# Patient Record
Sex: MALE | Race: WHITE | NOT HISPANIC OR LATINO | Employment: OTHER | URBAN - NONMETROPOLITAN AREA
[De-identification: names, ages, dates, MRNs, and addresses within clinical notes are randomized per-mention and may not be internally consistent; named-entity substitution may affect disease eponyms.]

---

## 2017-01-04 RX ORDER — DOXYCYCLINE 100 MG/1
100 CAPSULE ORAL 2 TIMES DAILY
Qty: 20 CAPSULE | Refills: 0 | Status: SHIPPED | OUTPATIENT
Start: 2017-01-04 | End: 2017-02-06

## 2017-02-06 ENCOUNTER — OFFICE VISIT (OUTPATIENT)
Dept: FAMILY MEDICINE CLINIC | Facility: CLINIC | Age: 52
End: 2017-02-06

## 2017-02-06 VITALS
OXYGEN SATURATION: 98 % | HEART RATE: 94 BPM | BODY MASS INDEX: 44.1 KG/M2 | TEMPERATURE: 98.2 F | HEIGHT: 71 IN | SYSTOLIC BLOOD PRESSURE: 180 MMHG | WEIGHT: 315 LBS | DIASTOLIC BLOOD PRESSURE: 90 MMHG

## 2017-02-06 DIAGNOSIS — Z99.89 OSA ON CPAP: ICD-10-CM

## 2017-02-06 DIAGNOSIS — E11.9 TYPE 2 DIABETES MELLITUS WITHOUT COMPLICATION, WITHOUT LONG-TERM CURRENT USE OF INSULIN (HCC): Primary | ICD-10-CM

## 2017-02-06 DIAGNOSIS — I10 ESSENTIAL HYPERTENSION: ICD-10-CM

## 2017-02-06 DIAGNOSIS — G47.33 OSA ON CPAP: ICD-10-CM

## 2017-02-06 DIAGNOSIS — E03.9 ACQUIRED HYPOTHYROIDISM: ICD-10-CM

## 2017-02-06 DIAGNOSIS — E78.00 HYPERCHOLESTEROLEMIA: ICD-10-CM

## 2017-02-06 PROCEDURE — 99214 OFFICE O/P EST MOD 30 MIN: CPT | Performed by: INTERNAL MEDICINE

## 2017-02-06 RX ORDER — HYDROCHLOROTHIAZIDE 12.5 MG/1
12.5 CAPSULE, GELATIN COATED ORAL DAILY
Qty: 30 CAPSULE | Refills: 11 | Status: SHIPPED | OUTPATIENT
Start: 2017-02-06 | End: 2017-11-14

## 2017-02-06 NOTE — PROGRESS NOTES
"Subjective   Patient ID: Cal Oliver Jr. is a 51 y.o. male Pt is here for management of multiple medical problems.  History of Present Illness  Pt is here for management of multiple medical problems.    BP high and just had an energy drink;/     Pt is under a lot of stress.         The following portions of the patient's history were reviewed and updated as appropriate: allergies, current medications, past family history, past medical history, past social history, past surgical history and problem list.      Review of Systems   Constitutional: Negative for fatigue.   All other systems reviewed and are negative.      Objective     Visit Vitals   • /90 (BP Location: Left arm, Patient Position: Sitting)   • Pulse 94   • Temp 98.2 °F (36.8 °C)   • Ht 71\" (180.3 cm)   • Wt (!) 370 lb (168 kg)   • SpO2 98%   • BMI 51.6 kg/m2     Physical Exam    Cal had a diabetic foot exam performed today.   During the foot exam he had a monofilament test performed.    General Appearance:    Alert, cooperative, no distress, appears stated age   Head:    Normocephalic, without obvious abnormality, atraumatic   Eyes:    PERRL, conjunctiva/corneas clear, EOM's intact           Ears:    Normal TM's and external ear canals, both ears   Nose:   Nares normal, septum midline, mucosa normal, no drainage    or sinus tenderness   Throat:   Lips, mucosa, and tongue normal; teeth and gums normal   Neck:   Supple, symmetrical, trachea midline, no adenopathy;        thyroid:  No enlargement/tenderness/nodules; no carotid    bruit or JVD   Back:     Symmetric, no curvature, ROM normal, no CVA tenderness   Lungs:     Slight prolonged expiatory bs.   to auscultation bilaterally, respirations unlabored   Chest wall:    No tenderness or deformity   Heart:    Regular rate and rhythm, S1 and S2 normal, no murmur, rub   or gallop   Abdomen:     Soft, non-tender, bowel sounds active all four quadrants,     no masses, no organomegaly         "   Extremities:   Extremities normal, atraumatic, no cyanosis, +1 edema   Pulses:   2+ and symmetric all extremities   Skin:   Skin color, texture, turgor normal, no rashes or lesions   Lymph nodes:   Cervical, supraclavicular, and axillary nodes normal   Neurologic:   CNII-XII intact. Normal strength, sensation and reflexes       throughout       Assessment/Plan     No labs done yet. Adjust bp meds. Pt to stop soda and improve diet and activity.         Cal was seen today for follow-up.    Diagnoses and all orders for this visit:    Type 2 diabetes mellitus without complication, without long-term current use of insulin    Essential hypertension    Hypercholesterolemia    SHUKRI on CPAP    Acquired hypothyroidism    Other orders  -     hydrochlorothiazide (MICROZIDE) 12.5 MG capsule; Take 1 capsule by mouth Daily.      Return in about 4 weeks (around 3/6/2017).                   There are no Patient Instructions on file for this visit.

## 2017-04-28 RX ORDER — LOSARTAN POTASSIUM 100 MG/1
TABLET ORAL
Qty: 90 TABLET | Refills: 3 | Status: SHIPPED | OUTPATIENT
Start: 2017-04-28 | End: 2017-11-14

## 2017-05-15 RX ORDER — POTASSIUM CHLORIDE 20 MEQ/1
TABLET, EXTENDED RELEASE ORAL
Qty: 90 TABLET | Refills: 3 | Status: SHIPPED | OUTPATIENT
Start: 2017-05-15 | End: 2018-08-06 | Stop reason: SDUPTHER

## 2017-05-16 RX ORDER — OMEPRAZOLE 20 MG/1
20 CAPSULE, DELAYED RELEASE ORAL DAILY
Qty: 30 CAPSULE | Refills: 0 | Status: SHIPPED | OUTPATIENT
Start: 2017-05-16 | End: 2017-06-26 | Stop reason: SDUPTHER

## 2017-06-05 RX ORDER — ALBUTEROL SULFATE 90 UG/1
AEROSOL, METERED RESPIRATORY (INHALATION)
Qty: 54 INHALER | Refills: 0 | Status: SHIPPED | OUTPATIENT
Start: 2017-06-05 | End: 2017-08-20 | Stop reason: SDUPTHER

## 2017-06-14 RX ORDER — CYCLOBENZAPRINE HCL 10 MG
TABLET ORAL
Qty: 30 TABLET | Refills: 11 | Status: SHIPPED | OUTPATIENT
Start: 2017-06-14 | End: 2018-03-19 | Stop reason: SDUPTHER

## 2017-06-26 RX ORDER — ROPINIROLE 2 MG/1
TABLET, FILM COATED ORAL
Qty: 30 TABLET | Refills: 11 | Status: SHIPPED | OUTPATIENT
Start: 2017-06-26 | End: 2018-06-13 | Stop reason: SDUPTHER

## 2017-06-26 RX ORDER — OMEPRAZOLE 20 MG/1
CAPSULE, DELAYED RELEASE ORAL
Qty: 30 CAPSULE | Refills: 0 | Status: SHIPPED | OUTPATIENT
Start: 2017-06-26 | End: 2017-07-31 | Stop reason: SDUPTHER

## 2017-06-26 RX ORDER — TOPIRAMATE 50 MG/1
TABLET, FILM COATED ORAL
Qty: 180 TABLET | Refills: 3 | Status: SHIPPED | OUTPATIENT
Start: 2017-06-26 | End: 2018-06-27 | Stop reason: SDUPTHER

## 2017-06-26 NOTE — TELEPHONE ENCOUNTER
Patient is requesting a refill on Ropinirole and topiramate.  He has no been seen since February and was suppose to follow up in 4 weeks.

## 2017-07-29 RX ORDER — OMEPRAZOLE 20 MG/1
CAPSULE, DELAYED RELEASE ORAL
Qty: 30 CAPSULE | Refills: 0 | Status: CANCELLED | OUTPATIENT
Start: 2017-07-29

## 2017-07-31 RX ORDER — OMEPRAZOLE 20 MG/1
20 CAPSULE, DELAYED RELEASE ORAL DAILY
Qty: 30 CAPSULE | Refills: 0 | Status: SHIPPED | OUTPATIENT
Start: 2017-07-31 | End: 2017-09-12 | Stop reason: SDUPTHER

## 2017-08-21 RX ORDER — ALBUTEROL SULFATE 90 UG/1
AEROSOL, METERED RESPIRATORY (INHALATION)
Qty: 54 INHALER | Refills: 0 | Status: SHIPPED | OUTPATIENT
Start: 2017-08-21 | End: 2017-09-21 | Stop reason: SDUPTHER

## 2017-09-12 NOTE — TELEPHONE ENCOUNTER
Cal Campos has not been seen since 02/2017, he was a no show in 03/2017, patient does not have any future appointments scheduled.

## 2017-09-13 RX ORDER — OMEPRAZOLE 20 MG/1
CAPSULE, DELAYED RELEASE ORAL
Qty: 30 CAPSULE | Refills: 0 | Status: SHIPPED | OUTPATIENT
Start: 2017-09-13 | End: 2017-11-01 | Stop reason: SDUPTHER

## 2017-09-22 RX ORDER — ALBUTEROL SULFATE 90 UG/1
AEROSOL, METERED RESPIRATORY (INHALATION)
Qty: 54 INHALER | Refills: 0 | Status: SHIPPED | OUTPATIENT
Start: 2017-09-22 | End: 2017-11-14 | Stop reason: SDUPTHER

## 2017-11-01 RX ORDER — OMEPRAZOLE 20 MG/1
CAPSULE, DELAYED RELEASE ORAL
Qty: 30 CAPSULE | Refills: 0 | Status: SHIPPED | OUTPATIENT
Start: 2017-11-01 | End: 2017-11-22 | Stop reason: SDUPTHER

## 2017-11-01 NOTE — TELEPHONE ENCOUNTER
I contacted the patient and told him that only a 30 day supply of medication would be called in and that he had to have a follow up appointment with labs.  He did agree to schedule an appointment.

## 2017-11-11 LAB
ALBUMIN SERPL-MCNC: 4 G/DL (ref 3.5–5)
ALBUMIN/GLOB SERPL: 1.3 G/DL (ref 1–2)
ALP SERPL-CCNC: 132 U/L (ref 38–126)
ALT SERPL-CCNC: 37 U/L (ref 13–69)
AST SERPL-CCNC: 18 U/L (ref 15–46)
BASOPHILS # BLD AUTO: 0.05 10*3/MM3 (ref 0–0.2)
BASOPHILS NFR BLD AUTO: 0.6 % (ref 0–2.5)
BILIRUB SERPL-MCNC: 0.2 MG/DL (ref 0.2–1.3)
BUN SERPL-MCNC: 12 MG/DL (ref 7–20)
BUN/CREAT SERPL: 13.3 (ref 6.3–21.9)
CALCIUM SERPL-MCNC: 9.8 MG/DL (ref 8.4–10.2)
CHLORIDE SERPL-SCNC: 105 MMOL/L (ref 98–107)
CHOLEST SERPL-MCNC: 186 MG/DL (ref 0–199)
CO2 SERPL-SCNC: 26 MMOL/L (ref 26–30)
CREAT SERPL-MCNC: 0.9 MG/DL (ref 0.6–1.3)
EOSINOPHIL # BLD AUTO: 0.19 10*3/MM3 (ref 0–0.7)
EOSINOPHIL NFR BLD AUTO: 2.2 % (ref 0–7)
ERYTHROCYTE [DISTWIDTH] IN BLOOD BY AUTOMATED COUNT: 13.6 % (ref 11.5–14.5)
GFR SERPLBLD CREATININE-BSD FMLA CKD-EPI: 108 ML/MIN/1.73
GFR SERPLBLD CREATININE-BSD FMLA CKD-EPI: 89 ML/MIN/1.73
GLOBULIN SER CALC-MCNC: 3 GM/DL
GLUCOSE SERPL-MCNC: 129 MG/DL (ref 74–98)
HBA1C MFR BLD: 6.3 %
HCT VFR BLD AUTO: 48 % (ref 42–52)
HDLC SERPL-MCNC: 37 MG/DL (ref 40–60)
HGB BLD-MCNC: 15.7 G/DL (ref 14–18)
IMM GRANULOCYTES # BLD: 0.03 10*3/MM3 (ref 0–0.06)
IMM GRANULOCYTES NFR BLD: 0.3 % (ref 0–0.6)
LDLC SERPL CALC-MCNC: 123 MG/DL (ref 0–99)
LYMPHOCYTES # BLD AUTO: 2.11 10*3/MM3 (ref 0.6–3.4)
LYMPHOCYTES NFR BLD AUTO: 24.6 % (ref 10–50)
MCH RBC QN AUTO: 29.5 PG (ref 27–31)
MCHC RBC AUTO-ENTMCNC: 32.7 G/DL (ref 30–37)
MCV RBC AUTO: 90.1 FL (ref 80–94)
MICROALBUMIN UR-MCNC: 62.3 UG/ML
MONOCYTES # BLD AUTO: 0.6 10*3/MM3 (ref 0–0.9)
MONOCYTES NFR BLD AUTO: 7 % (ref 0–12)
NEUTROPHILS # BLD AUTO: 5.61 10*3/MM3 (ref 2–6.9)
NEUTROPHILS NFR BLD AUTO: 65.3 % (ref 37–80)
NRBC BLD AUTO-RTO: 0 /100 WBC (ref 0–0)
PLATELET # BLD AUTO: 324 10*3/MM3 (ref 130–400)
POTASSIUM SERPL-SCNC: 3.9 MMOL/L (ref 3.5–5.1)
PROT SERPL-MCNC: 7 G/DL (ref 6.3–8.2)
RBC # BLD AUTO: 5.33 10*6/MM3 (ref 4.7–6.1)
SODIUM SERPL-SCNC: 145 MMOL/L (ref 137–145)
T4 FREE SERPL-MCNC: 0.95 NG/DL (ref 0.78–2.19)
TRIGL SERPL-MCNC: 131 MG/DL
TSH SERPL DL<=0.005 MIU/L-ACNC: 1.75 MIU/ML (ref 0.47–4.68)
VIT B12 SERPL-MCNC: 441 PG/ML (ref 239–931)
VLDLC SERPL CALC-MCNC: 26.2 MG/DL
WBC # BLD AUTO: 8.59 10*3/MM3 (ref 4.8–10.8)

## 2017-11-13 RX ORDER — ALBUTEROL SULFATE 90 UG/1
AEROSOL, METERED RESPIRATORY (INHALATION)
Qty: 54 INHALER | Refills: 0 | Status: CANCELLED | OUTPATIENT
Start: 2017-11-13

## 2017-11-14 ENCOUNTER — OFFICE VISIT (OUTPATIENT)
Dept: FAMILY MEDICINE CLINIC | Facility: CLINIC | Age: 52
End: 2017-11-14

## 2017-11-14 VITALS
WEIGHT: 315 LBS | HEIGHT: 71 IN | OXYGEN SATURATION: 98 % | RESPIRATION RATE: 16 BRPM | BODY MASS INDEX: 44.1 KG/M2 | HEART RATE: 92 BPM | TEMPERATURE: 98.5 F | SYSTOLIC BLOOD PRESSURE: 168 MMHG | DIASTOLIC BLOOD PRESSURE: 88 MMHG

## 2017-11-14 DIAGNOSIS — G47.33 OSA ON CPAP: ICD-10-CM

## 2017-11-14 DIAGNOSIS — E03.9 ACQUIRED HYPOTHYROIDISM: ICD-10-CM

## 2017-11-14 DIAGNOSIS — E66.01 MORBID OBESITY (HCC): ICD-10-CM

## 2017-11-14 DIAGNOSIS — E78.00 HYPERCHOLESTEROLEMIA: ICD-10-CM

## 2017-11-14 DIAGNOSIS — Z99.89 OSA ON CPAP: ICD-10-CM

## 2017-11-14 DIAGNOSIS — J44.9 MILD CHRONIC OBSTRUCTIVE PULMONARY DISEASE (HCC): ICD-10-CM

## 2017-11-14 DIAGNOSIS — E11.9 TYPE 2 DIABETES MELLITUS WITHOUT COMPLICATION, WITHOUT LONG-TERM CURRENT USE OF INSULIN (HCC): ICD-10-CM

## 2017-11-14 DIAGNOSIS — Z00.00 ROUTINE GENERAL MEDICAL EXAMINATION AT A HEALTH CARE FACILITY: ICD-10-CM

## 2017-11-14 DIAGNOSIS — I10 ESSENTIAL HYPERTENSION: Primary | ICD-10-CM

## 2017-11-14 PROCEDURE — 99214 OFFICE O/P EST MOD 30 MIN: CPT | Performed by: INTERNAL MEDICINE

## 2017-11-14 PROCEDURE — G0439 PPPS, SUBSEQ VISIT: HCPCS | Performed by: INTERNAL MEDICINE

## 2017-11-14 PROCEDURE — 96160 PT-FOCUSED HLTH RISK ASSMT: CPT | Performed by: INTERNAL MEDICINE

## 2017-11-14 RX ORDER — BUDESONIDE AND FORMOTEROL FUMARATE DIHYDRATE 160; 4.5 UG/1; UG/1
2 AEROSOL RESPIRATORY (INHALATION) 2 TIMES DAILY
Qty: 1 INHALER | Refills: 11 | Status: SHIPPED | OUTPATIENT
Start: 2017-11-14 | End: 2019-01-09 | Stop reason: SDUPTHER

## 2017-11-14 RX ORDER — LOSARTAN POTASSIUM AND HYDROCHLOROTHIAZIDE 12.5; 1 MG/1; MG/1
1 TABLET ORAL DAILY
Qty: 90 TABLET | Refills: 3 | Status: SHIPPED | OUTPATIENT
Start: 2017-11-14 | End: 2018-04-17 | Stop reason: SDUPTHER

## 2017-11-14 RX ORDER — ATORVASTATIN CALCIUM 10 MG/1
10 TABLET, FILM COATED ORAL DAILY
Qty: 90 TABLET | Refills: 3 | Status: SHIPPED | OUTPATIENT
Start: 2017-11-14 | End: 2018-03-06

## 2017-11-14 RX ORDER — ALBUTEROL SULFATE 90 UG/1
AEROSOL, METERED RESPIRATORY (INHALATION)
Qty: 54 INHALER | Refills: 2 | Status: SHIPPED | OUTPATIENT
Start: 2017-11-14 | End: 2018-08-03 | Stop reason: SDUPTHER

## 2017-11-14 NOTE — PROGRESS NOTES
Subjective     Patient ID: Cal Oliver Jr. is a 52 y.o. male. Patient is here for management of multiple medical problems.     Chief Complaint   Patient presents with   • Diabetes Mellitus     9 month follow-up   • medication refills     patient needs refills on Flexeril, Omeprazole, Amlodipine, Metformin and Ventolin inhaler sent to [facility]     Diabetes Mellitus   This is a chronic problem. The current episode started more than 1 year ago. The problem occurs constantly. The problem has been unchanged. Pertinent negatives include no chest pain, fatigue or vertigo. Nothing aggravates the symptoms. He has tried nothing for the symptoms.   Heartburn   He reports no chest pain. This is a chronic problem. The current episode started more than 1 year ago. The problem has been unchanged. Nothing aggravates the symptoms. Pertinent negatives include no fatigue. He has tried a PPI for the symptoms.        Pt under a lot of stress.     Out of Symbicort. On ventolin. Pt is around smokers.        The following portions of the patient's history were reviewed and updated as appropriate: allergies, current medications, past family history, past medical history, past social history, past surgical history and problem list.    Review of Systems   Constitutional: Negative for fatigue.   Cardiovascular: Negative for chest pain.   Neurological: Negative for vertigo.       Current Outpatient Prescriptions:   •  amLODIPine (NORVASC) 5 MG tablet, Take 1 tablet by mouth Daily., Disp: 90 tablet, Rfl: 3  •  colestipol (COLESTID) 1 G tablet, Take 1 tablet by mouth 2 (two) times a day. Adjust dose to achieve 1-3 BM's daily., Disp: 60 tablet, Rfl: 5  •  cyclobenzaprine (FLEXERIL) 10 MG tablet, take 1 tablet by mouth twice a day, Disp: 30 tablet, Rfl: 11  •  HYDROcodone-acetaminophen (NORCO)  MG per tablet, Take  by mouth., Disp: , Rfl:   •  metFORMIN (GLUCOPHAGE) 500 MG tablet, take 1 tablet by mouth twice a day, Disp: 60 tablet,  "Rfl: 0  •  omeprazole (priLOSEC) 20 MG capsule, take 1 capsule by mouth once daily, Disp: 30 capsule, Rfl: 0  •  potassium chloride (K-DUR,KLOR-CON) 20 MEQ CR tablet, take 1 tablet by mouth once daily, Disp: 90 tablet, Rfl: 3  •  rOPINIRole (REQUIP) 2 MG tablet, take 1 tablet by mouth at bedtime, Disp: 30 tablet, Rfl: 11  •  topiramate (TOPAMAX) 50 MG tablet, take 1 tablet by mouth twice a day, Disp: 180 tablet, Rfl: 3  •  VENTOLIN  (90 Base) MCG/ACT inhaler, inhale 1 to 2 puffs by mouth every 4 to 6 hours if needed, Disp: 54 inhaler, Rfl: 0  •  budesonide-formoterol (SYMBICORT) 160-4.5 MCG/ACT inhaler, Inhale 2 puffs 2 (Two) Times a Day., Disp: 1 inhaler, Rfl: 11  •  losartan-hydrochlorothiazide (HYZAAR) 100-12.5 MG per tablet, Take 1 tablet by mouth Daily., Disp: 90 tablet, Rfl: 3  •  ondansetron (ZOFRAN) 4 MG tablet, Take 1 tablet by mouth every 4 (four) hours as needed., Disp: , Rfl:     Objective      Blood pressure 168/88, pulse 92, temperature 98.5 °F (36.9 °C), temperature source Temporal Artery , resp. rate 16, height 71\" (180.3 cm), weight (!) 365 lb (166 kg), SpO2 98 %.    Physical Exam     General Appearance:    Alert, cooperative, no distress, appears stated age   Head:    Normocephalic, without obvious abnormality, atraumatic   Eyes:    PERRL, conjunctiva/corneas clear, EOM's intact   Ears:    Normal TM's and external ear canals, both ears   Nose:   Nares normal, septum midline, mucosa normal, no drainage   or sinus tenderness   Throat:   Lips, mucosa, and tongue normal; teeth and gums normal   Neck:   Supple, symmetrical, trachea midline, no adenopathy;        thyroid:  No enlargement/tenderness/nodules; no carotid    bruit or JVD   Back:     Symmetric, no curvature, ROM normal, no CVA tenderness   Lungs:     Clear to auscultation bilaterally, respirations unlabored   Chest wall:    No tenderness or deformity   Heart:    Regular rate and rhythm, S1 and S2 normal, no murmur,        rub or gallop "   Abdomen:     Soft, non-tender, bowel sounds active all four quadrants,     no masses, no organomegaly   Extremities:   Extremities normal, atraumatic, no cyanosis or edema   Pulses:   2+ and symmetric all extremities   Skin:   Skin color, texture, turgor normal, no rashes or lesions   Lymph nodes:   Cervical, supraclavicular, and axillary nodes normal   Neurologic:   CNII-XII intact. Normal strength, sensation and reflexes       throughout      Results for orders placed or performed in visit on 11/10/17   Comprehensive Metabolic Panel   Result Value Ref Range    Glucose 129 (H) 74 - 98 mg/dL    BUN 12 7 - 20 mg/dL    Creatinine 0.90 0.60 - 1.30 mg/dL    eGFR Non African Am 89 >60 mL/min/1.73    eGFR African Am 108 >60 mL/min/1.73    BUN/Creatinine Ratio 13.3 6.3 - 21.9    Sodium 145 137 - 145 mmol/L    Potassium 3.9 3.5 - 5.1 mmol/L    Chloride 105 98 - 107 mmol/L    Total CO2 26.0 26.0 - 30.0 mmol/L    Calcium 9.8 8.4 - 10.2 mg/dL    Total Protein 7.0 6.3 - 8.2 g/dL    Albumin 4.00 3.50 - 5.00 g/dL    Globulin 3.0 gm/dL    A/G Ratio 1.3 1.0 - 2.0 g/dL    Total Bilirubin 0.2 0.2 - 1.3 mg/dL    Alkaline Phosphatase 132 (H) 38 - 126 U/L    AST (SGOT) 18 15 - 46 U/L    ALT (SGPT) 37 13 - 69 U/L   Lipid Panel   Result Value Ref Range    Total Cholesterol 186 0 - 199 mg/dL    Triglycerides 131 <150 mg/dL    HDL Cholesterol 37 (L) 40 - 60 mg/dL    VLDL Cholesterol 26.2 mg/dL    LDL Cholesterol  123 (H) 0 - 99 mg/dL   Hemoglobin A1c   Result Value Ref Range    Hemoglobin A1C 6.30 %   T4, Free   Result Value Ref Range    Free T4 0.95 0.78 - 2.19 ng/dL   TSH   Result Value Ref Range    TSH 1.750 0.470 - 4.680 mIU/mL   MicroAlbumin, Urine, Random   Result Value Ref Range    Microalbumin, Urine 62.3 Not Estab. ug/mL   Vitamin B12   Result Value Ref Range    Vitamin B-12 441 239 - 931 pg/mL   CBC & Differential   Result Value Ref Range    WBC 8.59 4.80 - 10.80 10*3/mm3    RBC 5.33 4.70 - 6.10 10*6/mm3    Hemoglobin 15.7 14.0  - 18.0 g/dL    Hematocrit 48.0 42.0 - 52.0 %    MCV 90.1 80.0 - 94.0 fL    MCH 29.5 27.0 - 31.0 pg    MCHC 32.7 30.0 - 37.0 g/dL    RDW 13.6 11.5 - 14.5 %    Platelets 324 130 - 400 10*3/mm3    Neutrophil Rel % 65.3 37.0 - 80.0 %    Lymphocyte Rel % 24.6 10.0 - 50.0 %    Monocyte Rel % 7.0 0.0 - 12.0 %    Eosinophil Rel % 2.2 0.0 - 7.0 %    Basophil Rel % 0.6 0.0 - 2.5 %    Neutrophils Absolute 5.61 2.00 - 6.90 10*3/mm3    Lymphocytes Absolute 2.11 0.60 - 3.40 10*3/mm3    Monocytes Absolute 0.60 0.00 - 0.90 10*3/mm3    Eosinophils Absolute 0.19 0.00 - 0.70 10*3/mm3    Basophils Absolute 0.05 0.00 - 0.20 10*3/mm3    Immature Granulocyte Rel % 0.3 0.0 - 0.6 %    Immature Grans Absolute 0.03 0.00 - 0.06 10*3/mm3    nRBC 0.0 0.0 - 0.0 /100 WBC         Assessment/Plan   Pt not on hctz.    Will restart and put in combo.    Wt loss going well      Cal was seen today for diabetes mellitus and medication refills.    Diagnoses and all orders for this visit:    Essential hypertension  -     losartan-hydrochlorothiazide (HYZAAR) 100-12.5 MG per tablet; Take 1 tablet by mouth Daily.    Hypercholesterolemia    SHUKRI on CPAP  -     Ambulatory Referral to Neurology    Mild chronic obstructive pulmonary disease  -     budesonide-formoterol (SYMBICORT) 160-4.5 MCG/ACT inhaler; Inhale 2 puffs 2 (Two) Times a Day.    Type 2 diabetes mellitus without complication, without long-term current use of insulin    Acquired hypothyroidism    Morbid obesity    Routine general medical examination at a health care facility  -     Ambulatory Referral to General Surgery      Return in about 3 months (around 2/14/2018).          There are no Patient Instructions on file for this visit.     Woo Betancourt MD    Assessment/Plan

## 2017-11-14 NOTE — PROGRESS NOTES
QUICK REFERENCE INFORMATION:  The ABCs of the Annual Wellness Visit    Initial Medicare Wellness Visit    HEALTH RISK ASSESSMENT    1965    Recent Hospitalizations:  No hospitalization(s) within the last year..        Current Medical Providers:  Patient Care Team:  Woo Betancourt MD as PCP - General        Smoking Status:  History   Smoking Status   • Current Some Day Smoker   • Years: 20.00   • Types: Cigarettes   Smokeless Tobacco   • Never Used       Alcohol Consumption:  History   Alcohol Use No       Depression Screen:   No flowsheet data found.    Health Habits and Functional and Cognitive Screening:  No flowsheet data found.        Does the patient have evidence of cognitive impairment? No    Asiprin use counseling: Start ASA 81 mg daily       Recent Lab Results:    Visual Acuity:  No exam data present    Age-appropriate Screening Schedule:  Refer to the list below for future screening recommendations based on patient's age, sex and/or medical conditions. Orders for these recommended tests are listed in the plan section. The patient has been provided with a written plan.    Health Maintenance   Topic Date Due   • DIABETIC EYE EXAM  06/03/2016   • COLONOSCOPY  06/14/2017   • INFLUENZA VACCINE  08/01/2017   • DIABETIC FOOT EXAM  02/06/2018   • HEMOGLOBIN A1C  05/10/2018   • LIPID PANEL  11/10/2018   • URINE MICROALBUMIN  11/10/2018   • TDAP/TD VACCINES (2 - Td) 12/13/2022   • PNEUMOCOCCAL VACCINE (19-64 MEDIUM RISK)  Addressed        Subjective   History of Present Illness    Cal Oliver Jr. is a 52 y.o. male who presents for an Annual Wellness Visit.    The following portions of the patient's history were reviewed and updated as appropriate: allergies, current medications, past family history, past medical history, past social history, past surgical history and problem list.    Outpatient Medications Prior to Visit   Medication Sig Dispense Refill   • amLODIPine (NORVASC) 5 MG tablet Take 1 tablet  by mouth Daily. 90 tablet 3   • colestipol (COLESTID) 1 G tablet Take 1 tablet by mouth 2 (two) times a day. Adjust dose to achieve 1-3 BM's daily. 60 tablet 5   • cyclobenzaprine (FLEXERIL) 10 MG tablet take 1 tablet by mouth twice a day 30 tablet 11   • HYDROcodone-acetaminophen (NORCO)  MG per tablet Take  by mouth.     • metFORMIN (GLUCOPHAGE) 500 MG tablet take 1 tablet by mouth twice a day 60 tablet 0   • omeprazole (priLOSEC) 20 MG capsule take 1 capsule by mouth once daily 30 capsule 0   • potassium chloride (K-DUR,KLOR-CON) 20 MEQ CR tablet take 1 tablet by mouth once daily 90 tablet 3   • rOPINIRole (REQUIP) 2 MG tablet take 1 tablet by mouth at bedtime 30 tablet 11   • topiramate (TOPAMAX) 50 MG tablet take 1 tablet by mouth twice a day 180 tablet 3   • VENTOLIN  (90 Base) MCG/ACT inhaler inhale 1 to 2 puffs by mouth every 4 to 6 hours if needed 54 inhaler 0   • hydrochlorothiazide (MICROZIDE) 12.5 MG capsule Take 1 capsule by mouth Daily. 30 capsule 11   • losartan (COZAAR) 100 MG tablet take 1 tablet by mouth once daily 90 tablet 3   • ondansetron (ZOFRAN) 4 MG tablet Take 1 tablet by mouth every 4 (four) hours as needed.     • budesonide-formoterol (SYMBICORT) 160-4.5 MCG/ACT inhaler Inhale 2 puffs 2 (two) times a day. 1 inhaler 11     No facility-administered medications prior to visit.        Patient Active Problem List   Diagnosis   • Acute pharyngitis   • Acute sinusitis   • Asthma   • Mild chronic obstructive pulmonary disease   • Dental abscess   • Depression   • Diarrhea   • Gastroenteritis   • Gastroesophageal reflux disease   • Hypercholesterolemia   • Hypertension   • Hypokalemia   • Hypothyroidism   • Insomnia   • Muscle pain   • Nausea and vomiting   • SHUKRI on CPAP   • Restless legs syndrome   • Type 2 diabetes mellitus   • Vitamin D deficiency   • Abnormal liver function tests   • Chronic back pain   • Morbid obesity       Advance Care Planning:  power of  for  "healthcare on file, has NO advance directive - information provided to the patient today    Identification of Risk Factors:  Risk factors include: weight  and polypharmacy.    Review of Systems    Compared to one year ago, the patient feels his physical health is the same.  Compared to one year ago, the patient feels his mental health is the same.    Objective     Physical Exam    Vitals:    11/14/17 1022   BP: 168/88   BP Location: Left arm   Patient Position: Sitting   Cuff Size: Large Adult   Pulse: 92   Resp: 16   Temp: 98.5 °F (36.9 °C)   TempSrc: Temporal Artery    SpO2: 98%   Weight: (!) 365 lb (166 kg)   Height: 71\" (180.3 cm)       Body mass index is 50.91 kg/(m^2).  Discussed the patient's BMI with him. The BMI is above average; BMI management plan is completed.    Assessment/Plan   Patient Self-Management and Personalized Health Advice  The patient has been provided with information about: diet and exercise and preventive services including:   · Advance directive, Influenza vaccine.    Visit Diagnoses:    ICD-10-CM ICD-9-CM   1. Essential hypertension I10 401.9   2. Hypercholesterolemia E78.00 272.0   3. SHUKRI on CPAP G47.33 327.23    Z99.89 V46.8   4. Mild chronic obstructive pulmonary disease J44.9 496   5. Type 2 diabetes mellitus without complication, without long-term current use of insulin E11.9 250.00   6. Acquired hypothyroidism E03.9 244.9   7. Morbid obesity E66.01 278.01   8. Routine general medical examination at a health care facility Z00.00 V70.0       Orders Placed This Encounter   Procedures   • Ambulatory Referral to General Surgery     Referral Priority:   Routine     Referral Type:   Consultation     Referral Reason:   Specialty Services Required     Referred to Provider:   Kameron Peter MD     Requested Specialty:   General Surgery     Number of Visits Requested:   1   • Ambulatory Referral to Neurology     Referral Priority:   Routine     Referral Type:   Consultation     Referral " Reason:   Specialty Services Required     Referred to Provider:   Hector Berger MD, FAAN     Requested Specialty:   Neurology     Number of Visits Requested:   1       Outpatient Encounter Prescriptions as of 11/14/2017   Medication Sig Dispense Refill   • amLODIPine (NORVASC) 5 MG tablet Take 1 tablet by mouth Daily. 90 tablet 3   • colestipol (COLESTID) 1 G tablet Take 1 tablet by mouth 2 (two) times a day. Adjust dose to achieve 1-3 BM's daily. 60 tablet 5   • cyclobenzaprine (FLEXERIL) 10 MG tablet take 1 tablet by mouth twice a day 30 tablet 11   • HYDROcodone-acetaminophen (NORCO)  MG per tablet Take  by mouth.     • metFORMIN (GLUCOPHAGE) 500 MG tablet take 1 tablet by mouth twice a day 60 tablet 0   • omeprazole (priLOSEC) 20 MG capsule take 1 capsule by mouth once daily 30 capsule 0   • potassium chloride (K-DUR,KLOR-CON) 20 MEQ CR tablet take 1 tablet by mouth once daily 90 tablet 3   • rOPINIRole (REQUIP) 2 MG tablet take 1 tablet by mouth at bedtime 30 tablet 11   • topiramate (TOPAMAX) 50 MG tablet take 1 tablet by mouth twice a day 180 tablet 3   • VENTOLIN  (90 Base) MCG/ACT inhaler inhale 1 to 2 puffs by mouth every 4 to 6 hours if needed 54 inhaler 0   • [DISCONTINUED] hydrochlorothiazide (MICROZIDE) 12.5 MG capsule Take 1 capsule by mouth Daily. 30 capsule 11   • [DISCONTINUED] losartan (COZAAR) 100 MG tablet take 1 tablet by mouth once daily 90 tablet 3   • budesonide-formoterol (SYMBICORT) 160-4.5 MCG/ACT inhaler Inhale 2 puffs 2 (Two) Times a Day. 1 inhaler 11   • losartan-hydrochlorothiazide (HYZAAR) 100-12.5 MG per tablet Take 1 tablet by mouth Daily. 90 tablet 3   • ondansetron (ZOFRAN) 4 MG tablet Take 1 tablet by mouth every 4 (four) hours as needed.     • [DISCONTINUED] budesonide-formoterol (SYMBICORT) 160-4.5 MCG/ACT inhaler Inhale 2 puffs 2 (two) times a day. 1 inhaler 11     No facility-administered encounter medications on file as of 11/14/2017.        Reviewed use of  high risk medication in the elderly: yes  Reviewed for potential of harmful drug interactions in the elderly: yes    Follow Up:  Return in about 3 months (around 2/14/2018).     An After Visit Summary and PPPS with all of these plans were given to the patient.

## 2017-11-22 RX ORDER — OMEPRAZOLE 20 MG/1
CAPSULE, DELAYED RELEASE ORAL
Qty: 30 CAPSULE | Refills: 0 | Status: SHIPPED | OUTPATIENT
Start: 2017-11-22 | End: 2018-01-08 | Stop reason: SDUPTHER

## 2017-11-28 RX ORDER — AMLODIPINE BESYLATE 5 MG/1
TABLET ORAL
Qty: 90 TABLET | Refills: 3 | Status: SHIPPED | OUTPATIENT
Start: 2017-11-28 | End: 2018-10-17 | Stop reason: SDUPTHER

## 2017-11-29 ENCOUNTER — TELEPHONE (OUTPATIENT)
Dept: SURGERY | Facility: CLINIC | Age: 52
End: 2017-11-29

## 2017-12-05 ENCOUNTER — PREP FOR SURGERY (OUTPATIENT)
Dept: OTHER | Facility: HOSPITAL | Age: 52
End: 2017-12-05

## 2017-12-05 DIAGNOSIS — Z12.11 ENCOUNTER FOR SCREENING COLONOSCOPY: Primary | ICD-10-CM

## 2018-01-06 RX ORDER — OMEPRAZOLE 20 MG/1
CAPSULE, DELAYED RELEASE ORAL
Qty: 30 CAPSULE | Refills: 0 | Status: CANCELLED | OUTPATIENT
Start: 2018-01-06

## 2018-01-08 RX ORDER — OMEPRAZOLE 20 MG/1
20 CAPSULE, DELAYED RELEASE ORAL DAILY
Qty: 30 CAPSULE | Refills: 3 | Status: SHIPPED | OUTPATIENT
Start: 2018-01-08 | End: 2018-04-30 | Stop reason: SDUPTHER

## 2018-01-23 ENCOUNTER — TELEPHONE (OUTPATIENT)
Dept: SURGERY | Facility: CLINIC | Age: 53
End: 2018-01-23

## 2018-01-23 NOTE — TELEPHONE ENCOUNTER
Patient was scheduled for a colon tomorrow at [facility], patient wants to cancel at this time due to a family emergency.

## 2018-02-16 ENCOUNTER — OFFICE VISIT (OUTPATIENT)
Dept: INTERNAL MEDICINE | Facility: CLINIC | Age: 53
End: 2018-02-16

## 2018-02-16 VITALS
HEART RATE: 107 BPM | SYSTOLIC BLOOD PRESSURE: 188 MMHG | TEMPERATURE: 98.5 F | DIASTOLIC BLOOD PRESSURE: 120 MMHG | BODY MASS INDEX: 44.1 KG/M2 | HEIGHT: 71 IN | OXYGEN SATURATION: 98 % | WEIGHT: 315 LBS

## 2018-02-16 DIAGNOSIS — R00.2 PALPITATIONS: ICD-10-CM

## 2018-02-16 DIAGNOSIS — F41.1 ANXIOUS REACTION: ICD-10-CM

## 2018-02-16 DIAGNOSIS — I10 ESSENTIAL HYPERTENSION: Primary | ICD-10-CM

## 2018-02-16 DIAGNOSIS — F43.0 STRESS REACTION: ICD-10-CM

## 2018-02-16 PROCEDURE — 99214 OFFICE O/P EST MOD 30 MIN: CPT | Performed by: PHYSICIAN ASSISTANT

## 2018-02-16 PROCEDURE — 93000 ELECTROCARDIOGRAM COMPLETE: CPT | Performed by: PHYSICIAN ASSISTANT

## 2018-02-16 RX ORDER — METOPROLOL SUCCINATE 25 MG/1
25 TABLET, EXTENDED RELEASE ORAL DAILY
Qty: 30 TABLET | Refills: 5 | Status: SHIPPED | OUTPATIENT
Start: 2018-02-16 | End: 2018-08-15 | Stop reason: SDUPTHER

## 2018-02-16 RX ORDER — FLUOXETINE HYDROCHLORIDE 20 MG/1
20 CAPSULE ORAL DAILY
Qty: 30 CAPSULE | Refills: 2 | Status: SHIPPED | OUTPATIENT
Start: 2018-02-16 | End: 2018-03-06

## 2018-02-16 RX ORDER — BUSPIRONE HYDROCHLORIDE 10 MG/1
10 TABLET ORAL 3 TIMES DAILY PRN
Qty: 90 TABLET | Refills: 1 | Status: SHIPPED | OUTPATIENT
Start: 2018-02-16 | End: 2018-03-06

## 2018-02-16 NOTE — PROGRESS NOTES
Cal Oliver . is a 52 y.o. male.     Subjective   History of Present Illness   His 15 year old daughter was recently arrested for sending pornographic pictures of herself and since then he has been very stressed and anxious.  He has been having palpitations and some confusion but denies any chest pain.  Not sleeping well due to recent events.  Denies SI or HI.  He has felt his blood pressure staying elevated the last week. Taking blood amlodipine and losartan-HCTZ as directed though blood pressure is very elevated today. Patient denies chest pain, dyspnea, orthopnea, lower extremity edema, headaches, weakness or visual disturbances.           The following portions of the patient's history were reviewed and updated as appropriate: allergies, current medications, past family history, past medical history, past social history, past surgical history and problem list.    Review of Systems    Constitutional: Negative for appetite change, chills, fatigue, fever and unexpected weight change.   HENT: Negative for congestion, ear pain, hearing loss, nosebleeds, postnasal drip, rhinorrhea, sore throat, tinnitus and trouble swallowing.    Eyes: Negative for photophobia, discharge and visual disturbance.   Respiratory: Negative for cough, chest tightness, shortness of breath and wheezing.    Cardiovascular: palpitations.  Negative for chest pain and leg swelling.   Gastrointestinal: Negative for abdominal distention, abdominal pain, blood in stool, constipation, diarrhea, nausea and vomiting.   Endocrine: Negative for cold intolerance, heat intolerance, polydipsia, polyphagia and polyuria.   Musculoskeletal: Negative for arthralgias, back pain, joint swelling, myalgias, neck pain and neck stiffness.   Skin: Negative for color change, pallor, rash and wound.   Allergic/Immunologic: Negative for environmental allergies, food allergies and immunocompromised state.   Neurological: Negative for dizziness, tremors, seizures,  "weakness, numbness and headaches.   Hematological: Negative for adenopathy. Does not bruise/bleed easily.   Psychiatric/Behavioral: stressed, anxious, sleep disturbances, confusion.  Negative for agitation, behavioral problems, hallucinations, self-injury and suicidal ideas.       Objective    Physical Exam  Constitutional: Obese. Oriented to person, place, and time.   HENT:   Head: Normocephalic and atraumatic.   Eyes: EOM are normal. Pupils are equal, round, and reactive to light.   Neck: Normal range of motion. Neck supple.   Cardiovascular:  Tachycardic. Regular rhythm and normal heart sounds.    Pulmonary/Chest: Effort normal and breath sounds normal. No respiratory distress.  Has no wheezes or rales. Exhibits no chest wall tenderness.   Abdominal: Soft. Bowel sounds are normal. Exhibits no distension and no mass. There is no tenderness.   Musculoskeletal: Normal range of motion. Exhibits no tenderness.   Neurological: Alert and oriented to person, place, and time.   Skin: Skin is warm and dry.   Psychiatric: anxious, nearly crying when talking about his daughter. Judgment and thought content normal.       ECG 12 Lead  Date/Time: 2/16/2018 10:15 AM  Performed by: MAE PÉREZ  Authorized by: MAE PÉREZ   Comparison: compared with previous ECG from 12/23/2014  Similar to previous ECG  Rhythm: sinus rhythm  Rate: normal  BPM: 85  Conduction: conduction normal  ST Segments: ST segments normal  Other: no other findings  Clinical impression: normal ECG              BP (!) 188/120  Pulse 107  Temp 98.5 °F (36.9 °C)  Ht 180.3 cm (70.98\")  Wt (!) 173 kg (382 lb)  SpO2 98%  BMI 53.3 kg/m2    Nursing note and vitals reviewed.        Assessment/Plan   Cal was seen today for palpitations and anxiety.    Diagnoses and all orders for this visit:    Essential hypertension  -     Begin: metoprolol succinate XL (TOPROL-XL) 25 MG 24 hr tablet; Take 1 tablet by mouth Daily.  Continue amlodipine and " Losartan-HCTZ.     Palpitations  -     Begin: metoprolol succinate XL (TOPROL-XL) 25 MG 24 hr tablet; Take 1 tablet by mouth Daily.  -     ECG 12 Lead- normal  ED with any worsened symptoms.     Stress reaction  Anxious reaction  -     Begin prn: busPIRone (BUSPAR) 10 MG tablet; Take 1 tablet by mouth 3 (Three) Times a Day As Needed (anxiety).  -     Begin daily: FLUoxetine (PROZAC) 20 MG capsule; Take 1 capsule by mouth Daily.        Keep appointment with Dr. Betancourt for 3/6. Follow up sooner if needed.

## 2018-03-06 ENCOUNTER — OFFICE VISIT (OUTPATIENT)
Dept: INTERNAL MEDICINE | Facility: CLINIC | Age: 53
End: 2018-03-06

## 2018-03-06 VITALS
HEART RATE: 93 BPM | WEIGHT: 315 LBS | SYSTOLIC BLOOD PRESSURE: 174 MMHG | OXYGEN SATURATION: 97 % | HEIGHT: 71 IN | BODY MASS INDEX: 44.1 KG/M2 | RESPIRATION RATE: 16 BRPM | TEMPERATURE: 97.9 F | DIASTOLIC BLOOD PRESSURE: 92 MMHG

## 2018-03-06 DIAGNOSIS — E78.00 HYPERCHOLESTEROLEMIA: Primary | ICD-10-CM

## 2018-03-06 DIAGNOSIS — I10 ESSENTIAL HYPERTENSION: ICD-10-CM

## 2018-03-06 DIAGNOSIS — E55.9 VITAMIN D DEFICIENCY: ICD-10-CM

## 2018-03-06 DIAGNOSIS — G72.0 STATIN MYOPATHY: ICD-10-CM

## 2018-03-06 DIAGNOSIS — T46.6X5A STATIN MYOPATHY: ICD-10-CM

## 2018-03-06 DIAGNOSIS — E11.9 TYPE 2 DIABETES MELLITUS WITHOUT COMPLICATION, WITHOUT LONG-TERM CURRENT USE OF INSULIN (HCC): ICD-10-CM

## 2018-03-06 PROCEDURE — 99214 OFFICE O/P EST MOD 30 MIN: CPT | Performed by: INTERNAL MEDICINE

## 2018-03-06 RX ORDER — CLONIDINE HYDROCHLORIDE 0.1 MG/1
0.1 TABLET ORAL
Qty: 30 TABLET | Refills: 11 | Status: SHIPPED | OUTPATIENT
Start: 2018-03-06 | End: 2019-03-06 | Stop reason: SDUPTHER

## 2018-03-06 NOTE — PROGRESS NOTES
Subjective     Patient ID: Cal Oliver Jr. is a 52 y.o. male. Patient is here for management of multiple medical problems.     Chief Complaint   Patient presents with   • Hypertension     follow-up   • Diabetes Mellitus     follow-up   • medication issues     patient has questions regarding which medications he should be taking, he stopped the Buspar and the Prozac due to side effects     Hypertension   This is a new problem. The current episode started more than 1 year ago. Associated symptoms include anxiety. Pertinent negatives include no blurred vision, chest pain or headaches. There are no associated agents to hypertension. Risk factors for coronary artery disease include dyslipidemia, obesity and male gender. Past treatments include angiotensin blockers and diuretics. The current treatment provides no improvement. There are no compliance problems.  There is no history of angina, kidney disease or CAD/MI. There is no history of chronic renal disease.   Diabetes Mellitus   This is a chronic problem. The current episode started more than 1 year ago. Pertinent negatives include no chest pain or headaches.      Daughter in [facility]. Pt under stress.    Exwife in long-term.    Pt in recently Buspar started by PREET Ornelas. Pt had behavioral changes and had to leave work. Poor sleep.    Statin myopathy on lipitor and had to stop.            The following portions of the patient's history were reviewed and updated as appropriate: allergies, current medications, past family history, past medical history, past social history, past surgical history and problem list.    Review of Systems   Eyes: Negative for blurred vision.   Cardiovascular: Negative for chest pain.   Neurological: Negative for headaches.       Current Outpatient Prescriptions:   •  albuterol (VENTOLIN HFA) 108 (90 Base) MCG/ACT inhaler, Inhale 1 to 2 puffs every 4 to 6 hours as needed, Disp: 54 inhaler, Rfl: 2  •  amLODIPine (NORVASC) 5 MG tablet, take 1 tablet  "by mouth once daily, Disp: 90 tablet, Rfl: 3  •  budesonide-formoterol (SYMBICORT) 160-4.5 MCG/ACT inhaler, Inhale 2 puffs 2 (Two) Times a Day., Disp: 1 inhaler, Rfl: 11  •  colestipol (COLESTID) 1 G tablet, Take 1 tablet by mouth 2 (two) times a day. Adjust dose to achieve 1-3 BM's daily., Disp: 60 tablet, Rfl: 5  •  cyclobenzaprine (FLEXERIL) 10 MG tablet, take 1 tablet by mouth twice a day, Disp: 30 tablet, Rfl: 11  •  HYDROcodone-acetaminophen (NORCO)  MG per tablet, Take  by mouth., Disp: , Rfl:   •  losartan-hydrochlorothiazide (HYZAAR) 100-12.5 MG per tablet, Take 1 tablet by mouth Daily., Disp: 90 tablet, Rfl: 3  •  metFORMIN (GLUCOPHAGE) 500 MG tablet, take 1 tablet by mouth twice a day, Disp: 60 tablet, Rfl: 0  •  metoprolol succinate XL (TOPROL-XL) 25 MG 24 hr tablet, Take 1 tablet by mouth Daily., Disp: 30 tablet, Rfl: 5  •  omeprazole (priLOSEC) 20 MG capsule, Take 1 capsule by mouth Daily., Disp: 30 capsule, Rfl: 3  •  ondansetron (ZOFRAN) 4 MG tablet, Take 1 tablet by mouth every 4 (four) hours as needed., Disp: , Rfl:   •  potassium chloride (K-DUR,KLOR-CON) 20 MEQ CR tablet, take 1 tablet by mouth once daily, Disp: 90 tablet, Rfl: 3  •  rOPINIRole (REQUIP) 2 MG tablet, take 1 tablet by mouth at bedtime, Disp: 30 tablet, Rfl: 11  •  topiramate (TOPAMAX) 50 MG tablet, take 1 tablet by mouth twice a day, Disp: 180 tablet, Rfl: 3  •  CloNIDine (CATAPRES) 0.1 MG tablet, Take 1 tablet by mouth every night at bedtime., Disp: 30 tablet, Rfl: 11    Objective      Blood pressure 174/92, pulse 93, temperature 97.9 °F (36.6 °C), temperature source Oral, resp. rate 16, height 180.3 cm (71\"), weight (!) 173 kg (382 lb), SpO2 97 %.    Physical Exam     General Appearance:    Alert, cooperative, no distress, appears stated age   Head:    Normocephalic, without obvious abnormality, atraumatic   Eyes:    PERRL, conjunctiva/corneas clear, EOM's intact   Ears:    Normal TM's and external ear canals, both ears "   Nose:   Nares normal, septum midline, mucosa normal, no drainage   or sinus tenderness   Throat:   Lips, mucosa, and tongue normal; teeth and gums normal   Neck:   Supple, symmetrical, trachea midline, no adenopathy;        thyroid:  No enlargement/tenderness/nodules; no carotid    bruit or JVD   Back:     Symmetric, no curvature, ROM normal, no CVA tenderness   Lungs:     Clear to auscultation bilaterally, respirations unlabored   Chest wall:    No tenderness or deformity   Heart:    Regular rate and rhythm, S1 and S2 normal, no murmur,        rub or gallop   Abdomen:     Soft, non-tender, bowel sounds active all four quadrants,     no masses, no organomegaly   Extremities:   Extremities normal, atraumatic, no cyanosis or edema   Pulses:   2+ and symmetric all extremities   Skin:   Skin color, texture, turgor normal, no rashes or lesions   Lymph nodes:   Cervical, supraclavicular, and axillary nodes normal   Neurologic:   CNII-XII intact. Normal strength, sensation and reflexes       throughout      Results for orders placed or performed in visit on 11/10/17   Comprehensive Metabolic Panel   Result Value Ref Range    Glucose 129 (H) 74 - 98 mg/dL    BUN 12 7 - 20 mg/dL    Creatinine 0.90 0.60 - 1.30 mg/dL    eGFR Non African Am 89 >60 mL/min/1.73    eGFR African Am 108 >60 mL/min/1.73    BUN/Creatinine Ratio 13.3 6.3 - 21.9    Sodium 145 137 - 145 mmol/L    Potassium 3.9 3.5 - 5.1 mmol/L    Chloride 105 98 - 107 mmol/L    Total CO2 26.0 26.0 - 30.0 mmol/L    Calcium 9.8 8.4 - 10.2 mg/dL    Total Protein 7.0 6.3 - 8.2 g/dL    Albumin 4.00 3.50 - 5.00 g/dL    Globulin 3.0 gm/dL    A/G Ratio 1.3 1.0 - 2.0 g/dL    Total Bilirubin 0.2 0.2 - 1.3 mg/dL    Alkaline Phosphatase 132 (H) 38 - 126 U/L    AST (SGOT) 18 15 - 46 U/L    ALT (SGPT) 37 13 - 69 U/L   Lipid Panel   Result Value Ref Range    Total Cholesterol 186 0 - 199 mg/dL    Triglycerides 131 <150 mg/dL    HDL Cholesterol 37 (L) 40 - 60 mg/dL    VLDL Cholesterol  26.2 mg/dL    LDL Cholesterol  123 (H) 0 - 99 mg/dL   Hemoglobin A1c   Result Value Ref Range    Hemoglobin A1C 6.30 %   T4, Free   Result Value Ref Range    Free T4 0.95 0.78 - 2.19 ng/dL   TSH   Result Value Ref Range    TSH 1.750 0.470 - 4.680 mIU/mL   MicroAlbumin, Urine, Random   Result Value Ref Range    Microalbumin, Urine 62.3 Not Estab. ug/mL   Vitamin B12   Result Value Ref Range    Vitamin B-12 441 239 - 931 pg/mL   CBC & Differential   Result Value Ref Range    WBC 8.59 4.80 - 10.80 10*3/mm3    RBC 5.33 4.70 - 6.10 10*6/mm3    Hemoglobin 15.7 14.0 - 18.0 g/dL    Hematocrit 48.0 42.0 - 52.0 %    MCV 90.1 80.0 - 94.0 fL    MCH 29.5 27.0 - 31.0 pg    MCHC 32.7 30.0 - 37.0 g/dL    RDW 13.6 11.5 - 14.5 %    Platelets 324 130 - 400 10*3/mm3    Neutrophil Rel % 65.3 37.0 - 80.0 %    Lymphocyte Rel % 24.6 10.0 - 50.0 %    Monocyte Rel % 7.0 0.0 - 12.0 %    Eosinophil Rel % 2.2 0.0 - 7.0 %    Basophil Rel % 0.6 0.0 - 2.5 %    Neutrophils Absolute 5.61 2.00 - 6.90 10*3/mm3    Lymphocytes Absolute 2.11 0.60 - 3.40 10*3/mm3    Monocytes Absolute 0.60 0.00 - 0.90 10*3/mm3    Eosinophils Absolute 0.19 0.00 - 0.70 10*3/mm3    Basophils Absolute 0.05 0.00 - 0.20 10*3/mm3    Immature Granulocyte Rel % 0.3 0.0 - 0.6 %    Immature Grans Absolute 0.03 0.00 - 0.06 10*3/mm3    nRBC 0.0 0.0 - 0.0 /100 WBC         Assessment/Plan   cbt discussed.   Pt didn't do well with meds.  D/c lipitor for statin myopathy.    Pt will start clonidine for sleep at night.    Pt will see Dr Berger in am.        Cal was seen today for hypertension, diabetes mellitus and medication issues.    Diagnoses and all orders for this visit:    Hypercholesterolemia  -     Hemoglobin A1c  -     T4, Free  -     TSH  -     Vitamin B12  -     Lipid Panel  -     CBC & Differential  -     Comprehensive Metabolic Panel    Essential hypertension  -     Hemoglobin A1c  -     T4, Free  -     TSH  -     Vitamin B12  -     Lipid Panel  -     CBC & Differential  -      Comprehensive Metabolic Panel    Type 2 diabetes mellitus without complication, without long-term current use of insulin  -     Hemoglobin A1c  -     T4, Free  -     TSH  -     Vitamin B12  -     Lipid Panel  -     CBC & Differential  -     Comprehensive Metabolic Panel    Vitamin D deficiency  -     Hemoglobin A1c  -     T4, Free  -     TSH  -     Vitamin B12  -     Lipid Panel  -     CBC & Differential  -     Comprehensive Metabolic Panel    Statin myopathy  -     Hemoglobin A1c  -     T4, Free  -     TSH  -     Vitamin B12  -     Lipid Panel  -     CBC & Differential  -     Comprehensive Metabolic Panel    Other orders  -     CloNIDine (CATAPRES) 0.1 MG tablet; Take 1 tablet by mouth every night at bedtime.      Return in about 6 weeks (around 4/17/2018).          There are no Patient Instructions on file for this visit.     Woo Betancourt MD    Assessment/Plan

## 2018-03-07 ENCOUNTER — OFFICE VISIT (OUTPATIENT)
Dept: NEUROLOGY | Facility: CLINIC | Age: 53
End: 2018-03-07

## 2018-03-07 VITALS
DIASTOLIC BLOOD PRESSURE: 102 MMHG | WEIGHT: 315 LBS | BODY MASS INDEX: 44.1 KG/M2 | SYSTOLIC BLOOD PRESSURE: 188 MMHG | HEIGHT: 71 IN | HEART RATE: 103 BPM | OXYGEN SATURATION: 98 %

## 2018-03-07 DIAGNOSIS — G47.19 EXCESSIVE DAYTIME SLEEPINESS: ICD-10-CM

## 2018-03-07 DIAGNOSIS — G47.33 OBSTRUCTIVE SLEEP APNEA: Primary | ICD-10-CM

## 2018-03-07 DIAGNOSIS — G47.34 NOCTURNAL OXYGEN DESATURATION: ICD-10-CM

## 2018-03-07 DIAGNOSIS — G47.61 PERIODIC LIMB MOVEMENT DISORDER (PLMD): ICD-10-CM

## 2018-03-07 DIAGNOSIS — G25.81 RESTLESS LEGS SYNDROME (RLS): ICD-10-CM

## 2018-03-07 PROCEDURE — 99204 OFFICE O/P NEW MOD 45 MIN: CPT | Performed by: PSYCHIATRY & NEUROLOGY

## 2018-03-07 NOTE — PROGRESS NOTES
[facility] NEUROLOGY [place] CONSULTATION   History of Present Illness     Date: 3/7/2018    Patient Identification  Cal Oliver Jr. is a 52 y.o. male.    Patient information was obtained from patient.  History/Exam limitations: none.    CONSULTATION requested by: Woo Betancourt MD      Chief Complaint   Sleep Apnea (Patient states he has been using a CPAP for years. )      History of Present Illness   This is a 52-year-old male comes in today for evaluation of sleep apnea.  He states that he continues to have sleep disturbances.  He states that he is not sleeping through the night.  He continues to wake up in the morning with headaches and feelings of restlessness.  He states that he continues to snore through his CPAP.  He denies any leg edema.  He says that he was losing weight due to his home situation is getting that weight back.    PMH:   Past Medical History:   Diagnosis Date   • Chronic back pain    • Diabetes mellitus    • High blood pressure    • Hyperlipidemia    • Nausea and vomiting    • Sleep apnea        Past Surgical History:   Past Surgical History:   Procedure Laterality Date   • BACK SURGERY     • GALLBLADDER SURGERY     • KNEE SURGERY         Family Hisotry:   Family History   Problem Relation Age of Onset   • Other Other      HIGH BLOOD PRESSURE   • Hypertension Other    • Heart disease Mother    • Heart attack Mother    • Heart disease Father        Social History:   Social History     Social History   • Marital status:      Spouse name: N/A   • Number of children: N/A   • Years of education: N/A     Occupational History   • Not on file.     Social History Main Topics   • Smoking status: Current Some Day Smoker     Years: 20.00     Types: Cigarettes   • Smokeless tobacco: Never Used   • Alcohol use No   • Drug use: No   • Sexual activity: Not on file     Other Topics Concern   • Not on file     Social History Narrative       Medications:   Current Outpatient Prescriptions    Medication Sig Dispense Refill   • albuterol (VENTOLIN HFA) 108 (90 Base) MCG/ACT inhaler Inhale 1 to 2 puffs every 4 to 6 hours as needed 54 inhaler 2   • amLODIPine (NORVASC) 5 MG tablet take 1 tablet by mouth once daily 90 tablet 3   • budesonide-formoterol (SYMBICORT) 160-4.5 MCG/ACT inhaler Inhale 2 puffs 2 (Two) Times a Day. 1 inhaler 11   • CloNIDine (CATAPRES) 0.1 MG tablet Take 1 tablet by mouth every night at bedtime. 30 tablet 11   • colestipol (COLESTID) 1 G tablet Take 1 tablet by mouth 2 (two) times a day. Adjust dose to achieve 1-3 BM's daily. 60 tablet 5   • cyclobenzaprine (FLEXERIL) 10 MG tablet take 1 tablet by mouth twice a day 30 tablet 11   • HYDROcodone-acetaminophen (NORCO)  MG per tablet Take  by mouth.     • losartan-hydrochlorothiazide (HYZAAR) 100-12.5 MG per tablet Take 1 tablet by mouth Daily. 90 tablet 3   • metFORMIN (GLUCOPHAGE) 500 MG tablet take 1 tablet by mouth twice a day 60 tablet 0   • metoprolol succinate XL (TOPROL-XL) 25 MG 24 hr tablet Take 1 tablet by mouth Daily. 30 tablet 5   • omeprazole (priLOSEC) 20 MG capsule Take 1 capsule by mouth Daily. 30 capsule 3   • ondansetron (ZOFRAN) 4 MG tablet Take 1 tablet by mouth every 4 (four) hours as needed.     • potassium chloride (K-DUR,KLOR-CON) 20 MEQ CR tablet take 1 tablet by mouth once daily 90 tablet 3   • rOPINIRole (REQUIP) 2 MG tablet take 1 tablet by mouth at bedtime 30 tablet 11   • topiramate (TOPAMAX) 50 MG tablet take 1 tablet by mouth twice a day 180 tablet 3     No current facility-administered medications for this visit.        Allergy:   Allergies   Allergen Reactions   • Levothyroxine Sodium Swelling   • Lipitor [Atorvastatin] Myalgia   • Piroxicam Rash       Review of Systems:  Review of Systems   Constitutional: Positive for fatigue and unexpected weight change. Negative for chills and fever.   HENT: Negative for congestion, ear pain, hearing loss, rhinorrhea and sore throat.    Eyes: Negative for  "pain, discharge and redness.   Respiratory: Positive for apnea. Negative for cough, shortness of breath, wheezing and stridor.    Cardiovascular: Negative for chest pain, palpitations and leg swelling.   Gastrointestinal: Negative for abdominal pain, constipation, nausea and vomiting.   Endocrine: Negative for cold intolerance, heat intolerance and polyphagia.   Genitourinary: Negative for dysuria, flank pain, frequency and urgency.   Musculoskeletal: Negative for joint swelling, myalgias, neck pain and neck stiffness.   Skin: Negative for pallor, rash and wound.   Allergic/Immunologic: Negative for environmental allergies.   Neurological: Negative for dizziness, tremors, seizures, syncope, facial asymmetry, speech difficulty, weakness, light-headedness, numbness and headaches.   Hematological: Negative for adenopathy.   Psychiatric/Behavioral: Positive for sleep disturbance. Negative for confusion and hallucinations. The patient is not nervous/anxious.        Physical Exam     Vitals:    03/07/18 1048   BP: (!) 188/102   Pulse: 103   SpO2: 98%   Weight: (!) 173 kg (382 lb)   Height: 180.3 cm (70.98\")     GENERAL: Patient is pleasant, cooperative, appears to be stated age.  Body habitus is endomorphic.  SKIN AND EXTREMITIES:  No skin rashes or lesions are noted.  No cyanosis, clubbing or edema of the extremities.    HEAD:  Head is normocephalic and atraumatic.    NECK: Neck are non-tender without thyromegaly or adenopathy.  Carotic upstrokes are 1+/4.  No cranial or cervical bruits.  The neck is supple with a full range of motion.   ENT: palate elevate symmetrically, no evidence of high arch palate, tongue midline erythema in posterior pharynx, Mallampati Classification Class III   CARDIOVASCULAR:  Regular rate and rhythm with normal S1 and S2 without rub or gallop.  RESPIRATORY:  Clear to auscultation without wheezes or crackle   ABDOMEN:  Soft and non-tender, positive bowel sound without hepatosplenomegaly  BACK:  " Back is straight without midline defect.    PSYCH:  Higher cortical function/mental status:  The patient is alert.  She is oriented x3 to time, place and person.  Recent and the remote memory appear normal.  The patient has a good fund of knowledge.  There is no visual or auditory hallucination or suicidal or homicidal ideation.  SPEECH:There is no gross evidence of aphasia, dysarthria or agnosia.      CRANIAL NERVES:  Pupils are 4mm, equal round reactive to light, reacting briskly to 2mm without afferent pupillary defect.  Visual fields are intact to confrontation testing.  Fundoscopic examination reveals sharp disk margins with normal vasculature.  No papilledema, hemorrhages or exudates.  Extraocular movements are full and smooth with normal pursuits and saccades.  No nystagmus noted.  The face is symmetric. palate elevate symmetrically, Tongue midline, positive gag reflex. The remainder of the cranial nerves are intact and symmetrical.    MOTOR: Strength is 5/5 throughout with normal tone and bulk with the following exceptions, 4/5 intrinsic muscles of the hands and feet.  No involuntary movements noted.    Deep Tendon Reflexes: are 2/4 and symmetrical in the upper extremities, 2/4 and symmetrical at the knees and 1/4 and symmetrical at the Achilles tendon.  Plantar responses were down-going bilaterally.    SENSATION:  Intact to pinprick, light touch, vibration and proprioception.  Coordination:  The patient normally performs finger-nose-finger, heel-to-knee-to-shin and rapid alternating movements in symmetrical fashion.    COORDINATION AND GAIT:  The patient walks with a narrow-based gait.  Patient is able to heel-toe and tandem walk forward and backwards without difficulty.  Romberg and monopedal  Romberg are negative.    MUSCULOSKELETAL: Range of motion normal, no clubbing, cyanosis, or edema.  No joint swelling.            Records Reviewed: I have personally reviewed his previous medical record.    Cal  was seen today for sleep apnea.    Diagnoses and all orders for this visit:    Obstructive sleep apnea  -     Polysomnography 4 or More Parameters With CPAP; Future    Excessive daytime sleepiness  -     Polysomnography 4 or More Parameters With CPAP; Future    Nocturnal oxygen desaturation  -     Polysomnography 4 or More Parameters With CPAP; Future    Periodic limb movement disorder (PLMD)  -     Polysomnography 4 or More Parameters With CPAP; Future    Restless legs syndrome (RLS)  -     Polysomnography 4 or More Parameters With CPAP; Future      Treatments:  1.  Counseled the patient extensively on obstructive sleep apnea as follows  I have counseled patient extensively on Sleep Hygiene including regular sleep wake schedule and stimulus control therapy.  I have also discussed the importance of weight reduction because 10% reduction in body weight can reduce sleep apnea by 50 %. We have also discussed abstaining from smoking and drinking.  I have explained to patient that obstructive apnea episode is defined as the absence of airflow for at least 10 seconds.  Sleep apnea is usually accompanied by snoring, disturbed sleep, and daytime sleepiness. Patients with micrognathia, retrognathia, enlarged tonsils, tongue enlargement, and acromegaly are especially predisposed to obstructive sleep apnea. Abnormalities or weakness in the muscles can also contribute to obstructive sleep apnea. Obesity can also contribute to sleep apnea.     Sleep apnea can lead to a number of complications, ranging from daytime sleepiness to possible increased risk of cardiovascular risks.   Daytime sleepiness is the most serious.  Daytime sleepiness can also increase the risk for accident-related injuries. Several studies have suggested that people with sleep apnea have two to three times as many car accidents, and five to seven times the risk for multiple accidents.   A number of cardiovascular diseases -- including high blood pressure,  heart failure, stroke, and heart arrhythmias -- have an association with obstructive sleep apnea.   Up to a third of patients with heart failure also have sleep apnea. Both central and obstructive sleep apnea are linked with heart failure. Obstructive sleep apnea is also noted to be associated with type 2 diabetes according to Dr. Benito at [facility].  The best treatment for symptomatic obstructive sleep apnea is continuous positive airflow pressure (CPAP). Bilevel positive airway pressure (BPAP) systems may be particularly helpful for patients with coexisting lung disease and those with excessive levels of carbon dioxide.  Other treatment options including UPPP surgery, LAUP surgery, radiofrequency somnoplasty and dental appliances such [place] or [place].  2.  Ordered the patient BiPAP    This Document is signed by Hector Berger MD, FAAN, FAASM March 7, 20184:54 PM

## 2018-03-15 ENCOUNTER — HOSPITAL ENCOUNTER (OUTPATIENT)
Dept: SLEEP MEDICINE | Facility: HOSPITAL | Age: 53
Setting detail: THERAPIES SERIES
End: 2018-03-15
Attending: PSYCHIATRY & NEUROLOGY

## 2018-03-15 DIAGNOSIS — G47.34 NOCTURNAL OXYGEN DESATURATION: ICD-10-CM

## 2018-03-15 DIAGNOSIS — G47.33 OBSTRUCTIVE SLEEP APNEA: ICD-10-CM

## 2018-03-15 DIAGNOSIS — G25.81 RESTLESS LEGS SYNDROME (RLS): ICD-10-CM

## 2018-03-15 DIAGNOSIS — G47.19 EXCESSIVE DAYTIME SLEEPINESS: ICD-10-CM

## 2018-03-15 DIAGNOSIS — G47.61 PERIODIC LIMB MOVEMENT DISORDER (PLMD): ICD-10-CM

## 2018-03-15 PROCEDURE — 95811 POLYSOM 6/>YRS CPAP 4/> PARM: CPT

## 2018-03-15 PROCEDURE — 95811 POLYSOM 6/>YRS CPAP 4/> PARM: CPT | Performed by: PSYCHIATRY & NEUROLOGY

## 2018-03-19 RX ORDER — CYCLOBENZAPRINE HCL 10 MG
TABLET ORAL
Qty: 30 TABLET | Refills: 11 | Status: SHIPPED | OUTPATIENT
Start: 2018-03-19 | End: 2018-10-16 | Stop reason: SDUPTHER

## 2018-04-12 ENCOUNTER — APPOINTMENT (OUTPATIENT)
Dept: SLEEP MEDICINE | Facility: HOSPITAL | Age: 53
End: 2018-04-12
Attending: PSYCHIATRY & NEUROLOGY

## 2018-04-16 NOTE — TELEPHONE ENCOUNTER
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

## 2018-04-17 DIAGNOSIS — I10 ESSENTIAL HYPERTENSION: ICD-10-CM

## 2018-04-17 RX ORDER — LOSARTAN POTASSIUM AND HYDROCHLOROTHIAZIDE 12.5; 1 MG/1; MG/1
1 TABLET ORAL DAILY
Qty: 90 TABLET | Refills: 3 | Status: SHIPPED | OUTPATIENT
Start: 2018-04-17 | End: 2018-09-05

## 2018-04-17 RX ORDER — LOSARTAN POTASSIUM 100 MG/1
TABLET ORAL
Qty: 90 TABLET | Refills: 3 | OUTPATIENT
Start: 2018-04-17

## 2018-04-18 ENCOUNTER — OFFICE VISIT (OUTPATIENT)
Dept: NEUROLOGY | Facility: CLINIC | Age: 53
End: 2018-04-18

## 2018-04-18 VITALS
OXYGEN SATURATION: 98 % | DIASTOLIC BLOOD PRESSURE: 90 MMHG | WEIGHT: 315 LBS | HEART RATE: 84 BPM | HEIGHT: 71 IN | SYSTOLIC BLOOD PRESSURE: 152 MMHG | BODY MASS INDEX: 44.1 KG/M2

## 2018-04-18 DIAGNOSIS — G47.34 NOCTURNAL OXYGEN DESATURATION: ICD-10-CM

## 2018-04-18 DIAGNOSIS — G47.19 EXCESSIVE DAYTIME SLEEPINESS: ICD-10-CM

## 2018-04-18 DIAGNOSIS — G47.33 OBSTRUCTIVE SLEEP APNEA: Primary | ICD-10-CM

## 2018-04-18 PROCEDURE — 99214 OFFICE O/P EST MOD 30 MIN: CPT | Performed by: PSYCHIATRY & NEUROLOGY

## 2018-04-18 NOTE — PROGRESS NOTES
[facility] NEUROLOGY [place] PROGRESS NOTE  History of Present Illness     Date: 4/18/2018    Patient Identification  Cal Oliver Jr. is a 52 y.o. male.    Patient information was obtained from patient.  History/Exam limitations: none.    Original consultation requested by: Woo Betancourt MD    Chief Complaint   Sleep Apnea (Pt in office today to review results of sleep study )      History of Present Illness   Patient is a pleasant 52-year-old referred to [facility] for evaluation of sleep apnea.  Patient was placed on nasal BIPAP at 18/13 cm water pressure up to failing nasal CPAP.  Patient reported that it was the best night she has slept for many years patient spent 154.3 minutes out of 162.3 minute of time in bed.  Patient spent 26.5 minute and REM sleep.  His AHI reduced to 2.3 events per hour and the lowest oxygen saturation was 89% and such pressure.      PMH:   Past Medical History:   Diagnosis Date   • Chronic back pain    • Diabetes mellitus    • High blood pressure    • Hyperlipidemia    • Nausea and vomiting    • Sleep apnea        Past Surgical History:   Past Surgical History:   Procedure Laterality Date   • BACK SURGERY     • GALLBLADDER SURGERY     • KNEE SURGERY         Family Hisotry:   Family History   Problem Relation Age of Onset   • Other Other      HIGH BLOOD PRESSURE   • Hypertension Other    • Heart disease Mother    • Heart attack Mother    • Heart disease Father        Social History:   Social History     Social History   • Marital status:      Spouse name: N/A   • Number of children: N/A   • Years of education: N/A     Occupational History   • Not on file.     Social History Main Topics   • Smoking status: Current Some Day Smoker     Years: 20.00     Types: Cigarettes   • Smokeless tobacco: Never Used   • Alcohol use No   • Drug use: No   • Sexual activity: Not on file     Other Topics Concern   • Not on file     Social History Narrative   • No  narrative on file       Medications:   Current Outpatient Prescriptions   Medication Sig Dispense Refill   • albuterol (VENTOLIN HFA) 108 (90 Base) MCG/ACT inhaler Inhale 1 to 2 puffs every 4 to 6 hours as needed 54 inhaler 2   • amLODIPine (NORVASC) 5 MG tablet take 1 tablet by mouth once daily 90 tablet 3   • budesonide-formoterol (SYMBICORT) 160-4.5 MCG/ACT inhaler Inhale 2 puffs 2 (Two) Times a Day. 1 inhaler 11   • CloNIDine (CATAPRES) 0.1 MG tablet Take 1 tablet by mouth every night at bedtime. 30 tablet 11   • colestipol (COLESTID) 1 G tablet Take 1 tablet by mouth 2 (two) times a day. Adjust dose to achieve 1-3 BM's daily. 60 tablet 5   • cyclobenzaprine (FLEXERIL) 10 MG tablet take 1 tablet by mouth twice a day 30 tablet 11   • HYDROcodone-acetaminophen (NORCO)  MG per tablet Take  by mouth.     • losartan-hydrochlorothiazide (HYZAAR) 100-12.5 MG per tablet Take 1 tablet by mouth Daily. 90 tablet 3   • metFORMIN (GLUCOPHAGE) 500 MG tablet Take 1 tablet by mouth 2 (Two) Times a Day. 60 tablet 5   • metoprolol succinate XL (TOPROL-XL) 25 MG 24 hr tablet Take 1 tablet by mouth Daily. 30 tablet 5   • omeprazole (priLOSEC) 20 MG capsule Take 1 capsule by mouth Daily. 30 capsule 3   • ondansetron (ZOFRAN) 4 MG tablet Take 1 tablet by mouth every 4 (four) hours as needed.     • potassium chloride (K-DUR,KLOR-CON) 20 MEQ CR tablet take 1 tablet by mouth once daily 90 tablet 3   • rOPINIRole (REQUIP) 2 MG tablet take 1 tablet by mouth at bedtime 30 tablet 11   • topiramate (TOPAMAX) 50 MG tablet take 1 tablet by mouth twice a day 180 tablet 3     No current facility-administered medications for this visit.        Allergy:   Allergies   Allergen Reactions   • Levothyroxine Sodium Swelling   • Lipitor [Atorvastatin] Myalgia   • Piroxicam Rash       Review of Systems:  Review of Systems   Constitutional: Positive for fatigue. Negative for chills and fever.   HENT: Negative for congestion, ear pain, hearing loss,  "rhinorrhea and sore throat.    Eyes: Negative for pain, discharge and redness.   Respiratory: Positive for apnea. Negative for cough, shortness of breath, wheezing and stridor.    Cardiovascular: Negative for chest pain, palpitations and leg swelling.   Gastrointestinal: Negative for abdominal pain, constipation, nausea and vomiting.   Endocrine: Negative for cold intolerance, heat intolerance and polyphagia.   Genitourinary: Negative for dysuria, flank pain, frequency and urgency.   Musculoskeletal: Negative for joint swelling, myalgias, neck pain and neck stiffness.   Skin: Negative for pallor, rash and wound.   Allergic/Immunologic: Negative for environmental allergies.   Neurological: Negative for dizziness, tremors, seizures, syncope, facial asymmetry, speech difficulty, weakness, light-headedness, numbness and headaches.   Hematological: Negative for adenopathy.   Psychiatric/Behavioral: Positive for sleep disturbance. Negative for confusion and hallucinations. The patient is not nervous/anxious.        Physical Exam     Vitals:    04/18/18 1559   BP: 152/90   Pulse: 84   SpO2: 98%   Weight: (!) 173 kg (382 lb)   Height: 180.3 cm (71\")     GENERAL: Patient is pleasant, cooperative, appears to be stated age.  Body habitus is endomorphic.  SKIN AND EXTREMITIES:  No skin rashes or lesions are noted.  No cyanosis, clubbing or edema of the extremities.    HEAD:  Head is normocephalic and atraumatic.    NECK: Neck are non-tender without thyromegaly or adenopathy.  Carotic upstrokes are 1+/4.  No cranial or cervical bruits.  The neck is supple with a full range of motion.   ENT: palate elevate symmetrically, no evidence of high arch palate, tongue midline erythema in posterior pharynx, Mallampati Classification Class III   CARDIOVASCULAR:  Regular rate and rhythm with normal S1 and S2 without rub or gallop.  RESPIRATORY:  Clear to auscultation without wheezes or crackle   ABDOMEN:  Soft and non-tender, positive bowel " sound without hepatosplenomegaly  BACK:  Back is straight without midline defect.    PSYCH:  Higher cortical function/mental status:  The patient is alert.  She is oriented x3 to time, place and person.  Recent and the remote memory appear normal.  The patient has a good fund of knowledge.  There is no visual or auditory hallucination or suicidal or homicidal ideation.  SPEECH:There is no gross evidence of aphasia, dysarthria or agnosia.      CRANIAL NERVES:  Pupils are 4mm, equal round reactive to light, reacting briskly to 2mm without afferent pupillary defect.  Visual fields are intact to confrontation testing.  Fundoscopic examination reveals sharp disk margins with normal vasculature.  No papilledema, hemorrhages or exudates.  Extraocular movements are full and smooth with normal pursuits and saccades.  No nystagmus noted.  The face is symmetric. palate elevate symmetrically, Tongue midline, positive gag reflex. The remainder of the cranial nerves are intact and symmetrical.    MOTOR: Strength is 5/5 throughout with normal tone and bulk with the following exceptions, 4/5 intrinsic muscles of the hands and feet.  No involuntary movements noted.    Deep Tendon Reflexes: are 2/4 and symmetrical in the upper extremities, 2/4 and symmetrical at the knees and 1/4 and symmetrical at the Achilles tendon.  Plantar responses were down-going bilaterally.    SENSATION:  Intact to pinprick, light touch, vibration and proprioception.  Coordination:  The patient normally performs finger-nose-finger, heel-to-knee-to-shin and rapid alternating movements in symmetrical fashion.    COORDINATION AND GAIT:  The patient walks with a narrow-based gait.  Patient is able to heel-toe and tandem walk forward and backwards without difficulty.  Romberg and monopedal  Romberg are negative.    MUSCULOSKELETAL: Range of motion normal, no clubbing, cyanosis, or edema.  No joint swelling.            Records Reviewed: I have personally reviewed  his previous medical record.    Cal was seen today for sleep apnea.    Diagnoses and all orders for this visit:    Obstructive sleep apnea    Excessive daytime sleepiness    Nocturnal oxygen desaturation        Treatments:  1.  Encourage patient to be compliance with nasal BiPAP at 18/13 cm water pressure  2.  Consider weight reduction   3.  Counseled patient on good sleep hygiene  Discussion:  I have counseled patient extensively on Sleep Hygiene including regular sleep wake schedule and stimulus control therapy.  I have also discussed the importance of weight reduction because 10% reduction in body weight can reduce sleep apnea by 50 %. We have also discussed abstaining from smoking and drinking.  I have explained to patient that obstructive apnea episode is defined as the absence of airflow for at least 10 seconds.  Sleep apnea is usually accompanied by snoring, disturbed sleep, and daytime sleepiness. Patients with micrognathia, retrognathia, enlarged tonsils, tongue enlargement, and acromegaly are especially predisposed to obstructive sleep apnea. Abnormalities or weakness in the muscles can also contribute to obstructive sleep apnea. Obesity can also contribute to sleep apnea.     Sleep apnea can lead to a number of complications, ranging from daytime sleepiness to possible increased risk of cardiovascular risks.   Daytime sleepiness is the most serious.  Daytime sleepiness can also increase the risk for accident-related injuries. Several studies have suggested that people with sleep apnea have two to three times as many car accidents, and five to seven times the risk for multiple accidents.   A number of cardiovascular diseases -- including high blood pressure, heart failure, stroke, and heart arrhythmias -- have an association with obstructive sleep apnea.   Up to a third of patients with heart failure also have sleep apnea. Both central and obstructive sleep apnea are linked with heart failure.  Obstructive sleep apnea is also noted to be associated with type 2 diabetes according to Dr. Benito at [facility].  The best treatment for symptomatic obstructive sleep apnea is continuous positive airflow pressure (CPAP). Bilevel positive airway pressure (BPAP) systems may be particularly helpful for patients with coexisting lung disease and those with excessive levels of carbon dioxide.  Other treatment options including UPPP surgery, LAUP surgery, radiofrequency somnoplasty and dental appliances such [place] or Clearway.    This Document is signed by Hector Berger MD, FAAN, FAASM   April 18, 20184:09 PM

## 2018-04-28 RX ORDER — OMEPRAZOLE 20 MG/1
CAPSULE, DELAYED RELEASE ORAL
Qty: 30 CAPSULE | Refills: 3 | Status: CANCELLED | OUTPATIENT
Start: 2018-04-28

## 2018-04-30 RX ORDER — OMEPRAZOLE 20 MG/1
20 CAPSULE, DELAYED RELEASE ORAL DAILY
Qty: 30 CAPSULE | Refills: 5 | Status: SHIPPED | OUTPATIENT
Start: 2018-04-30 | End: 2018-10-29 | Stop reason: SDUPTHER

## 2018-06-13 RX ORDER — ROPINIROLE 2 MG/1
TABLET, FILM COATED ORAL
Qty: 30 TABLET | Refills: 3 | Status: SHIPPED | OUTPATIENT
Start: 2018-06-13 | End: 2018-12-07 | Stop reason: SDUPTHER

## 2018-06-27 RX ORDER — TOPIRAMATE 50 MG/1
TABLET, FILM COATED ORAL
Qty: 180 TABLET | Refills: 1 | Status: SHIPPED | OUTPATIENT
Start: 2018-06-27 | End: 2018-12-31 | Stop reason: SDUPTHER

## 2018-07-09 ENCOUNTER — OFFICE VISIT (OUTPATIENT)
Dept: NEUROLOGY | Facility: CLINIC | Age: 53
End: 2018-07-09

## 2018-07-09 VITALS
DIASTOLIC BLOOD PRESSURE: 102 MMHG | WEIGHT: 315 LBS | SYSTOLIC BLOOD PRESSURE: 162 MMHG | BODY MASS INDEX: 44.1 KG/M2 | HEART RATE: 91 BPM | OXYGEN SATURATION: 100 % | HEIGHT: 71 IN

## 2018-07-09 DIAGNOSIS — G47.34 NOCTURNAL OXYGEN DESATURATION: ICD-10-CM

## 2018-07-09 DIAGNOSIS — G47.19 EXCESSIVE DAYTIME SLEEPINESS: ICD-10-CM

## 2018-07-09 DIAGNOSIS — G47.33 OBSTRUCTIVE SLEEP APNEA: Primary | ICD-10-CM

## 2018-07-09 PROCEDURE — 99213 OFFICE O/P EST LOW 20 MIN: CPT | Performed by: PSYCHIATRY & NEUROLOGY

## 2018-07-10 NOTE — PROGRESS NOTES
[facility] NEUROLOGY [place] PROGRESS NOTE  History of Present Illness     Date: 7/9/2018    Patient Identification  Cal Oliver Jr. is a 53 y.o. male.    Patient information was obtained from patient.  History/Exam limitations: none.    Original consultation requested by: Woo Betancourt MD      Chief Complaint   Sleep Apnea (Pt in office today for 3 month follow up, pt states he is sleeping better using BiPAP)      History of Present Illness   Patient is a delightful 33-year-old referred to [facility] for eval showed obstructive sleep apnea agent has been compliant with using nasal CPAP.  Her CPAP compliance meter show 100% compliance and 100% of the time.  Usage is more than 4 hours a night.  Apnea hypotony index reduced to 2.3 events per hour.  Patient was placed on nasal BiPAP on 18/13 cm water pressure.    PMH:   Past Medical History:   Diagnosis Date   • Chronic back pain    • Diabetes mellitus (CMS/HCC)    • High blood pressure    • Hyperlipidemia    • Nausea and vomiting    • Sleep apnea        Past Surgical History:   Past Surgical History:   Procedure Laterality Date   • BACK SURGERY     • GALLBLADDER SURGERY     • KNEE SURGERY         Family Hisotry:   Family History   Problem Relation Age of Onset   • Other Other         HIGH BLOOD PRESSURE   • Hypertension Other    • Heart disease Mother    • Heart attack Mother    • Heart disease Father        Social History:   Social History     Social History   • Marital status:      Spouse name: N/A   • Number of children: N/A   • Years of education: N/A     Occupational History   • Not on file.     Social History Main Topics   • Smoking status: Current Some Day Smoker     Years: 20.00     Types: Cigarettes   • Smokeless tobacco: Never Used   • Alcohol use No   • Drug use: No   • Sexual activity: Not on file     Other Topics Concern   • Not on file     Social History Narrative   • No narrative on file       Medications:    Current Outpatient Prescriptions   Medication Sig Dispense Refill   • albuterol (VENTOLIN HFA) 108 (90 Base) MCG/ACT inhaler Inhale 1 to 2 puffs every 4 to 6 hours as needed 54 inhaler 2   • amLODIPine (NORVASC) 5 MG tablet take 1 tablet by mouth once daily 90 tablet 3   • budesonide-formoterol (SYMBICORT) 160-4.5 MCG/ACT inhaler Inhale 2 puffs 2 (Two) Times a Day. 1 inhaler 11   • CloNIDine (CATAPRES) 0.1 MG tablet Take 1 tablet by mouth every night at bedtime. 30 tablet 11   • colestipol (COLESTID) 1 G tablet Take 1 tablet by mouth 2 (two) times a day. Adjust dose to achieve 1-3 BM's daily. 60 tablet 5   • cyclobenzaprine (FLEXERIL) 10 MG tablet take 1 tablet by mouth twice a day 30 tablet 11   • HYDROcodone-acetaminophen (NORCO)  MG per tablet Take  by mouth.     • losartan-hydrochlorothiazide (HYZAAR) 100-12.5 MG per tablet Take 1 tablet by mouth Daily. 90 tablet 3   • metFORMIN (GLUCOPHAGE) 500 MG tablet Take 1 tablet by mouth 2 (Two) Times a Day. 60 tablet 5   • metoprolol succinate XL (TOPROL-XL) 25 MG 24 hr tablet Take 1 tablet by mouth Daily. 30 tablet 5   • omeprazole (priLOSEC) 20 MG capsule Take 1 capsule by mouth Daily. 30 capsule 5   • ondansetron (ZOFRAN) 4 MG tablet Take 1 tablet by mouth every 4 (four) hours as needed.     • potassium chloride (K-DUR,KLOR-CON) 20 MEQ CR tablet take 1 tablet by mouth once daily 90 tablet 3   • rOPINIRole (REQUIP) 2 MG tablet take 1 tablet by mouth at bedtime 30 tablet 3   • topiramate (TOPAMAX) 50 MG tablet take 1 tablet by mouth twice a day 180 tablet 1     No current facility-administered medications for this visit.        Allergy:   Allergies   Allergen Reactions   • Levothyroxine Sodium Swelling   • Lipitor [Atorvastatin] Myalgia   • Piroxicam Rash       Review of Systems:  Review of Systems   Constitutional: Positive for fatigue. Negative for chills and fever.   HENT: Negative for congestion, ear pain, hearing loss, rhinorrhea and sore throat.    Eyes:  "Negative for pain, discharge and redness.   Respiratory: Positive for apnea. Negative for cough, shortness of breath, wheezing and stridor.    Cardiovascular: Negative for chest pain, palpitations and leg swelling.   Gastrointestinal: Negative for abdominal pain, constipation, nausea and vomiting.   Endocrine: Negative for cold intolerance, heat intolerance and polyphagia.   Genitourinary: Negative for dysuria, flank pain, frequency and urgency.   Musculoskeletal: Negative for joint swelling, myalgias, neck pain and neck stiffness.   Skin: Negative for pallor, rash and wound.   Allergic/Immunologic: Negative for environmental allergies.   Neurological: Negative for dizziness, tremors, seizures, syncope, facial asymmetry, speech difficulty, weakness, light-headedness, numbness and headaches.   Hematological: Negative for adenopathy.   Psychiatric/Behavioral: Positive for sleep disturbance. Negative for confusion and hallucinations. The patient is not nervous/anxious.        Physical Exam     Vitals:    07/09/18 1006   BP: (!) 162/102   Pulse: 91   SpO2: 100%   Weight: (!) 173 kg (382 lb)   Height: 180.3 cm (71\")     GENERAL: Patient is pleasant, cooperative, appears to be stated age.  Body habitus is endomorphic.  SKIN AND EXTREMITIES:  No skin rashes or lesions are noted.  No cyanosis, clubbing or edema of the extremities.    HEAD:  Head is normocephalic and atraumatic.    NECK: Neck are non-tender without thyromegaly or adenopathy.  Carotic upstrokes are 1+/4.  No cranial or cervical bruits.  The neck is supple with a full range of motion.   ENT: palate elevate symmetrically, no evidence of high arch palate, tongue midline erythema in posterior pharynx, Mallampati Classification Class III   CARDIOVASCULAR:  Regular rate and rhythm with normal S1 and S2 without rub or gallop.  RESPIRATORY:  Clear to auscultation without wheezes or crackle   ABDOMEN:  Soft and non-tender, positive bowel sound without " hepatosplenomegaly  BACK:  Back is straight without midline defect.    PSYCH:  Higher cortical function/mental status:  The patient is alert.  She is oriented x3 to time, place and person.  Recent and the remote memory appear normal.  The patient has a good fund of knowledge.  There is no visual or auditory hallucination or suicidal or homicidal ideation.  SPEECH:There is no gross evidence of aphasia, dysarthria or agnosia.      CRANIAL NERVES:  Pupils are 4mm, equal round reactive to light, reacting briskly to 2mm without afferent pupillary defect.  Visual fields are intact to confrontation testing.  Fundoscopic examination reveals sharp disk margins with normal vasculature.  No papilledema, hemorrhages or exudates.  Extraocular movements are full and smooth with normal pursuits and saccades.  No nystagmus noted.  The face is symmetric. palate elevate symmetrically, Tongue midline, positive gag reflex. The remainder of the cranial nerves are intact and symmetrical.    MOTOR: Strength is 5/5 throughout with normal tone and bulk with the following exceptions, 4/5 intrinsic muscles of the hands and feet.  No involuntary movements noted.    Deep Tendon Reflexes: are 2/4 and symmetrical in the upper extremities, 2/4 and symmetrical at the knees and 1/4 and symmetrical at the Achilles tendon.  Plantar responses were down-going bilaterally.    SENSATION:  Intact to pinprick, light touch, vibration and proprioception.  Coordination:  The patient normally performs finger-nose-finger, heel-to-knee-to-shin and rapid alternating movements in symmetrical fashion.    COORDINATION AND GAIT:  The patient walks with a narrow-based gait.  Patient is able to heel-toe and tandem walk forward and backwards without difficulty.  Romberg and monopedal  Romberg are negative.    MUSCULOSKELETAL: Range of motion normal, no clubbing, cyanosis, or edema.  No joint swelling.            Records Reviewed: I have personally reviewed his previous  medical record.    Cal was seen today for sleep apnea.    Diagnoses and all orders for this visit:    Obstructive sleep apnea    Excessive daytime sleepiness    Nocturnal oxygen desaturation        Treatments:  1.  Encourage regular sleep wake schedule  2.  Avoid sleep deprivation  3.  Counseled patient extensively on sleep apnea  Discussion:  I have counseled patient extensively on Sleep Hygiene including regular sleep wake schedule and stimulus control therapy.  I have also discussed the importance of weight reduction because 10% reduction in body weight can reduce sleep apnea by 50 %. We have also discussed abstaining from smoking and drinking.  I have explained to patient that obstructive apnea episode is defined as the absence of airflow for at least 10 seconds.  Sleep apnea is usually accompanied by snoring, disturbed sleep, and daytime sleepiness. Patients with micrognathia, retrognathia, enlarged tonsils, tongue enlargement, and acromegaly are especially predisposed to obstructive sleep apnea. Abnormalities or weakness in the muscles can also contribute to obstructive sleep apnea. Obesity can also contribute to sleep apnea.     Sleep apnea can lead to a number of complications, ranging from daytime sleepiness to possible increased risk of cardiovascular risks.   Daytime sleepiness is the most serious.  Daytime sleepiness can also increase the risk for accident-related injuries. Several studies have suggested that people with sleep apnea have two to three times as many car accidents, and five to seven times the risk for multiple accidents.   A number of cardiovascular diseases -- including high blood pressure, heart failure, stroke, and heart arrhythmias -- have an association with obstructive sleep apnea.   Up to a third of patients with heart failure also have sleep apnea. Both central and obstructive sleep apnea are linked with heart failure. Obstructive sleep apnea is also noted to be associated with  type 2 diabetes according to Dr. Benito at [facility].  The best treatment for symptomatic obstructive sleep apnea is continuous positive airflow pressure (CPAP). Bilevel positive airway pressure (BPAP) systems may be particularly helpful for patients with coexisting lung disease and those with excessive levels of carbon dioxide.  Other treatment options including UPPP surgery, LAUP surgery, radiofrequency somnoplasty and dental appliances such [place] or Clearway.  This Document is signed by Hector Berger MD, FAAN, FAASM   July 9, 20189:19 PM

## 2018-08-03 RX ORDER — ALBUTEROL SULFATE 90 UG/1
AEROSOL, METERED RESPIRATORY (INHALATION)
Qty: 54 G | Refills: 2 | Status: SHIPPED | OUTPATIENT
Start: 2018-08-03 | End: 2019-04-10 | Stop reason: SDUPTHER

## 2018-08-06 RX ORDER — POTASSIUM CHLORIDE 20 MEQ/1
TABLET, EXTENDED RELEASE ORAL
Qty: 90 TABLET | Refills: 3 | Status: SHIPPED | OUTPATIENT
Start: 2018-08-06 | End: 2018-09-05

## 2018-08-15 DIAGNOSIS — I10 ESSENTIAL HYPERTENSION: ICD-10-CM

## 2018-08-15 DIAGNOSIS — R00.2 PALPITATIONS: ICD-10-CM

## 2018-08-15 RX ORDER — METOPROLOL SUCCINATE 25 MG/1
TABLET, EXTENDED RELEASE ORAL
Qty: 30 TABLET | Refills: 5 | Status: SHIPPED | OUTPATIENT
Start: 2018-08-15 | End: 2019-02-08 | Stop reason: SDUPTHER

## 2018-08-29 ENCOUNTER — OFFICE VISIT (OUTPATIENT)
Dept: INTERNAL MEDICINE | Facility: CLINIC | Age: 53
End: 2018-08-29

## 2018-08-29 VITALS
DIASTOLIC BLOOD PRESSURE: 113 MMHG | SYSTOLIC BLOOD PRESSURE: 193 MMHG | WEIGHT: 315 LBS | OXYGEN SATURATION: 99 % | RESPIRATION RATE: 16 BRPM | BODY MASS INDEX: 44.1 KG/M2 | HEIGHT: 71 IN | TEMPERATURE: 98.2 F | HEART RATE: 80 BPM

## 2018-08-29 DIAGNOSIS — E78.00 HYPERCHOLESTEROLEMIA: ICD-10-CM

## 2018-08-29 DIAGNOSIS — E03.9 ACQUIRED HYPOTHYROIDISM: ICD-10-CM

## 2018-08-29 DIAGNOSIS — E11.9 TYPE 2 DIABETES MELLITUS WITHOUT COMPLICATION, WITHOUT LONG-TERM CURRENT USE OF INSULIN (HCC): ICD-10-CM

## 2018-08-29 DIAGNOSIS — I10 ESSENTIAL HYPERTENSION: Primary | ICD-10-CM

## 2018-08-29 PROCEDURE — 99214 OFFICE O/P EST MOD 30 MIN: CPT | Performed by: INTERNAL MEDICINE

## 2018-08-29 RX ORDER — MONTELUKAST SODIUM 4 MG/1
1 TABLET, CHEWABLE ORAL 2 TIMES DAILY
Qty: 60 TABLET | Refills: 5 | Status: SHIPPED | OUTPATIENT
Start: 2018-08-29 | End: 2019-12-30 | Stop reason: SDUPTHER

## 2018-08-29 NOTE — PROGRESS NOTES
Subjective     Patient ID: Cal Oliver Jr. is a 53 y.o. male. Patient is here for management of multiple medical problems.     Chief Complaint   Patient presents with   • Hyperlipidemia     follow-up   • Hypertension     follow-up   • Diabetes     follow-up     Hyperlipidemia   This is a chronic problem. The problem is uncontrolled. Recent lipid tests were reviewed and are normal. He has no history of chronic renal disease or diabetes. There are no known factors aggravating his hyperlipidemia. Associated symptoms include shortness of breath. Pertinent negatives include no chest pain. Current antihyperlipidemic treatment includes statins. The current treatment provides moderate improvement of lipids. There are no compliance problems.    Hypertension   This is a chronic problem. The problem is unchanged. The problem is uncontrolled. Associated symptoms include shortness of breath. Pertinent negatives include no chest pain. There are no associated agents to hypertension. Current antihypertension treatment includes angiotensin blockers and diuretics. The current treatment provides mild improvement. There are no compliance problems.  There is no history of angina or kidney disease. There is no history of chronic renal disease.   Diabetes   He presents for his follow-up diabetic visit. He has type 2 diabetes mellitus. There are no hypoglycemic associated symptoms. Associated symptoms include fatigue. Pertinent negatives for diabetes include no chest pain. There are no hypoglycemic complications. Symptoms are resolved. There are no diabetic complications. He is compliant with treatment all of the time. His weight is stable. He participates in exercise daily. His home blood glucose trend is fluctuating dramatically. An ACE inhibitor/angiotensin II receptor blocker is being taken. Eye exam is not current.        Pt goes to [facility] DR Hernandez. On pain meds that are not helping. Dr Hernandez has closed his practice. Pt was not  given an alternative PTC to take over.  Pt has surgery x 3 for lower back issues.  hydrocodone 10mg qid prn.  Has 1/2 bottle left.    Diarrhea since ccy. Asking for lomotil      Pt not taking norvasc.   Pt leaves to [place] soon.          The following portions of the patient's history were reviewed and updated as appropriate: allergies, current medications, past family history, past medical history, past social history, past surgical history and problem list.    Review of Systems   Constitutional: Positive for fatigue.   Respiratory: Positive for shortness of breath.    Cardiovascular: Negative for chest pain.   Gastrointestinal: Positive for diarrhea. Negative for abdominal distention and abdominal pain.   Musculoskeletal: Positive for arthralgias, back pain, gait problem and joint swelling.   Psychiatric/Behavioral: Negative for sleep disturbance.   All other systems reviewed and are negative.      Current Outpatient Prescriptions:   •  amLODIPine (NORVASC) 5 MG tablet, take 1 tablet by mouth once daily, Disp: 90 tablet, Rfl: 3  •  budesonide-formoterol (SYMBICORT) 160-4.5 MCG/ACT inhaler, Inhale 2 puffs 2 (Two) Times a Day., Disp: 1 inhaler, Rfl: 11  •  CloNIDine (CATAPRES) 0.1 MG tablet, Take 1 tablet by mouth every night at bedtime., Disp: 30 tablet, Rfl: 11  •  colestipol (COLESTID) 1 G tablet, Take 1 tablet by mouth 2 (two) times a day. Adjust dose to achieve 1-3 BM's daily., Disp: 60 tablet, Rfl: 5  •  cyclobenzaprine (FLEXERIL) 10 MG tablet, take 1 tablet by mouth twice a day, Disp: 30 tablet, Rfl: 11  •  HYDROcodone-acetaminophen (NORCO)  MG per tablet, Take  by mouth., Disp: , Rfl:   •  losartan-hydrochlorothiazide (HYZAAR) 100-12.5 MG per tablet, Take 1 tablet by mouth Daily., Disp: 90 tablet, Rfl: 3  •  metFORMIN (GLUCOPHAGE) 500 MG tablet, Take 1 tablet by mouth 2 (Two) Times a Day., Disp: 60 tablet, Rfl: 5  •  metoprolol succinate XL (TOPROL-XL) 25 MG 24 hr tablet, take 1 tablet by mouth once  "daily, Disp: 30 tablet, Rfl: 5  •  omeprazole (priLOSEC) 20 MG capsule, Take 1 capsule by mouth Daily., Disp: 30 capsule, Rfl: 5  •  ondansetron (ZOFRAN) 4 MG tablet, Take 1 tablet by mouth every 4 (four) hours as needed., Disp: , Rfl:   •  potassium chloride (K-DUR,KLOR-CON) 20 MEQ CR tablet, take 1 tablet by mouth once daily, Disp: 90 tablet, Rfl: 3  •  rOPINIRole (REQUIP) 2 MG tablet, take 1 tablet by mouth at bedtime, Disp: 30 tablet, Rfl: 3  •  topiramate (TOPAMAX) 50 MG tablet, take 1 tablet by mouth twice a day, Disp: 180 tablet, Rfl: 1  •  VENTOLIN  (90 Base) MCG/ACT inhaler, inhale 1 to 2 puffs by mouth every 4 to 6 hours if needed, Disp: 54 g, Rfl: 2  •  colestipol (COLESTID) 1 g tablet, Take 1 tablet by mouth 2 (Two) Times a Day. Adjust dose to achieve 1-3 BM's daily., Disp: 60 tablet, Rfl: 5    Objective      Blood pressure (!) 193/113, pulse 80, temperature 98.2 °F (36.8 °C), temperature source Oral, resp. rate 16, height 180.3 cm (71\"), weight (!) 176 kg (389 lb), SpO2 99 %.    Physical Exam     General Appearance:    Alert, cooperative, no distress, appears stated age   Head:    Normocephalic, without obvious abnormality, atraumatic   Eyes:    PERRL, conjunctiva/corneas clear, EOM's intact   Ears:    Normal TM's and external ear canals, both ears   Nose:   Nares normal, septum midline, mucosa normal, no drainage   or sinus tenderness   Throat:   Lips, mucosa, and tongue normal; teeth and gums normal   Neck:   Supple, symmetrical, trachea midline, no adenopathy;        thyroid:  No enlargement/tenderness/nodules; no carotid    bruit or JVD   Back:     Symmetric, no curvature, ROM normal, no CVA tenderness   Lungs:     Clear to auscultation bilaterally, respirations unlabored   Chest wall:    No tenderness or deformity   Heart:    Regular rate and rhythm, S1 and S2 normal, no murmur,        rub or gallop   Abdomen:     Soft, non-tender, bowel sounds active all four quadrants,     no masses, no " organomegaly   Extremities:   Extremities normal, atraumatic, no cyanosis or edema   Pulses:   2+ and symmetric all extremities   Skin:   Skin color, texture, turgor normal, no rashes or lesions   Lymph nodes:   Cervical, supraclavicular, and axillary nodes normal   Neurologic:   CNII-XII intact. Normal strength, sensation and reflexes       throughout      Results for orders placed or performed in visit on 11/10/17   Comprehensive Metabolic Panel   Result Value Ref Range    Glucose 129 (H) 74 - 98 mg/dL    BUN 12 7 - 20 mg/dL    Creatinine 0.90 0.60 - 1.30 mg/dL    eGFR Non African Am 89 >60 mL/min/1.73    eGFR African Am 108 >60 mL/min/1.73    BUN/Creatinine Ratio 13.3 6.3 - 21.9    Sodium 145 137 - 145 mmol/L    Potassium 3.9 3.5 - 5.1 mmol/L    Chloride 105 98 - 107 mmol/L    Total CO2 26.0 26.0 - 30.0 mmol/L    Calcium 9.8 8.4 - 10.2 mg/dL    Total Protein 7.0 6.3 - 8.2 g/dL    Albumin 4.00 3.50 - 5.00 g/dL    Globulin 3.0 gm/dL    A/G Ratio 1.3 1.0 - 2.0 g/dL    Total Bilirubin 0.2 0.2 - 1.3 mg/dL    Alkaline Phosphatase 132 (H) 38 - 126 U/L    AST (SGOT) 18 15 - 46 U/L    ALT (SGPT) 37 13 - 69 U/L   Lipid Panel   Result Value Ref Range    Total Cholesterol 186 0 - 199 mg/dL    Triglycerides 131 <150 mg/dL    HDL Cholesterol 37 (L) 40 - 60 mg/dL    VLDL Cholesterol 26.2 mg/dL    LDL Cholesterol  123 (H) 0 - 99 mg/dL   Hemoglobin A1c   Result Value Ref Range    Hemoglobin A1C 6.30 %   T4, Free   Result Value Ref Range    Free T4 0.95 0.78 - 2.19 ng/dL   TSH   Result Value Ref Range    TSH 1.750 0.470 - 4.680 mIU/mL   MicroAlbumin, Urine, Random   Result Value Ref Range    Microalbumin, Urine 62.3 Not Estab. ug/mL   Vitamin B12   Result Value Ref Range    Vitamin B-12 441 239 - 931 pg/mL   CBC & Differential   Result Value Ref Range    WBC 8.59 4.80 - 10.80 10*3/mm3    RBC 5.33 4.70 - 6.10 10*6/mm3    Hemoglobin 15.7 14.0 - 18.0 g/dL    Hematocrit 48.0 42.0 - 52.0 %    MCV 90.1 80.0 - 94.0 fL    MCH 29.5 27.0 -  31.0 pg    MCHC 32.7 30.0 - 37.0 g/dL    RDW 13.6 11.5 - 14.5 %    Platelets 324 130 - 400 10*3/mm3    Neutrophil Rel % 65.3 37.0 - 80.0 %    Lymphocyte Rel % 24.6 10.0 - 50.0 %    Monocyte Rel % 7.0 0.0 - 12.0 %    Eosinophil Rel % 2.2 0.0 - 7.0 %    Basophil Rel % 0.6 0.0 - 2.5 %    Neutrophils Absolute 5.61 2.00 - 6.90 10*3/mm3    Lymphocytes Absolute 2.11 0.60 - 3.40 10*3/mm3    Monocytes Absolute 0.60 0.00 - 0.90 10*3/mm3    Eosinophils Absolute 0.19 0.00 - 0.70 10*3/mm3    Basophils Absolute 0.05 0.00 - 0.20 10*3/mm3    Immature Granulocyte Rel % 0.3 0.0 - 0.6 %    Immature Grans Absolute 0.03 0.00 - 0.06 10*3/mm3    nRBC 0.0 0.0 - 0.0 /100 WBC         Assessment/Plan   Pt will ween off pain meds. Pain gel.  For lower back.    Restart norvasc.      Cal was seen today for hyperlipidemia, hypertension and diabetes.    Diagnoses and all orders for this visit:    Essential hypertension  -     TSH  -     T4, Free  -     Comprehensive Metabolic Panel  -     Vitamin B12  -     CBC & Differential  -     Lipid Panel  -     Hemoglobin A1c  -     MicroAlbumin, Urine, Random - Urine, Clean Catch    Hypercholesterolemia  -     Lipid Panel    Type 2 diabetes mellitus without complication, without long-term current use of insulin (CMS/[facility])  -     TSH  -     T4, Free  -     Comprehensive Metabolic Panel  -     Vitamin B12  -     CBC & Differential  -     Lipid Panel  -     Hemoglobin A1c  -     MicroAlbumin, Urine, Random - Urine, Clean Catch    Acquired hypothyroidism  -     TSH  -     T4, Free    Other orders  -     colestipol (COLESTID) 1 g tablet; Take 1 tablet by mouth 2 (Two) Times a Day. Adjust dose to achieve 1-3 BM's daily.      Return in about 1 week (around 9/5/2018).          There are no Patient Instructions on file for this visit.     Woo Betancourt MD    Assessment/Plan

## 2018-09-05 ENCOUNTER — OFFICE VISIT (OUTPATIENT)
Dept: INTERNAL MEDICINE | Facility: CLINIC | Age: 53
End: 2018-09-05

## 2018-09-05 VITALS
RESPIRATION RATE: 16 BRPM | TEMPERATURE: 97.9 F | OXYGEN SATURATION: 97 % | HEIGHT: 71 IN | DIASTOLIC BLOOD PRESSURE: 89 MMHG | WEIGHT: 315 LBS | SYSTOLIC BLOOD PRESSURE: 152 MMHG | BODY MASS INDEX: 44.1 KG/M2 | HEART RATE: 86 BPM

## 2018-09-05 DIAGNOSIS — I10 ESSENTIAL HYPERTENSION: Primary | ICD-10-CM

## 2018-09-05 DIAGNOSIS — E11.9 DIABETES MELLITUS WITHOUT COMPLICATION (HCC): ICD-10-CM

## 2018-09-05 LAB
ALBUMIN SERPL-MCNC: 4.3 G/DL (ref 3.5–5)
ALBUMIN/GLOB SERPL: 1.4 G/DL (ref 1–2)
ALP SERPL-CCNC: 135 U/L (ref 38–126)
ALT SERPL-CCNC: 51 U/L (ref 13–69)
AST SERPL-CCNC: 42 U/L (ref 15–46)
BASOPHILS # BLD AUTO: 0.04 10*3/MM3 (ref 0–0.2)
BASOPHILS NFR BLD AUTO: 0.4 % (ref 0–2.5)
BILIRUB SERPL-MCNC: 0.8 MG/DL (ref 0.2–1.3)
BUN SERPL-MCNC: 12 MG/DL (ref 7–20)
BUN/CREAT SERPL: 13.3 (ref 6.3–21.9)
CALCIUM SERPL-MCNC: 9.7 MG/DL (ref 8.4–10.2)
CHLORIDE SERPL-SCNC: 102 MMOL/L (ref 98–107)
CHOLEST SERPL-MCNC: 229 MG/DL (ref 0–199)
CO2 SERPL-SCNC: 29 MMOL/L (ref 26–30)
CREAT SERPL-MCNC: 0.9 MG/DL (ref 0.6–1.3)
EOSINOPHIL # BLD AUTO: 0.13 10*3/MM3 (ref 0–0.7)
EOSINOPHIL NFR BLD AUTO: 1.2 % (ref 0–7)
ERYTHROCYTE [DISTWIDTH] IN BLOOD BY AUTOMATED COUNT: 14.5 % (ref 11.5–14.5)
GLOBULIN SER CALC-MCNC: 3 GM/DL
GLUCOSE SERPL-MCNC: 123 MG/DL (ref 74–98)
HBA1C MFR BLD: 6.6 %
HCT VFR BLD AUTO: 48.1 % (ref 42–52)
HDLC SERPL-MCNC: 42 MG/DL (ref 40–60)
HGB BLD-MCNC: 15.8 G/DL (ref 14–18)
IMM GRANULOCYTES # BLD: 0.04 10*3/MM3 (ref 0–0.06)
IMM GRANULOCYTES NFR BLD: 0.4 % (ref 0–0.6)
LDLC SERPL CALC-MCNC: 141 MG/DL (ref 0–99)
LYMPHOCYTES # BLD AUTO: 1.88 10*3/MM3 (ref 0.6–3.4)
LYMPHOCYTES NFR BLD AUTO: 17.9 % (ref 10–50)
MCH RBC QN AUTO: 29.6 PG (ref 27–31)
MCHC RBC AUTO-ENTMCNC: 32.8 G/DL (ref 30–37)
MCV RBC AUTO: 90.2 FL (ref 80–94)
MICROALBUMIN UR-MCNC: 45.4 UG/ML
MONOCYTES # BLD AUTO: 0.81 10*3/MM3 (ref 0–0.9)
MONOCYTES NFR BLD AUTO: 7.7 % (ref 0–12)
NEUTROPHILS # BLD AUTO: 7.63 10*3/MM3 (ref 2–6.9)
NEUTROPHILS NFR BLD AUTO: 72.4 % (ref 37–80)
NRBC BLD AUTO-RTO: 0 /100 WBC (ref 0–0)
PLATELET # BLD AUTO: 335 10*3/MM3 (ref 130–400)
POTASSIUM SERPL-SCNC: 3.7 MMOL/L (ref 3.5–5.1)
PROT SERPL-MCNC: 7.3 G/DL (ref 6.3–8.2)
RBC # BLD AUTO: 5.33 10*6/MM3 (ref 4.7–6.1)
SODIUM SERPL-SCNC: 141 MMOL/L (ref 137–145)
T4 FREE SERPL-MCNC: 1 NG/DL (ref 0.78–2.19)
TRIGL SERPL-MCNC: 230 MG/DL
TSH SERPL DL<=0.005 MIU/L-ACNC: 1.41 MIU/ML (ref 0.47–4.68)
VIT B12 SERPL-MCNC: 560 PG/ML (ref 239–931)
VLDLC SERPL CALC-MCNC: 46 MG/DL
WBC # BLD AUTO: 10.53 10*3/MM3 (ref 4.8–10.8)

## 2018-09-05 PROCEDURE — 99214 OFFICE O/P EST MOD 30 MIN: CPT | Performed by: INTERNAL MEDICINE

## 2018-09-05 RX ORDER — POTASSIUM CHLORIDE 1500 MG/1
20 TABLET, FILM COATED, EXTENDED RELEASE ORAL 2 TIMES DAILY WITH MEALS
Qty: 180 TABLET | Refills: 3 | Status: SHIPPED | OUTPATIENT
Start: 2018-09-05 | End: 2019-07-03 | Stop reason: SDUPTHER

## 2018-09-05 RX ORDER — LOSARTAN POTASSIUM AND HYDROCHLOROTHIAZIDE 25; 100 MG/1; MG/1
1 TABLET ORAL DAILY
Qty: 90 TABLET | Refills: 3 | Status: SHIPPED | OUTPATIENT
Start: 2018-09-05 | End: 2019-09-25 | Stop reason: SDUPTHER

## 2018-09-05 NOTE — PROGRESS NOTES
Subjective     Patient ID: Cal Oliver Jr. is a 53 y.o. male. Patient is here for management of multiple medical problems.     Chief Complaint   Patient presents with   • Hypertension     1 week follow-up   • Hyperlipidemia     1 week follow-up     Hypertension   This is a chronic problem. The problem is controlled. Pertinent negatives include no neck pain or shortness of breath. There are no known risk factors for coronary artery disease. Past treatments include diuretics. Current antihypertension treatment includes diuretics, ACE inhibitors and calcium channel blockers.   Hyperlipidemia   Pertinent negatives include no myalgias or shortness of breath.        The following portions of the patient's history were reviewed and updated as appropriate: allergies, current medications, past family history, past medical history, past social history, past surgical history and problem list.    Review of Systems   HENT: Negative for congestion, postnasal drip, rhinorrhea, sinus pressure and sneezing.    Respiratory: Negative for shortness of breath.    Musculoskeletal: Negative for gait problem, joint swelling, myalgias and neck pain.   All other systems reviewed and are negative.      Current Outpatient Prescriptions:   •  amLODIPine (NORVASC) 5 MG tablet, take 1 tablet by mouth once daily, Disp: 90 tablet, Rfl: 3  •  budesonide-formoterol (SYMBICORT) 160-4.5 MCG/ACT inhaler, Inhale 2 puffs 2 (Two) Times a Day., Disp: 1 inhaler, Rfl: 11  •  CloNIDine (CATAPRES) 0.1 MG tablet, Take 1 tablet by mouth every night at bedtime., Disp: 30 tablet, Rfl: 11  •  colestipol (COLESTID) 1 g tablet, Take 1 tablet by mouth 2 (Two) Times a Day. Adjust dose to achieve 1-3 BM's daily., Disp: 60 tablet, Rfl: 5  •  cyclobenzaprine (FLEXERIL) 10 MG tablet, take 1 tablet by mouth twice a day, Disp: 30 tablet, Rfl: 11  •  HYDROcodone-acetaminophen (NORCO)  MG per tablet, Take  by mouth., Disp: , Rfl:   •  metFORMIN (GLUCOPHAGE) 500 MG  "tablet, Take 1 tablet by mouth 2 (Two) Times a Day., Disp: 60 tablet, Rfl: 5  •  metoprolol succinate XL (TOPROL-XL) 25 MG 24 hr tablet, take 1 tablet by mouth once daily, Disp: 30 tablet, Rfl: 5  •  omeprazole (priLOSEC) 20 MG capsule, Take 1 capsule by mouth Daily., Disp: 30 capsule, Rfl: 5  •  ondansetron (ZOFRAN) 4 MG tablet, Take 1 tablet by mouth every 4 (four) hours as needed., Disp: , Rfl:   •  rOPINIRole (REQUIP) 2 MG tablet, take 1 tablet by mouth at bedtime, Disp: 30 tablet, Rfl: 3  •  topiramate (TOPAMAX) 50 MG tablet, take 1 tablet by mouth twice a day, Disp: 180 tablet, Rfl: 1  •  VENTOLIN  (90 Base) MCG/ACT inhaler, inhale 1 to 2 puffs by mouth every 4 to 6 hours if needed, Disp: 54 g, Rfl: 2  •  losartan-hydrochlorothiazide (HYZAAR) 100-25 MG per tablet, Take 1 tablet by mouth Daily., Disp: 90 tablet, Rfl: 3  •  potassium chloride (K-TAB) 20 MEQ tablet controlled-release ER tablet, Take 1 tablet by mouth 2 (Two) Times a Day With Meals., Disp: 180 tablet, Rfl: 3    Objective      Blood pressure 152/89, pulse 86, temperature 97.9 °F (36.6 °C), temperature source Oral, resp. rate 16, height 180.3 cm (71\"), weight (!) 174 kg (384 lb), SpO2 97 %.    Physical Exam     General Appearance:    Alert, cooperative, no distress, appears stated age   Head:    Normocephalic, without obvious abnormality, atraumatic   Eyes:    PERRL, conjunctiva/corneas clear, EOM's intact   Ears:    Normal TM's and external ear canals, both ears   Nose:   Nares normal, septum midline, mucosa normal, no drainage   or sinus tenderness   Throat:   Lips, mucosa, and tongue normal; teeth and gums normal   Neck:   Supple, symmetrical, trachea midline, no adenopathy;        thyroid:  No enlargement/tenderness/nodules; no carotid    bruit or JVD   Back:     Symmetric, no curvature, ROM normal, no CVA tenderness   Lungs:     Clear to auscultation bilaterally, respirations unlabored   Chest wall:    No tenderness or deformity   Heart: "    Regular rate and rhythm, S1 and S2 normal, no murmur,        rub or gallop   Abdomen:     Soft, non-tender, bowel sounds active all four quadrants,     no masses, no organomegaly   Extremities:   Extremities normal, atraumatic, no cyanosis or edema   Pulses:   2+ and symmetric all extremities   Skin:   Skin color, texture, turgor normal, no rashes or lesions   Lymph nodes:   Cervical, supraclavicular, and axillary nodes normal   Neurologic:   CNII-XII intact. Normal strength, sensation and reflexes       throughout      Results for orders placed or performed in visit on 08/29/18   TSH   Result Value Ref Range    TSH 1.410 0.470 - 4.680 mIU/mL   T4, Free   Result Value Ref Range    Free T4 1.00 0.78 - 2.19 ng/dL   Comprehensive Metabolic Panel   Result Value Ref Range    Glucose 123 (H) 74 - 98 mg/dL    BUN 12 7 - 20 mg/dL    Creatinine 0.90 0.60 - 1.30 mg/dL    eGFR Non African Am 89 >60 mL/min/1.73    eGFR African Am 108 >60 mL/min/1.73    BUN/Creatinine Ratio 13.3 6.3 - 21.9    Sodium 141 137 - 145 mmol/L    Potassium 3.7 3.5 - 5.1 mmol/L    Chloride 102 98 - 107 mmol/L    Total CO2 29.0 26.0 - 30.0 mmol/L    Calcium 9.7 8.4 - 10.2 mg/dL    Total Protein 7.3 6.3 - 8.2 g/dL    Albumin 4.30 3.50 - 5.00 g/dL    Globulin 3.0 gm/dL    A/G Ratio 1.4 1.0 - 2.0 g/dL    Total Bilirubin 0.8 0.2 - 1.3 mg/dL    Alkaline Phosphatase 135 (H) 38 - 126 U/L    AST (SGOT) 42 15 - 46 U/L    ALT (SGPT) 51 13 - 69 U/L   Vitamin B12   Result Value Ref Range    Vitamin B-12 560 239 - 931 pg/mL   Lipid Panel   Result Value Ref Range    Total Cholesterol 229 (H) 0 - 199 mg/dL    Triglycerides 230 (H) <150 mg/dL    HDL Cholesterol 42 40 - 60 mg/dL    VLDL Cholesterol 46 mg/dL    LDL Cholesterol  141 (H) 0 - 99 mg/dL   Hemoglobin A1c   Result Value Ref Range    Hemoglobin A1C 6.60 %   MicroAlbumin, Urine, Random - Urine, Clean Catch   Result Value Ref Range    Microalbumin, Urine 45.4 Not Estab. ug/mL   CBC & Differential   Result Value  Ref Range    WBC 10.53 4.80 - 10.80 10*3/mm3    RBC 5.33 4.70 - 6.10 10*6/mm3    Hemoglobin 15.8 14.0 - 18.0 g/dL    Hematocrit 48.1 42.0 - 52.0 %    MCV 90.2 80.0 - 94.0 fL    MCH 29.6 27.0 - 31.0 pg    MCHC 32.8 30.0 - 37.0 g/dL    RDW 14.5 11.5 - 14.5 %    Platelets 335 130 - 400 10*3/mm3    Neutrophil Rel % 72.4 37.0 - 80.0 %    Lymphocyte Rel % 17.9 10.0 - 50.0 %    Monocyte Rel % 7.7 0.0 - 12.0 %    Eosinophil Rel % 1.2 0.0 - 7.0 %    Basophil Rel % 0.4 0.0 - 2.5 %    Neutrophils Absolute 7.63 (H) 2.00 - 6.90 10*3/mm3    Lymphocytes Absolute 1.88 0.60 - 3.40 10*3/mm3    Monocytes Absolute 0.81 0.00 - 0.90 10*3/mm3    Eosinophils Absolute 0.13 0.00 - 0.70 10*3/mm3    Basophils Absolute 0.04 0.00 - 0.20 10*3/mm3    Immature Granulocyte Rel % 0.4 0.0 - 0.6 %    Immature Grans Absolute 0.04 0.00 - 0.06 10*3/mm3    nRBC 0.0 0.0 - 0.0 /100 WBC         Assessment/Plan       Cal was seen today for hypertension and hyperlipidemia.    Diagnoses and all orders for this visit:    Essential hypertension    Diabetes mellitus without complication (CMS/[facility])    Other orders  -     losartan-hydrochlorothiazide (HYZAAR) 100-25 MG per tablet; Take 1 tablet by mouth Daily.  -     potassium chloride (K-TAB) 20 MEQ tablet controlled-release ER tablet; Take 1 tablet by mouth 2 (Two) Times a Day With Meals.    ha1c stable.    Return in about 6 weeks (around 10/17/2018).          There are no Patient Instructions on file for this visit.     Woo Betancourt MD    Assessment/Plan

## 2018-09-10 ENCOUNTER — TELEPHONE (OUTPATIENT)
Dept: INTERNAL MEDICINE | Facility: CLINIC | Age: 53
End: 2018-09-10

## 2018-09-10 NOTE — TELEPHONE ENCOUNTER
Patient called and left vm on med refill line requesting a call back from Dr. Betancourt's nurse regarding pain medication.

## 2018-09-11 NOTE — TELEPHONE ENCOUNTER
I spoke with patient, he did not get compounding medication, the order has been refaxed and patient has been notified

## 2018-10-16 RX ORDER — CYCLOBENZAPRINE HCL 10 MG
TABLET ORAL
Qty: 30 TABLET | Refills: 11 | Status: SHIPPED | OUTPATIENT
Start: 2018-10-16 | End: 2019-07-03 | Stop reason: SDUPTHER

## 2018-10-17 ENCOUNTER — OFFICE VISIT (OUTPATIENT)
Dept: INTERNAL MEDICINE | Facility: CLINIC | Age: 53
End: 2018-10-17

## 2018-10-17 ENCOUNTER — TELEPHONE (OUTPATIENT)
Dept: SURGERY | Facility: CLINIC | Age: 53
End: 2018-10-17

## 2018-10-17 VITALS
HEART RATE: 84 BPM | HEIGHT: 71 IN | RESPIRATION RATE: 16 BRPM | DIASTOLIC BLOOD PRESSURE: 98 MMHG | OXYGEN SATURATION: 99 % | SYSTOLIC BLOOD PRESSURE: 164 MMHG | WEIGHT: 315 LBS | TEMPERATURE: 97.8 F | BODY MASS INDEX: 44.1 KG/M2

## 2018-10-17 DIAGNOSIS — I10 ESSENTIAL HYPERTENSION: Primary | ICD-10-CM

## 2018-10-17 DIAGNOSIS — Z12.11 SCREEN FOR COLON CANCER: ICD-10-CM

## 2018-10-17 DIAGNOSIS — E11.9 TYPE 2 DIABETES MELLITUS WITHOUT COMPLICATION, WITHOUT LONG-TERM CURRENT USE OF INSULIN (HCC): ICD-10-CM

## 2018-10-17 DIAGNOSIS — R59.0 SUPRACLAVICULAR LYMPHADENOPATHY: ICD-10-CM

## 2018-10-17 PROCEDURE — 99214 OFFICE O/P EST MOD 30 MIN: CPT | Performed by: INTERNAL MEDICINE

## 2018-10-17 RX ORDER — AMLODIPINE BESYLATE 10 MG/1
5 TABLET ORAL DAILY
Qty: 90 TABLET | Refills: 3 | Status: SHIPPED | OUTPATIENT
Start: 2018-10-17 | End: 2019-07-03 | Stop reason: SDUPTHER

## 2018-10-17 NOTE — PROGRESS NOTES
Subjective     Patient ID: Cal Oliver Jr. is a 53 y.o. male. Patient is here for management of multiple medical problems.     Chief Complaint   Patient presents with   • Hypertension     follow-up   • medication refills     patient needs refills on Symbicort inhaler     History of Present Illness     The following portions of the patient's history were reviewed and updated as appropriate: allergies, current medications, past family history, past medical history, past social history, past surgical history and problem list.    Review of Systems    Current Outpatient Prescriptions:   •  amLODIPine (NORVASC) 5 MG tablet, take 1 tablet by mouth once daily, Disp: 90 tablet, Rfl: 3  •  budesonide-formoterol (SYMBICORT) 160-4.5 MCG/ACT inhaler, Inhale 2 puffs 2 (Two) Times a Day., Disp: 1 inhaler, Rfl: 11  •  CloNIDine (CATAPRES) 0.1 MG tablet, Take 1 tablet by mouth every night at bedtime., Disp: 30 tablet, Rfl: 11  •  colestipol (COLESTID) 1 g tablet, Take 1 tablet by mouth 2 (Two) Times a Day. Adjust dose to achieve 1-3 BM's daily., Disp: 60 tablet, Rfl: 5  •  cyclobenzaprine (FLEXERIL) 10 MG tablet, take 1 tablet by mouth twice a day, Disp: 30 tablet, Rfl: 11  •  HYDROcodone-acetaminophen (NORCO)  MG per tablet, Take  by mouth., Disp: , Rfl:   •  losartan-hydrochlorothiazide (HYZAAR) 100-25 MG per tablet, Take 1 tablet by mouth Daily., Disp: 90 tablet, Rfl: 3  •  metFORMIN (GLUCOPHAGE) 500 MG tablet, Take 1 tablet by mouth 2 (Two) Times a Day., Disp: 90 tablet, Rfl: 3  •  metoprolol succinate XL (TOPROL-XL) 25 MG 24 hr tablet, take 1 tablet by mouth once daily, Disp: 30 tablet, Rfl: 5  •  omeprazole (priLOSEC) 20 MG capsule, Take 1 capsule by mouth Daily., Disp: 30 capsule, Rfl: 5  •  ondansetron (ZOFRAN) 4 MG tablet, Take 1 tablet by mouth every 4 (four) hours as needed., Disp: , Rfl:   •  potassium chloride (K-TAB) 20 MEQ tablet controlled-release ER tablet, Take 1 tablet by mouth 2 (Two) Times a Day With  "Meals., Disp: 180 tablet, Rfl: 3  •  rOPINIRole (REQUIP) 2 MG tablet, take 1 tablet by mouth at bedtime, Disp: 30 tablet, Rfl: 3  •  topiramate (TOPAMAX) 50 MG tablet, take 1 tablet by mouth twice a day, Disp: 180 tablet, Rfl: 1  •  VENTOLIN  (90 Base) MCG/ACT inhaler, inhale 1 to 2 puffs by mouth every 4 to 6 hours if needed, Disp: 54 g, Rfl: 2    Objective      Blood pressure 164/98, pulse 84, temperature 97.8 °F (36.6 °C), temperature source Oral, resp. rate 16, height 180.3 cm (71\"), weight (!) 168 kg (370 lb), SpO2 99 %.    Physical Exam     General Appearance:    Alert, cooperative, no distress, appears stated age   Head:    Normocephalic, without obvious abnormality, atraumatic   Eyes:    PERRL, conjunctiva/corneas clear, EOM's intact   Ears:    Normal TM's and external ear canals, both ears   Nose:   Nares normal, septum midline, mucosa normal, no drainage   or sinus tenderness   Throat:   Lips, mucosa, and tongue normal; teeth and gums normal   Neck:   Supple, symmetrical, trachea midline, no adenopathy;        thyroid:  No enlargement/tenderness/nodules; no carotid    bruit or JVD   Back:     Symmetric, no curvature, ROM normal, no CVA tenderness   Lungs:     Clear to auscultation bilaterally, respirations unlabored   Chest wall:    No tenderness or deformity   Heart:    Regular rate and rhythm, S1 and S2 normal, no murmur,        rub or gallop   Abdomen:     Soft, non-tender, bowel sounds active all four quadrants,     no masses, no organomegaly   Extremities:   Extremities normal, atraumatic, no cyanosis or edema   Pulses:   2+ and symmetric all extremities   Skin:   Skin color, texture, turgor normal, no rashes or lesions   Lymph nodes:   Cervical, supraclavicular, and axillary nodes normal   Neurologic:   CNII-XII intact. Normal strength, sensation and reflexes       throughout      Results for orders placed or performed in visit on 08/29/18   TSH   Result Value Ref Range    TSH 1.410 0.470 - " 4.680 mIU/mL   T4, Free   Result Value Ref Range    Free T4 1.00 0.78 - 2.19 ng/dL   Comprehensive Metabolic Panel   Result Value Ref Range    Glucose 123 (H) 74 - 98 mg/dL    BUN 12 7 - 20 mg/dL    Creatinine 0.90 0.60 - 1.30 mg/dL    eGFR Non African Am 89 >60 mL/min/1.73    eGFR African Am 108 >60 mL/min/1.73    BUN/Creatinine Ratio 13.3 6.3 - 21.9    Sodium 141 137 - 145 mmol/L    Potassium 3.7 3.5 - 5.1 mmol/L    Chloride 102 98 - 107 mmol/L    Total CO2 29.0 26.0 - 30.0 mmol/L    Calcium 9.7 8.4 - 10.2 mg/dL    Total Protein 7.3 6.3 - 8.2 g/dL    Albumin 4.30 3.50 - 5.00 g/dL    Globulin 3.0 gm/dL    A/G Ratio 1.4 1.0 - 2.0 g/dL    Total Bilirubin 0.8 0.2 - 1.3 mg/dL    Alkaline Phosphatase 135 (H) 38 - 126 U/L    AST (SGOT) 42 15 - 46 U/L    ALT (SGPT) 51 13 - 69 U/L   Vitamin B12   Result Value Ref Range    Vitamin B-12 560 239 - 931 pg/mL   Lipid Panel   Result Value Ref Range    Total Cholesterol 229 (H) 0 - 199 mg/dL    Triglycerides 230 (H) <150 mg/dL    HDL Cholesterol 42 40 - 60 mg/dL    VLDL Cholesterol 46 mg/dL    LDL Cholesterol  141 (H) 0 - 99 mg/dL   Hemoglobin A1c   Result Value Ref Range    Hemoglobin A1C 6.60 %   MicroAlbumin, Urine, Random - Urine, Clean Catch   Result Value Ref Range    Microalbumin, Urine 45.4 Not Estab. ug/mL   CBC & Differential   Result Value Ref Range    WBC 10.53 4.80 - 10.80 10*3/mm3    RBC 5.33 4.70 - 6.10 10*6/mm3    Hemoglobin 15.8 14.0 - 18.0 g/dL    Hematocrit 48.1 42.0 - 52.0 %    MCV 90.2 80.0 - 94.0 fL    MCH 29.6 27.0 - 31.0 pg    MCHC 32.8 30.0 - 37.0 g/dL    RDW 14.5 11.5 - 14.5 %    Platelets 335 130 - 400 10*3/mm3    Neutrophil Rel % 72.4 37.0 - 80.0 %    Lymphocyte Rel % 17.9 10.0 - 50.0 %    Monocyte Rel % 7.7 0.0 - 12.0 %    Eosinophil Rel % 1.2 0.0 - 7.0 %    Basophil Rel % 0.4 0.0 - 2.5 %    Neutrophils Absolute 7.63 (H) 2.00 - 6.90 10*3/mm3    Lymphocytes Absolute 1.88 0.60 - 3.40 10*3/mm3    Monocytes Absolute 0.81 0.00 - 0.90 10*3/mm3     Eosinophils Absolute 0.13 0.00 - 0.70 10*3/mm3    Basophils Absolute 0.04 0.00 - 0.20 10*3/mm3    Immature Granulocyte Rel % 0.4 0.0 - 0.6 %    Immature Grans Absolute 0.04 0.00 - 0.06 10*3/mm3    nRBC 0.0 0.0 - 0.0 /100 WBC         Assessment/Plan       There are no diagnoses linked to this encounter.  No Follow-up on file.          There are no Patient Instructions on file for this visit.     Woo Betancourt MD    Assessment/Plan

## 2018-10-17 NOTE — PROGRESS NOTES
Subjective     Patient ID: Cal Oliver Jr. is a 53 y.o. male. Patient is here for management of multiple medical problems.     Chief Complaint   Patient presents with   • Hypertension     follow-up   • medication refills     patient needs refills on Symbicort inhaler     Hypertension   This is a chronic problem. The current episode started today. The problem is uncontrolled. Pertinent negatives include no anxiety, blurred vision, chest pain or headaches. There are no associated agents to hypertension. Risk factors for coronary artery disease include diabetes mellitus, obesity, male gender and sedentary lifestyle. Past treatments include ACE inhibitors. Current antihypertension treatment includes calcium channel blockers, angiotensin blockers, diuretics and beta blockers. The current treatment provides moderate improvement. There are no compliance problems.  Hypertensive end-organ damage includes CVA. There is no history of angina or kidney disease. There is no history of chronic renal disease, coarctation of the aorta, hyperaldosteronism, hypercortisolism or hyperparathyroidism.      Pt wt is down. Just got back from [place].   Pt much improved in mood.             The following portions of the patient's history were reviewed and updated as appropriate: allergies, current medications, past family history, past medical history, past social history, past surgical history and problem list.    Review of Systems   Eyes: Negative for blurred vision.   Cardiovascular: Negative for chest pain.   Neurological: Negative for headaches.       Current Outpatient Prescriptions:   •  amLODIPine (NORVASC) 10 MG tablet, Take 0.5 tablets by mouth Daily., Disp: 90 tablet, Rfl: 3  •  budesonide-formoterol (SYMBICORT) 160-4.5 MCG/ACT inhaler, Inhale 2 puffs 2 (Two) Times a Day., Disp: 1 inhaler, Rfl: 11  •  CloNIDine (CATAPRES) 0.1 MG tablet, Take 1 tablet by mouth every night at bedtime., Disp: 30 tablet, Rfl: 11  •  colestipol  "(COLESTID) 1 g tablet, Take 1 tablet by mouth 2 (Two) Times a Day. Adjust dose to achieve 1-3 BM's daily., Disp: 60 tablet, Rfl: 5  •  cyclobenzaprine (FLEXERIL) 10 MG tablet, take 1 tablet by mouth twice a day, Disp: 30 tablet, Rfl: 11  •  HYDROcodone-acetaminophen (NORCO)  MG per tablet, Take  by mouth., Disp: , Rfl:   •  losartan-hydrochlorothiazide (HYZAAR) 100-25 MG per tablet, Take 1 tablet by mouth Daily., Disp: 90 tablet, Rfl: 3  •  metFORMIN (GLUCOPHAGE) 500 MG tablet, Take 1 tablet by mouth 2 (Two) Times a Day., Disp: 90 tablet, Rfl: 3  •  metoprolol succinate XL (TOPROL-XL) 25 MG 24 hr tablet, take 1 tablet by mouth once daily, Disp: 30 tablet, Rfl: 5  •  omeprazole (priLOSEC) 20 MG capsule, Take 1 capsule by mouth Daily., Disp: 30 capsule, Rfl: 5  •  ondansetron (ZOFRAN) 4 MG tablet, Take 1 tablet by mouth every 4 (four) hours as needed., Disp: , Rfl:   •  potassium chloride (K-TAB) 20 MEQ tablet controlled-release ER tablet, Take 1 tablet by mouth 2 (Two) Times a Day With Meals., Disp: 180 tablet, Rfl: 3  •  rOPINIRole (REQUIP) 2 MG tablet, take 1 tablet by mouth at bedtime, Disp: 30 tablet, Rfl: 3  •  topiramate (TOPAMAX) 50 MG tablet, take 1 tablet by mouth twice a day, Disp: 180 tablet, Rfl: 1  •  VENTOLIN  (90 Base) MCG/ACT inhaler, inhale 1 to 2 puffs by mouth every 4 to 6 hours if needed, Disp: 54 g, Rfl: 2    Objective      Blood pressure 164/98, pulse 84, temperature 97.8 °F (36.6 °C), temperature source Oral, resp. rate 16, height 180.3 cm (71\"), weight (!) 168 kg (370 lb), SpO2 99 %.    Physical Exam    Cal had a diabetic foot exam performed today.   During the foot exam he had a monofilament test performed.    Neurological Sensory Findings - Unaltered hot/cold right ankle/foot discrimination and unaltered hot/cold left ankle/foot discrimination. Unaltered sharp/dull right ankle/foot discrimination and unaltered sharp/dull left ankle/foot discrimination. No right ankle/foot " altered proprioception and no left ankle/foot altered proprioception  Vascular Status -  His right foot exhibits normal foot vasculature  and no edema. His left foot exhibits normal foot vasculature  and no edema.  Skin Integrity  -  His right foot skin is intact..       General Appearance:    Alert, cooperative, no distress, appears stated age   Head:    Normocephalic, without obvious abnormality, atraumatic   Eyes:    PERRL, conjunctiva/corneas clear, EOM's intact   Ears:    Normal TM's and external ear canals, both ears   Nose:   Nares normal, septum midline, mucosa normal, no drainage   or sinus tenderness   Throat:   Lips, mucosa, and tongue normal; teeth and gums normal   Neck:   Supraclavicular ln right side.  Supple, symmetrical, trachea midline, no adenopathy;        thyroid:  No enlargement/tenderness/nodules; no carotid    bruit or JVD   Back:     Symmetric, no curvature, ROM normal, no CVA tenderness   Lungs:     Clear to auscultation bilaterally, respirations unlabored   Chest wall:    No tenderness or deformity   Heart:    Regular rate and rhythm, S1 and S2 normal, no murmur,        rub or gallop   Abdomen:     Soft, non-tender, bowel sounds active all four quadrants,     no masses, no organomegaly   Extremities:   Extremities normal, atraumatic, no cyanosis or edema   Pulses:   2+ and symmetric all extremities   Skin:   Skin color, texture, turgor normal, no rashes or lesions   Lymph nodes:   Cervical, supraclavicular, and axillary nodes normal   Neurologic:   CNII-XII intact. Normal strength, sensation and reflexes       throughout      Results for orders placed or performed in visit on 08/29/18   TSH   Result Value Ref Range    TSH 1.410 0.470 - 4.680 mIU/mL   T4, Free   Result Value Ref Range    Free T4 1.00 0.78 - 2.19 ng/dL   Comprehensive Metabolic Panel   Result Value Ref Range    Glucose 123 (H) 74 - 98 mg/dL    BUN 12 7 - 20 mg/dL    Creatinine 0.90 0.60 - 1.30 mg/dL    eGFR Non African Am 89  >60 mL/min/1.73    eGFR African Am 108 >60 mL/min/1.73    BUN/Creatinine Ratio 13.3 6.3 - 21.9    Sodium 141 137 - 145 mmol/L    Potassium 3.7 3.5 - 5.1 mmol/L    Chloride 102 98 - 107 mmol/L    Total CO2 29.0 26.0 - 30.0 mmol/L    Calcium 9.7 8.4 - 10.2 mg/dL    Total Protein 7.3 6.3 - 8.2 g/dL    Albumin 4.30 3.50 - 5.00 g/dL    Globulin 3.0 gm/dL    A/G Ratio 1.4 1.0 - 2.0 g/dL    Total Bilirubin 0.8 0.2 - 1.3 mg/dL    Alkaline Phosphatase 135 (H) 38 - 126 U/L    AST (SGOT) 42 15 - 46 U/L    ALT (SGPT) 51 13 - 69 U/L   Vitamin B12   Result Value Ref Range    Vitamin B-12 560 239 - 931 pg/mL   Lipid Panel   Result Value Ref Range    Total Cholesterol 229 (H) 0 - 199 mg/dL    Triglycerides 230 (H) <150 mg/dL    HDL Cholesterol 42 40 - 60 mg/dL    VLDL Cholesterol 46 mg/dL    LDL Cholesterol  141 (H) 0 - 99 mg/dL   Hemoglobin A1c   Result Value Ref Range    Hemoglobin A1C 6.60 %   MicroAlbumin, Urine, Random - Urine, Clean Catch   Result Value Ref Range    Microalbumin, Urine 45.4 Not Estab. ug/mL   CBC & Differential   Result Value Ref Range    WBC 10.53 4.80 - 10.80 10*3/mm3    RBC 5.33 4.70 - 6.10 10*6/mm3    Hemoglobin 15.8 14.0 - 18.0 g/dL    Hematocrit 48.1 42.0 - 52.0 %    MCV 90.2 80.0 - 94.0 fL    MCH 29.6 27.0 - 31.0 pg    MCHC 32.8 30.0 - 37.0 g/dL    RDW 14.5 11.5 - 14.5 %    Platelets 335 130 - 400 10*3/mm3    Neutrophil Rel % 72.4 37.0 - 80.0 %    Lymphocyte Rel % 17.9 10.0 - 50.0 %    Monocyte Rel % 7.7 0.0 - 12.0 %    Eosinophil Rel % 1.2 0.0 - 7.0 %    Basophil Rel % 0.4 0.0 - 2.5 %    Neutrophils Absolute 7.63 (H) 2.00 - 6.90 10*3/mm3    Lymphocytes Absolute 1.88 0.60 - 3.40 10*3/mm3    Monocytes Absolute 0.81 0.00 - 0.90 10*3/mm3    Eosinophils Absolute 0.13 0.00 - 0.70 10*3/mm3    Basophils Absolute 0.04 0.00 - 0.20 10*3/mm3    Immature Granulocyte Rel % 0.4 0.0 - 0.6 %    Immature Grans Absolute 0.04 0.00 - 0.06 10*3/mm3    nRBC 0.0 0.0 - 0.0 /100 WBC         Assessment/Plan     Decreased soda.   Diet going better          Cal was seen today for hypertension and medication refills.    Diagnoses and all orders for this visit:    Essential hypertension  -     amLODIPine (NORVASC) 10 MG tablet; Take 0.5 tablets by mouth Daily.    Type 2 diabetes mellitus without complication, without long-term current use of insulin (CMS/[facility])    Screen for colon cancer  -     Ambulatory Referral to General Surgery      Return in about 6 weeks (around 11/28/2018).          There are no Patient Instructions on file for this visit.     Woo Betancourt MD    Assessment/Plan

## 2018-10-18 ENCOUNTER — TELEPHONE (OUTPATIENT)
Dept: SURGERY | Facility: CLINIC | Age: 53
End: 2018-10-18

## 2018-10-22 RX ORDER — POLYETHYLENE GLYCOL 3350 17 G/17G
255 POWDER, FOR SOLUTION ORAL ONCE
Qty: 15 PACKET | Refills: 0 | Status: SHIPPED | OUTPATIENT
Start: 2018-10-22 | End: 2018-10-22

## 2018-10-22 RX ORDER — BISACODYL 5 MG/1
TABLET, DELAYED RELEASE ORAL
Qty: 4 TABLET | Refills: 0 | Status: SHIPPED | OUTPATIENT
Start: 2018-10-22 | End: 2019-09-25

## 2018-10-29 RX ORDER — OMEPRAZOLE 20 MG/1
CAPSULE, DELAYED RELEASE ORAL
Qty: 30 CAPSULE | Refills: 5 | Status: SHIPPED | OUTPATIENT
Start: 2018-10-29 | End: 2019-03-05 | Stop reason: SDUPTHER

## 2018-11-29 ENCOUNTER — OFFICE VISIT (OUTPATIENT)
Dept: INTERNAL MEDICINE | Facility: CLINIC | Age: 53
End: 2018-11-29

## 2018-11-29 VITALS
DIASTOLIC BLOOD PRESSURE: 107 MMHG | OXYGEN SATURATION: 98 % | RESPIRATION RATE: 16 BRPM | HEART RATE: 86 BPM | HEIGHT: 71 IN | WEIGHT: 315 LBS | SYSTOLIC BLOOD PRESSURE: 170 MMHG | TEMPERATURE: 98 F | BODY MASS INDEX: 44.1 KG/M2

## 2018-11-29 DIAGNOSIS — I10 ESSENTIAL HYPERTENSION: ICD-10-CM

## 2018-11-29 DIAGNOSIS — E11.9 TYPE 2 DIABETES MELLITUS WITHOUT COMPLICATION, WITHOUT LONG-TERM CURRENT USE OF INSULIN (HCC): Primary | ICD-10-CM

## 2018-11-29 PROCEDURE — 99214 OFFICE O/P EST MOD 30 MIN: CPT | Performed by: INTERNAL MEDICINE

## 2018-11-29 RX ORDER — HYDRALAZINE HYDROCHLORIDE 10 MG/1
10 TABLET, FILM COATED ORAL 3 TIMES DAILY
Qty: 90 TABLET | Refills: 5 | Status: SHIPPED | OUTPATIENT
Start: 2018-11-29 | End: 2018-12-07 | Stop reason: SDUPTHER

## 2018-11-29 NOTE — PROGRESS NOTES
Subjective     Patient ID: Cal Oliver Jr. is a 53 y.o. male. Patient is here for management of multiple medical problems.     Chief Complaint   Patient presents with   • Hypertension     follow-up     Hypertension   This is a chronic problem. The problem is uncontrolled. Associated symptoms include anxiety. Pertinent negatives include no blurred vision, chest pain or headaches. There are no associated agents to hypertension. Past treatments include calcium channel blockers, angiotensin blockers and diuretics. Current antihypertension treatment includes angiotensin blockers, calcium channel blockers, diuretics and beta blockers. The current treatment provides mild improvement. There are no compliance problems.  There is no history of angina, kidney disease, CAD/MI, heart failure or left ventricular hypertrophy. There is no history of chronic renal disease, coarctation of the aorta, hypercortisolism or hyperparathyroidism.              The following portions of the patient's history were reviewed and updated as appropriate: allergies, current medications, past family history, past medical history, past social history, past surgical history and problem list.    Review of Systems   Constitutional: Positive for fatigue.   Eyes: Negative for blurred vision.   Cardiovascular: Negative for chest pain.   Neurological: Negative for headaches.   Psychiatric/Behavioral: Positive for dysphoric mood and sleep disturbance.   All other systems reviewed and are negative.      Current Outpatient Medications:   •  amLODIPine (NORVASC) 10 MG tablet, Take 0.5 tablets by mouth Daily., Disp: 90 tablet, Rfl: 3  •  bisacodyl (DULCOLAX) 5 MG EC tablet, As directed/colon prep, Disp: 4 tablet, Rfl: 0  •  budesonide-formoterol (SYMBICORT) 160-4.5 MCG/ACT inhaler, Inhale 2 puffs 2 (Two) Times a Day., Disp: 1 inhaler, Rfl: 11  •  CloNIDine (CATAPRES) 0.1 MG tablet, Take 1 tablet by mouth every night at bedtime., Disp: 30 tablet, Rfl: 11  •  " colestipol (COLESTID) 1 g tablet, Take 1 tablet by mouth 2 (Two) Times a Day. Adjust dose to achieve 1-3 BM's daily., Disp: 60 tablet, Rfl: 5  •  cyclobenzaprine (FLEXERIL) 10 MG tablet, take 1 tablet by mouth twice a day, Disp: 30 tablet, Rfl: 11  •  HYDROcodone-acetaminophen (NORCO)  MG per tablet, Take  by mouth., Disp: , Rfl:   •  losartan-hydrochlorothiazide (HYZAAR) 100-25 MG per tablet, Take 1 tablet by mouth Daily., Disp: 90 tablet, Rfl: 3  •  metFORMIN (GLUCOPHAGE) 500 MG tablet, Take 1 tablet by mouth 2 (Two) Times a Day., Disp: 90 tablet, Rfl: 3  •  metoprolol succinate XL (TOPROL-XL) 25 MG 24 hr tablet, take 1 tablet by mouth once daily, Disp: 30 tablet, Rfl: 5  •  omeprazole (priLOSEC) 20 MG capsule, take 1 capsule by mouth once daily, Disp: 30 capsule, Rfl: 5  •  ondansetron (ZOFRAN) 4 MG tablet, Take 1 tablet by mouth every 4 (four) hours as needed., Disp: , Rfl:   •  potassium chloride (K-TAB) 20 MEQ tablet controlled-release ER tablet, Take 1 tablet by mouth 2 (Two) Times a Day With Meals., Disp: 180 tablet, Rfl: 3  •  rOPINIRole (REQUIP) 2 MG tablet, take 1 tablet by mouth at bedtime, Disp: 30 tablet, Rfl: 3  •  topiramate (TOPAMAX) 50 MG tablet, take 1 tablet by mouth twice a day, Disp: 180 tablet, Rfl: 1  •  VENTOLIN  (90 Base) MCG/ACT inhaler, inhale 1 to 2 puffs by mouth every 4 to 6 hours if needed, Disp: 54 g, Rfl: 2  •  hydrALAZINE (APRESOLINE) 10 MG tablet, Take 1 tablet by mouth 3 (Three) Times a Day., Disp: 90 tablet, Rfl: 5    Objective      Blood pressure (!) 170/107, pulse 86, temperature 98 °F (36.7 °C), temperature source Oral, resp. rate 16, height 180.3 cm (71\"), weight (!) 167 kg (369 lb), SpO2 98 %.    Physical Exam     General Appearance:    Alert, cooperative, no distress, appears stated age   Head:    Normocephalic, without obvious abnormality, atraumatic   Eyes:    PERRL, conjunctiva/corneas clear, EOM's intact   Ears:    Normal TM's and external ear canals, " both ears   Nose:   Nares normal, septum midline, mucosa normal, no drainage   or sinus tenderness   Throat:   Lips, mucosa, and tongue normal; teeth and gums normal   Neck:   Supple, symmetrical, trachea midline, no adenopathy;        thyroid:  No enlargement/tenderness/nodules; no carotid    bruit or JVD   Back:     Symmetric, no curvature, ROM normal, no CVA tenderness   Lungs:     Clear to auscultation bilaterally, respirations unlabored   Chest wall:    No tenderness or deformity   Heart:    Regular rate and rhythm, S1 and S2 normal, no murmur,        rub or gallop   Abdomen:     Soft, non-tender, bowel sounds active all four quadrants,     no masses, no organomegaly   Extremities:   Extremities normal, atraumatic, no cyanosis or edema   Pulses:   2+ and symmetric all extremities   Skin:   Skin color, texture, turgor normal, no rashes or lesions   Lymph nodes:   Cervical, supraclavicular, and axillary nodes normal   Neurologic:   CNII-XII intact. Normal strength, sensation and reflexes       throughout      Results for orders placed or performed in visit on 08/29/18   TSH   Result Value Ref Range    TSH 1.410 0.470 - 4.680 mIU/mL   T4, Free   Result Value Ref Range    Free T4 1.00 0.78 - 2.19 ng/dL   Comprehensive Metabolic Panel   Result Value Ref Range    Glucose 123 (H) 74 - 98 mg/dL    BUN 12 7 - 20 mg/dL    Creatinine 0.90 0.60 - 1.30 mg/dL    eGFR Non African Am 89 >60 mL/min/1.73    eGFR African Am 108 >60 mL/min/1.73    BUN/Creatinine Ratio 13.3 6.3 - 21.9    Sodium 141 137 - 145 mmol/L    Potassium 3.7 3.5 - 5.1 mmol/L    Chloride 102 98 - 107 mmol/L    Total CO2 29.0 26.0 - 30.0 mmol/L    Calcium 9.7 8.4 - 10.2 mg/dL    Total Protein 7.3 6.3 - 8.2 g/dL    Albumin 4.30 3.50 - 5.00 g/dL    Globulin 3.0 gm/dL    A/G Ratio 1.4 1.0 - 2.0 g/dL    Total Bilirubin 0.8 0.2 - 1.3 mg/dL    Alkaline Phosphatase 135 (H) 38 - 126 U/L    AST (SGOT) 42 15 - 46 U/L    ALT (SGPT) 51 13 - 69 U/L   Vitamin B12   Result  Value Ref Range    Vitamin B-12 560 239 - 931 pg/mL   Lipid Panel   Result Value Ref Range    Total Cholesterol 229 (H) 0 - 199 mg/dL    Triglycerides 230 (H) <150 mg/dL    HDL Cholesterol 42 40 - 60 mg/dL    VLDL Cholesterol 46 mg/dL    LDL Cholesterol  141 (H) 0 - 99 mg/dL   Hemoglobin A1c   Result Value Ref Range    Hemoglobin A1C 6.60 %   MicroAlbumin, Urine, Random - Urine, Clean Catch   Result Value Ref Range    Microalbumin, Urine 45.4 Not Estab. ug/mL   CBC & Differential   Result Value Ref Range    WBC 10.53 4.80 - 10.80 10*3/mm3    RBC 5.33 4.70 - 6.10 10*6/mm3    Hemoglobin 15.8 14.0 - 18.0 g/dL    Hematocrit 48.1 42.0 - 52.0 %    MCV 90.2 80.0 - 94.0 fL    MCH 29.6 27.0 - 31.0 pg    MCHC 32.8 30.0 - 37.0 g/dL    RDW 14.5 11.5 - 14.5 %    Platelets 335 130 - 400 10*3/mm3    Neutrophil Rel % 72.4 37.0 - 80.0 %    Lymphocyte Rel % 17.9 10.0 - 50.0 %    Monocyte Rel % 7.7 0.0 - 12.0 %    Eosinophil Rel % 1.2 0.0 - 7.0 %    Basophil Rel % 0.4 0.0 - 2.5 %    Neutrophils Absolute 7.63 (H) 2.00 - 6.90 10*3/mm3    Lymphocytes Absolute 1.88 0.60 - 3.40 10*3/mm3    Monocytes Absolute 0.81 0.00 - 0.90 10*3/mm3    Eosinophils Absolute 0.13 0.00 - 0.70 10*3/mm3    Basophils Absolute 0.04 0.00 - 0.20 10*3/mm3    Immature Granulocyte Rel % 0.4 0.0 - 0.6 %    Immature Grans Absolute 0.04 0.00 - 0.06 10*3/mm3    nRBC 0.0 0.0 - 0.0 /100 WBC         Assessment/Plan       Cal was seen today for hypertension.    Diagnoses and all orders for this visit:    Type 2 diabetes mellitus without complication, without long-term current use of insulin (CMS/[facility])    Essential hypertension    Other orders  -     hydrALAZINE (APRESOLINE) 10 MG tablet; Take 1 tablet by mouth 3 (Three) Times a Day.      Return in about 1 week (around 12/6/2018).          There are no Patient Instructions on file for this visit.     Woo Betancourt MD    Assessment/Plan

## 2018-12-07 ENCOUNTER — OFFICE VISIT (OUTPATIENT)
Dept: INTERNAL MEDICINE | Facility: CLINIC | Age: 53
End: 2018-12-07

## 2018-12-07 VITALS
SYSTOLIC BLOOD PRESSURE: 163 MMHG | WEIGHT: 315 LBS | OXYGEN SATURATION: 98 % | BODY MASS INDEX: 44.1 KG/M2 | HEART RATE: 88 BPM | DIASTOLIC BLOOD PRESSURE: 77 MMHG | RESPIRATION RATE: 16 BRPM | TEMPERATURE: 97.5 F | HEIGHT: 71 IN

## 2018-12-07 DIAGNOSIS — I10 ESSENTIAL HYPERTENSION: Primary | ICD-10-CM

## 2018-12-07 PROCEDURE — 99213 OFFICE O/P EST LOW 20 MIN: CPT | Performed by: INTERNAL MEDICINE

## 2018-12-07 RX ORDER — HYDRALAZINE HYDROCHLORIDE 25 MG/1
25 TABLET, FILM COATED ORAL 3 TIMES DAILY
Qty: 90 TABLET | Refills: 11 | Status: SHIPPED | OUTPATIENT
Start: 2018-12-07 | End: 2019-03-08 | Stop reason: SDUPTHER

## 2018-12-07 RX ORDER — ROPINIROLE 2 MG/1
2 TABLET, FILM COATED ORAL
Qty: 30 TABLET | Refills: 11 | Status: SHIPPED | OUTPATIENT
Start: 2018-12-07 | End: 2019-06-21 | Stop reason: SDUPTHER

## 2018-12-07 NOTE — PROGRESS NOTES
Subjective     Patient ID: Cal Oliver Jr. is a 53 y.o. male. Patient is here for management of multiple medical problems.     Chief Complaint   Patient presents with   • Hypertension     follow-up   • medication refills     patient need refills on Ropinirole 2 mg sent to [facility]     History of Present Illness         The following portions of the patient's history were reviewed and updated as appropriate: allergies, current medications, past family history, past medical history, past social history, past surgical history and problem list.    Review of Systems   Constitutional: Positive for fatigue.   Musculoskeletal: Negative for myalgias and neck pain.   Psychiatric/Behavioral: Negative for sleep disturbance.   All other systems reviewed and are negative.      Current Outpatient Medications:   •  amLODIPine (NORVASC) 10 MG tablet, Take 0.5 tablets by mouth Daily., Disp: 90 tablet, Rfl: 3  •  bisacodyl (DULCOLAX) 5 MG EC tablet, As directed/colon prep, Disp: 4 tablet, Rfl: 0  •  budesonide-formoterol (SYMBICORT) 160-4.5 MCG/ACT inhaler, Inhale 2 puffs 2 (Two) Times a Day., Disp: 1 inhaler, Rfl: 11  •  CloNIDine (CATAPRES) 0.1 MG tablet, Take 1 tablet by mouth every night at bedtime., Disp: 30 tablet, Rfl: 11  •  colestipol (COLESTID) 1 g tablet, Take 1 tablet by mouth 2 (Two) Times a Day. Adjust dose to achieve 1-3 BM's daily., Disp: 60 tablet, Rfl: 5  •  cyclobenzaprine (FLEXERIL) 10 MG tablet, take 1 tablet by mouth twice a day, Disp: 30 tablet, Rfl: 11  •  hydrALAZINE (APRESOLINE) 25 MG tablet, Take 1 tablet by mouth 3 (Three) Times a Day., Disp: 90 tablet, Rfl: 11  •  HYDROcodone-acetaminophen (NORCO)  MG per tablet, Take  by mouth., Disp: , Rfl:   •  losartan-hydrochlorothiazide (HYZAAR) 100-25 MG per tablet, Take 1 tablet by mouth Daily., Disp: 90 tablet, Rfl: 3  •  metFORMIN (GLUCOPHAGE) 500 MG tablet, Take 1 tablet by mouth 2 (Two) Times a Day., Disp: 90 tablet, Rfl: 3  •  metoprolol succinate  "XL (TOPROL-XL) 25 MG 24 hr tablet, take 1 tablet by mouth once daily, Disp: 30 tablet, Rfl: 5  •  omeprazole (priLOSEC) 20 MG capsule, take 1 capsule by mouth once daily, Disp: 30 capsule, Rfl: 5  •  ondansetron (ZOFRAN) 4 MG tablet, Take 1 tablet by mouth every 4 (four) hours as needed., Disp: , Rfl:   •  potassium chloride (K-TAB) 20 MEQ tablet controlled-release ER tablet, Take 1 tablet by mouth 2 (Two) Times a Day With Meals., Disp: 180 tablet, Rfl: 3  •  rOPINIRole (REQUIP) 2 MG tablet, Take 1 tablet by mouth every night at bedtime., Disp: 30 tablet, Rfl: 11  •  topiramate (TOPAMAX) 50 MG tablet, take 1 tablet by mouth twice a day, Disp: 180 tablet, Rfl: 1  •  VENTOLIN  (90 Base) MCG/ACT inhaler, inhale 1 to 2 puffs by mouth every 4 to 6 hours if needed, Disp: 54 g, Rfl: 2    Objective      Blood pressure 163/77, pulse 88, temperature 97.5 °F (36.4 °C), temperature source Oral, resp. rate 16, height 180.3 cm (71\"), weight (!) 168 kg (370 lb), SpO2 98 %.    Physical Exam     General Appearance:    Alert, cooperative, no distress, appears stated age   Head:    Normocephalic, without obvious abnormality, atraumatic   Eyes:    PERRL, conjunctiva/corneas clear, EOM's intact   Ears:    Normal TM's and external ear canals, both ears   Nose:   Nares normal, septum midline, mucosa normal, no drainage   or sinus tenderness   Throat:   Lips, mucosa, and tongue normal; teeth and gums normal   Neck:   Supple, symmetrical, trachea midline, no adenopathy;        thyroid:  No enlargement/tenderness/nodules; no carotid    bruit or JVD   Back:     Symmetric, no curvature, ROM normal, no CVA tenderness   Lungs:     Clear to auscultation bilaterally, respirations unlabored   Chest wall:    No tenderness or deformity   Heart:    Regular rate and rhythm, S1 and S2 normal, no murmur,        rub or gallop   Abdomen:     Soft, non-tender, bowel sounds active all four quadrants,     no masses, no organomegaly   Extremities:   " Extremities normal, atraumatic, no cyanosis or edema   Pulses:   2+ and symmetric all extremities   Skin:   Skin color, texture, turgor normal, no rashes or lesions   Lymph nodes:   Cervical, supraclavicular, and axillary nodes normal   Neurologic:   CNII-XII intact. Normal strength, sensation and reflexes       throughout      Results for orders placed or performed in visit on 08/29/18   TSH   Result Value Ref Range    TSH 1.410 0.470 - 4.680 mIU/mL   T4, Free   Result Value Ref Range    Free T4 1.00 0.78 - 2.19 ng/dL   Comprehensive Metabolic Panel   Result Value Ref Range    Glucose 123 (H) 74 - 98 mg/dL    BUN 12 7 - 20 mg/dL    Creatinine 0.90 0.60 - 1.30 mg/dL    eGFR Non African Am 89 >60 mL/min/1.73    eGFR African Am 108 >60 mL/min/1.73    BUN/Creatinine Ratio 13.3 6.3 - 21.9    Sodium 141 137 - 145 mmol/L    Potassium 3.7 3.5 - 5.1 mmol/L    Chloride 102 98 - 107 mmol/L    Total CO2 29.0 26.0 - 30.0 mmol/L    Calcium 9.7 8.4 - 10.2 mg/dL    Total Protein 7.3 6.3 - 8.2 g/dL    Albumin 4.30 3.50 - 5.00 g/dL    Globulin 3.0 gm/dL    A/G Ratio 1.4 1.0 - 2.0 g/dL    Total Bilirubin 0.8 0.2 - 1.3 mg/dL    Alkaline Phosphatase 135 (H) 38 - 126 U/L    AST (SGOT) 42 15 - 46 U/L    ALT (SGPT) 51 13 - 69 U/L   Vitamin B12   Result Value Ref Range    Vitamin B-12 560 239 - 931 pg/mL   Lipid Panel   Result Value Ref Range    Total Cholesterol 229 (H) 0 - 199 mg/dL    Triglycerides 230 (H) <150 mg/dL    HDL Cholesterol 42 40 - 60 mg/dL    VLDL Cholesterol 46 mg/dL    LDL Cholesterol  141 (H) 0 - 99 mg/dL   Hemoglobin A1c   Result Value Ref Range    Hemoglobin A1C 6.60 %   MicroAlbumin, Urine, Random - Urine, Clean Catch   Result Value Ref Range    Microalbumin, Urine 45.4 Not Estab. ug/mL   CBC & Differential   Result Value Ref Range    WBC 10.53 4.80 - 10.80 10*3/mm3    RBC 5.33 4.70 - 6.10 10*6/mm3    Hemoglobin 15.8 14.0 - 18.0 g/dL    Hematocrit 48.1 42.0 - 52.0 %    MCV 90.2 80.0 - 94.0 fL    MCH 29.6 27.0 - 31.0  pg    MCHC 32.8 30.0 - 37.0 g/dL    RDW 14.5 11.5 - 14.5 %    Platelets 335 130 - 400 10*3/mm3    Neutrophil Rel % 72.4 37.0 - 80.0 %    Lymphocyte Rel % 17.9 10.0 - 50.0 %    Monocyte Rel % 7.7 0.0 - 12.0 %    Eosinophil Rel % 1.2 0.0 - 7.0 %    Basophil Rel % 0.4 0.0 - 2.5 %    Neutrophils Absolute 7.63 (H) 2.00 - 6.90 10*3/mm3    Lymphocytes Absolute 1.88 0.60 - 3.40 10*3/mm3    Monocytes Absolute 0.81 0.00 - 0.90 10*3/mm3    Eosinophils Absolute 0.13 0.00 - 0.70 10*3/mm3    Basophils Absolute 0.04 0.00 - 0.20 10*3/mm3    Immature Granulocyte Rel % 0.4 0.0 - 0.6 %    Immature Grans Absolute 0.04 0.00 - 0.06 10*3/mm3    nRBC 0.0 0.0 - 0.0 /100 WBC         Assessment/Plan       Cal was seen today for hypertension and medication refills.    Diagnoses and all orders for this visit:    Essential hypertension    Other orders  -     hydrALAZINE (APRESOLINE) 25 MG tablet; Take 1 tablet by mouth 3 (Three) Times a Day.  -     rOPINIRole (REQUIP) 2 MG tablet; Take 1 tablet by mouth every night at bedtime.      Return in about 6 weeks (around 1/18/2019).          There are no Patient Instructions on file for this visit.     Woo Betancourt MD    Assessment/Plan

## 2019-01-02 RX ORDER — TOPIRAMATE 50 MG/1
TABLET, FILM COATED ORAL
Qty: 180 TABLET | Refills: 1 | Status: SHIPPED | OUTPATIENT
Start: 2019-01-02 | End: 2019-12-30 | Stop reason: SDUPTHER

## 2019-01-09 DIAGNOSIS — J44.9 MILD CHRONIC OBSTRUCTIVE PULMONARY DISEASE (HCC): ICD-10-CM

## 2019-01-09 RX ORDER — BUDESONIDE AND FORMOTEROL FUMARATE DIHYDRATE 160; 4.5 UG/1; UG/1
2 AEROSOL RESPIRATORY (INHALATION)
Qty: 1 INHALER | Refills: 11 | Status: SHIPPED | OUTPATIENT
Start: 2019-01-09 | End: 2019-10-17

## 2019-02-08 ENCOUNTER — OFFICE VISIT (OUTPATIENT)
Dept: INTERNAL MEDICINE | Facility: CLINIC | Age: 54
End: 2019-02-08

## 2019-02-08 VITALS
WEIGHT: 315 LBS | DIASTOLIC BLOOD PRESSURE: 82 MMHG | TEMPERATURE: 97.6 F | HEIGHT: 71 IN | SYSTOLIC BLOOD PRESSURE: 145 MMHG | RESPIRATION RATE: 16 BRPM | HEART RATE: 88 BPM | BODY MASS INDEX: 44.1 KG/M2 | OXYGEN SATURATION: 98 %

## 2019-02-08 DIAGNOSIS — M25.562 CHRONIC PAIN OF BOTH KNEES: ICD-10-CM

## 2019-02-08 DIAGNOSIS — M25.561 CHRONIC PAIN OF BOTH KNEES: ICD-10-CM

## 2019-02-08 DIAGNOSIS — G89.29 CHRONIC PAIN OF BOTH KNEES: ICD-10-CM

## 2019-02-08 DIAGNOSIS — R00.2 PALPITATIONS: ICD-10-CM

## 2019-02-08 DIAGNOSIS — I10 ESSENTIAL HYPERTENSION: Primary | ICD-10-CM

## 2019-02-08 DIAGNOSIS — E78.00 HYPERCHOLESTEROLEMIA: ICD-10-CM

## 2019-02-08 PROCEDURE — 99214 OFFICE O/P EST MOD 30 MIN: CPT | Performed by: INTERNAL MEDICINE

## 2019-02-08 RX ORDER — METOPROLOL SUCCINATE 50 MG/1
25 TABLET, EXTENDED RELEASE ORAL DAILY
Qty: 90 TABLET | Refills: 3 | Status: SHIPPED | OUTPATIENT
Start: 2019-02-08 | End: 2019-09-25 | Stop reason: SDUPTHER

## 2019-02-08 NOTE — PROGRESS NOTES
Subjective     Patient ID: Cal Oliver Jr. is a 53 y.o. male. Patient is here for management of multiple medical problems.     Chief Complaint   Patient presents with   • Hypertension     follow-up     Hypertension   This is a chronic problem. The problem is controlled. Associated symptoms include anxiety. There are no associated agents to hypertension. Current antihypertension treatment includes ACE inhibitors, angiotensin blockers, diuretics and central alpha agonists. The current treatment provides moderate improvement. There is no history of angina, kidney disease or CAD/MI.        Still on cpap and doing it every night.        The following portions of the patient's history were reviewed and updated as appropriate: allergies, current medications, past family history, past medical history, past social history, past surgical history and problem list.    Review of Systems   Constitutional: Positive for fatigue.   HENT: Positive for congestion, rhinorrhea and sinus pressure.    Psychiatric/Behavioral: Positive for sleep disturbance.       Current Outpatient Medications:   •  amLODIPine (NORVASC) 10 MG tablet, Take 0.5 tablets by mouth Daily., Disp: 90 tablet, Rfl: 3  •  bisacodyl (DULCOLAX) 5 MG EC tablet, As directed/colon prep, Disp: 4 tablet, Rfl: 0  •  budesonide-formoterol (SYMBICORT) 160-4.5 MCG/ACT inhaler, Inhale 2 puffs 2 (Two) Times a Day., Disp: 1 inhaler, Rfl: 11  •  CloNIDine (CATAPRES) 0.1 MG tablet, Take 1 tablet by mouth every night at bedtime., Disp: 30 tablet, Rfl: 11  •  colestipol (COLESTID) 1 g tablet, Take 1 tablet by mouth 2 (Two) Times a Day. Adjust dose to achieve 1-3 BM's daily., Disp: 60 tablet, Rfl: 5  •  cyclobenzaprine (FLEXERIL) 10 MG tablet, take 1 tablet by mouth twice a day, Disp: 30 tablet, Rfl: 11  •  hydrALAZINE (APRESOLINE) 25 MG tablet, Take 1 tablet by mouth 3 (Three) Times a Day., Disp: 90 tablet, Rfl: 11  •  HYDROcodone-acetaminophen (NORCO)  MG per tablet, Take  " by mouth., Disp: , Rfl:   •  losartan-hydrochlorothiazide (HYZAAR) 100-25 MG per tablet, Take 1 tablet by mouth Daily., Disp: 90 tablet, Rfl: 3  •  metFORMIN (GLUCOPHAGE) 500 MG tablet, Take 1 tablet by mouth 2 (Two) Times a Day., Disp: 90 tablet, Rfl: 3  •  metoprolol succinate XL (TOPROL-XL) 50 MG 24 hr tablet, Take 0.5 tablets by mouth Daily., Disp: 90 tablet, Rfl: 3  •  omeprazole (priLOSEC) 20 MG capsule, take 1 capsule by mouth once daily, Disp: 30 capsule, Rfl: 5  •  ondansetron (ZOFRAN) 4 MG tablet, Take 1 tablet by mouth every 4 (four) hours as needed., Disp: , Rfl:   •  potassium chloride (K-TAB) 20 MEQ tablet controlled-release ER tablet, Take 1 tablet by mouth 2 (Two) Times a Day With Meals., Disp: 180 tablet, Rfl: 3  •  rOPINIRole (REQUIP) 2 MG tablet, Take 1 tablet by mouth every night at bedtime., Disp: 30 tablet, Rfl: 11  •  topiramate (TOPAMAX) 50 MG tablet, take 1 tablet by mouth twice a day, Disp: 180 tablet, Rfl: 1  •  VENTOLIN  (90 Base) MCG/ACT inhaler, inhale 1 to 2 puffs by mouth every 4 to 6 hours if needed, Disp: 54 g, Rfl: 2    Objective      Blood pressure 145/82, pulse 88, temperature 97.6 °F (36.4 °C), temperature source Oral, resp. rate 16, height 180.3 cm (71\"), weight (!) 171 kg (377 lb), SpO2 98 %.    Physical Exam     General Appearance:    Alert, cooperative, no distress, appears stated age   Head:    Normocephalic, without obvious abnormality, atraumatic   Eyes:    PERRL, conjunctiva/corneas clear, EOM's intact   Ears:    Normal TM's and external ear canals, both ears   Nose:   Nares normal, septum midline, mucosa normal, no drainage   or sinus tenderness   Throat:   Lips, mucosa, and tongue normal; teeth and gums normal   Neck:   Supple, symmetrical, trachea midline, no adenopathy;        thyroid:  No enlargement/tenderness/nodules; no carotid    bruit or JVD   Back:     Symmetric, no curvature, ROM normal, no CVA tenderness   Lungs:     Clear to auscultation " bilaterally, respirations unlabored   Chest wall:    No tenderness or deformity   Heart:    Regular rate and rhythm, S1 and S2 normal, no murmur,        rub or gallop   Abdomen:     Soft, non-tender, bowel sounds active all four quadrants,     no masses, no organomegaly   Extremities:   Extremities normal, atraumatic, no cyanosis or edema   Pulses:   2+ and symmetric all extremities   Skin:   Skin color, texture, turgor normal, no rashes or lesions   Lymph nodes:   Cervical, supraclavicular, and axillary nodes normal   Neurologic:   CNII-XII intact. Normal strength, sensation and reflexes       throughout      Results for orders placed or performed in visit on 08/29/18   TSH   Result Value Ref Range    TSH 1.410 0.470 - 4.680 mIU/mL   T4, Free   Result Value Ref Range    Free T4 1.00 0.78 - 2.19 ng/dL   Comprehensive Metabolic Panel   Result Value Ref Range    Glucose 123 (H) 74 - 98 mg/dL    BUN 12 7 - 20 mg/dL    Creatinine 0.90 0.60 - 1.30 mg/dL    eGFR Non African Am 89 >60 mL/min/1.73    eGFR African Am 108 >60 mL/min/1.73    BUN/Creatinine Ratio 13.3 6.3 - 21.9    Sodium 141 137 - 145 mmol/L    Potassium 3.7 3.5 - 5.1 mmol/L    Chloride 102 98 - 107 mmol/L    Total CO2 29.0 26.0 - 30.0 mmol/L    Calcium 9.7 8.4 - 10.2 mg/dL    Total Protein 7.3 6.3 - 8.2 g/dL    Albumin 4.30 3.50 - 5.00 g/dL    Globulin 3.0 gm/dL    A/G Ratio 1.4 1.0 - 2.0 g/dL    Total Bilirubin 0.8 0.2 - 1.3 mg/dL    Alkaline Phosphatase 135 (H) 38 - 126 U/L    AST (SGOT) 42 15 - 46 U/L    ALT (SGPT) 51 13 - 69 U/L   Vitamin B12   Result Value Ref Range    Vitamin B-12 560 239 - 931 pg/mL   Lipid Panel   Result Value Ref Range    Total Cholesterol 229 (H) 0 - 199 mg/dL    Triglycerides 230 (H) <150 mg/dL    HDL Cholesterol 42 40 - 60 mg/dL    VLDL Cholesterol 46 mg/dL    LDL Cholesterol  141 (H) 0 - 99 mg/dL   Hemoglobin A1c   Result Value Ref Range    Hemoglobin A1C 6.60 %   MicroAlbumin, Urine, Random - Urine, Clean Catch   Result Value  Ref Range    Microalbumin, Urine 45.4 Not Estab. ug/mL   CBC & Differential   Result Value Ref Range    WBC 10.53 4.80 - 10.80 10*3/mm3    RBC 5.33 4.70 - 6.10 10*6/mm3    Hemoglobin 15.8 14.0 - 18.0 g/dL    Hematocrit 48.1 42.0 - 52.0 %    MCV 90.2 80.0 - 94.0 fL    MCH 29.6 27.0 - 31.0 pg    MCHC 32.8 30.0 - 37.0 g/dL    RDW 14.5 11.5 - 14.5 %    Platelets 335 130 - 400 10*3/mm3    Neutrophil Rel % 72.4 37.0 - 80.0 %    Lymphocyte Rel % 17.9 10.0 - 50.0 %    Monocyte Rel % 7.7 0.0 - 12.0 %    Eosinophil Rel % 1.2 0.0 - 7.0 %    Basophil Rel % 0.4 0.0 - 2.5 %    Neutrophils Absolute 7.63 (H) 2.00 - 6.90 10*3/mm3    Lymphocytes Absolute 1.88 0.60 - 3.40 10*3/mm3    Monocytes Absolute 0.81 0.00 - 0.90 10*3/mm3    Eosinophils Absolute 0.13 0.00 - 0.70 10*3/mm3    Basophils Absolute 0.04 0.00 - 0.20 10*3/mm3    Immature Granulocyte Rel % 0.4 0.0 - 0.6 %    Immature Grans Absolute 0.04 0.00 - 0.06 10*3/mm3    nRBC 0.0 0.0 - 0.0 /100 WBC         Assessment/Plan     Pt with b/l knee pian sever. Unable to afford pain gel from [place]. Will try [facility].               Cal was seen today for hypertension.    Diagnoses and all orders for this visit:    Essential hypertension  -     metoprolol succinate XL (TOPROL-XL) 50 MG 24 hr tablet; Take 0.5 tablets by mouth Daily.    Hypercholesterolemia    Chronic pain of both knees  -     Ambulatory Referral to Orthopedic Surgery    Palpitations  -     metoprolol succinate XL (TOPROL-XL) 50 MG 24 hr tablet; Take 0.5 tablets by mouth Daily.      Return in about 6 weeks (around 3/22/2019).          There are no Patient Instructions on file for this visit.     Woo Betancourt MD    Assessment/Plan

## 2019-03-05 RX ORDER — METOPROLOL SUCCINATE 25 MG/1
TABLET, EXTENDED RELEASE ORAL
Qty: 150 TABLET | Refills: 0 | Status: SHIPPED | OUTPATIENT
Start: 2019-03-05 | End: 2019-09-25 | Stop reason: SDUPTHER

## 2019-03-05 RX ORDER — OMEPRAZOLE 20 MG/1
20 CAPSULE, DELAYED RELEASE ORAL DAILY
Qty: 90 CAPSULE | Refills: 1 | Status: SHIPPED | OUTPATIENT
Start: 2019-03-05 | End: 2019-09-25 | Stop reason: SDUPTHER

## 2019-03-06 RX ORDER — CLONIDINE HYDROCHLORIDE 0.1 MG/1
0.1 TABLET ORAL
Qty: 30 TABLET | Refills: 11 | Status: SHIPPED | OUTPATIENT
Start: 2019-03-06 | End: 2019-06-21 | Stop reason: SDUPTHER

## 2019-03-08 ENCOUNTER — OFFICE VISIT (OUTPATIENT)
Dept: INTERNAL MEDICINE | Facility: CLINIC | Age: 54
End: 2019-03-08

## 2019-03-08 VITALS
TEMPERATURE: 97.9 F | DIASTOLIC BLOOD PRESSURE: 86 MMHG | BODY MASS INDEX: 44.1 KG/M2 | HEART RATE: 95 BPM | WEIGHT: 315 LBS | RESPIRATION RATE: 16 BRPM | HEIGHT: 71 IN | OXYGEN SATURATION: 97 % | SYSTOLIC BLOOD PRESSURE: 156 MMHG

## 2019-03-08 DIAGNOSIS — Z00.00 ROUTINE GENERAL MEDICAL EXAMINATION AT A HEALTH CARE FACILITY: ICD-10-CM

## 2019-03-08 DIAGNOSIS — I10 ESSENTIAL HYPERTENSION: Primary | ICD-10-CM

## 2019-03-08 PROCEDURE — 96160 PT-FOCUSED HLTH RISK ASSMT: CPT | Performed by: INTERNAL MEDICINE

## 2019-03-08 PROCEDURE — 99396 PREV VISIT EST AGE 40-64: CPT | Performed by: INTERNAL MEDICINE

## 2019-03-08 PROCEDURE — G0439 PPPS, SUBSEQ VISIT: HCPCS | Performed by: INTERNAL MEDICINE

## 2019-03-08 RX ORDER — HYDRALAZINE HYDROCHLORIDE 50 MG/1
50 TABLET, FILM COATED ORAL 3 TIMES DAILY
Qty: 90 TABLET | Refills: 5 | Status: SHIPPED | OUTPATIENT
Start: 2019-03-08 | End: 2019-10-17

## 2019-03-08 NOTE — PROGRESS NOTES
QUICK REFERENCE INFORMATION:  The ABCs of the Annual Wellness Visit    Medicare Annual Wellness Visit    Subjective   History of Present Illness    Cal Oliver Jr. is a 53 y.o. male who presents for an Annual Wellness Visit. In addition, we addressed the following health issues:htn    Pain scale: 7/10 due to chronic lower back pain.    PMH, PSH, SocHx, FamHx, Allergies, and Medications: Reviewed and updated.     Outpatient Medications Prior to Visit   Medication Sig Dispense Refill   • amLODIPine (NORVASC) 10 MG tablet Take 0.5 tablets by mouth Daily. 90 tablet 3   • bisacodyl (DULCOLAX) 5 MG EC tablet As directed/colon prep 4 tablet 0   • budesonide-formoterol (SYMBICORT) 160-4.5 MCG/ACT inhaler Inhale 2 puffs 2 (Two) Times a Day. 1 inhaler 11   • CloNIDine (CATAPRES) 0.1 MG tablet Take 1 tablet by mouth every night at bedtime. 30 tablet 11   • colestipol (COLESTID) 1 g tablet Take 1 tablet by mouth 2 (Two) Times a Day. Adjust dose to achieve 1-3 BM's daily. 60 tablet 5   • cyclobenzaprine (FLEXERIL) 10 MG tablet take 1 tablet by mouth twice a day 30 tablet 11   • HYDROcodone-acetaminophen (NORCO)  MG per tablet Take  by mouth.     • losartan-hydrochlorothiazide (HYZAAR) 100-25 MG per tablet Take 1 tablet by mouth Daily. 90 tablet 3   • metFORMIN (GLUCOPHAGE) 500 MG tablet Take 1 tablet by mouth 2 (Two) Times a Day. 90 tablet 3   • metoprolol succinate XL (TOPROL-XL) 25 MG 24 hr tablet take 1 tablet by mouth once daily 150 tablet 0   • metoprolol succinate XL (TOPROL-XL) 50 MG 24 hr tablet Take 0.5 tablets by mouth Daily. 90 tablet 3   • omeprazole (priLOSEC) 20 MG capsule Take 1 capsule by mouth Daily. 90 capsule 1   • ondansetron (ZOFRAN) 4 MG tablet Take 1 tablet by mouth every 4 (four) hours as needed.     • potassium chloride (K-TAB) 20 MEQ tablet controlled-release ER tablet Take 1 tablet by mouth 2 (Two) Times a Day With Meals. 180 tablet 3   • rOPINIRole (REQUIP) 2 MG tablet Take 1 tablet by  mouth every night at bedtime. 30 tablet 11   • topiramate (TOPAMAX) 50 MG tablet take 1 tablet by mouth twice a day 180 tablet 1   • VENTOLIN  (90 Base) MCG/ACT inhaler inhale 1 to 2 puffs by mouth every 4 to 6 hours if needed 54 g 2   • hydrALAZINE (APRESOLINE) 25 MG tablet Take 1 tablet by mouth 3 (Three) Times a Day. 90 tablet 11     No facility-administered medications prior to visit.        Patient Active Problem List   Diagnosis   • Acute pharyngitis   • Acute sinusitis   • Asthma   • Mild chronic obstructive pulmonary disease (CMS/HCC)   • Dental abscess   • Depression   • Diarrhea   • Gastroenteritis   • Gastroesophageal reflux disease   • Hypercholesterolemia   • Hypertension   • Hypokalemia   • Hypothyroidism   • Insomnia   • Muscle pain   • Nausea and vomiting   • SHUKRI on CPAP   • Restless legs syndrome   • Type 2 diabetes mellitus (CMS/HCC)   • Vitamin D deficiency   • Abnormal liver function tests   • Chronic back pain   • Morbid obesity (CMS/HCC)   • Encounter for screening colonoscopy   • Routine general medical examination at a health care facility       Health Habits:  Dental Exam. not up to date - can't afford.  Eye Exam. not up to date - rescedualeed  No exam data present  Exercise: 0 times/week.  Current exercise activities include: none    Health Risk Assessment:  The patient has completed a Health Risk Assessment. This has been reviewed with them and has been scanned into the patient's chart.    Current Medical Providers:  Patient Care Team:  Woo Betancourt MD as PCP - General    The [facility] providers who are involved in the care of this patient are listed above. Additional providers and suppliers are listed below:  None      Recent Hospitalizations:  No hospitalization(s) within the last year..    Recent Lab Results:  CMP:  Lab Results   Component Value Date     (H) 09/04/2018    BUN 12 09/04/2018    CREATININE 0.90 09/04/2018    EGFRIFNONA 89 09/04/2018    EGFRIFAFRI  108 09/04/2018    BCR 13.3 09/04/2018     09/04/2018    K 3.7 09/04/2018    CO2 29.0 09/04/2018    CALCIUM 9.7 09/04/2018    PROTENTOTREF 7.3 09/04/2018    ALBUMIN 4.30 09/04/2018    LABGLOBREF 3.0 09/04/2018    LABIL2 1.4 09/04/2018    BILITOT 0.8 09/04/2018    ALKPHOS 135 (H) 09/04/2018    AST 42 09/04/2018    ALT 51 09/04/2018       LIPID PANEL:  Lab Results   Component Value Date    CHLPL 229 (H) 09/04/2018    TRIG 230 (H) 09/04/2018    HDL 42 09/04/2018    VLDL 46 09/04/2018     (H) 09/04/2018       HbA1c:  Lab Results   Component Value Date    HGBA1C 6.60 09/04/2018       URINE MICROALBUMIN:  Lab Results   Component Value Date    MICROALBUR 45.4 09/04/2018       PSA:  No results found for: PSA    Age-appropriate Screening Schedule:  Refer to the list below for future screening recommendations based on patient's age, sex and/or medical conditions. Orders for these recommended tests are listed in the plan section. The patient has been provided with a written plan.    Health Maintenance   Topic Date Due   • COLONOSCOPY  06/03/2016   • ZOSTER VACCINE (1 of 2) 05/23/2017   • DIABETIC EYE EXAM  12/14/2018   • HEMOGLOBIN A1C  03/04/2019   • LIPID PANEL  09/04/2019   • URINE MICROALBUMIN  09/04/2019   • DIABETIC FOOT EXAM  10/17/2019   • TDAP/TD VACCINES (2 - Td) 12/13/2022   • INFLUENZA VACCINE  Completed   • PNEUMOCOCCAL VACCINE (19-64 MEDIUM RISK)  Addressed       Depression Screen:   PHQ-2/PHQ-9 Depression Screening 3/8/2019   Little interest or pleasure in doing things 0   Feeling down, depressed, or hopeless 0   Trouble falling or staying asleep, or sleeping too much 0   Feeling tired or having little energy 0   Poor appetite or overeating 0   Feeling bad about yourself - or that you are a failure or have let yourself or your family down 0   Trouble concentrating on things, such as reading the newspaper or watching television 0   Moving or speaking so slowly that other people could have noticed. Or  the opposite - being so fidgety or restless that you have been moving around a lot more than usual 0   Total Score 0         Functional and Cognitive Screening:  Functional & Cognitive Status 3/8/2019   Do you have difficulty preparing food and eating? No   Do you have difficulty bathing yourself, getting dressed or grooming yourself? No   Do you have difficulty using the toilet? No   Do you have difficulty moving around from place to place? No   Do you have trouble with steps or getting out of a bed or a chair? No   In the past year have you fallen or experienced a near fall? No   Current Diet Unhealthy Diet   Dental Exam Not up to date   Eye Exam Not up to date   Exercise (times per week) 0 times per week   Current Exercise Activities Include No Regular Exercise   Do you need help using the phone?  No   Are you deaf or do you have serious difficulty hearing?  No   Do you need help with transportation? No   Do you need help shopping? No   Do you need help preparing meals?  No   Do you need help with housework?  No   Do you need help with laundry? No   Do you need help taking your medications? No   Do you need help managing money? No   Do you ever drive or ride in a car without wearing a seat belt? No   Have you felt unusual stress, anger or loneliness in the last month? No   Who do you live with? Child   If you need help, do you have trouble finding someone available to you? No   Have you been bothered in the last four weeks by sexual problems? No   Do you have difficulty concentrating, remembering or making decisions? No       Does the patient have evidence of cognitive impairment? No    Advanced Care Planning:  has NO advance directive - not interested in additional information    Identification of Risk Factors:  Risk factors include: weight  and chronic pain.    Review of Systems    Compared to one year ago, the patient feels his physical health is better.  Compared to one year ago, the patient feels his mental  "health is better.    Objective     Physical Exam    Vitals:    03/08/19 1102   BP: 156/86   BP Location: Left arm   Patient Position: Sitting   Cuff Size: Large Adult   Pulse: 95   Resp: 16   Temp: 97.9 °F (36.6 °C)   TempSrc: Oral   SpO2: 97%   Weight: (!) 165 kg (363 lb 6.4 oz)   Height: 180.3 cm (71\")       Body mass index is 50.68 kg/m².  Discussed the patient's BMI with him. The BMI is in the acceptable range.    Assessment/Plan   Patient Self-Management and Personalized Health Advice  The patient has been provided with information about: diet, exercise and weight management and preventive services including:   · Advance directive, Fall Risk assessment done, Fall Risk plan of care done.    Visit Diagnoses:    ICD-10-CM ICD-9-CM   1. Essential hypertension I10 401.9   2. Routine general medical examination at a health care facility Z00.00 V70.0       No orders of the defined types were placed in this encounter.      Outpatient Encounter Medications as of 3/8/2019   Medication Sig Dispense Refill   • amLODIPine (NORVASC) 10 MG tablet Take 0.5 tablets by mouth Daily. 90 tablet 3   • bisacodyl (DULCOLAX) 5 MG EC tablet As directed/colon prep 4 tablet 0   • budesonide-formoterol (SYMBICORT) 160-4.5 MCG/ACT inhaler Inhale 2 puffs 2 (Two) Times a Day. 1 inhaler 11   • CloNIDine (CATAPRES) 0.1 MG tablet Take 1 tablet by mouth every night at bedtime. 30 tablet 11   • colestipol (COLESTID) 1 g tablet Take 1 tablet by mouth 2 (Two) Times a Day. Adjust dose to achieve 1-3 BM's daily. 60 tablet 5   • cyclobenzaprine (FLEXERIL) 10 MG tablet take 1 tablet by mouth twice a day 30 tablet 11   • hydrALAZINE (APRESOLINE) 50 MG tablet Take 1 tablet by mouth 3 (Three) Times a Day. 90 tablet 5   • HYDROcodone-acetaminophen (NORCO)  MG per tablet Take  by mouth.     • losartan-hydrochlorothiazide (HYZAAR) 100-25 MG per tablet Take 1 tablet by mouth Daily. 90 tablet 3   • metFORMIN (GLUCOPHAGE) 500 MG tablet Take 1 tablet by " mouth 2 (Two) Times a Day. 90 tablet 3   • metoprolol succinate XL (TOPROL-XL) 25 MG 24 hr tablet take 1 tablet by mouth once daily 150 tablet 0   • metoprolol succinate XL (TOPROL-XL) 50 MG 24 hr tablet Take 0.5 tablets by mouth Daily. 90 tablet 3   • omeprazole (priLOSEC) 20 MG capsule Take 1 capsule by mouth Daily. 90 capsule 1   • ondansetron (ZOFRAN) 4 MG tablet Take 1 tablet by mouth every 4 (four) hours as needed.     • potassium chloride (K-TAB) 20 MEQ tablet controlled-release ER tablet Take 1 tablet by mouth 2 (Two) Times a Day With Meals. 180 tablet 3   • rOPINIRole (REQUIP) 2 MG tablet Take 1 tablet by mouth every night at bedtime. 30 tablet 11   • topiramate (TOPAMAX) 50 MG tablet take 1 tablet by mouth twice a day 180 tablet 1   • VENTOLIN  (90 Base) MCG/ACT inhaler inhale 1 to 2 puffs by mouth every 4 to 6 hours if needed 54 g 2   • [DISCONTINUED] hydrALAZINE (APRESOLINE) 25 MG tablet Take 1 tablet by mouth 3 (Three) Times a Day. 90 tablet 11     No facility-administered encounter medications on file as of 3/8/2019.        Reviewed use of high risk medication in the elderly: yes  Reviewed for potential of harmful drug interactions in the elderly: yes    Follow Up:  Return in about 3 months (around 6/8/2019).     An After Visit Summary and PPPS with all of these plans were given to the patient.             Cal was seen today for hypertension and ear issues.    Diagnoses and all orders for this visit:    Essential hypertension  -     hydrALAZINE (APRESOLINE) 50 MG tablet; Take 1 tablet by mouth 3 (Three) Times a Day.    Routine general medical examination at a health care facility

## 2019-03-08 NOTE — PROGRESS NOTES
Subjective     Patient ID: Cal Oliver Jr. is a 53 y.o. male. Patient is here for management of multiple medical problems.     Chief Complaint   Patient presents with   • Hypertension     follow-up   • Ear issues     patient having ringing in both ears, worse at night x 1 week     Hypertension   This is a chronic problem. The current episode started more than 1 year ago. The problem is unchanged. The problem is controlled. Pertinent negatives include no anxiety, blurred vision, chest pain or headaches. There are no known risk factors for coronary artery disease. Current antihypertension treatment includes angiotensin blockers and diuretics.          Wt down.  Drinking water.          The following portions of the patient's history were reviewed and updated as appropriate: allergies, current medications, past family history, past medical history, past social history, past surgical history and problem list.    Review of Systems   Eyes: Negative for blurred vision.   Cardiovascular: Negative for chest pain.   Neurological: Negative for headaches.       Current Outpatient Medications:   •  amLODIPine (NORVASC) 10 MG tablet, Take 0.5 tablets by mouth Daily., Disp: 90 tablet, Rfl: 3  •  bisacodyl (DULCOLAX) 5 MG EC tablet, As directed/colon prep, Disp: 4 tablet, Rfl: 0  •  budesonide-formoterol (SYMBICORT) 160-4.5 MCG/ACT inhaler, Inhale 2 puffs 2 (Two) Times a Day., Disp: 1 inhaler, Rfl: 11  •  CloNIDine (CATAPRES) 0.1 MG tablet, Take 1 tablet by mouth every night at bedtime., Disp: 30 tablet, Rfl: 11  •  colestipol (COLESTID) 1 g tablet, Take 1 tablet by mouth 2 (Two) Times a Day. Adjust dose to achieve 1-3 BM's daily., Disp: 60 tablet, Rfl: 5  •  cyclobenzaprine (FLEXERIL) 10 MG tablet, take 1 tablet by mouth twice a day, Disp: 30 tablet, Rfl: 11  •  hydrALAZINE (APRESOLINE) 50 MG tablet, Take 1 tablet by mouth 3 (Three) Times a Day., Disp: 90 tablet, Rfl: 5  •  HYDROcodone-acetaminophen (NORCO)  MG per  "tablet, Take  by mouth., Disp: , Rfl:   •  losartan-hydrochlorothiazide (HYZAAR) 100-25 MG per tablet, Take 1 tablet by mouth Daily., Disp: 90 tablet, Rfl: 3  •  metFORMIN (GLUCOPHAGE) 500 MG tablet, Take 1 tablet by mouth 2 (Two) Times a Day., Disp: 90 tablet, Rfl: 3  •  metoprolol succinate XL (TOPROL-XL) 25 MG 24 hr tablet, take 1 tablet by mouth once daily, Disp: 150 tablet, Rfl: 0  •  metoprolol succinate XL (TOPROL-XL) 50 MG 24 hr tablet, Take 0.5 tablets by mouth Daily., Disp: 90 tablet, Rfl: 3  •  omeprazole (priLOSEC) 20 MG capsule, Take 1 capsule by mouth Daily., Disp: 90 capsule, Rfl: 1  •  ondansetron (ZOFRAN) 4 MG tablet, Take 1 tablet by mouth every 4 (four) hours as needed., Disp: , Rfl:   •  potassium chloride (K-TAB) 20 MEQ tablet controlled-release ER tablet, Take 1 tablet by mouth 2 (Two) Times a Day With Meals., Disp: 180 tablet, Rfl: 3  •  rOPINIRole (REQUIP) 2 MG tablet, Take 1 tablet by mouth every night at bedtime., Disp: 30 tablet, Rfl: 11  •  topiramate (TOPAMAX) 50 MG tablet, take 1 tablet by mouth twice a day, Disp: 180 tablet, Rfl: 1  •  VENTOLIN  (90 Base) MCG/ACT inhaler, inhale 1 to 2 puffs by mouth every 4 to 6 hours if needed, Disp: 54 g, Rfl: 2    Objective      Blood pressure 156/86, pulse 95, temperature 97.9 °F (36.6 °C), temperature source Oral, resp. rate 16, height 180.3 cm (71\"), weight (!) 165 kg (363 lb 6.4 oz), SpO2 97 %.    Physical Exam     General Appearance:    Alert, cooperative, no distress, appears stated age   Head:    Normocephalic, without obvious abnormality, atraumatic   Eyes:    PERRL, conjunctiva/corneas clear, EOM's intact   Ears:    Normal TM's and external ear canals, both ears   Nose:   Nares normal, septum midline, mucosa normal, no drainage   or sinus tenderness   Throat:   Lips, mucosa, and tongue normal; teeth and gums normal   Neck:   Supple, symmetrical, trachea midline, no adenopathy;        thyroid:  No enlargement/tenderness/nodules; no " carotid    bruit or JVD   Back:     Symmetric, no curvature, ROM normal, no CVA tenderness   Lungs:     Clear to auscultation bilaterally, respirations unlabored   Chest wall:    No tenderness or deformity   Heart:    Regular rate and rhythm, S1 and S2 normal, no murmur,        rub or gallop   Abdomen:     Soft, non-tender, bowel sounds active all four quadrants,     no masses, no organomegaly   Extremities:   Extremities normal, atraumatic, no cyanosis or edema   Pulses:   2+ and symmetric all extremities   Skin:   Skin color, texture, turgor normal, no rashes or lesions   Lymph nodes:   Cervical, supraclavicular, and axillary nodes normal   Neurologic:   CNII-XII intact. Normal strength, sensation and reflexes       throughout      Results for orders placed or performed in visit on 08/29/18   TSH   Result Value Ref Range    TSH 1.410 0.470 - 4.680 mIU/mL   T4, Free   Result Value Ref Range    Free T4 1.00 0.78 - 2.19 ng/dL   Comprehensive Metabolic Panel   Result Value Ref Range    Glucose 123 (H) 74 - 98 mg/dL    BUN 12 7 - 20 mg/dL    Creatinine 0.90 0.60 - 1.30 mg/dL    eGFR Non African Am 89 >60 mL/min/1.73    eGFR African Am 108 >60 mL/min/1.73    BUN/Creatinine Ratio 13.3 6.3 - 21.9    Sodium 141 137 - 145 mmol/L    Potassium 3.7 3.5 - 5.1 mmol/L    Chloride 102 98 - 107 mmol/L    Total CO2 29.0 26.0 - 30.0 mmol/L    Calcium 9.7 8.4 - 10.2 mg/dL    Total Protein 7.3 6.3 - 8.2 g/dL    Albumin 4.30 3.50 - 5.00 g/dL    Globulin 3.0 gm/dL    A/G Ratio 1.4 1.0 - 2.0 g/dL    Total Bilirubin 0.8 0.2 - 1.3 mg/dL    Alkaline Phosphatase 135 (H) 38 - 126 U/L    AST (SGOT) 42 15 - 46 U/L    ALT (SGPT) 51 13 - 69 U/L   Vitamin B12   Result Value Ref Range    Vitamin B-12 560 239 - 931 pg/mL   Lipid Panel   Result Value Ref Range    Total Cholesterol 229 (H) 0 - 199 mg/dL    Triglycerides 230 (H) <150 mg/dL    HDL Cholesterol 42 40 - 60 mg/dL    VLDL Cholesterol 46 mg/dL    LDL Cholesterol  141 (H) 0 - 99 mg/dL    Hemoglobin A1c   Result Value Ref Range    Hemoglobin A1C 6.60 %   MicroAlbumin, Urine, Random - Urine, Clean Catch   Result Value Ref Range    Microalbumin, Urine 45.4 Not Estab. ug/mL   CBC & Differential   Result Value Ref Range    WBC 10.53 4.80 - 10.80 10*3/mm3    RBC 5.33 4.70 - 6.10 10*6/mm3    Hemoglobin 15.8 14.0 - 18.0 g/dL    Hematocrit 48.1 42.0 - 52.0 %    MCV 90.2 80.0 - 94.0 fL    MCH 29.6 27.0 - 31.0 pg    MCHC 32.8 30.0 - 37.0 g/dL    RDW 14.5 11.5 - 14.5 %    Platelets 335 130 - 400 10*3/mm3    Neutrophil Rel % 72.4 37.0 - 80.0 %    Lymphocyte Rel % 17.9 10.0 - 50.0 %    Monocyte Rel % 7.7 0.0 - 12.0 %    Eosinophil Rel % 1.2 0.0 - 7.0 %    Basophil Rel % 0.4 0.0 - 2.5 %    Neutrophils Absolute 7.63 (H) 2.00 - 6.90 10*3/mm3    Lymphocytes Absolute 1.88 0.60 - 3.40 10*3/mm3    Monocytes Absolute 0.81 0.00 - 0.90 10*3/mm3    Eosinophils Absolute 0.13 0.00 - 0.70 10*3/mm3    Basophils Absolute 0.04 0.00 - 0.20 10*3/mm3    Immature Granulocyte Rel % 0.4 0.0 - 0.6 %    Immature Grans Absolute 0.04 0.00 - 0.06 10*3/mm3    nRBC 0.0 0.0 - 0.0 /100 WBC         Assessment/Plan   Student loan paper for disability signed today.    bp not well controlled. Increase meds.    Diet going well.      Cal was seen today for hypertension and ear issues.    Diagnoses and all orders for this visit:    Essential hypertension    Other orders  -     hydrALAZINE (APRESOLINE) 50 MG tablet; Take 1 tablet by mouth 3 (Three) Times a Day.      Return in about 3 months (around 6/8/2019).          There are no Patient Instructions on file for this visit.     Woo Betancourt MD    Assessment/Plan

## 2019-04-10 RX ORDER — ALBUTEROL SULFATE 90 UG/1
2 AEROSOL, METERED RESPIRATORY (INHALATION) EVERY 4 HOURS PRN
Qty: 3 INHALER | Refills: 3 | Status: SHIPPED | OUTPATIENT
Start: 2019-04-10 | End: 2019-12-04 | Stop reason: SDUPTHER

## 2019-05-30 RX ORDER — LOSARTAN POTASSIUM AND HYDROCHLOROTHIAZIDE 12.5; 1 MG/1; MG/1
TABLET ORAL
Qty: 90 TABLET | Refills: 1 | OUTPATIENT
Start: 2019-05-30

## 2019-06-12 ENCOUNTER — TELEPHONE (OUTPATIENT)
Dept: INTERNAL MEDICINE | Facility: CLINIC | Age: 54
End: 2019-06-12

## 2019-06-21 RX ORDER — CLONIDINE HYDROCHLORIDE 0.1 MG/1
0.1 TABLET ORAL
Qty: 30 TABLET | Refills: 7 | Status: SHIPPED | OUTPATIENT
Start: 2019-06-21 | End: 2019-09-25 | Stop reason: SDUPTHER

## 2019-06-21 RX ORDER — ROPINIROLE 2 MG/1
2 TABLET, FILM COATED ORAL
Qty: 30 TABLET | Refills: 5 | Status: SHIPPED | OUTPATIENT
Start: 2019-06-21 | End: 2019-09-25 | Stop reason: SDUPTHER

## 2019-07-03 DIAGNOSIS — I10 ESSENTIAL HYPERTENSION: ICD-10-CM

## 2019-07-03 RX ORDER — CYCLOBENZAPRINE HCL 10 MG
10 TABLET ORAL 2 TIMES DAILY
Qty: 60 TABLET | Refills: 5 | Status: SHIPPED | OUTPATIENT
Start: 2019-07-03 | End: 2019-12-30 | Stop reason: SDUPTHER

## 2019-07-03 RX ORDER — POTASSIUM CHLORIDE 1500 MG/1
20 TABLET, FILM COATED, EXTENDED RELEASE ORAL 2 TIMES DAILY WITH MEALS
Qty: 180 TABLET | Refills: 1 | Status: SHIPPED | OUTPATIENT
Start: 2019-07-03 | End: 2019-12-30 | Stop reason: SDUPTHER

## 2019-07-03 RX ORDER — AMLODIPINE BESYLATE 10 MG/1
5 TABLET ORAL DAILY
Qty: 90 TABLET | Refills: 1 | Status: SHIPPED | OUTPATIENT
Start: 2019-07-03 | End: 2019-09-25 | Stop reason: SDUPTHER

## 2019-09-25 ENCOUNTER — OFFICE VISIT (OUTPATIENT)
Dept: INTERNAL MEDICINE | Facility: CLINIC | Age: 54
End: 2019-09-25

## 2019-09-25 ENCOUNTER — HOSPITAL ENCOUNTER (OUTPATIENT)
Dept: GENERAL RADIOLOGY | Facility: HOSPITAL | Age: 54
Discharge: HOME | End: 2019-09-25
Admitting: INTERNAL MEDICINE

## 2019-09-25 VITALS
WEIGHT: 315 LBS | HEIGHT: 71 IN | SYSTOLIC BLOOD PRESSURE: 167 MMHG | BODY MASS INDEX: 44.1 KG/M2 | OXYGEN SATURATION: 97 % | DIASTOLIC BLOOD PRESSURE: 100 MMHG | RESPIRATION RATE: 16 BRPM | HEART RATE: 81 BPM | TEMPERATURE: 98.2 F

## 2019-09-25 DIAGNOSIS — E11.9 TYPE 2 DIABETES MELLITUS WITHOUT COMPLICATION, WITHOUT LONG-TERM CURRENT USE OF INSULIN (HCC): ICD-10-CM

## 2019-09-25 DIAGNOSIS — I10 ESSENTIAL HYPERTENSION: ICD-10-CM

## 2019-09-25 DIAGNOSIS — M25.561 CHRONIC PAIN OF RIGHT KNEE: ICD-10-CM

## 2019-09-25 DIAGNOSIS — K21.9 GASTROESOPHAGEAL REFLUX DISEASE WITHOUT ESOPHAGITIS: ICD-10-CM

## 2019-09-25 DIAGNOSIS — G89.29 CHRONIC PAIN OF RIGHT KNEE: ICD-10-CM

## 2019-09-25 DIAGNOSIS — G25.81 RLS (RESTLESS LEGS SYNDROME): ICD-10-CM

## 2019-09-25 DIAGNOSIS — R79.9 ABNORMAL FINDING OF BLOOD CHEMISTRY: ICD-10-CM

## 2019-09-25 DIAGNOSIS — E78.00 HYPERCHOLESTEROLEMIA: Primary | ICD-10-CM

## 2019-09-25 DIAGNOSIS — E78.00 HYPERCHOLESTEROLEMIA: ICD-10-CM

## 2019-09-25 PROCEDURE — 99214 OFFICE O/P EST MOD 30 MIN: CPT | Performed by: INTERNAL MEDICINE

## 2019-09-25 PROCEDURE — 73560 X-RAY EXAM OF KNEE 1 OR 2: CPT

## 2019-09-25 RX ORDER — CLONIDINE HYDROCHLORIDE 0.1 MG/1
0.1 TABLET ORAL
Qty: 90 TABLET | Refills: 3 | Status: SHIPPED | OUTPATIENT
Start: 2019-09-25 | End: 2019-12-30 | Stop reason: SDUPTHER

## 2019-09-25 RX ORDER — AMLODIPINE BESYLATE 10 MG/1
5 TABLET ORAL DAILY
Qty: 90 TABLET | Refills: 3 | Status: SHIPPED | OUTPATIENT
Start: 2019-09-25 | End: 2019-12-30 | Stop reason: SDUPTHER

## 2019-09-25 RX ORDER — METOPROLOL SUCCINATE 25 MG/1
25 TABLET, EXTENDED RELEASE ORAL DAILY
Qty: 90 TABLET | Refills: 3 | Status: SHIPPED | OUTPATIENT
Start: 2019-09-25 | End: 2019-12-30 | Stop reason: SDUPTHER

## 2019-09-25 RX ORDER — LOSARTAN POTASSIUM AND HYDROCHLOROTHIAZIDE 25; 100 MG/1; MG/1
1 TABLET ORAL DAILY
Qty: 90 TABLET | Refills: 3 | Status: SHIPPED | OUTPATIENT
Start: 2019-09-25 | End: 2019-12-30 | Stop reason: SDUPTHER

## 2019-09-25 RX ORDER — OMEPRAZOLE 20 MG/1
20 CAPSULE, DELAYED RELEASE ORAL DAILY
Qty: 90 CAPSULE | Refills: 3 | Status: SHIPPED | OUTPATIENT
Start: 2019-09-25 | End: 2019-12-30 | Stop reason: SDUPTHER

## 2019-09-25 RX ORDER — ROPINIROLE 2 MG/1
2 TABLET, FILM COATED ORAL
Qty: 90 TABLET | Refills: 3 | Status: SHIPPED | OUTPATIENT
Start: 2019-09-25 | End: 2019-12-30 | Stop reason: SDUPTHER

## 2019-09-25 NOTE — PROGRESS NOTES
Subjective     Patient ID: aCl Oliver Jr. is a 54 y.o. male. Patient is here for management of multiple medical problems.     Chief Complaint   Patient presents with   • Knee Pain     patient having right knee pain, for several years, worse x 1 week    • Hypertension     patient's blood pressure is elevated today, he states he has been out of some of his medications x 3 days     Knee Pain    The incident occurred more than 1 week ago. The incident occurred at home. There was no injury mechanism. The pain is at a severity of 3/10. The pain is moderate. The pain has been constant since onset. Pertinent negatives include no inability to bear weight or loss of motion. He reports no foreign bodies present.   Hypertension   This is a chronic problem. The current episode started more than 1 year ago. The problem is unchanged. Pertinent negatives include no chest pain or shortness of breath. There are no associated agents to hypertension. Past treatments include angiotensin blockers and diuretics.                  The following portions of the patient's history were reviewed and updated as appropriate: allergies, current medications, past family history, past medical history, past social history, past surgical history and problem list.    Review of Systems   Constitutional: Positive for fatigue.   Respiratory: Negative for cough, shortness of breath and stridor.    Cardiovascular: Negative for chest pain and leg swelling.   Musculoskeletal: Negative for gait problem and joint swelling.   Psychiatric/Behavioral: Negative for self-injury and sleep disturbance. The patient is not nervous/anxious and is not hyperactive.    All other systems reviewed and are negative.      Current Outpatient Medications:   •  albuterol sulfate HFA (VENTOLIN HFA) 108 (90 Base) MCG/ACT inhaler, Inhale 2 puffs Every 4 (Four) Hours As Needed for Wheezing or Shortness of Air., Disp: 3 inhaler, Rfl: 3  •  amLODIPine (NORVASC) 10 MG tablet, Take  "0.5 tablets by mouth Daily., Disp: 90 tablet, Rfl: 3  •  budesonide-formoterol (SYMBICORT) 160-4.5 MCG/ACT inhaler, Inhale 2 puffs 2 (Two) Times a Day., Disp: 1 inhaler, Rfl: 11  •  cloNIDine (CATAPRES) 0.1 MG tablet, Take 1 tablet by mouth every night at bedtime., Disp: 90 tablet, Rfl: 3  •  colestipol (COLESTID) 1 g tablet, Take 1 tablet by mouth 2 (Two) Times a Day. Adjust dose to achieve 1-3 BM's daily., Disp: 60 tablet, Rfl: 5  •  cyclobenzaprine (FLEXERIL) 10 MG tablet, Take 1 tablet by mouth 2 (Two) Times a Day., Disp: 60 tablet, Rfl: 5  •  hydrALAZINE (APRESOLINE) 50 MG tablet, Take 1 tablet by mouth 3 (Three) Times a Day., Disp: 90 tablet, Rfl: 5  •  losartan-hydrochlorothiazide (HYZAAR) 100-25 MG per tablet, Take 1 tablet by mouth Daily., Disp: 90 tablet, Rfl: 3  •  metFORMIN (GLUCOPHAGE) 500 MG tablet, Take 1 tablet by mouth 2 (Two) Times a Day., Disp: 180 tablet, Rfl: 3  •  metoprolol succinate XL (TOPROL-XL) 25 MG 24 hr tablet, Take 1 tablet by mouth Daily., Disp: 90 tablet, Rfl: 3  •  omeprazole (priLOSEC) 20 MG capsule, Take 1 capsule by mouth Daily., Disp: 90 capsule, Rfl: 3  •  ondansetron (ZOFRAN) 4 MG tablet, Take 1 tablet by mouth every 4 (four) hours as needed., Disp: , Rfl:   •  potassium chloride ER (K-TAB) 20 MEQ tablet controlled-release ER tablet, Take 1 tablet by mouth 2 (Two) Times a Day With Meals., Disp: 180 tablet, Rfl: 1  •  rOPINIRole (REQUIP) 2 MG tablet, Take 1 tablet by mouth every night at bedtime., Disp: 90 tablet, Rfl: 3  •  topiramate (TOPAMAX) 50 MG tablet, take 1 tablet by mouth twice a day, Disp: 180 tablet, Rfl: 1  •  HYDROcodone-acetaminophen (NORCO)  MG per tablet, Take  by mouth., Disp: , Rfl:     Objective      Blood pressure 167/100, pulse 81, temperature 98.2 °F (36.8 °C), temperature source Oral, resp. rate 16, height 180.3 cm (71\"), weight (!) 159 kg (351 lb), SpO2 97 %.    Physical Exam     General Appearance:    Alert, cooperative, no distress, appears " stated age   Head:    Normocephalic, without obvious abnormality, atraumatic   Eyes:    PERRL, conjunctiva/corneas clear, EOM's intact   Ears:    Normal TM's and external ear canals, both ears   Nose:   Nares normal, septum midline, mucosa normal, no drainage   or sinus tenderness   Throat:   Lips, mucosa, and tongue normal; teeth and gums normal   Neck:   Supple, symmetrical, trachea midline, no adenopathy;        thyroid:  No enlargement/tenderness/nodules; no carotid    bruit or JVD   Back:     Symmetric, no curvature, ROM normal, no CVA tenderness   Lungs:     Clear to auscultation bilaterally, respirations unlabored   Chest wall:    No tenderness or deformity   Heart:    Regular rate and rhythm, S1 and S2 normal, no murmur,        rub or gallop   Abdomen:     Soft, non-tender, bowel sounds active all four quadrants,     no masses, no organomegaly   Extremities:   Extremities normal, atraumatic, no cyanosis or edema   Pulses:   2+ and symmetric all extremities   Skin:   Skin color, texture, turgor normal, no rashes or lesions   Lymph nodes:   Cervical, supraclavicular, and axillary nodes normal   Neurologic:   CNII-XII intact. Normal strength, sensation and reflexes       throughout      Results for orders placed or performed in visit on 08/29/18   TSH   Result Value Ref Range    TSH 1.410 0.470 - 4.680 mIU/mL   T4, Free   Result Value Ref Range    Free T4 1.00 0.78 - 2.19 ng/dL   Comprehensive Metabolic Panel   Result Value Ref Range    Glucose 123 (H) 74 - 98 mg/dL    BUN 12 7 - 20 mg/dL    Creatinine 0.90 0.60 - 1.30 mg/dL    eGFR Non African Am 89 >60 mL/min/1.73    eGFR African Am 108 >60 mL/min/1.73    BUN/Creatinine Ratio 13.3 6.3 - 21.9    Sodium 141 137 - 145 mmol/L    Potassium 3.7 3.5 - 5.1 mmol/L    Chloride 102 98 - 107 mmol/L    Total CO2 29.0 26.0 - 30.0 mmol/L    Calcium 9.7 8.4 - 10.2 mg/dL    Total Protein 7.3 6.3 - 8.2 g/dL    Albumin 4.30 3.50 - 5.00 g/dL    Globulin 3.0 gm/dL    A/G Ratio  1.4 1.0 - 2.0 g/dL    Total Bilirubin 0.8 0.2 - 1.3 mg/dL    Alkaline Phosphatase 135 (H) 38 - 126 U/L    AST (SGOT) 42 15 - 46 U/L    ALT (SGPT) 51 13 - 69 U/L   Vitamin B12   Result Value Ref Range    Vitamin B-12 560 239 - 931 pg/mL   Lipid Panel   Result Value Ref Range    Total Cholesterol 229 (H) 0 - 199 mg/dL    Triglycerides 230 (H) <150 mg/dL    HDL Cholesterol 42 40 - 60 mg/dL    VLDL Cholesterol 46 mg/dL    LDL Cholesterol  141 (H) 0 - 99 mg/dL   Hemoglobin A1c   Result Value Ref Range    Hemoglobin A1C 6.60 %   MicroAlbumin, Urine, Random - Urine, Clean Catch   Result Value Ref Range    Microalbumin, Urine 45.4 Not Estab. ug/mL   CBC & Differential   Result Value Ref Range    WBC 10.53 4.80 - 10.80 10*3/mm3    RBC 5.33 4.70 - 6.10 10*6/mm3    Hemoglobin 15.8 14.0 - 18.0 g/dL    Hematocrit 48.1 42.0 - 52.0 %    MCV 90.2 80.0 - 94.0 fL    MCH 29.6 27.0 - 31.0 pg    MCHC 32.8 30.0 - 37.0 g/dL    RDW 14.5 11.5 - 14.5 %    Platelets 335 130 - 400 10*3/mm3    Neutrophil Rel % 72.4 37.0 - 80.0 %    Lymphocyte Rel % 17.9 10.0 - 50.0 %    Monocyte Rel % 7.7 0.0 - 12.0 %    Eosinophil Rel % 1.2 0.0 - 7.0 %    Basophil Rel % 0.4 0.0 - 2.5 %    Neutrophils Absolute 7.63 (H) 2.00 - 6.90 10*3/mm3    Lymphocytes Absolute 1.88 0.60 - 3.40 10*3/mm3    Monocytes Absolute 0.81 0.00 - 0.90 10*3/mm3    Eosinophils Absolute 0.13 0.00 - 0.70 10*3/mm3    Basophils Absolute 0.04 0.00 - 0.20 10*3/mm3    Immature Granulocyte Rel % 0.4 0.0 - 0.6 %    Immature Grans Absolute 0.04 0.00 - 0.06 10*3/mm3    nRBC 0.0 0.0 - 0.0 /100 WBC         Assessment/Plan       Cal was seen today for knee pain and hypertension.    Diagnoses and all orders for this visit:    Hypercholesterolemia  -     Ambulatory Referral to Orthopedic Surgery  -     XR Knee 1 or 2 View Right; Future  -     CBC & Differential  -     Vitamin B12  -     Comprehensive Metabolic Panel  -     T4, Free  -     TSH  -     Hemoglobin A1c  -     Lipid Panel    Essential  hypertension  -     losartan-hydrochlorothiazide (HYZAAR) 100-25 MG per tablet; Take 1 tablet by mouth Daily.  -     metoprolol succinate XL (TOPROL-XL) 25 MG 24 hr tablet; Take 1 tablet by mouth Daily.  -     amLODIPine (NORVASC) 10 MG tablet; Take 0.5 tablets by mouth Daily.  -     cloNIDine (CATAPRES) 0.1 MG tablet; Take 1 tablet by mouth every night at bedtime.  -     Ambulatory Referral to Orthopedic Surgery  -     XR Knee 1 or 2 View Right; Future  -     CBC & Differential  -     Vitamin B12  -     Comprehensive Metabolic Panel  -     T4, Free  -     TSH  -     Hemoglobin A1c  -     Lipid Panel    Chronic pain of right knee  -     Uric Acid  -     Ambulatory Referral to Orthopedic Surgery  -     XR Knee 1 or 2 View Right; Future  -     CBC & Differential  -     Vitamin B12  -     Comprehensive Metabolic Panel  -     T4, Free  -     TSH  -     Hemoglobin A1c  -     Lipid Panel    Type 2 diabetes mellitus without complication, without long-term current use of insulin (CMS/[facility])  -     metFORMIN (GLUCOPHAGE) 500 MG tablet; Take 1 tablet by mouth 2 (Two) Times a Day.    RLS (restless legs syndrome)  -     rOPINIRole (REQUIP) 2 MG tablet; Take 1 tablet by mouth every night at bedtime.    Gastroesophageal reflux disease without esophagitis  -     omeprazole (priLOSEC) 20 MG capsule; Take 1 capsule by mouth Daily.    Abnormal finding of blood chemistry   -     Hemoglobin A1c      Return in about 4 weeks (around 10/23/2019).          There are no Patient Instructions on file for this visit.     Woo Betancourt MD    Assessment/Plan

## 2019-09-26 LAB
ALBUMIN SERPL-MCNC: 4.4 G/DL (ref 3.5–5.2)
ALBUMIN/GLOB SERPL: 1.4 G/DL
ALP SERPL-CCNC: 114 U/L (ref 39–117)
ALT SERPL-CCNC: 31 U/L (ref 1–41)
AST SERPL-CCNC: 17 U/L (ref 1–40)
BASOPHILS # BLD AUTO: 0.06 10*3/MM3 (ref 0–0.2)
BASOPHILS NFR BLD AUTO: 0.5 % (ref 0–1.5)
BILIRUB SERPL-MCNC: 0.4 MG/DL (ref 0.2–1.2)
BUN SERPL-MCNC: 11 MG/DL (ref 6–20)
BUN/CREAT SERPL: 14.5 (ref 7–25)
CALCIUM SERPL-MCNC: 9.1 MG/DL (ref 8.6–10.5)
CHLORIDE SERPL-SCNC: 102 MMOL/L (ref 98–107)
CHOLEST SERPL-MCNC: 222 MG/DL (ref 0–200)
CO2 SERPL-SCNC: 27.9 MMOL/L (ref 22–29)
CREAT SERPL-MCNC: 0.76 MG/DL (ref 0.76–1.27)
EOSINOPHIL # BLD AUTO: 0.14 10*3/MM3 (ref 0–0.4)
EOSINOPHIL NFR BLD AUTO: 1.3 % (ref 0.3–6.2)
ERYTHROCYTE [DISTWIDTH] IN BLOOD BY AUTOMATED COUNT: 13.3 % (ref 12.3–15.4)
GLOBULIN SER CALC-MCNC: 3.1 GM/DL
GLUCOSE SERPL-MCNC: 88 MG/DL (ref 65–99)
HBA1C MFR BLD: 6.3 % (ref 4.8–5.6)
HCT VFR BLD AUTO: 49 % (ref 37.5–51)
HDLC SERPL-MCNC: 41 MG/DL (ref 40–60)
HGB BLD-MCNC: 16.3 G/DL (ref 13–17.7)
IMM GRANULOCYTES # BLD AUTO: 0.07 10*3/MM3 (ref 0–0.05)
IMM GRANULOCYTES NFR BLD AUTO: 0.6 % (ref 0–0.5)
LDLC SERPL CALC-MCNC: 152 MG/DL (ref 0–100)
LYMPHOCYTES # BLD AUTO: 2.51 10*3/MM3 (ref 0.7–3.1)
LYMPHOCYTES NFR BLD AUTO: 22.6 % (ref 19.6–45.3)
MCH RBC QN AUTO: 29.4 PG (ref 26.6–33)
MCHC RBC AUTO-ENTMCNC: 33.3 G/DL (ref 31.5–35.7)
MCV RBC AUTO: 88.3 FL (ref 79–97)
MONOCYTES # BLD AUTO: 0.96 10*3/MM3 (ref 0.1–0.9)
MONOCYTES NFR BLD AUTO: 8.6 % (ref 5–12)
NEUTROPHILS # BLD AUTO: 7.39 10*3/MM3 (ref 1.7–7)
NEUTROPHILS NFR BLD AUTO: 66.4 % (ref 42.7–76)
NRBC BLD AUTO-RTO: 0 /100 WBC (ref 0–0.2)
PLATELET # BLD AUTO: 360 10*3/MM3 (ref 140–450)
POTASSIUM SERPL-SCNC: 3.9 MMOL/L (ref 3.5–5.2)
PROT SERPL-MCNC: 7.5 G/DL (ref 6–8.5)
RBC # BLD AUTO: 5.55 10*6/MM3 (ref 4.14–5.8)
SODIUM SERPL-SCNC: 142 MMOL/L (ref 136–145)
T4 FREE SERPL-MCNC: 0.9 NG/DL (ref 0.93–1.7)
TRIGL SERPL-MCNC: 144 MG/DL (ref 0–150)
TSH SERPL DL<=0.005 MIU/L-ACNC: 2.46 UIU/ML (ref 0.27–4.2)
URATE SERPL-MCNC: 5.1 MG/DL (ref 3.4–7)
VIT B12 SERPL-MCNC: 695 PG/ML (ref 211–946)
VLDLC SERPL CALC-MCNC: 28.8 MG/DL
WBC # BLD AUTO: 11.13 10*3/MM3 (ref 3.4–10.8)

## 2019-10-17 ENCOUNTER — OFFICE VISIT (OUTPATIENT)
Dept: ORTHOPEDIC SURGERY | Facility: CLINIC | Age: 54
End: 2019-10-17

## 2019-10-17 VITALS — HEIGHT: 71 IN | WEIGHT: 315 LBS | HEART RATE: 84 BPM | OXYGEN SATURATION: 98 % | BODY MASS INDEX: 44.1 KG/M2

## 2019-10-17 DIAGNOSIS — M17.11 PRIMARY OSTEOARTHRITIS OF RIGHT KNEE: ICD-10-CM

## 2019-10-17 DIAGNOSIS — E11.9 TYPE 2 DIABETES MELLITUS WITHOUT COMPLICATION, WITHOUT LONG-TERM CURRENT USE OF INSULIN (HCC): ICD-10-CM

## 2019-10-17 DIAGNOSIS — Z72.0 TOBACCO ABUSE: ICD-10-CM

## 2019-10-17 DIAGNOSIS — M25.561 CHRONIC PAIN OF RIGHT KNEE: Primary | ICD-10-CM

## 2019-10-17 DIAGNOSIS — G89.29 CHRONIC PAIN OF RIGHT KNEE: Primary | ICD-10-CM

## 2019-10-17 DIAGNOSIS — E66.01 MORBID OBESITY (HCC): ICD-10-CM

## 2019-10-17 PROCEDURE — 99214 OFFICE O/P EST MOD 30 MIN: CPT | Performed by: PHYSICIAN ASSISTANT

## 2019-10-17 PROCEDURE — 99406 BEHAV CHNG SMOKING 3-10 MIN: CPT | Performed by: PHYSICIAN ASSISTANT

## 2019-10-17 RX ORDER — DICLOFENAC SODIUM 75 MG/1
75 TABLET, DELAYED RELEASE ORAL 2 TIMES DAILY
Qty: 60 TABLET | Refills: 2 | Status: SHIPPED | OUTPATIENT
Start: 2019-10-17 | End: 2019-12-30 | Stop reason: SDUPTHER

## 2019-10-17 NOTE — PROGRESS NOTES
[facility] [place] [facility] Note    Subjective     Chief Complaint   Patient presents with   • Right Knee - Pain   • Left Knee - Pain        HPI  Cal Oliver Jr. is a 54 y.o. male.  Patient presents for evaluation of bilateral knee pain.'s been ongoing for 4+ years.  No specific history of injury or trauma.  He did have a history of left and right knee arthroscopies performed for 5 years ago with debridement only.  No records are available regarding those procedures.    At this time he endorses pain scale of 6/10.  Severity the pain moderate.  Quality the pain burning, throbbing, stabbing, shooting.  Associated symptoms swelling, popping, grinding, stiffness and sensation of giving away.  Symptoms are worse with walking, standing, stair climbing and it does affect his sleep.  Pain is predominantly localized to medial lateral joint lines as well as anteriorly.  No reported numbness or tingling into the extremity.    Prior treatments included physical therapy following surgeries.  He is also undergoing gel series 3 years ago without any changes in his symptoms.  Following the gel series he did undergo corticosteroid injections without any changes in his symptoms.    Patient is a positive tobacco abuse, type 2 diabetes and morbidly obese.    Past Medical History:   Diagnosis Date   • Chronic back pain    • Diabetes mellitus (CMS/HCC)    • High blood pressure    • Hyperlipidemia    • Nausea and vomiting    • Sleep apnea       Past Surgical History:   Procedure Laterality Date   • BACK SURGERY     • GALLBLADDER SURGERY     • KNEE SURGERY        Family History   Problem Relation Age of Onset   • Other Other         HIGH BLOOD PRESSURE   • Hypertension Other    • Heart disease Mother    • Heart attack Mother    • Heart disease Father      Social History     Socioeconomic History   • Marital status:      Spouse name: Not on file   • Number of children: Not on file   • Years of education: Not on file    • Highest education level: Not on file   Tobacco Use   • Smoking status: Current Some Day Smoker     Packs/day: 0.25     Years: 20.00     Pack years: 5.00     Types: Cigarettes   • Smokeless tobacco: Never Used   Substance and Sexual Activity   • Alcohol use: No   • Drug use: No   • Sexual activity: Defer      Current Outpatient Medications on File Prior to Visit   Medication Sig Dispense Refill   • albuterol sulfate HFA (VENTOLIN HFA) 108 (90 Base) MCG/ACT inhaler Inhale 2 puffs Every 4 (Four) Hours As Needed for Wheezing or Shortness of Air. 3 inhaler 3   • amLODIPine (NORVASC) 10 MG tablet Take 0.5 tablets by mouth Daily. 90 tablet 3   • cloNIDine (CATAPRES) 0.1 MG tablet Take 1 tablet by mouth every night at bedtime. 90 tablet 3   • colestipol (COLESTID) 1 g tablet Take 1 tablet by mouth 2 (Two) Times a Day. Adjust dose to achieve 1-3 BM's daily. 60 tablet 5   • cyclobenzaprine (FLEXERIL) 10 MG tablet Take 1 tablet by mouth 2 (Two) Times a Day. 60 tablet 5   • losartan-hydrochlorothiazide (HYZAAR) 100-25 MG per tablet Take 1 tablet by mouth Daily. 90 tablet 3   • metFORMIN (GLUCOPHAGE) 500 MG tablet Take 1 tablet by mouth 2 (Two) Times a Day. 180 tablet 3   • metoprolol succinate XL (TOPROL-XL) 25 MG 24 hr tablet Take 1 tablet by mouth Daily. 90 tablet 3   • omeprazole (priLOSEC) 20 MG capsule Take 1 capsule by mouth Daily. 90 capsule 3   • potassium chloride ER (K-TAB) 20 MEQ tablet controlled-release ER tablet Take 1 tablet by mouth 2 (Two) Times a Day With Meals. 180 tablet 1   • rOPINIRole (REQUIP) 2 MG tablet Take 1 tablet by mouth every night at bedtime. 90 tablet 3   • topiramate (TOPAMAX) 50 MG tablet take 1 tablet by mouth twice a day 180 tablet 1     No current facility-administered medications on file prior to visit.       Allergies   Allergen Reactions   • Levothyroxine Sodium Swelling   • Lipitor [Atorvastatin] Myalgia   • Piroxicam Rash        The following portions of the patient's history were  "reviewed and updated as appropriate: allergies, current medications, past family history, past medical history, past social history, past surgical history and problem list.    Review of Systems   Constitutional: Positive for activity change, appetite change and fatigue.   Eyes: Negative.    Respiratory: Positive for apnea.    Cardiovascular: Negative.    Gastrointestinal: Negative.    Endocrine: Negative.    Genitourinary: Negative.    Musculoskeletal: Positive for arthralgias and back pain.   Skin: Negative.    Allergic/Immunologic: Negative.    Neurological: Positive for headaches.   Hematological: Negative.    Psychiatric/Behavioral: Negative.         Objective      Physical Exam  Pulse 84   Ht 180.3 cm (70.98\")   Wt (!) 149 kg (328 lb 0.7 oz)   SpO2 98%   BMI 45.77 kg/m²     Body mass index is 45.77 kg/m².    GENERAL APPEARANCE: awake, alert & oriented x 3, in no acute distress and well developed, well nourished  PSYCH: normal mood and affect  LUNGS:  breathing nonlabored, no wheezing  EYES: sclera anicteric, pupils equal  CARDIOVASCULAR: palpable pulses dorsalis pedis, palpable posterior tibial bilaterally. Capillary refill less than 2 seconds  INTEGUMENTARY: skin intact, no clubbing, cyanosis  NEUROLOGIC:  Normal Sensation         Ortho Exam  Peripheral Vascular:    Upper Extremity:   Inspection:  Left--no cyanotic nail beds Right--no cyanotic nail beds   Bilateral:  Pink nail beds with brisk capillary refill   Palpation:  Bilateral radial pulse normal    Musculoskeletal:  Global Assessment:  Overall assessment of Lower Extremity Muscle Strength and Tone:  Left quadriceps--5/5   Left hamstrings--5/5       Left tibialis anterior--5/5  Left gastroc-soleus--5/5  Left EHL--5/5    Right quadriceps--5/5  Right hamstrings--5/5  Right tibialis anterior--5/5  Right gastroc soleus--5/5  Right EHL--5/5    Lower Extremity:  Knee/Patella:  No digital clubbing or cyanosis.    Examination of left and right knees reveals: "  Normal deep tendon reflexes, coordination, strength, tone, sensation.  No known fractures or deformities.    Inspection and Palpation:    Left knee:  Tenderness: Positive medial greater than lateral joint line as well as anteriorly anna-/retropatellar.  Effusion: Trace  Crepitus: Positive  Pulses:  2+  Ecchymosis:  None  Warmth:  None     Right knee:  Tenderness:  Positive medial greater than lateral joint line as well as anteriorly anna-/retropatellar.  Effusion: Trace  Crepitus: Positive  Pulses:  2+  Ecchymosis:  None  Warmth:  None     ROM:  Right:  Extension:0    Flexion:120  Left:  Extension:0     Flexion:120    Instability:    Left:  Lachman Test:  Negative, Varus stress test negative, Valgus stress test negative   Anterior Drawer Test:  Negative, Posterior Drawer Test:  Negative    Right:  Lachman Test:  Negative, Varus stress test negative, Valgus stress test negative   Anterior Drawer Test:  Negative, Posterior Drawer Test:  Negative    Deformities/Malalignments/Discrepancies:    Left:  none  Right:  none    Functional Testing:  Right:  Keli's test:  Negative  Patella grind test: Positive  Q-angle:  Normal  Apprehension Sign:  Negative    Left:  Keli's test:  Negative  Patella grind test: Positive  Q-angle:  Normal  Apprehension Sign:  Negative      Imaging/Studies  Dr. Mejia and I reviewed plain film imaging performed on 9/25/2019    EXAMINATION: XR KNEE 1 OR 2 VW RIGHT-      INDICATION: I10-Essential (primary) hypertension; E78.00-Pure  hypercholesterolemia, unspecified; M25.561-Pain in right knee;  G89.29-Other chronic pain.      COMPARISON: None.     FINDINGS: Two views of the right knee were submitted for review. There  is normal anatomic alignment of the right knee. No acute osseous  abnormality identified. Severe tricompartmental degenerative changes are  identified with significant joint space narrowing and endplate sclerosis  identified predominantly involving the medial joint  space.  Tricompartment osteophytosis is identified. Small joint effusion is  present. No radiopaque foreign body identified.         IMPRESSION:  1. No acute osseous abnormality.  2. Severe tricompartmental degenerative changes with a small joint  effusion.     D:  09/26/2019  E:  09/26/2019     This report was finalized on 9/26/2019 6:06 PM by Dr. Farhan Buckner MD.    Laboratory data: A1c 9/25/2019 - 6.3  Assessment/Plan        ICD-10-CM ICD-9-CM   1. Chronic pain of right knee M25.561 719.46    G89.29 338.29   2. Primary osteoarthritis of right knee M17.11 715.16   3. Tobacco abuse Z72.0 305.1   4. Type 2 diabetes mellitus without complication, without long-term current use of insulin (CMS/[facility]) E11.9 250.00   5. Morbid obesity (CMS/[facility]) E66.01 278.01       No orders of the defined types were placed in this encounter.       -Chronic knee pain due to severe tricompartmental osteoarthritis.  -Only treatment options available at this time a repeat corticosteroid or Visco supplementation injections, use of anti-inflammatories.  If those do not work then TKA.  He is not a candidate for any type of knee arthroscopy.    -Patient is interested in TKA however because of weight, smoking and diabetes does need to get these under control before proceeding with TKA.  -Patient has a BMI of 45.8 which needs to be watched.  Briefly discussed weight loss and nutrition.  Patient is currently working on weight loss and understands he needs to have a BMI under 40 in order to be considered for TKA.  -With regards to his diabetes his A1c is under 7.5 so he needs to continue to maintain glucose control.    -Patient understands he needs to be tobacco free before proceeding with TKA.  Discussed the importance of smoking cessation, reviewed the adverse effects of tobacco use: increased risk of tendonitis (more difficult to treat and resolve), hardening of the arteries, gangrene, amputation to name a few. Included in the counseling were  treatments options for cessation: quitting cold turkey, medication, nicotine step-down programs and smoking cessation programs. Furthermore, I explained to the patient the importance of abstaining from nicotine usage as nicotine is known to increase risk of complications associated with surgery including but not limited to infection, decreased wound healing, fracture/fusion nonunion, slowed soft tissue healing, and increased surgery associated pain. (3 minutes spent counseling patient).  -Patient is requesting pain medication.  Unfortunately we are only able to prescribe narcotics post surgery and for those that have acute fractures.  All other narcotic type pain medication would have to go through his PCP.  I have prescribed him some Voltaren 75 mg twice daily.  Discontinue medication if GI side effects develop.  -Follow-up in 3 months with Dr. Mejia and if he has gotten his weight down, discontinued smoking, and is A1c remains under 7.5 he will be evaluated for TKA.  -Questions and concerns answered..    History, exam, imaging all discussed with Dr. Mejia who agrees with the above assessment and plan.    Medical Decision Making  Management Options : prescription/IM medicine  Data/Risk: lab tests and radiology tests       Magalys Bailey PA-C  10/21/19  8:14 AM         EMR Dragon/Transcription disclaimer:  Much of this encounter note is an electronic transcription of spoken language to printed text. Electronic transcription of spoken language may permit erroneous, or at times, nonsensical words or phrases to be inadvertently transcribed. Although I have reviewed the note for such errors, some may still exist.

## 2019-10-21 ENCOUNTER — OFFICE VISIT (OUTPATIENT)
Dept: INTERNAL MEDICINE | Facility: CLINIC | Age: 54
End: 2019-10-21

## 2019-10-21 VITALS
BODY MASS INDEX: 44.1 KG/M2 | SYSTOLIC BLOOD PRESSURE: 161 MMHG | OXYGEN SATURATION: 95 % | HEART RATE: 84 BPM | TEMPERATURE: 98.3 F | WEIGHT: 315 LBS | HEIGHT: 71 IN | DIASTOLIC BLOOD PRESSURE: 100 MMHG

## 2019-10-21 DIAGNOSIS — M25.561 CHRONIC PAIN OF RIGHT KNEE: Primary | ICD-10-CM

## 2019-10-21 DIAGNOSIS — E11.9 TYPE 2 DIABETES MELLITUS WITHOUT COMPLICATION, WITHOUT LONG-TERM CURRENT USE OF INSULIN (HCC): ICD-10-CM

## 2019-10-21 DIAGNOSIS — I10 ESSENTIAL HYPERTENSION: ICD-10-CM

## 2019-10-21 DIAGNOSIS — E66.01 MORBID OBESITY (HCC): ICD-10-CM

## 2019-10-21 DIAGNOSIS — G89.29 CHRONIC PAIN OF RIGHT KNEE: Primary | ICD-10-CM

## 2019-10-21 PROCEDURE — 99214 OFFICE O/P EST MOD 30 MIN: CPT | Performed by: NURSE PRACTITIONER

## 2019-10-21 NOTE — PROGRESS NOTES
Date: 10/21/2019    Name: Cal Oliver Jr.  : 1965    Chief Complaint:   Chief Complaint   Patient presents with   • Knee Pain     rt knee       HPI:  Patient presents with history of right knee pain, referred to ortho surgery and has right total knee replacement scheduled for 20.  He is requesting pain medication and handicapped placard.  Rates pain as 7/10 at this time.  At night it is a 9/10, can't sleep due to pain. Has been taking ibuprofen & using ice pack without relief.   Reports surgeon's office states that his primary care's responsibility to take care of pain control until surgery.  He has been taking diclofenac as prescribed, states he is not sure if it helps or not because he has not stopped taking it for months.  He was advised by surgeon to lose 50 pounds before surgery, to reduce complications and increase success of knee replacement.  He has lost 4 pounds in 2 weeks.    His BP is elevated, states he just took his medication.  Does not monitor BP at home.  Denies chest pain, dyspnea, orthopnea, palpitations, lower extremity edema, headaches, weakness, visual disturbances or confusion.  He has a history of type 2 diabetes, is taking Glucophage as prescribed.  Does not monitor blood sugar at home.  Has been following a low-carb diet after being advised to lose weight by surgeon.Patient denies foot ulcerations, hyperglycemia, hypoglycemia, nausea, paresthesia of the feet, polydipsia, polyuria, polyphagia, visual disturbances and weight loss.     History:  The following portions of the patient's history were reviewed and updated as appropriate: allergies, current medications, past medical history, family history, surgical history, social history and problem list.     ROS:  Review of Systems   Respiratory: Negative for cough and wheezing.    Musculoskeletal: Positive for arthralgias, gait problem and joint swelling. Negative for myalgias.       VS:  Vitals:    10/21/19 0946   BP: 161/100  "  Pulse: 84   Temp: 98.3 °F (36.8 °C)   TempSrc: Temporal   SpO2: 95%   Weight: (!) 158 kg (349 lb)   Height: 180.3 cm (70.98\")     Body mass index is 48.7 kg/m².    PE:  Physical Exam   Constitutional: He is oriented to person, place, and time. He appears well-developed and well-nourished. No distress. He is obese.  HENT:   Head: Normocephalic.   Mouth/Throat: Oropharynx is clear and moist.   Eyes: Conjunctivae are normal. Pupils are equal, round, and reactive to light.   Cardiovascular: Normal rate, regular rhythm, normal heart sounds and intact distal pulses.   Pulmonary/Chest: Effort normal. He has wheezes.   Musculoskeletal:   Right knee decreased range of motion with extension and flexion, tender to touch, edematous, crepitus with movement.   Neurological: He is alert and oriented to person, place, and time.   Skin: Skin is warm. Capillary refill takes less than 2 seconds.       Assessment/Plan:  Cal was seen today for knee pain.    Diagnoses and all orders for this visit:    Chronic pain of right knee         - Advised to contact surgeon's office for further clarification of pain control issues prior to surgery.         - Encouraged to lose weight.  Essential hypertension        - Follow heart healthy diet.  Advised to reduce daily sodium intake to < 1500 mg per day.  Avoid processed & fast foods.        - Monitor blood pressure as discussed, keep log and bring to next appointment.          - Exercise as tolerated, with a goal of 30 minutes of moderate exercise most days. Recumbent bike use discussed.        - Take medications as prescribed.  Morbid obesity (CMS/HCC)  Type 2 diabetes mellitus without complication, without long-term current use of insulin (CMS/HCC)  -     Semaglutide,0.25 or 0.5MG/DOS, (OZEMPIC) 2 MG/1.5ML solution pen-injector; Inject 0.25 mg under the skin into the appropriate area as directed 1 (One) Time Per Week. Patient watched video regarding administration of medication, no " questions at this time.          - Follow diabetic diet        - Monitor blood sugars as discussed        - See eye doctor annually or as discussed        - Wear protective foot wear/no bare feet        - Check feet regularly for calluses or ulcers        - Discussed risk of poorly controlled diabetes and long-term complications        - Exercise as tolerated up to 30 minutes 5 days a week        - Take all medications as prescribed    Return if symptoms worsen or fail to improve.

## 2019-10-24 ENCOUNTER — OFFICE VISIT (OUTPATIENT)
Dept: INTERNAL MEDICINE | Facility: CLINIC | Age: 54
End: 2019-10-24

## 2019-10-24 VITALS
SYSTOLIC BLOOD PRESSURE: 160 MMHG | OXYGEN SATURATION: 97 % | DIASTOLIC BLOOD PRESSURE: 92 MMHG | RESPIRATION RATE: 16 BRPM | HEIGHT: 71 IN | HEART RATE: 84 BPM | BODY MASS INDEX: 44.1 KG/M2 | TEMPERATURE: 98.2 F | WEIGHT: 315 LBS

## 2019-10-24 DIAGNOSIS — G89.29 CHRONIC PAIN OF RIGHT KNEE: Primary | ICD-10-CM

## 2019-10-24 DIAGNOSIS — M25.561 CHRONIC PAIN OF RIGHT KNEE: Primary | ICD-10-CM

## 2019-10-24 PROCEDURE — 99213 OFFICE O/P EST LOW 20 MIN: CPT | Performed by: INTERNAL MEDICINE

## 2019-10-24 NOTE — PROGRESS NOTES
Subjective     Patient ID: Cal Oliver Jr. is a 54 y.o. male. Patient is here for management of multiple medical problems.     Chief Complaint   Patient presents with   • Hyperlipidemia     follow-up   • Hypertension     follow-up     History of Present Illness     Pt still with pain in right knee.       Pt waiting for surgical eval.    Pain gel worked in past.      The following portions of the patient's history were reviewed and updated as appropriate: allergies, current medications, past family history, past medical history, past social history, past surgical history and problem list.    Review of Systems   HENT: Negative for congestion and dental problem.    Cardiovascular: Negative for chest pain and leg swelling.   Gastrointestinal: Negative for abdominal distention and abdominal pain.   Psychiatric/Behavioral: Negative for self-injury and sleep disturbance. The patient is not nervous/anxious.    All other systems reviewed and are negative.      Current Outpatient Medications:   •  albuterol sulfate HFA (VENTOLIN HFA) 108 (90 Base) MCG/ACT inhaler, Inhale 2 puffs Every 4 (Four) Hours As Needed for Wheezing or Shortness of Air., Disp: 3 inhaler, Rfl: 3  •  amLODIPine (NORVASC) 10 MG tablet, Take 0.5 tablets by mouth Daily., Disp: 90 tablet, Rfl: 3  •  cloNIDine (CATAPRES) 0.1 MG tablet, Take 1 tablet by mouth every night at bedtime., Disp: 90 tablet, Rfl: 3  •  colestipol (COLESTID) 1 g tablet, Take 1 tablet by mouth 2 (Two) Times a Day. Adjust dose to achieve 1-3 BM's daily., Disp: 60 tablet, Rfl: 5  •  cyclobenzaprine (FLEXERIL) 10 MG tablet, Take 1 tablet by mouth 2 (Two) Times a Day., Disp: 60 tablet, Rfl: 5  •  diclofenac (VOLTAREN) 75 MG EC tablet, Take 1 tablet by mouth 2 (Two) Times a Day., Disp: 60 tablet, Rfl: 2  •  losartan-hydrochlorothiazide (HYZAAR) 100-25 MG per tablet, Take 1 tablet by mouth Daily., Disp: 90 tablet, Rfl: 3  •  metFORMIN (GLUCOPHAGE) 500 MG tablet, Take 1 tablet by mouth 2  "(Two) Times a Day., Disp: 180 tablet, Rfl: 3  •  metoprolol succinate XL (TOPROL-XL) 25 MG 24 hr tablet, Take 1 tablet by mouth Daily., Disp: 90 tablet, Rfl: 3  •  omeprazole (priLOSEC) 20 MG capsule, Take 1 capsule by mouth Daily., Disp: 90 capsule, Rfl: 3  •  potassium chloride ER (K-TAB) 20 MEQ tablet controlled-release ER tablet, Take 1 tablet by mouth 2 (Two) Times a Day With Meals., Disp: 180 tablet, Rfl: 1  •  rOPINIRole (REQUIP) 2 MG tablet, Take 1 tablet by mouth every night at bedtime., Disp: 90 tablet, Rfl: 3  •  Semaglutide,0.25 or 0.5MG/DOS, (OZEMPIC) 2 MG/1.5ML solution pen-injector, Inject 0.25 mg under the skin into the appropriate area as directed 1 (One) Time Per Week., Disp: 2 pen, Rfl: 0  •  topiramate (TOPAMAX) 50 MG tablet, take 1 tablet by mouth twice a day, Disp: 180 tablet, Rfl: 1    Objective      Blood pressure 160/92, pulse 84, temperature 98.2 °F (36.8 °C), temperature source Oral, resp. rate 16, height 180.3 cm (71\"), weight (!) 163 kg (359 lb 12.8 oz), SpO2 97 %.    Physical Exam     General Appearance:    Alert, cooperative, no distress, appears stated age   Head:    Normocephalic, without obvious abnormality, atraumatic   Eyes:    PERRL, conjunctiva/corneas clear, EOM's intact   Ears:    Normal TM's and external ear canals, both ears   Nose:   Nares normal, septum midline, mucosa normal, no drainage   or sinus tenderness   Throat:   Lips, mucosa, and tongue normal; teeth and gums normal   Neck:   Supple, symmetrical, trachea midline, no adenopathy;        thyroid:  No enlargement/tenderness/nodules; no carotid    bruit or JVD   Back:     Symmetric, no curvature, ROM normal, no CVA tenderness   Lungs:     Clear to auscultation bilaterally, respirations unlabored   Chest wall:    No tenderness or deformity   Heart:    Regular rate and rhythm, S1 and S2 normal, no murmur,        rub or gallop   Abdomen:     Soft, non-tender, bowel sounds active all four quadrants,     no masses, no " organomegaly   Extremities:   Extremities normal, atraumatic, no cyanosis or edema   Pulses:   2+ and symmetric all extremities   Skin:   Skin color, texture, turgor normal, no rashes or lesions   Lymph nodes:   Cervical, supraclavicular, and axillary nodes normal   Neurologic:   CNII-XII intact. Normal strength, sensation and reflexes       throughout      Results for orders placed or performed in visit on 09/25/19   Uric Acid   Result Value Ref Range    Uric Acid 5.1 3.4 - 7.0 mg/dL   Vitamin B12   Result Value Ref Range    Vitamin B-12 695 211 - 946 pg/mL   Comprehensive Metabolic Panel   Result Value Ref Range    Glucose 88 65 - 99 mg/dL    BUN 11 6 - 20 mg/dL    Creatinine 0.76 0.76 - 1.27 mg/dL    eGFR Non African Am 108 >60 mL/min/1.73    eGFR African Am 131 >60 mL/min/1.73    BUN/Creatinine Ratio 14.5 7.0 - 25.0    Sodium 142 136 - 145 mmol/L    Potassium 3.9 3.5 - 5.2 mmol/L    Chloride 102 98 - 107 mmol/L    Total CO2 27.9 22.0 - 29.0 mmol/L    Calcium 9.1 8.6 - 10.5 mg/dL    Total Protein 7.5 6.0 - 8.5 g/dL    Albumin 4.40 3.50 - 5.20 g/dL    Globulin 3.1 gm/dL    A/G Ratio 1.4 g/dL    Total Bilirubin 0.4 0.2 - 1.2 mg/dL    Alkaline Phosphatase 114 39 - 117 U/L    AST (SGOT) 17 1 - 40 U/L    ALT (SGPT) 31 1 - 41 U/L   T4, Free   Result Value Ref Range    Free T4 0.90 (L) 0.93 - 1.70 ng/dL   TSH   Result Value Ref Range    TSH 2.460 0.270 - 4.200 uIU/mL   Hemoglobin A1c   Result Value Ref Range    Hemoglobin A1C 6.30 (H) 4.80 - 5.60 %   Lipid Panel   Result Value Ref Range    Total Cholesterol 222 (H) 0 - 200 mg/dL    Triglycerides 144 0 - 150 mg/dL    HDL Cholesterol 41 40 - 60 mg/dL    VLDL Cholesterol 28.8 mg/dL    LDL Cholesterol  152 (H) 0 - 100 mg/dL   CBC & Differential   Result Value Ref Range    WBC 11.13 (H) 3.40 - 10.80 10*3/mm3    RBC 5.55 4.14 - 5.80 10*6/mm3    Hemoglobin 16.3 13.0 - 17.7 g/dL    Hematocrit 49.0 37.5 - 51.0 %    MCV 88.3 79.0 - 97.0 fL    MCH 29.4 26.6 - 33.0 pg    MCHC 33.3  31.5 - 35.7 g/dL    RDW 13.3 12.3 - 15.4 %    Platelets 360 140 - 450 10*3/mm3    Neutrophil Rel % 66.4 42.7 - 76.0 %    Lymphocyte Rel % 22.6 19.6 - 45.3 %    Monocyte Rel % 8.6 5.0 - 12.0 %    Eosinophil Rel % 1.3 0.3 - 6.2 %    Basophil Rel % 0.5 0.0 - 1.5 %    Neutrophils Absolute 7.39 (H) 1.70 - 7.00 10*3/mm3    Lymphocytes Absolute 2.51 0.70 - 3.10 10*3/mm3    Monocytes Absolute 0.96 (H) 0.10 - 0.90 10*3/mm3    Eosinophils Absolute 0.14 0.00 - 0.40 10*3/mm3    Basophils Absolute 0.06 0.00 - 0.20 10*3/mm3    Immature Granulocyte Rel % 0.6 (H) 0.0 - 0.5 %    Immature Grans Absolute 0.07 (H) 0.00 - 0.05 10*3/mm3    nRBC 0.0 0.0 - 0.2 /100 WBC         Assessment/Plan     Pain gel rx sen to [facility].  Will include ketamine and gabapentin.     Start ozempic for wt loss.    And diabetic controled.          Cal was seen today for hyperlipidemia and hypertension.    Diagnoses and all orders for this visit:    Chronic pain of right knee      Return if symptoms worsen or fail to improve.          There are no Patient Instructions on file for this visit.     Woo Betancourt MD    Assessment/Plan

## 2019-12-04 RX ORDER — ALBUTEROL SULFATE 90 UG/1
AEROSOL, METERED RESPIRATORY (INHALATION)
Qty: 54 G | Refills: 0 | Status: SHIPPED | OUTPATIENT
Start: 2019-12-04 | End: 2019-12-05 | Stop reason: SDUPTHER

## 2019-12-04 RX ORDER — ALBUTEROL SULFATE 90 UG/1
AEROSOL, METERED RESPIRATORY (INHALATION)
Qty: 90 G | Refills: 0 | Status: CANCELLED | OUTPATIENT
Start: 2019-12-04

## 2019-12-05 RX ORDER — ALBUTEROL SULFATE 90 UG/1
2 AEROSOL, METERED RESPIRATORY (INHALATION) EVERY 4 HOURS PRN
Qty: 3 INHALER | Refills: 3 | Status: SHIPPED | OUTPATIENT
Start: 2019-12-05 | End: 2019-12-30 | Stop reason: SDUPTHER

## 2019-12-30 ENCOUNTER — OFFICE VISIT (OUTPATIENT)
Dept: INTERNAL MEDICINE | Facility: CLINIC | Age: 54
End: 2019-12-30

## 2019-12-30 VITALS
HEIGHT: 71 IN | RESPIRATION RATE: 16 BRPM | WEIGHT: 315 LBS | HEART RATE: 93 BPM | BODY MASS INDEX: 44.1 KG/M2 | OXYGEN SATURATION: 97 % | DIASTOLIC BLOOD PRESSURE: 93 MMHG | TEMPERATURE: 96.8 F | SYSTOLIC BLOOD PRESSURE: 155 MMHG

## 2019-12-30 DIAGNOSIS — E11.9 TYPE 2 DIABETES MELLITUS WITHOUT COMPLICATION, WITHOUT LONG-TERM CURRENT USE OF INSULIN (HCC): ICD-10-CM

## 2019-12-30 DIAGNOSIS — G25.81 RLS (RESTLESS LEGS SYNDROME): ICD-10-CM

## 2019-12-30 DIAGNOSIS — M25.512 LEFT SHOULDER PAIN, UNSPECIFIED CHRONICITY: Primary | ICD-10-CM

## 2019-12-30 DIAGNOSIS — I10 ESSENTIAL HYPERTENSION: ICD-10-CM

## 2019-12-30 DIAGNOSIS — E66.01 MORBID OBESITY (HCC): ICD-10-CM

## 2019-12-30 DIAGNOSIS — M54.12 CERVICAL RADICULOPATHY: ICD-10-CM

## 2019-12-30 DIAGNOSIS — K21.9 GASTROESOPHAGEAL REFLUX DISEASE WITHOUT ESOPHAGITIS: ICD-10-CM

## 2019-12-30 PROBLEM — G47.30 SLEEP APNEA: Status: ACTIVE | Noted: 2019-12-30

## 2019-12-30 PROCEDURE — 99214 OFFICE O/P EST MOD 30 MIN: CPT | Performed by: PHYSICIAN ASSISTANT

## 2019-12-30 RX ORDER — ALBUTEROL SULFATE 90 UG/1
2 AEROSOL, METERED RESPIRATORY (INHALATION) EVERY 4 HOURS PRN
Qty: 3 INHALER | Refills: 3 | Status: SHIPPED | OUTPATIENT
Start: 2019-12-30 | End: 2020-11-04

## 2019-12-30 RX ORDER — DICLOFENAC SODIUM 75 MG/1
75 TABLET, DELAYED RELEASE ORAL 2 TIMES DAILY
Qty: 60 TABLET | Refills: 2 | Status: SHIPPED | OUTPATIENT
Start: 2019-12-30 | End: 2020-01-17

## 2019-12-30 RX ORDER — TOPIRAMATE 50 MG/1
50 TABLET, FILM COATED ORAL 2 TIMES DAILY
Qty: 180 TABLET | Refills: 1 | Status: SHIPPED | OUTPATIENT
Start: 2019-12-30 | End: 2020-06-16

## 2019-12-30 RX ORDER — POTASSIUM CHLORIDE 1500 MG/1
20 TABLET, FILM COATED, EXTENDED RELEASE ORAL 2 TIMES DAILY WITH MEALS
Qty: 180 TABLET | Refills: 1 | Status: SHIPPED | OUTPATIENT
Start: 2019-12-30 | End: 2020-09-11 | Stop reason: SDUPTHER

## 2019-12-30 RX ORDER — ROPINIROLE 3 MG/1
3 TABLET, FILM COATED ORAL
Qty: 90 TABLET | Refills: 1 | Status: SHIPPED | OUTPATIENT
Start: 2019-12-30 | End: 2020-09-11 | Stop reason: SDUPTHER

## 2019-12-30 RX ORDER — CYCLOBENZAPRINE HCL 10 MG
10 TABLET ORAL 2 TIMES DAILY
Qty: 60 TABLET | Refills: 5 | Status: SHIPPED | OUTPATIENT
Start: 2019-12-30 | End: 2020-09-11 | Stop reason: SDUPTHER

## 2019-12-30 RX ORDER — MONTELUKAST SODIUM 4 MG/1
1 TABLET, CHEWABLE ORAL 2 TIMES DAILY
Qty: 60 TABLET | Refills: 5 | Status: SHIPPED | OUTPATIENT
Start: 2019-12-30 | End: 2020-06-16

## 2019-12-30 RX ORDER — OMEPRAZOLE 20 MG/1
20 CAPSULE, DELAYED RELEASE ORAL DAILY
Qty: 90 CAPSULE | Refills: 3 | Status: SHIPPED | OUTPATIENT
Start: 2019-12-30 | End: 2021-05-14 | Stop reason: SDUPTHER

## 2019-12-30 RX ORDER — CLONIDINE HYDROCHLORIDE 0.1 MG/1
0.1 TABLET ORAL
Qty: 90 TABLET | Refills: 3 | Status: SHIPPED | OUTPATIENT
Start: 2019-12-30 | End: 2021-01-27

## 2019-12-30 RX ORDER — LOSARTAN POTASSIUM AND HYDROCHLOROTHIAZIDE 25; 100 MG/1; MG/1
1 TABLET ORAL DAILY
Qty: 90 TABLET | Refills: 3 | Status: SHIPPED | OUTPATIENT
Start: 2019-12-30 | End: 2021-01-27

## 2019-12-30 RX ORDER — AMLODIPINE BESYLATE 10 MG/1
5 TABLET ORAL DAILY
Qty: 90 TABLET | Refills: 3 | Status: SHIPPED | OUTPATIENT
Start: 2019-12-30 | End: 2020-08-12 | Stop reason: SDUPTHER

## 2019-12-30 RX ORDER — METOPROLOL SUCCINATE 25 MG/1
25 TABLET, EXTENDED RELEASE ORAL DAILY
Qty: 90 TABLET | Refills: 3 | Status: SHIPPED | OUTPATIENT
Start: 2019-12-30 | End: 2021-01-27

## 2019-12-30 NOTE — PROGRESS NOTES
Cal Oliver Jr. is a 54 y.o. male.     Subjective   History of Present Illness   Here today with concern of chronic left-sided neck and left shoulder pain which has been acutely worse for the last 5 days. Certain positions cause some pain relief and others cause worsened pain which disturbs sleep.  He endorses numbness of the left 2nd and 3rd fingers.  He has also been experiencing leftellbow pain.  Flexeril and a previously prescribed compounded pain gel are not helping any.  He feels symptoms may have worsened after sleeping wrong.  He denies CP or SOA. He does endorse a history of cervical disc disorder which has previously been managed with chiropractic care. He has not yet tried chiropractic care for the acute pain flare up.     He has been using Ozempic 0.25 mg weekly for control of diabetes and in effort to help him lose weight.  He is tolerating Ozempic very well without any side effects, although he has had an additional 8 pound weight gain in the last 2 months.  He admits he did eat poorly over the Christmas holiday which may have contributed to weight gain.    Symptoms of restless leg syndrome have been very bothersome recently.  Symptoms were previously well controlled with Requip but gradually it seems to have reduced efficacy.          The following portions of the patient's history were reviewed and updated as appropriate: allergies, current medications, past family history, past medical history, past social history, past surgical history and problem list.    Review of Systems   Constitutional: Positive for unexpected weight change. Negative for appetite change, chills, fatigue and fever.   Eyes: Negative for visual disturbance.   Respiratory: Negative for cough, chest tightness, shortness of breath and wheezing.    Cardiovascular: Negative for chest pain, palpitations and leg swelling.   Musculoskeletal: Positive for arthralgias, myalgias and neck pain. Negative for joint swelling and neck  stiffness.   Skin: Negative for color change, pallor, rash and wound.   Allergic/Immunologic: Negative for immunocompromised state.   Neurological: Positive for numbness. Negative for dizziness, syncope, weakness and headaches.   Hematological: Negative for adenopathy. Does not bruise/bleed easily.   Psychiatric/Behavioral: Positive for sleep disturbance. Negative for agitation, behavioral problems, decreased concentration, dysphoric mood, self-injury and suicidal ideas. The patient is not nervous/anxious and is not hyperactive.          Objective    Physical Exam   Constitutional: He is oriented to person, place, and time. He appears well-developed and well-nourished. No distress.   Morbidly obese.    HENT:   Head: Normocephalic and atraumatic.   Eyes: Pupils are equal, round, and reactive to light. Conjunctivae and EOM are normal.   Neck: Normal range of motion. Neck supple.   Cardiovascular: Normal rate, regular rhythm and normal heart sounds. Exam reveals no gallop and no friction rub.   No murmur heard.  Pulmonary/Chest: Effort normal and breath sounds normal. No respiratory distress. He has no wheezes. He has no rales.   Abdominal: Soft. Bowel sounds are normal. There is no tenderness.   Musculoskeletal: He exhibits no edema or deformity.        Cervical back: He exhibits decreased range of motion, tenderness, bony tenderness (C3-C5 predominantly), pain and spasm. He exhibits no swelling, no deformity and normal pulse.        Back:    Neurological: He is alert and oriented to person, place, and time. He displays normal reflexes. No cranial nerve deficit or sensory deficit. He exhibits normal muscle tone. Coordination normal.   Skin: Skin is warm and dry. Capillary refill takes less than 2 seconds. No rash noted. He is not diaphoretic.   Psychiatric: He has a normal mood and affect. His behavior is normal. Judgment and thought content normal.   Nursing note and vitals reviewed.        /93   Pulse 93    "Temp 96.8 °F (36 °C)   Resp 16   Ht 180.3 cm (71\")   Wt (!) 166 kg (367 lb)   SpO2 97%   BMI 51.19 kg/m²     Nursing note and vitals reviewed.          Assessment/Plan   Cal was seen today for shoulder pain.    Diagnoses and all orders for this visit:    Left shoulder pain, unspecified chronicity  Cervical radiculopathy  Strongly encouraged he seek chiropractic care today as this has the best chance of offering pain relief.  May continue diclofenac 75 mg twice daily as needed but was advised to use sparingly and to always take with food.  May continue Flexeril 3 times daily as needed.      Pain medication was requested by the patient for short-term use but was denied by his PCP, Dr. Betancourt, who agreed that he should seek chiropractic care instead.  CONTROLLED SUBSTANCE TRACKING 12/30/2019   Last Surjit 12/30/2019   Report Number [id number]        Essential hypertension  -     Continue: AmLODIPine (NORVASC) 10 MG tablet; Take 0.5 tablets by mouth Daily.  -     Continue: CloNIDine (CATAPRES) 0.1 MG tablet; Take 1 tablet by mouth every night at bedtime.  -     Continue: Losartan-hydrochlorothiazide (HYZAAR) 100-25 MG per tablet; Take 1 tablet by mouth Daily.  -     Continue: Metoprolol succinate XL (TOPROL-XL) 25 MG 24 hr tablet; Take 1 tablet by mouth Daily.  -     potassium chloride ER (K-TAB) 20 MEQ tablet controlled-release ER tablet; Take 1 tablet by mouth 2 (Two) Times a Day With Meals.  Weight loss strongly encouraged.  Follow heart healthy/low salt diet.  Avoid processed foods.  Monitor blood pressure as discussed.  Exercise as tolerated up to 30 minutes 5 days per week.  Take medications as prescribed.    Type 2 diabetes mellitus without complication, without long-term current use of insulin (CMS/[facility])  -     Continue: MetFORMIN (GLUCOPHAGE) 500 MG tablet; Take 1 tablet by mouth 2 (Two) Times a Day.  -     Increased to 0.5 mg weekly: Semaglutide,0.25 or 0.5MG/DOS, (OZEMPIC) 2 MG/1.5ML solution " pen-injector; Inject 0.5 mg under the skin into the appropriate area as directed 1 (One) Time Per Week.  Improved diet strongly encouraged.    RLS (restless legs syndrome)  -     Dose increase: rOPINIRole (REQUIP) 3 MG tablet; Take 1 tablet by mouth every night at bedtime.    Morbid obesity (CMS/HCC)  -     Dose increased: Semaglutide,0.25 or 0.5MG/DOS, (OZEMPIC) 2 MG/1.5ML solution pen-injector; Inject 0.5 mg under the skin into the appropriate area as directed 1 (One) Time Per Week.    Other orders (refills)  -     albuterol sulfate HFA (VENTOLIN HFA) 108 (90 Base) MCG/ACT inhaler; Inhale 2 puffs Every 4 (Four) Hours As Needed for Wheezing.  -     colestipol (COLESTID) 1 g tablet; Take 1 tablet by mouth 2 (Two) Times a Day. Adjust dose to achieve 1-3 BM's daily.  -     cyclobenzaprine (FLEXERIL) 10 MG tablet; Take 1 tablet by mouth 2 (Two) Times a Day.  -     diclofenac (VOLTAREN) 75 MG EC tablet; Take 1 tablet by mouth 2 (Two) Times a Day.  -     topiramate (TOPAMAX) 50 MG tablet; Take 1 tablet by mouth 2 (Two) Times a Day.        Follow-up with Dr. Betancourt in 3 months or sooner if needed.

## 2020-01-17 ENCOUNTER — OFFICE VISIT (OUTPATIENT)
Dept: ORTHOPEDIC SURGERY | Facility: CLINIC | Age: 55
End: 2020-01-17

## 2020-01-17 VITALS — OXYGEN SATURATION: 99 % | BODY MASS INDEX: 44.1 KG/M2 | HEIGHT: 71 IN | HEART RATE: 119 BPM | WEIGHT: 315 LBS

## 2020-01-17 DIAGNOSIS — M17.11 PRIMARY OSTEOARTHRITIS OF RIGHT KNEE: ICD-10-CM

## 2020-01-17 DIAGNOSIS — E66.01 MORBID OBESITY WITH BMI OF 50.0-59.9, ADULT (HCC): ICD-10-CM

## 2020-01-17 DIAGNOSIS — Z72.0 TOBACCO ABUSE: ICD-10-CM

## 2020-01-17 DIAGNOSIS — M17.11 PRIMARY OSTEOARTHRITIS OF RIGHT KNEE: Primary | ICD-10-CM

## 2020-01-17 PROCEDURE — 99213 OFFICE O/P EST LOW 20 MIN: CPT | Performed by: ORTHOPAEDIC SURGERY

## 2020-01-17 PROCEDURE — 99406 BEHAV CHNG SMOKING 3-10 MIN: CPT | Performed by: ORTHOPAEDIC SURGERY

## 2020-01-17 RX ORDER — MELOXICAM 15 MG/1
TABLET ORAL
Qty: 60 TABLET | Refills: 0 | Status: SHIPPED | OUTPATIENT
Start: 2020-01-17 | End: 2020-01-17

## 2020-01-17 RX ORDER — MELOXICAM 15 MG/1
TABLET ORAL
Qty: 90 TABLET | Refills: 1 | Status: SHIPPED | OUTPATIENT
Start: 2020-01-17 | End: 2020-09-11 | Stop reason: ALTCHOICE

## 2020-01-17 NOTE — PROGRESS NOTES
Orthopaedic Clinic Note: Knee Established Patient    Chief Complaint   Patient presents with   • Follow-up     3 month recheck - Primary osteoarthritis of right knee        HPI    It has been 3  month(s) since Mr. Oliver's last visit. He returns to clinic today for complaint of right knee pain.  He had previously seen Magalys Bailey.  He is new to me.  He is here today to see me to discuss potential right knee arthroplasty.  He is complaining of 8/10 pain on the right knee.  His pain is localized globally throughout the knee.  His pain is worse with walking weightbearing activities.  Sitting and resting eases his pain.  He describes pain as aching throbbing sensation with occasional stabbing shooting pain.  He has been attempting weight loss but has made no significant progress.  His BMI has now increased from 45-51.  He continues to smoke but has cut back.  Reportedly his A1c is under better control.  Overall he is doing worse.    Past Medical History:   Diagnosis Date   • Chronic back pain    • Diabetes mellitus (CMS/HCC)    • High blood pressure    • Hyperlipidemia    • Nausea and vomiting    • Sleep apnea       Past Surgical History:   Procedure Laterality Date   • BACK SURGERY     • GALLBLADDER SURGERY     • KNEE SURGERY        Family History   Problem Relation Age of Onset   • Other Other         HIGH BLOOD PRESSURE   • Hypertension Other    • Heart disease Mother    • Heart attack Mother    • Heart disease Father      Social History     Socioeconomic History   • Marital status:      Spouse name: Not on file   • Number of children: Not on file   • Years of education: Not on file   • Highest education level: Not on file   Tobacco Use   • Smoking status: Current Some Day Smoker     Packs/day: 0.25     Years: 20.00     Pack years: 5.00     Types: Cigarettes   • Smokeless tobacco: Never Used   Substance and Sexual Activity   • Alcohol use: No   • Drug use: No   • Sexual activity: Defer      Current  Outpatient Medications on File Prior to Visit   Medication Sig Dispense Refill   • albuterol sulfate HFA (VENTOLIN HFA) 108 (90 Base) MCG/ACT inhaler Inhale 2 puffs Every 4 (Four) Hours As Needed for Wheezing. 3 inhaler 3   • amLODIPine (NORVASC) 10 MG tablet Take 0.5 tablets by mouth Daily. 90 tablet 3   • cloNIDine (CATAPRES) 0.1 MG tablet Take 1 tablet by mouth every night at bedtime. 90 tablet 3   • colestipol (COLESTID) 1 g tablet Take 1 tablet by mouth 2 (Two) Times a Day. Adjust dose to achieve 1-3 BM's daily. 60 tablet 5   • cyclobenzaprine (FLEXERIL) 10 MG tablet Take 1 tablet by mouth 2 (Two) Times a Day. 60 tablet 5   • losartan-hydrochlorothiazide (HYZAAR) 100-25 MG per tablet Take 1 tablet by mouth Daily. 90 tablet 3   • metFORMIN (GLUCOPHAGE) 500 MG tablet Take 1 tablet by mouth 2 (Two) Times a Day. 180 tablet 3   • metoprolol succinate XL (TOPROL-XL) 25 MG 24 hr tablet Take 1 tablet by mouth Daily. 90 tablet 3   • omeprazole (priLOSEC) 20 MG capsule Take 1 capsule by mouth Daily. 90 capsule 3   • potassium chloride ER (K-TAB) 20 MEQ tablet controlled-release ER tablet Take 1 tablet by mouth 2 (Two) Times a Day With Meals. 180 tablet 1   • rOPINIRole (REQUIP) 3 MG tablet Take 1 tablet by mouth every night at bedtime. 90 tablet 1   • Semaglutide,0.25 or 0.5MG/DOS, (OZEMPIC) 2 MG/1.5ML solution pen-injector Inject 0.5 mg under the skin into the appropriate area as directed 1 (One) Time Per Week. 2 pen 0   • topiramate (TOPAMAX) 50 MG tablet Take 1 tablet by mouth 2 (Two) Times a Day. 180 tablet 1   • [DISCONTINUED] diclofenac (VOLTAREN) 75 MG EC tablet Take 1 tablet by mouth 2 (Two) Times a Day. 60 tablet 2     No current facility-administered medications on file prior to visit.       Allergies   Allergen Reactions   • Levothyroxine Sodium Swelling   • Lipitor [Atorvastatin] Myalgia   • Piroxicam Rash        Review of Systems   Constitutional: Negative.    HENT: Negative.    Eyes: Negative.   "  Respiratory: Negative.    Cardiovascular: Negative.    Gastrointestinal: Negative.    Endocrine: Negative.    Genitourinary: Negative.    Musculoskeletal: Positive for arthralgias and joint swelling.   Skin: Negative.    Allergic/Immunologic: Negative.    Neurological: Negative.    Hematological: Negative.    Psychiatric/Behavioral: Negative.         The patient's Review of Systems was personally reviewed and confirmed as accurate.    Physical Exam  Pulse 119, height 180.3 cm (70.98\"), weight (!) 166 kg (365 lb 15.4 oz), SpO2 99 %.    Body mass index is 51.06 kg/m².    GENERAL APPEARANCE: awake, alert, oriented, in no acute distress, well developed, well nourished and obese  LUNGS:  breathing nonlabored  EXTREMITIES: no clubbing, cyanosis  PERIPHERAL PULSES: palpable dorsalis pedis and posterior tibial pulses bilaterally.    GAIT:  Antalgic        ----------  Right Knee Exam:  ----------  ALIGNMENT: severe varus, correctable to neutral  ----------  RANGE OF MOTION:  Decreased (5 - 115 degrees) with no extensor lag  LIGAMENTOUS STABILITY:   stable to varus and valgus stress at terminal extension and 30 degrees; retensioning of the MCL is appreciated with valgus stress at 30 degrees consistent with medial compartment degeneration  ----------  STRENGTH:  KNEE FLEXION 5/5  KNEE EXTENSION  5/5  ANKLE DORSIFLEXION  5/5  ANKLE PLANTARFLEXION  5/5  ----------  PAIN WITH PALPATION:global  KNEE EFFUSION: yes, mild effusion  PAIN WITH KNEE ROM: yes  PATELLAR CREPITUS:  yes, painful and symptomatic  ----------  SENSATION TO LIGHT TOUCH:  DEEP PERONEAL/SUPERFICIAL PERONEAL/SURAL/SAPHENOUS/TIBIAL:    intact  ----------  EDEMA:  no  ERYTHEMA:    no  WOUNDS/INCISIONS:   no  _____________________________________________________________________  _____________________________________________________________________    RADIOGRAPHIC FINDINGS:   No new imaging today.  Prior imaging from 9/25/2019 reveals moderate to severe " tricompartmental osteoarthritis with genu varum alignment    Assessment/Plan:   Diagnosis Plan   1. Primary osteoarthritis of right knee  meloxicam (MOBIC) 15 MG tablet   2. Tobacco abuse     3. Morbid obesity with BMI of 50.0-59.9, adult (CMS/[facility])       Patient is not a candidate for surgical intervention due to elevated BMI and continued smoking status.  I discussed alternative treatment options.  He is agreeable to prescription anti-inflammatory.    Patient has an elevated BMI greater than 40.  This places the patient at increased risk for perioperative complications as well as increased risk of accelerating the degenerative processes within the joint.  As a result, surgical intervention will be deferred until the patient can achieve a BMI less than 40.  In the interval, the patient has been instructed on weight loss avenues including diet, portion control, calorie restriction, low/no impact exercise, referral to weight loss management and/or bariatric surgery.  It was explained that weight loss can improve joint pain alone by decreasing the joint reaction forces.  For every pound of weight change, the knee and hip joints see a 4 to 5 fold multiplier affect.  Given these options, the patient will proceed with diet and modified exercise.    I spent approximately 5-10 minutes counseling the patient regarding the adverse effects of tobacco use.  Included in this counseling session were treatment options for cessation including quitting cold turkey, medication, nicotine step-down programs, and smoking cessation programs.  Furthermore, I explained to the patient the importance of abstaining from nicotine usage as nicotine is known to increase the risk of complications associated with surgery including but not limited to infection, decreased wound healing, slowed soft tissue healing, and increased surgery associated pain.  As a result of this counseling session, the patient will weigh the options and determine how to  proceed with achieving tobacco cessation in preparation for surgery.  He will follow-up in 3 months to discuss his cessation success.      Arnold Mejia MD  01/17/20  11:40 AM

## 2020-03-18 RX ORDER — DICLOFENAC SODIUM 75 MG/1
TABLET, DELAYED RELEASE ORAL
Qty: 60 TABLET | Refills: 2 | OUTPATIENT
Start: 2020-03-18

## 2020-06-16 RX ORDER — TOPIRAMATE 50 MG/1
TABLET, FILM COATED ORAL
Qty: 180 TABLET | Refills: 1 | Status: SHIPPED | OUTPATIENT
Start: 2020-06-16 | End: 2020-09-29

## 2020-06-16 RX ORDER — MONTELUKAST SODIUM 4 MG/1
TABLET, CHEWABLE ORAL
Qty: 60 TABLET | Refills: 5 | Status: SHIPPED | OUTPATIENT
Start: 2020-06-16 | End: 2020-09-29

## 2020-08-12 DIAGNOSIS — I10 ESSENTIAL HYPERTENSION: ICD-10-CM

## 2020-08-12 RX ORDER — AMLODIPINE BESYLATE 10 MG/1
5 TABLET ORAL DAILY
Qty: 90 TABLET | Refills: 3 | Status: SHIPPED | OUTPATIENT
Start: 2020-08-12 | End: 2020-10-08 | Stop reason: SDUPTHER

## 2020-09-11 ENCOUNTER — OFFICE VISIT (OUTPATIENT)
Dept: INTERNAL MEDICINE | Facility: CLINIC | Age: 55
End: 2020-09-11

## 2020-09-11 ENCOUNTER — RESULTS ENCOUNTER (OUTPATIENT)
Dept: INTERNAL MEDICINE | Facility: CLINIC | Age: 55
End: 2020-09-11

## 2020-09-11 VITALS
HEART RATE: 90 BPM | WEIGHT: 315 LBS | BODY MASS INDEX: 44.1 KG/M2 | RESPIRATION RATE: 16 BRPM | HEIGHT: 71 IN | OXYGEN SATURATION: 96 % | SYSTOLIC BLOOD PRESSURE: 175 MMHG | DIASTOLIC BLOOD PRESSURE: 96 MMHG | TEMPERATURE: 97.7 F

## 2020-09-11 DIAGNOSIS — E11.9 TYPE 2 DIABETES MELLITUS WITHOUT COMPLICATION, WITHOUT LONG-TERM CURRENT USE OF INSULIN (HCC): ICD-10-CM

## 2020-09-11 DIAGNOSIS — Z12.5 PROSTATE CANCER SCREENING: ICD-10-CM

## 2020-09-11 DIAGNOSIS — G25.81 RLS (RESTLESS LEGS SYNDROME): ICD-10-CM

## 2020-09-11 DIAGNOSIS — Z00.00 ROUTINE GENERAL MEDICAL EXAMINATION AT A HEALTH CARE FACILITY: Primary | ICD-10-CM

## 2020-09-11 DIAGNOSIS — Z12.11 COLON CANCER SCREENING: ICD-10-CM

## 2020-09-11 DIAGNOSIS — I10 ESSENTIAL HYPERTENSION: ICD-10-CM

## 2020-09-11 DIAGNOSIS — R79.9 ABNORMAL FINDING OF BLOOD CHEMISTRY, UNSPECIFIED: ICD-10-CM

## 2020-09-11 DIAGNOSIS — E66.01 MORBID OBESITY (HCC): ICD-10-CM

## 2020-09-11 PROCEDURE — 99396 PREV VISIT EST AGE 40-64: CPT | Performed by: INTERNAL MEDICINE

## 2020-09-11 PROCEDURE — 96160 PT-FOCUSED HLTH RISK ASSMT: CPT | Performed by: INTERNAL MEDICINE

## 2020-09-11 RX ORDER — ROPINIROLE 3 MG/1
3 TABLET, FILM COATED ORAL
Qty: 90 TABLET | Refills: 1 | Status: SHIPPED | OUTPATIENT
Start: 2020-09-11 | End: 2021-05-27

## 2020-09-11 RX ORDER — CYCLOBENZAPRINE HCL 10 MG
10 TABLET ORAL 2 TIMES DAILY
Qty: 60 TABLET | Refills: 5 | Status: SHIPPED | OUTPATIENT
Start: 2020-09-11 | End: 2021-05-27

## 2020-09-11 RX ORDER — POTASSIUM CHLORIDE 1500 MG/1
20 TABLET, FILM COATED, EXTENDED RELEASE ORAL 2 TIMES DAILY WITH MEALS
Qty: 180 TABLET | Refills: 1 | Status: SHIPPED | OUTPATIENT
Start: 2020-09-11 | End: 2021-04-27

## 2020-09-11 RX ORDER — MELOXICAM 15 MG/1
15 TABLET ORAL DAILY
Qty: 30 TABLET | Refills: 3 | Status: SHIPPED | OUTPATIENT
Start: 2020-09-11 | End: 2021-01-11

## 2020-09-11 NOTE — PROGRESS NOTES
QUICK REFERENCE INFORMATION:  The ABCs of the Annual Wellness Visit    Medicare Annual Wellness Visit    Subjective   History of Present Illness    Cal Oliver Jr. is a 55 y.o. male who presents for an Annual Wellness Visit. In addition, we addressed the following health issues:  htn    Didn't have bp meds today and ran out of norava.,     Chronic pain      PMH, PSH, SocHx, FamHx, Allergies, and Medications: Reviewed and updated.     Outpatient Medications Prior to Visit   Medication Sig Dispense Refill   • albuterol sulfate HFA (VENTOLIN HFA) 108 (90 Base) MCG/ACT inhaler Inhale 2 puffs Every 4 (Four) Hours As Needed for Wheezing. 3 inhaler 3   • amLODIPine (NORVASC) 10 MG tablet Take 0.5 tablets by mouth Daily. 90 tablet 3   • aspirin 81 MG chewable tablet CSW 1 T PO D     • cloNIDine (CATAPRES) 0.1 MG tablet Take 1 tablet by mouth every night at bedtime. 90 tablet 3   • colestipol (COLESTID) 1 g tablet TAKE 1 TABLET BY MOUTH TWICE DAILY. ADJUST DOSE TO ACHIEVE 1-3 BOWEL MOVEMENT 'S DAILY 60 tablet 5   • losartan-hydrochlorothiazide (HYZAAR) 100-25 MG per tablet Take 1 tablet by mouth Daily. 90 tablet 3   • metFORMIN (GLUCOPHAGE) 500 MG tablet Take 1 tablet by mouth 2 (Two) Times a Day. 180 tablet 3   • metoprolol succinate XL (TOPROL-XL) 25 MG 24 hr tablet Take 1 tablet by mouth Daily. 90 tablet 3   • omeprazole (priLOSEC) 20 MG capsule Take 1 capsule by mouth Daily. 90 capsule 3   • Semaglutide,0.25 or 0.5MG/DOS, (OZEMPIC) 2 MG/1.5ML solution pen-injector Inject 0.5 mg under the skin into the appropriate area as directed 1 (One) Time Per Week. 2 pen 0   • topiramate (TOPAMAX) 50 MG tablet TAKE 1 TABLET BY MOUTH TWICE DAILY 180 tablet 1   • cyclobenzaprine (FLEXERIL) 10 MG tablet Take 1 tablet by mouth 2 (Two) Times a Day. 60 tablet 5   • potassium chloride ER (K-TAB) 20 MEQ tablet controlled-release ER tablet Take 1 tablet by mouth 2 (Two) Times a Day With Meals. 180 tablet 1   • rOPINIRole (REQUIP) 3 MG  tablet Take 1 tablet by mouth every night at bedtime. 90 tablet 1   • doxycycline (VIBRAMYCIN) 100 MG capsule Take 1 capsule by mouth 2 (Two) Times a Day. 14 capsule 0   • meloxicam (MOBIC) 15 MG tablet TAKE 1 TABLET BY MOUTH DAILY WITH FOOD 90 tablet 1     No facility-administered medications prior to visit.        Patient Active Problem List   Diagnosis   • Asthma   • Mild chronic obstructive pulmonary disease (CMS/HCC)   • Dental abscess   • Depression   • Diarrhea   • Gastroesophageal reflux disease   • Hypercholesterolemia   • Hypertension   • Hypokalemia   • Hypothyroidism   • Insomnia   • Muscle pain   • Nausea and vomiting   • SHUKRI on CPAP   • Restless legs syndrome   • Type 2 diabetes mellitus (CMS/HCC)   • Vitamin D deficiency   • Abnormal liver function tests   • Chronic back pain   • Morbid obesity (CMS/HCC)   • Encounter for screening colonoscopy   • Routine general medical examination at a health care facility   • Chronic pain of right knee   • Sleep apnea       Health Habits:  Dental Exam. up to date  Eye Exam. up to date  No exam data present  Exercise: 7 times/week.  Current exercise activities include: walking    Health Risk Assessment:  The patient has completed a Health Risk Assessment. This has been reviewed with them and has been scanned into the patient's chart.    Current Medical Providers:  Patient Care Team:  Woo Betancourt MD as PCP - General    The [facility] providers who are involved in the care of this patient are listed above. Additional providers and suppliers are listed below:      Recent Hospitalizations:  No hospitalization(s) within the last year..    Recent Lab Results:  CMP:  Lab Results   Component Value Date    GLU 88 09/25/2019    BUN 11 09/25/2019    CREATININE 0.76 09/25/2019    EGFRIFNONA 108 09/25/2019    EGFRIFAFRI 131 09/25/2019    BCR 14.5 09/25/2019     09/25/2019    K 3.9 09/25/2019    CO2 27.9 09/25/2019    CALCIUM 9.1 09/25/2019    PROTENTOTREF 7.5  09/25/2019    ALBUMIN 4.40 09/25/2019    LABGLOBREF 3.1 09/25/2019    LABIL2 1.4 09/25/2019    BILITOT 0.4 09/25/2019    ALKPHOS 114 09/25/2019    AST 17 09/25/2019    ALT 31 09/25/2019       LIPID PANEL:  Lab Results   Component Value Date    CHLPL 222 (H) 09/25/2019    TRIG 144 09/25/2019    HDL 41 09/25/2019    VLDL 28.8 09/25/2019     (H) 09/25/2019       HbA1c:  Lab Results   Component Value Date    HGBA1C 6.30 (H) 09/25/2019       URINE MICROALBUMIN:  Lab Results   Component Value Date    MICROALBUR 45.4 09/04/2018       PSA:  No results found for: PSA    Age-appropriate Screening Schedule:  Refer to the list below for future screening recommendations based on patient's age, sex and/or medical conditions. Orders for these recommended tests are listed in the plan section. The patient has been provided with a written plan.    Health Maintenance   Topic Date Due   • COLONOSCOPY  06/03/2016   • ZOSTER VACCINE (1 of 2) 05/23/2017   • DIABETIC EYE EXAM  12/14/2018   • URINE MICROALBUMIN  09/04/2019   • DIABETIC FOOT EXAM  10/17/2019   • HEMOGLOBIN A1C  03/25/2020   • LIPID PANEL  09/25/2020   • TDAP/TD VACCINES (2 - Td) 12/13/2022   • INFLUENZA VACCINE  Completed   • PNEUMOCOCCAL VACCINE (19-64 MEDIUM RISK)  Addressed       Depression Screen:   PHQ-2/PHQ-9 Depression Screening 9/11/2020   Little interest or pleasure in doing things 1   Feeling down, depressed, or hopeless 0   Trouble falling or staying asleep, or sleeping too much -   Feeling tired or having little energy -   Poor appetite or overeating -   Feeling bad about yourself - or that you are a failure or have let yourself or your family down -   Trouble concentrating on things, such as reading the newspaper or watching television -   Moving or speaking so slowly that other people could have noticed. Or the opposite - being so fidgety or restless that you have been moving around a lot more than usual -   Total Score 1         Functional and Cognitive  Screening:  Functional & Cognitive Status 9/11/2020   Do you have difficulty preparing food and eating? No   Do you have difficulty bathing yourself, getting dressed or grooming yourself? No   Do you have difficulty using the toilet? No   Do you have difficulty moving around from place to place? Yes   Do you have trouble with steps or getting out of a bed or a chair? Yes   Current Diet Well Balanced Diet   Dental Exam Up to date   Eye Exam Up to date   Exercise (times per week) 0 times per week   Current Exercise Activities Include None   Do you need help using the phone?  No   Are you deaf or do you have serious difficulty hearing?  No   Do you need help with transportation? No   Do you need help shopping? No   Do you need help preparing meals?  No   Do you need help with housework?  No   Do you need help with laundry? No   Do you need help taking your medications? No   Do you need help managing money? No   Do you ever drive or ride in a car without wearing a seat belt? Yes   Have you felt unusual stress, anger or loneliness in the last month? -   Who do you live with? -   If you need help, do you have trouble finding someone available to you? -   Have you been bothered in the last four weeks by sexual problems? -   Do you have difficulty concentrating, remembering or making decisions? -       Does the patient have evidence of cognitive impairment? No    Advanced Care Planning:  ACP discussion was held with the patient during this visit. Patient has an advance directive in EMR which is still valid.     Identification of Risk Factors:  Risk factors include: Advance Directive Discussion  Chronic Pain   Inactivity/Sedentary.    Review of Systems   Constitutional: Negative for fatigue.   Respiratory: Negative for apnea and shortness of breath.    Genitourinary: Negative for scrotal swelling and testicular pain.   Psychiatric/Behavioral: Negative for hallucinations. The patient is not nervous/anxious and is not  "hyperactive.    All other systems reviewed and are negative.      Compared to one year ago, the patient feels his physical health is the same.  Compared to one year ago, the patient feels his mental health is the same.    Objective     Physical Exam   Constitutional: He is oriented to person, place, and time. He appears well-developed and well-nourished.   HENT:   Head: Normocephalic.   Right Ear: External ear normal.   Left Ear: External ear normal.   Mouth/Throat: Oropharynx is clear and moist. No oropharyngeal exudate.   Eyes: Pupils are equal, round, and reactive to light. EOM are normal. Right eye exhibits no discharge. Left eye exhibits no discharge. No scleral icterus.   Neck: Normal range of motion. Neck supple.   Cardiovascular: Normal rate.   Pulmonary/Chest: No stridor. No respiratory distress. He has no rales.   Abdominal: Soft. Bowel sounds are normal. He exhibits no distension and no mass. There is no tenderness. There is no guarding.   Neurological: He is alert and oriented to person, place, and time. No cranial nerve deficit.   Skin: Skin is warm and dry. Capillary refill takes less than 2 seconds.   Psychiatric: He has a normal mood and affect. His behavior is normal. Judgment and thought content normal.   Vitals reviewed.      Vitals:    09/11/20 1013 09/11/20 1016   BP: (!) 171/101 175/96   BP Location: Left arm Right arm   Patient Position: Sitting Sitting   Cuff Size: Large Adult Large Adult   Pulse: 89 90   Resp: 16 16   Temp: 97.7 °F (36.5 °C) 97.7 °F (36.5 °C)   TempSrc: Temporal Temporal   SpO2: 97% 96%   Weight: (!) 176 kg (388 lb 6.4 oz) (!) 176 kg (388 lb 6.4 oz)   Height: 180.3 cm (71\") 180.3 cm (71\")       Body mass index is 54.17 kg/m².  Discussed the patient's BMI with him. The BMI is above average; BMI management plan is completed.    Assessment/Plan   Patient Self-Management and Personalized Health Advice  The patient has been provided with information about: diet, exercise, weight " management and fall prevention and preventive services including:   · Annual Wellness Visit (AWV).    Visit Diagnoses:    ICD-10-CM ICD-9-CM   1. Routine general medical examination at a health care facility Z00.00 V70.0   2. Essential hypertension I10 401.9   3. RLS (restless legs syndrome) G25.81 333.94   4. Prostate cancer screening Z12.5 V76.44   5. Abnormal finding of blood chemistry, unspecified  R79.9 790.6   6. Colon cancer screening Z12.11 V76.51   7. Type 2 diabetes mellitus without complication, without long-term current use of insulin (CMS/[facility]) E11.9 250.00       Orders Placed This Encounter   Procedures   • Lipid Panel   • Vitamin B12   • Comprehensive Metabolic Panel   • TSH   • T4, Free   • Hemoglobin A1c   • MicroAlbumin, Urine, Random - Urine, Clean Catch   • PSA Screen   • Cologuard - Stool, Per Rectum     Standing Status:   Future     Standing Expiration Date:   9/11/2021   • Ambulatory Referral to Ophthalmology     Referral Priority:   Routine     Referral Type:   Consultation     Referral Reason:   Specialty Services Required     Referred to Provider:   Jose Muniz MD     Requested Specialty:   Ophthalmology     Number of Visits Requested:   1   • CBC & Differential     Order Specific Question:   Manual Differential     Answer:   No       Outpatient Encounter Medications as of 9/11/2020   Medication Sig Dispense Refill   • albuterol sulfate HFA (VENTOLIN HFA) 108 (90 Base) MCG/ACT inhaler Inhale 2 puffs Every 4 (Four) Hours As Needed for Wheezing. 3 inhaler 3   • amLODIPine (NORVASC) 10 MG tablet Take 0.5 tablets by mouth Daily. 90 tablet 3   • aspirin 81 MG chewable tablet CSW 1 T PO D     • cloNIDine (CATAPRES) 0.1 MG tablet Take 1 tablet by mouth every night at bedtime. 90 tablet 3   • colestipol (COLESTID) 1 g tablet TAKE 1 TABLET BY MOUTH TWICE DAILY. ADJUST DOSE TO ACHIEVE 1-3 BOWEL MOVEMENT 'S DAILY 60 tablet 5   • cyclobenzaprine (FLEXERIL) 10 MG tablet Take 1 tablet by mouth 2  (Two) Times a Day. 60 tablet 5   • losartan-hydrochlorothiazide (HYZAAR) 100-25 MG per tablet Take 1 tablet by mouth Daily. 90 tablet 3   • metFORMIN (GLUCOPHAGE) 500 MG tablet Take 1 tablet by mouth 2 (Two) Times a Day. 180 tablet 3   • metoprolol succinate XL (TOPROL-XL) 25 MG 24 hr tablet Take 1 tablet by mouth Daily. 90 tablet 3   • omeprazole (priLOSEC) 20 MG capsule Take 1 capsule by mouth Daily. 90 capsule 3   • potassium chloride ER (K-TAB) 20 MEQ tablet controlled-release ER tablet Take 1 tablet by mouth 2 (Two) Times a Day With Meals. 180 tablet 1   • rOPINIRole (REQUIP) 3 MG tablet Take 1 tablet by mouth every night at bedtime. 90 tablet 1   • Semaglutide,0.25 or 0.5MG/DOS, (OZEMPIC) 2 MG/1.5ML solution pen-injector Inject 0.5 mg under the skin into the appropriate area as directed 1 (One) Time Per Week. 2 pen 0   • topiramate (TOPAMAX) 50 MG tablet TAKE 1 TABLET BY MOUTH TWICE DAILY 180 tablet 1   • [DISCONTINUED] cyclobenzaprine (FLEXERIL) 10 MG tablet Take 1 tablet by mouth 2 (Two) Times a Day. 60 tablet 5   • [DISCONTINUED] potassium chloride ER (K-TAB) 20 MEQ tablet controlled-release ER tablet Take 1 tablet by mouth 2 (Two) Times a Day With Meals. 180 tablet 1   • [DISCONTINUED] rOPINIRole (REQUIP) 3 MG tablet Take 1 tablet by mouth every night at bedtime. 90 tablet 1   • meloxicam (Mobic) 15 MG tablet Take 1 tablet by mouth Daily. 30 tablet 3   • [DISCONTINUED] doxycycline (VIBRAMYCIN) 100 MG capsule Take 1 capsule by mouth 2 (Two) Times a Day. 14 capsule 0   • [DISCONTINUED] meloxicam (MOBIC) 15 MG tablet TAKE 1 TABLET BY MOUTH DAILY WITH FOOD 90 tablet 1     No facility-administered encounter medications on file as of 9/11/2020.        Reviewed use of high risk medication in the elderly: yes  Reviewed for potential of harmful drug interactions in the elderly: yes    Follow Up:  Return in about 4 weeks (around 10/9/2020).     An After Visit Summary and PPPS with all of these plans were given to  the patientFina Mccall was seen today for medicare wellness-subsequent.    Diagnoses and all orders for this visit:    Routine general medical examination at a health care facility  -     Lipid Panel  -     CBC & Differential  -     Vitamin B12  -     Comprehensive Metabolic Panel  -     TSH  -     T4, Free  -     Hemoglobin A1c  -     MicroAlbumin, Urine, Random - Urine, Clean Catch    Essential hypertension  -     potassium chloride ER (K-TAB) 20 MEQ tablet controlled-release ER tablet; Take 1 tablet by mouth 2 (Two) Times a Day With Meals.  -     Lipid Panel  -     CBC & Differential  -     Vitamin B12  -     Comprehensive Metabolic Panel  -     TSH  -     T4, Free  -     Hemoglobin A1c  -     MicroAlbumin, Urine, Random - Urine, Clean Catch    RLS (restless legs syndrome)  -     rOPINIRole (REQUIP) 3 MG tablet; Take 1 tablet by mouth every night at bedtime.  -     Lipid Panel  -     CBC & Differential  -     Vitamin B12  -     Comprehensive Metabolic Panel  -     TSH  -     T4, Free  -     Hemoglobin A1c  -     MicroAlbumin, Urine, Random - Urine, Clean Catch    Prostate cancer screening  -     PSA Screen    Abnormal finding of blood chemistry, unspecified   -     Hemoglobin A1c    Colon cancer screening  -     Cologuard - Stool, Per Rectum; Future    Type 2 diabetes mellitus without complication, without long-term current use of insulin (CMS/[facility])  -     Ambulatory Referral to Ophthalmology    Other orders  -     cyclobenzaprine (FLEXERIL) 10 MG tablet; Take 1 tablet by mouth 2 (Two) Times a Day.  -     meloxicam (Mobic) 15 MG tablet; Take 1 tablet by mouth Daily.

## 2020-09-29 RX ORDER — TOPIRAMATE 50 MG/1
TABLET, FILM COATED ORAL
Qty: 60 TABLET | Refills: 0 | Status: SHIPPED | OUTPATIENT
Start: 2020-09-29 | End: 2020-10-29

## 2020-09-29 RX ORDER — MONTELUKAST SODIUM 4 MG/1
TABLET, CHEWABLE ORAL
Qty: 60 TABLET | Refills: 0 | Status: SHIPPED | OUTPATIENT
Start: 2020-09-29 | End: 2020-10-29

## 2020-10-08 ENCOUNTER — OFFICE VISIT (OUTPATIENT)
Dept: INTERNAL MEDICINE | Facility: CLINIC | Age: 55
End: 2020-10-08

## 2020-10-08 VITALS
BODY MASS INDEX: 44.1 KG/M2 | HEART RATE: 92 BPM | HEIGHT: 71 IN | DIASTOLIC BLOOD PRESSURE: 90 MMHG | WEIGHT: 315 LBS | RESPIRATION RATE: 16 BRPM | OXYGEN SATURATION: 97 % | TEMPERATURE: 97.7 F | SYSTOLIC BLOOD PRESSURE: 155 MMHG

## 2020-10-08 DIAGNOSIS — E66.01 MORBID OBESITY (HCC): ICD-10-CM

## 2020-10-08 DIAGNOSIS — E11.9 TYPE 2 DIABETES MELLITUS WITHOUT COMPLICATION, WITHOUT LONG-TERM CURRENT USE OF INSULIN (HCC): ICD-10-CM

## 2020-10-08 DIAGNOSIS — E03.9 ACQUIRED HYPOTHYROIDISM: ICD-10-CM

## 2020-10-08 DIAGNOSIS — I10 ESSENTIAL HYPERTENSION: Primary | ICD-10-CM

## 2020-10-08 PROCEDURE — 99214 OFFICE O/P EST MOD 30 MIN: CPT | Performed by: INTERNAL MEDICINE

## 2020-10-08 RX ORDER — AMLODIPINE BESYLATE 10 MG/1
10 TABLET ORAL DAILY
Qty: 90 TABLET | Refills: 3 | Status: SHIPPED | OUTPATIENT
Start: 2020-10-08 | End: 2021-11-18 | Stop reason: SDUPTHER

## 2020-10-08 NOTE — PROGRESS NOTES
Subjective     Patient ID: Cal Oliver Jr. is a 55 y.o. male. Patient is here for management of multiple medical problems.     Chief Complaint   Patient presents with   • Hypertension     History of Present Illness     htn  bp still elevated.        Wt up. Pt ran out of ozempic 2 weeks a go.        The following portions of the patient's history were reviewed and updated as appropriate: allergies, current medications, past family history, past medical history, past social history, past surgical history and problem list.    Review of Systems   Constitutional: Negative for diaphoresis and fatigue.   HENT: Negative for congestion and drooling.    Gastrointestinal: Negative for abdominal distention and abdominal pain.   Musculoskeletal: Negative for arthralgias and back pain.   Skin: Negative for color change and pallor.   Psychiatric/Behavioral: Negative for self-injury and sleep disturbance. The patient is not nervous/anxious.    All other systems reviewed and are negative.      Current Outpatient Medications:   •  albuterol sulfate HFA (VENTOLIN HFA) 108 (90 Base) MCG/ACT inhaler, Inhale 2 puffs Every 4 (Four) Hours As Needed for Wheezing., Disp: 3 inhaler, Rfl: 3  •  amLODIPine (NORVASC) 10 MG tablet, Take 1 tablet by mouth Daily., Disp: 90 tablet, Rfl: 3  •  aspirin 81 MG chewable tablet, CSW 1 T PO D, Disp: , Rfl:   •  cloNIDine (CATAPRES) 0.1 MG tablet, Take 1 tablet by mouth every night at bedtime., Disp: 90 tablet, Rfl: 3  •  colestipol (COLESTID) 1 g tablet, TAKE 1 TABLET BY MOUTH TWICE DAILY. ADJUST DOSE TO ACHIEVE 1-3 BOWEL MOVEMENTS DAILY, Disp: 60 tablet, Rfl: 0  •  cyclobenzaprine (FLEXERIL) 10 MG tablet, Take 1 tablet by mouth 2 (Two) Times a Day., Disp: 60 tablet, Rfl: 5  •  losartan-hydrochlorothiazide (HYZAAR) 100-25 MG per tablet, Take 1 tablet by mouth Daily., Disp: 90 tablet, Rfl: 3  •  meloxicam (Mobic) 15 MG tablet, Take 1 tablet by mouth Daily., Disp: 30 tablet, Rfl: 3  •  metFORMIN  "(GLUCOPHAGE) 500 MG tablet, Take 1 tablet by mouth 2 (Two) Times a Day., Disp: 180 tablet, Rfl: 3  •  metoprolol succinate XL (TOPROL-XL) 25 MG 24 hr tablet, Take 1 tablet by mouth Daily., Disp: 90 tablet, Rfl: 3  •  omeprazole (priLOSEC) 20 MG capsule, Take 1 capsule by mouth Daily., Disp: 90 capsule, Rfl: 3  •  potassium chloride ER (K-TAB) 20 MEQ tablet controlled-release ER tablet, Take 1 tablet by mouth 2 (Two) Times a Day With Meals., Disp: 180 tablet, Rfl: 1  •  rOPINIRole (REQUIP) 3 MG tablet, Take 1 tablet by mouth every night at bedtime., Disp: 90 tablet, Rfl: 1  •  Semaglutide,0.25 or 0.5MG/DOS, (OZEMPIC) 2 MG/1.5ML solution pen-injector, Inject 1 mg under the skin into the appropriate area as directed 1 (One) Time Per Week., Disp: 2 pen, Rfl: 0  •  topiramate (TOPAMAX) 50 MG tablet, TAKE 1 TABLET BY MOUTH TWICE DAILY, Disp: 60 tablet, Rfl: 0    Objective      Blood pressure 155/90, pulse 92, temperature 97.7 °F (36.5 °C), temperature source Temporal, resp. rate 16, height 180.3 cm (71\"), weight (!) 176 kg (387 lb 12.8 oz), SpO2 97 %.    Physical Exam     General Appearance:    Alert, cooperative, no distress, appears stated age   Head:    Normocephalic, without obvious abnormality, atraumatic   Eyes:    PERRL, conjunctiva/corneas clear, EOM's intact   Ears:    Normal TM's and external ear canals, both ears   Nose:   Nares normal, septum midline, mucosa normal, no drainage   or sinus tenderness   Throat:   Lips, mucosa, and tongue normal; teeth and gums normal   Neck:   Supple, symmetrical, trachea midline, no adenopathy;        thyroid:  No enlargement/tenderness/nodules; no carotid    bruit or JVD   Back:     Symmetric, no curvature, ROM normal, no CVA tenderness   Lungs:     Clear to auscultation bilaterally, respirations unlabored   Chest wall:    No tenderness or deformity   Heart:    Regular rate and rhythm, S1 and S2 normal, no murmur,        rub or gallop   Abdomen:     Soft, non-tender, bowel " sounds active all four quadrants,     no masses, no organomegaly   Extremities:   Extremities normal, atraumatic, no cyanosis or edema   Pulses:   2+ and symmetric all extremities   Skin:   Skin color, texture, turgor normal, no rashes or lesions   Lymph nodes:   Cervical, supraclavicular, and axillary nodes normal   Neurologic:   CNII-XII intact. Normal strength, sensation and reflexes       throughout      Results for orders placed or performed during the hospital encounter of 03/11/20   POCT Influenza A/B    Specimen: Swab   Result Value Ref Range    Rapid Influenza A Ag Negative Negative    Rapid Influenza B Ag Negative Negative    Internal Control Passed Passed    Lot Number [id number]     Expiration Date 12/4/22          Assessment/Plan     Wt 360-->387.      Sample of ozempic.      Cal was seen today for hypertension.    Diagnoses and all orders for this visit:    Essential hypertension  -     amLODIPine (NORVASC) 10 MG tablet; Take 1 tablet by mouth Daily.    Type 2 diabetes mellitus without complication, without long-term current use of insulin (CMS/[facility])  -     Semaglutide,0.25 or 0.5MG/DOS, (OZEMPIC) 2 MG/1.5ML solution pen-injector; Inject 1 mg under the skin into the appropriate area as directed 1 (One) Time Per Week.    Acquired hypothyroidism    Morbid obesity (CMS/HCC)  -     Semaglutide,0.25 or 0.5MG/DOS, (OZEMPIC) 2 MG/1.5ML solution pen-injector; Inject 1 mg under the skin into the appropriate area as directed 1 (One) Time Per Week.      Return in about 8 weeks (around 12/3/2020).          There are no Patient Instructions on file for this visit.     Woo Betancourt MD    Assessment/Plan

## 2020-10-21 ENCOUNTER — TELEPHONE (OUTPATIENT)
Dept: NEUROLOGY | Facility: CLINIC | Age: 55
End: 2020-10-21

## 2020-10-21 NOTE — TELEPHONE ENCOUNTER
PATIENT CALLED TO SCHEDULE SLEEP FOLLOW UP, FORMER PATIENT OF DR OBSS.  LAST SEEN 07-09-18.  I WAS UNABLE TO FIND A Tuesday APPT WITH [facility] TO SCHEDULE PATIENT PRIOR TO January; PATIENT WOULD LIKE TO BE SEEN SOONER IF POSSIBLE.  PER REFERENCE TOOL, SLEEP PATIENTS ARE Tuesday ONLY.    PLEASE ADVISE ON SCHEDULING.    BEST CALL BACK: [phone]

## 2020-10-27 ENCOUNTER — OFFICE VISIT (OUTPATIENT)
Dept: NEUROLOGY | Facility: CLINIC | Age: 55
End: 2020-10-27

## 2020-10-27 VITALS
HEART RATE: 98 BPM | OXYGEN SATURATION: 97 % | DIASTOLIC BLOOD PRESSURE: 92 MMHG | TEMPERATURE: 98 F | BODY MASS INDEX: 44.1 KG/M2 | SYSTOLIC BLOOD PRESSURE: 140 MMHG | WEIGHT: 315 LBS | HEIGHT: 71 IN

## 2020-10-27 DIAGNOSIS — G47.33 OBSTRUCTIVE SLEEP APNEA: Primary | ICD-10-CM

## 2020-10-27 PROCEDURE — 99213 OFFICE O/P EST LOW 20 MIN: CPT | Performed by: NURSE PRACTITIONER

## 2020-10-27 NOTE — PROGRESS NOTES
New Sleep Patient Office Visit      Patient Name: Cal Oliver Jr.  : 1965   MRN: [id number]     Referring Physician: No ref. provider found    Chief Complaint:    Chief Complaint   Patient presents with   • Sleeping Problem     Patient is in office today to Follow up on sleep apnea       History of Present Illness: Cal Oliver Jr. is a 55 y.o. male who is here today to follow up for SHUKRI.  Currenlty on Bipap 18/13cm, compliance 100%, AHI 6.6.  He is tolerating his pressures well and says he loves his machine.  He is here today because he wants an order for heated tubing, which he has been told could help with his extremely dry mouth.  Additional risk factors- BMI 53, HTN, GERD, dyslipidemia, COPD, diabetes type 2.     Pertinent Medical History:   Current compliance report reviewed and has been scanned into the patient's medical record.    Subjective      Review of Systems:   Review of Systems   Constitutional: Negative for fatigue, fever, unexpected weight gain and unexpected weight loss.   HENT: Negative for hearing loss, sore throat, swollen glands, tinnitus and trouble swallowing.    Eyes: Negative for blurred vision, double vision, photophobia and visual disturbance.   Respiratory: Positive for apnea. Negative for cough, chest tightness and shortness of breath.    Cardiovascular: Negative for chest pain, palpitations and leg swelling.   Gastrointestinal: Negative for constipation, diarrhea and nausea.   Endocrine: Negative for cold intolerance and heat intolerance.   Musculoskeletal: Negative for gait problem, neck pain and neck stiffness.   Skin: Negative for color change and rash.   Allergic/Immunologic: Negative for environmental allergies and food allergies.   Neurological: Negative for dizziness, syncope, facial asymmetry, speech difficulty, weakness, headache, memory problem and confusion.   Psychiatric/Behavioral: Positive for sleep disturbance. Negative for agitation, behavioral  problems and depressed mood. The patient is not nervous/anxious.        Past Medical History:   Past Medical History:   Diagnosis Date   • Chronic back pain    • Diabetes mellitus (CMS/HCC)    • High blood pressure    • Hyperlipidemia    • Migraine    • Nausea and vomiting    • Sleep apnea        Past Surgical History:   Past Surgical History:   Procedure Laterality Date   • BACK SURGERY     • GALLBLADDER SURGERY     • KNEE SURGERY         Family History:   Family History   Problem Relation Age of Onset   • Other Other         HIGH BLOOD PRESSURE   • Hypertension Other    • Heart disease Mother    • Heart attack Mother    • Heart disease Father        Social History:   Social History     Socioeconomic History   • Marital status:      Spouse name: Not on file   • Number of children: Not on file   • Years of education: Not on file   • Highest education level: Not on file   Tobacco Use   • Smoking status: Current Some Day Smoker     Packs/day: 0.25     Years: 20.00     Pack years: 5.00     Types: Cigarettes   • Smokeless tobacco: Never Used   Substance and Sexual Activity   • Alcohol use: No   • Drug use: No   • Sexual activity: Defer       Medications:     Current Outpatient Medications:   •  albuterol sulfate HFA (VENTOLIN HFA) 108 (90 Base) MCG/ACT inhaler, Inhale 2 puffs Every 4 (Four) Hours As Needed for Wheezing., Disp: 3 inhaler, Rfl: 3  •  amLODIPine (NORVASC) 10 MG tablet, Take 1 tablet by mouth Daily., Disp: 90 tablet, Rfl: 3  •  aspirin 81 MG chewable tablet, CSW 1 T PO D, Disp: , Rfl:   •  cloNIDine (CATAPRES) 0.1 MG tablet, Take 1 tablet by mouth every night at bedtime., Disp: 90 tablet, Rfl: 3  •  colestipol (COLESTID) 1 g tablet, TAKE 1 TABLET BY MOUTH TWICE DAILY. ADJUST DOSE TO ACHIEVE 1-3 BOWEL MOVEMENTS DAILY, Disp: 60 tablet, Rfl: 0  •  cyclobenzaprine (FLEXERIL) 10 MG tablet, Take 1 tablet by mouth 2 (Two) Times a Day., Disp: 60 tablet, Rfl: 5  •  losartan-hydrochlorothiazide (HYZAAR) 100-25  "MG per tablet, Take 1 tablet by mouth Daily., Disp: 90 tablet, Rfl: 3  •  meloxicam (Mobic) 15 MG tablet, Take 1 tablet by mouth Daily., Disp: 30 tablet, Rfl: 3  •  metFORMIN (GLUCOPHAGE) 500 MG tablet, Take 1 tablet by mouth 2 (Two) Times a Day., Disp: 180 tablet, Rfl: 3  •  metoprolol succinate XL (TOPROL-XL) 25 MG 24 hr tablet, Take 1 tablet by mouth Daily., Disp: 90 tablet, Rfl: 3  •  omeprazole (priLOSEC) 20 MG capsule, Take 1 capsule by mouth Daily., Disp: 90 capsule, Rfl: 3  •  potassium chloride ER (K-TAB) 20 MEQ tablet controlled-release ER tablet, Take 1 tablet by mouth 2 (Two) Times a Day With Meals., Disp: 180 tablet, Rfl: 1  •  rOPINIRole (REQUIP) 3 MG tablet, Take 1 tablet by mouth every night at bedtime., Disp: 90 tablet, Rfl: 1  •  Semaglutide,0.25 or 0.5MG/DOS, (OZEMPIC) 2 MG/1.5ML solution pen-injector, Inject 1 mg under the skin into the appropriate area as directed 1 (One) Time Per Week., Disp: 2 pen, Rfl: 0  •  topiramate (TOPAMAX) 50 MG tablet, TAKE 1 TABLET BY MOUTH TWICE DAILY, Disp: 60 tablet, Rfl: 0    Allergies:   Allergies   Allergen Reactions   • Levothyroxine Sodium Swelling   • Lipitor [Atorvastatin] Myalgia   • Piroxicam Rash       Objective     Physical Exam:  Vital Signs:   Vitals:    10/27/20 1524   BP: 140/92   BP Location: Left arm   Patient Position: Sitting   Cuff Size: Large Adult   Pulse: 98   Temp: 98 °F (36.7 °C)   SpO2: 97%   Weight: (!) 175 kg (386 lb 3.2 oz)   Height: 180.3 cm (71\")   PainSc: 0-No pain     BMI: Body mass index is 53.86 kg/m².    Physical Exam  Vitals signs and nursing note reviewed.   Constitutional:       General: He is not in acute distress.     Appearance: He is well-developed. He is obese. He is not diaphoretic.   HENT:      Head: Normocephalic and atraumatic.      Comments: Mallampati 4  Eyes:      Conjunctiva/sclera: Conjunctivae normal.      Pupils: Pupils are equal, round, and reactive to light.   Neck:      Musculoskeletal: Neck supple.      " Thyroid: No thyroid mass or thyromegaly.      Vascular: Normal carotid pulses.      Trachea: Trachea normal.   Cardiovascular:      Rate and Rhythm: Normal rate and regular rhythm.      Heart sounds: Normal heart sounds. No murmur. No friction rub. No gallop.    Pulmonary:      Effort: Pulmonary effort is normal. No respiratory distress.      Breath sounds: Normal breath sounds. No wheezing or rales.   Musculoskeletal: Normal range of motion.   Skin:     General: Skin is warm and dry.      Findings: No rash.   Neurological:      Mental Status: He is alert and oriented to person, place, and time.   Psychiatric:         Behavior: Behavior normal.         Thought Content: Thought content normal.         Assessment / Plan      Assessment/Plan:   Diagnoses and all orders for this visit:    1. Obstructive sleep apnea (Primary)  -  Order for supplies sent to [facility].   - Advised patient to avoid driving if drowsy.     2. BMI 50.0-59.9, adult (CMS/[facility])  - The patient was counseled on goals and the need for weight reduction. They were directed to the NIH's website on weight management, (https://www.niddk.nih.gov/health-information/weight-management/health-tips-adults) which addresses the risks of being overweight or obese, a healthy diet, tips for losing weight, and the benefit of physical activity/exercise.         Follow Up:   Return in about 1 year (around 10/27/2021) for F/U Obstructive Sleep Apnea.    I have advised the patient the need to continue the use of CPAP.  Gold standard for treatment of sleep apnea includes weight loss, use of cpap and avoidance of alcohol.  Untreated SHUKRI may increase the risk for development of hypertension, stroke, myocardial infarction, diabetes, cardiovascular disease, work-related issues and driving accidents. I have counseled and advised the patient to avoid driving or operating heavy/dangerous equipment if feeling drowsy.     REFUGIO Ann, FNP-C  Psychiatric  Neurology and Sleep Medicine       Please note that portions of this note may have been completed with a voice recognition program. Efforts were made to edit the dictations, but occasionally words are mistranscribed.

## 2020-10-27 NOTE — PATIENT INSTRUCTIONS
Living With Sleep Apnea  Sleep apnea is a condition in which breathing pauses or becomes shallow during sleep. Sleep apnea is most commonly caused by a collapsed or blocked airway. People with sleep apnea snore loudly and have times when they gasp and stop breathing for 10 seconds or more during sleep. This happens over and over during the night. This disrupts your sleep and keeps your body from getting the rest that it needs, which can cause tiredness and lack of energy (fatigue) during the day.  The breaks in breathing also interrupt the deep sleep that you need to feel rested. Even if you do not completely wake up from the gaps in breathing, your sleep may not be restful. You may also have a headache in the morning and low energy during the day, and you may feel anxious or depressed.  How can sleep apnea affect me?  Sleep apnea increases your chances of extreme tiredness during the day (daytime fatigue). It can also increase your risk for health conditions, such as:  · Heart attack.  · Stroke.  · Diabetes.  · Heart failure.  · Irregular heartbeat.  · High blood pressure.  If you have daytime fatigue as a result of sleep apnea, you may be more likely to:  · Perform poorly at school or work.  · Fall asleep while driving.  · Have difficulty with attention.  · Develop depression or anxiety.  · Become severely overweight (obese).  · Have sexual dysfunction.  What actions can I take to manage sleep apnea?  Sleep apnea treatment    · If you were given a device to open your airway while you sleep, use it only as told by your health care provider. You may be given:  ? An oral appliance. This is a custom-made mouthpiece that shifts your lower jaw forward.  ? A continuous positive airway pressure (CPAP) device. This device blows air through a mask when you breathe out (exhale).  ? A nasal expiratory positive airway pressure (EPAP) device. This device has valves that you put into each nostril.  ? A bi-level positive airway  pressure (BPAP) device. This device blows air through a mask when you breathe in (inhale) and breathe out (exhale).  · You may need surgery if other treatments do not work for you.  Sleep habits  · Go to sleep and wake up at the same time every day. This helps set your internal clock (circadian rhythm) for sleeping.  ? If you stay up later than usual, such as on weekends, try to get up in the morning within 2 hours of your normal wake time.  · Try to get at least 7-9 hours of sleep each night.  · Stop computer, tablet, and mobile phone use a few hours before bedtime.  · Do not take long naps during the day. If you nap, limit it to 30 minutes.  · Have a relaxing bedtime routine. Reading or listening to music may relax you and help you sleep.  · Use your bedroom only for sleep.  ? Keep your television and computer out of your bedroom.  ? Keep your bedroom cool, dark, and quiet.  ? Use a supportive mattress and pillows.  · Follow your health care provider's instructions for other changes to sleep habits.  Nutrition  · Do not eat heavy meals in the evening.  · Do not have caffeine in the later part of the day. The effects of caffeine can last for more than 5 hours.  · Follow your health care provider's or dietitian's instructions for any diet changes.  Lifestyle         · Do not drink alcohol before bedtime. Alcohol can cause you to fall asleep at first, but then it can cause you to wake up in the middle of the night and have trouble getting back to sleep.  · Do not use any products that contain nicotine or tobacco, such as cigarettes and e-cigarettes. If you need help quitting, ask your health care provider.  Medicines  · Take over-the-counter and prescription medicines only as told by your health care provider.  · Do not use over-the-counter sleep medicine. You can become dependent on this medicine, and it can make sleep apnea worse.  · Do not use medicines, such as sedatives and narcotics, unless told by your health  care provider.  Activity  · Exercise on most days, but avoid exercising in the evening. Exercising near bedtime can interfere with sleeping.  · If possible, spend time outside every day. Natural light helps regulate your circadian rhythm.  General information  · Lose weight if you need to, and maintain a healthy weight.  · Keep all follow-up visits as told by your health care provider. This is important.  · If you are having surgery, make sure to tell your health care provider that you have sleep apnea. You may need to bring your device with you.  Where to find more information  Learn more about sleep apnea and daytime fatigue from:  · American Sleep Association: sleepassociation.org  · National Sleep Foundation: sleepfoundation.org  · National Heart, Lung, and Blood [place]: nhlbi.nih.gov  Summary  · Sleep apnea can cause daytime fatigue and other serious health conditions.  · Both sleep apnea and daytime fatigue can be bad for your health and well-being.  · You may need to wear a device while sleeping to help keep your airway open.  · If you are having surgery, make sure to tell your health care provider that you have sleep apnea. You may need to bring your device with you.  · Making changes to sleep habits, diet, lifestyle, and activity can help you manage sleep apnea.  This information is not intended to replace advice given to you by your health care provider. Make sure you discuss any questions you have with your health care provider.  Document Released: 03/14/2019 Document Revised: 04/10/2020 Document Reviewed: 03/14/2019  [employer] Patient Education © 2020 [employer]

## 2020-10-29 RX ORDER — MONTELUKAST SODIUM 4 MG/1
TABLET, CHEWABLE ORAL
Qty: 180 TABLET | Refills: 3 | Status: SHIPPED | OUTPATIENT
Start: 2020-10-29 | End: 2021-10-25

## 2020-10-29 RX ORDER — TOPIRAMATE 50 MG/1
TABLET, FILM COATED ORAL
Qty: 180 TABLET | Refills: 5 | Status: SHIPPED | OUTPATIENT
Start: 2020-10-29 | End: 2022-01-25

## 2020-11-05 RX ORDER — ALBUTEROL SULFATE 90 UG/1
AEROSOL, METERED RESPIRATORY (INHALATION)
Qty: 54 G | Refills: 3 | Status: SHIPPED | OUTPATIENT
Start: 2020-11-05 | End: 2021-05-14 | Stop reason: SDUPTHER

## 2020-11-25 ENCOUNTER — TELEPHONE (OUTPATIENT)
Dept: INTERNAL MEDICINE | Facility: CLINIC | Age: 55
End: 2020-11-25

## 2020-11-25 NOTE — TELEPHONE ENCOUNTER
Patient was informed of Daisy's message and stated he will come in prior to his appointment to do his lab work.    Patient call back [phone]

## 2020-11-25 NOTE — TELEPHONE ENCOUNTER
Called patient with no answer, and no vm set up. If patient calls back, Dr. Betancourt ordered labs for patient to have done before his upcoming appointment on 12/03/2020 and wanted patient to have those done a few days before appointment. Hub may deliver message.

## 2020-12-30 PROCEDURE — U0004 COV-19 TEST NON-CDC HGH THRU: HCPCS | Performed by: NURSE PRACTITIONER

## 2020-12-31 RX ORDER — DOXYCYCLINE 100 MG/1
100 CAPSULE ORAL EVERY 12 HOURS SCHEDULED
Qty: 20 CAPSULE | Refills: 0 | Status: SHIPPED | OUTPATIENT
Start: 2020-12-31 | End: 2021-08-18

## 2021-01-11 RX ORDER — MELOXICAM 15 MG/1
15 TABLET ORAL DAILY
Qty: 90 TABLET | Refills: 3 | Status: SHIPPED | OUTPATIENT
Start: 2021-01-11 | End: 2023-02-03 | Stop reason: SDUPTHER

## 2021-01-27 DIAGNOSIS — I10 ESSENTIAL HYPERTENSION: ICD-10-CM

## 2021-01-27 RX ORDER — LOSARTAN POTASSIUM AND HYDROCHLOROTHIAZIDE 25; 100 MG/1; MG/1
1 TABLET ORAL DAILY
Qty: 90 TABLET | Refills: 3 | Status: SHIPPED | OUTPATIENT
Start: 2021-01-27 | End: 2022-01-25

## 2021-01-27 RX ORDER — CLONIDINE HYDROCHLORIDE 0.1 MG/1
0.1 TABLET ORAL
Qty: 90 TABLET | Refills: 3 | Status: SHIPPED | OUTPATIENT
Start: 2021-01-27 | End: 2023-02-03 | Stop reason: SDUPTHER

## 2021-01-27 RX ORDER — METOPROLOL SUCCINATE 25 MG/1
25 TABLET, EXTENDED RELEASE ORAL DAILY
Qty: 90 TABLET | Refills: 3 | Status: SHIPPED | OUTPATIENT
Start: 2021-01-27 | End: 2021-08-18 | Stop reason: SDUPTHER

## 2021-02-01 DIAGNOSIS — E11.9 TYPE 2 DIABETES MELLITUS WITHOUT COMPLICATION, WITHOUT LONG-TERM CURRENT USE OF INSULIN (HCC): ICD-10-CM

## 2021-02-26 DIAGNOSIS — G25.81 RLS (RESTLESS LEGS SYNDROME): ICD-10-CM

## 2021-02-26 RX ORDER — ROPINIROLE 2 MG/1
2 TABLET, FILM COATED ORAL
Qty: 90 TABLET | Refills: 3 | Status: SHIPPED | OUTPATIENT
Start: 2021-02-26 | End: 2022-02-22

## 2021-04-27 DIAGNOSIS — I10 ESSENTIAL HYPERTENSION: ICD-10-CM

## 2021-04-27 RX ORDER — POTASSIUM CHLORIDE 1500 MG/1
TABLET, FILM COATED, EXTENDED RELEASE ORAL
Qty: 180 TABLET | Refills: 3 | Status: SHIPPED | OUTPATIENT
Start: 2021-04-27 | End: 2022-04-22

## 2021-05-14 DIAGNOSIS — K21.9 GASTROESOPHAGEAL REFLUX DISEASE WITHOUT ESOPHAGITIS: ICD-10-CM

## 2021-05-14 RX ORDER — OMEPRAZOLE 20 MG/1
20 CAPSULE, DELAYED RELEASE ORAL DAILY
Qty: 90 CAPSULE | Refills: 3 | Status: SHIPPED | OUTPATIENT
Start: 2021-05-14 | End: 2021-08-18 | Stop reason: SDUPTHER

## 2021-05-14 RX ORDER — ALBUTEROL SULFATE 90 UG/1
AEROSOL, METERED RESPIRATORY (INHALATION)
Qty: 54 G | Refills: 3 | Status: SHIPPED | OUTPATIENT
Start: 2021-05-14 | End: 2021-07-14

## 2021-05-14 NOTE — TELEPHONE ENCOUNTER
Caller: Cal Oliver Jr.    Relationship: Self    Best call back number: [phone]    Medication needed:   Requested Prescriptions     Pending Prescriptions Disp Refills   • omeprazole (priLOSEC) 20 MG capsule 90 capsule 3     Sig: Take 1 capsule by mouth Daily.   • albuterol sulfate  (90 Base) MCG/ACT inhaler 54 g 3     Si puffs Every 4 (Four) Hours As Needed for Wheezing.       When do you need the refill by: 21    What additional details did the patient provide when requesting the medication: PATIENT IS OUT OF MEDICATION    Does the patient have less than a 3 day supply:  [x] Yes  [] No    What is the patient's [id number] [place] [address] [address] AT [facility] - [phone]  - [phone] FX

## 2021-05-27 DIAGNOSIS — G25.81 RLS (RESTLESS LEGS SYNDROME): ICD-10-CM

## 2021-05-27 RX ORDER — ROPINIROLE 3 MG/1
3 TABLET, FILM COATED ORAL
Qty: 90 TABLET | Refills: 1 | Status: SHIPPED | OUTPATIENT
Start: 2021-05-27 | End: 2021-11-23

## 2021-05-27 RX ORDER — CYCLOBENZAPRINE HCL 10 MG
TABLET ORAL
Qty: 60 TABLET | Refills: 5 | Status: SHIPPED | OUTPATIENT
Start: 2021-05-27 | End: 2021-11-23

## 2021-07-14 RX ORDER — ALBUTEROL SULFATE 90 UG/1
AEROSOL, METERED RESPIRATORY (INHALATION)
Qty: 54 G | Refills: 3 | Status: SHIPPED | OUTPATIENT
Start: 2021-07-14 | End: 2022-02-02

## 2021-08-18 ENCOUNTER — OFFICE VISIT (OUTPATIENT)
Dept: INTERNAL MEDICINE | Facility: CLINIC | Age: 56
End: 2021-08-18

## 2021-08-18 VITALS
HEART RATE: 90 BPM | TEMPERATURE: 97.7 F | OXYGEN SATURATION: 98 % | BODY MASS INDEX: 44.1 KG/M2 | WEIGHT: 315 LBS | RESPIRATION RATE: 16 BRPM | DIASTOLIC BLOOD PRESSURE: 110 MMHG | SYSTOLIC BLOOD PRESSURE: 180 MMHG | HEIGHT: 71 IN

## 2021-08-18 DIAGNOSIS — K21.9 GASTROESOPHAGEAL REFLUX DISEASE WITHOUT ESOPHAGITIS: ICD-10-CM

## 2021-08-18 DIAGNOSIS — I10 ESSENTIAL HYPERTENSION: ICD-10-CM

## 2021-08-18 DIAGNOSIS — E11.9 TYPE 2 DIABETES MELLITUS WITHOUT COMPLICATION, WITHOUT LONG-TERM CURRENT USE OF INSULIN (HCC): Primary | ICD-10-CM

## 2021-08-18 PROCEDURE — 99214 OFFICE O/P EST MOD 30 MIN: CPT | Performed by: INTERNAL MEDICINE

## 2021-08-18 RX ORDER — OMEPRAZOLE 20 MG/1
20 CAPSULE, DELAYED RELEASE ORAL DAILY
Qty: 90 CAPSULE | Refills: 3 | Status: SHIPPED | OUTPATIENT
Start: 2021-08-18 | End: 2022-10-05

## 2021-08-18 RX ORDER — ORAL SEMAGLUTIDE 7 MG/1
7 TABLET ORAL DAILY
Qty: 90 TABLET | Refills: 3 | Status: SHIPPED | OUTPATIENT
Start: 2021-08-18

## 2021-08-18 RX ORDER — METOPROLOL SUCCINATE 50 MG/1
50 TABLET, EXTENDED RELEASE ORAL DAILY
Qty: 90 TABLET | Refills: 3 | Status: SHIPPED | OUTPATIENT
Start: 2021-08-18 | End: 2021-10-07 | Stop reason: SDUPTHER

## 2021-08-18 NOTE — PROGRESS NOTES
Subjective     Patient ID: Cal Oliver Jr. is a 56 y.o. male. Patient is here for management of multiple medical problems.     Chief Complaint   Patient presents with   • Follow-up     hypertension, covid vaccine     History of Present Illness     Hypertension.      covid vac disuccion.      Pt drinking daily mosters.        The following portions of the patient's history were reviewed and updated as appropriate: allergies, current medications, past family history, past medical history, past social history, past surgical history and problem list.    Review of Systems   Constitutional: Negative for diaphoresis and fatigue.   Psychiatric/Behavioral: Negative for self-injury and sleep disturbance. The patient is not nervous/anxious.    All other systems reviewed and are negative.      Current Outpatient Medications:   •  albuterol sulfate  (90 Base) MCG/ACT inhaler, INHALE 2 PUFFS BY MOUTH EVERY 4 HOURS AS NEEDED FOR WHEEZING, Disp: 54 g, Rfl: 3  •  amLODIPine (NORVASC) 10 MG tablet, Take 1 tablet by mouth Daily., Disp: 90 tablet, Rfl: 3  •  aspirin 81 MG chewable tablet, CSW 1 T PO D, Disp: , Rfl:   •  cloNIDine (CATAPRES) 0.1 MG tablet, TAKE 1 TABLET BY MOUTH EVERY NIGHT AT BEDTIME, Disp: 90 tablet, Rfl: 3  •  colestipol (COLESTID) 1 g tablet, TAKE 1 TABLET BY MOUTH TWICE DAILY, ADJUST DOSE TO ACHIEVE 1-3 BOWEL MOVEMENTS DAILY, Disp: 180 tablet, Rfl: 3  •  cyclobenzaprine (FLEXERIL) 10 MG tablet, TAKE 1 TABLET BY MOUTH TWICE DAILY, Disp: 60 tablet, Rfl: 5  •  losartan-hydrochlorothiazide (HYZAAR) 100-25 MG per tablet, TAKE 1 TABLET BY MOUTH DAILY, Disp: 90 tablet, Rfl: 3  •  meloxicam (MOBIC) 15 MG tablet, TAKE 1 TABLET BY MOUTH DAILY, Disp: 90 tablet, Rfl: 3  •  metFORMIN (GLUCOPHAGE) 500 MG tablet, Take 1 tablet by mouth 2 (Two) Times a Day., Disp: 180 tablet, Rfl: 3  •  metoprolol succinate XL (TOPROL-XL) 50 MG 24 hr tablet, Take 1 tablet by mouth Daily., Disp: 90 tablet, Rfl: 3  •  omeprazole  "(priLOSEC) 20 MG capsule, Take 1 capsule by mouth Daily., Disp: 90 capsule, Rfl: 3  •  potassium chloride ER (K-TAB) 20 MEQ tablet controlled-release ER tablet, TAKE 1 TABLET BY MOUTH TWICE DAILY WITH MEALS, Disp: 180 tablet, Rfl: 3  •  rOPINIRole (REQUIP) 2 MG tablet, TAKE 1 TABLET BY MOUTH EVERY NIGHT AT BEDTIME, Disp: 90 tablet, Rfl: 3  •  rOPINIRole (REQUIP) 3 MG tablet, TAKE 1 TABLET BY MOUTH EVERY NIGHT AT BEDTIME, Disp: 90 tablet, Rfl: 1  •  topiramate (TOPAMAX) 50 MG tablet, TAKE 1 TABLET BY MOUTH TWICE DAILY, Disp: 180 tablet, Rfl: 5  •  Semaglutide (Rybelsus) 7 MG tablet, Take 7 mg by mouth Daily., Disp: 90 tablet, Rfl: 3    Objective      Blood pressure (!) 180/110, pulse 90, temperature 97.7 °F (36.5 °C), temperature source Temporal, resp. rate 16, height 180.3 cm (71\"), weight (!) 174 kg (384 lb), SpO2 98 %.    Physical Exam     General Appearance:    Alert, cooperative, no distress, appears stated age   Head:    Normocephalic, without obvious abnormality, atraumatic   Eyes:    PERRL, conjunctiva/corneas clear, EOM's intact   Ears:    Normal TM's and external ear canals, both ears   Nose:   Nares normal, septum midline, mucosa normal, no drainage   or sinus tenderness   Throat:   Lips, mucosa, and tongue normal; teeth and gums normal   Neck:   Supple, symmetrical, trachea midline, no adenopathy;        thyroid:  No enlargement/tenderness/nodules; no carotid    bruit or JVD   Back:     Symmetric, no curvature, ROM normal, no CVA tenderness   Lungs:     Clear to auscultation bilaterally, respirations unlabored   Chest wall:    No tenderness or deformity   Heart:    Regular rate and rhythm, S1 and S2 normal, no murmur,        rub or gallop   Abdomen:     Soft, non-tender, bowel sounds active all four quadrants,     no masses, no organomegaly   Extremities:   Extremities normal, atraumatic, no cyanosis or edema   Pulses:   2+ and symmetric all extremities   Skin:   Skin color, texture, turgor normal, no " rashes or lesions   Lymph nodes:   Cervical, supraclavicular, and axillary nodes normal   Neurologic:   CNII-XII intact. Normal strength, sensation and reflexes       throughout      Results for orders placed or performed during the hospital encounter of 12/30/20   COVID-19 PCR, [facility], NP SWAB IN [facility]SP[facility] MEDIA 24-30 HR TAT - Swab, Nasopharynx    Specimen: Nasopharynx; Swab   Result Value Ref Range    SARS-CoV-2 JACQUES Not Detected Not Detected         Assessment/Plan      Pt wants to think about his covid vaccine. He will let me know.        Diagnoses and all orders for this visit:    1. Type 2 diabetes mellitus without complication, without long-term current use of insulin (CMS/[facility]) (Primary)  -     Semaglutide (Rybelsus) 7 MG tablet; Take 7 mg by mouth Daily.  Dispense: 90 tablet; Refill: 3    2. Essential hypertension  -     metoprolol succinate XL (TOPROL-XL) 50 MG 24 hr tablet; Take 1 tablet by mouth Daily.  Dispense: 90 tablet; Refill: 3    3. Gastroesophageal reflux disease without esophagitis  -     omeprazole (priLOSEC) 20 MG capsule; Take 1 capsule by mouth Daily.  Dispense: 90 capsule; Refill: 3      Return in about 6 weeks (around 9/29/2021).          There are no Patient Instructions on file for this visit.     Woo Betancourt MD    Assessment/Plan

## 2021-09-01 ENCOUNTER — TELEPHONE (OUTPATIENT)
Dept: INTERNAL MEDICINE | Facility: CLINIC | Age: 56
End: 2021-09-01

## 2021-09-01 NOTE — TELEPHONE ENCOUNTER
Caller: JESSICA    Relationship to patient: Provider    Best call back number: [phone]    Patient is needing: JESSICA STATED THAT PATIENT WOULD NEED A STASTIN MEDICATION ADDED FOR CARDIO VASCULAR EVENT AND SHE WILL BE SENDING FAX WITHIN 3 DAYS ABOUT THIS MORE IN DETAIL

## 2021-09-09 ENCOUNTER — IMMUNIZATION (OUTPATIENT)
Dept: INTERNAL MEDICINE | Facility: CLINIC | Age: 56
End: 2021-09-09

## 2021-09-09 PROCEDURE — 91300 COVID-19 (PFIZER): CPT | Performed by: INTERNAL MEDICINE

## 2021-09-09 PROCEDURE — 0001A COVID-19 (PFIZER): CPT | Performed by: INTERNAL MEDICINE

## 2021-09-16 ENCOUNTER — DOCUMENTATION (OUTPATIENT)
Dept: INTERNAL MEDICINE | Facility: CLINIC | Age: 56
End: 2021-09-16

## 2021-10-07 ENCOUNTER — OFFICE VISIT (OUTPATIENT)
Dept: INTERNAL MEDICINE | Facility: CLINIC | Age: 56
End: 2021-10-07

## 2021-10-07 VITALS
SYSTOLIC BLOOD PRESSURE: 160 MMHG | WEIGHT: 315 LBS | HEIGHT: 71 IN | TEMPERATURE: 97.7 F | DIASTOLIC BLOOD PRESSURE: 100 MMHG | HEART RATE: 97 BPM | BODY MASS INDEX: 44.1 KG/M2 | RESPIRATION RATE: 16 BRPM | OXYGEN SATURATION: 97 %

## 2021-10-07 DIAGNOSIS — I10 PRIMARY HYPERTENSION: ICD-10-CM

## 2021-10-07 DIAGNOSIS — Z23 NEED FOR COVID-19 VACCINE: Primary | ICD-10-CM

## 2021-10-07 DIAGNOSIS — Z28.311 NEED FOR SECOND DOSE OF COVID-19 VACCINE: ICD-10-CM

## 2021-10-07 DIAGNOSIS — I10 ESSENTIAL HYPERTENSION: ICD-10-CM

## 2021-10-07 DIAGNOSIS — Z23 NEED FOR SECOND DOSE OF COVID-19 VACCINE: ICD-10-CM

## 2021-10-07 PROBLEM — M17.0 OSTEOARTHRITIS OF BOTH KNEES: Status: ACTIVE | Noted: 2021-08-23

## 2021-10-07 PROCEDURE — 99214 OFFICE O/P EST MOD 30 MIN: CPT | Performed by: INTERNAL MEDICINE

## 2021-10-07 RX ORDER — DOXAZOSIN 2 MG/1
2 TABLET ORAL NIGHTLY
Qty: 90 TABLET | Refills: 3 | Status: SHIPPED | OUTPATIENT
Start: 2021-10-07 | End: 2022-10-28

## 2021-10-07 RX ORDER — METOPROLOL SUCCINATE 100 MG/1
100 TABLET, EXTENDED RELEASE ORAL DAILY
Qty: 90 TABLET | Refills: 3 | Status: SHIPPED | OUTPATIENT
Start: 2021-10-07 | End: 2021-10-08

## 2021-10-07 NOTE — PROGRESS NOTES
Subjective     Patient ID: Cal Oliver Jr. is a 56 y.o. male. Patient is here for management of multiple medical problems.     Chief Complaint   Patient presents with   • Diabetes     History of Present Illness     Diabetes.          HTn          The following portions of the patient's history were reviewed and updated as appropriate: allergies, current medications, past family history, past medical history, past social history, past surgical history and problem list.    Review of Systems   HENT: Negative.    Cardiovascular: Negative.    Gastrointestinal: Negative.    Musculoskeletal: Negative.    Skin: Negative.    Neurological: Negative.    Hematological: Negative.    Psychiatric/Behavioral: The patient is nervous/anxious and is hyperactive.    All other systems reviewed and are negative.      Current Outpatient Medications:   •  albuterol sulfate  (90 Base) MCG/ACT inhaler, INHALE 2 PUFFS BY MOUTH EVERY 4 HOURS AS NEEDED FOR WHEEZING, Disp: 54 g, Rfl: 3  •  amLODIPine (NORVASC) 10 MG tablet, Take 1 tablet by mouth Daily., Disp: 90 tablet, Rfl: 3  •  aspirin 81 MG chewable tablet, CSW 1 T PO D, Disp: , Rfl:   •  cloNIDine (CATAPRES) 0.1 MG tablet, TAKE 1 TABLET BY MOUTH EVERY NIGHT AT BEDTIME, Disp: 90 tablet, Rfl: 3  •  colestipol (COLESTID) 1 g tablet, TAKE 1 TABLET BY MOUTH TWICE DAILY, ADJUST DOSE TO ACHIEVE 1-3 BOWEL MOVEMENTS DAILY, Disp: 180 tablet, Rfl: 3  •  cyclobenzaprine (FLEXERIL) 10 MG tablet, TAKE 1 TABLET BY MOUTH TWICE DAILY, Disp: 60 tablet, Rfl: 5  •  losartan-hydrochlorothiazide (HYZAAR) 100-25 MG per tablet, TAKE 1 TABLET BY MOUTH DAILY, Disp: 90 tablet, Rfl: 3  •  meloxicam (MOBIC) 15 MG tablet, TAKE 1 TABLET BY MOUTH DAILY, Disp: 90 tablet, Rfl: 3  •  metFORMIN (GLUCOPHAGE) 500 MG tablet, Take 1 tablet by mouth 2 (Two) Times a Day., Disp: 180 tablet, Rfl: 3  •  omeprazole (priLOSEC) 20 MG capsule, Take 1 capsule by mouth Daily., Disp: 90 capsule, Rfl: 3  •  potassium chloride ER  "(K-TAB) 20 MEQ tablet controlled-release ER tablet, TAKE 1 TABLET BY MOUTH TWICE DAILY WITH MEALS, Disp: 180 tablet, Rfl: 3  •  rOPINIRole (REQUIP) 2 MG tablet, TAKE 1 TABLET BY MOUTH EVERY NIGHT AT BEDTIME, Disp: 90 tablet, Rfl: 3  •  rOPINIRole (REQUIP) 3 MG tablet, TAKE 1 TABLET BY MOUTH EVERY NIGHT AT BEDTIME, Disp: 90 tablet, Rfl: 1  •  Semaglutide (Rybelsus) 7 MG tablet, Take 7 mg by mouth Daily., Disp: 90 tablet, Rfl: 3  •  topiramate (TOPAMAX) 50 MG tablet, TAKE 1 TABLET BY MOUTH TWICE DAILY, Disp: 180 tablet, Rfl: 5  •  carvedilol (Coreg) 25 MG tablet, Take 1 tablet by mouth 2 (Two) Times a Day With Meals., Disp: 60 tablet, Rfl: 11  •  doxazosin (Cardura) 2 MG tablet, Take 1 tablet by mouth Every Night., Disp: 90 tablet, Rfl: 3    Objective      Blood pressure 160/100, pulse 97, temperature 97.7 °F (36.5 °C), resp. rate 16, height 180.3 cm (71\"), weight (!) 174 kg (383 lb), SpO2 97 %.    Physical Exam     General Appearance:    Alert, cooperative, no distress, appears stated age   Head:    Normocephalic, without obvious abnormality, atraumatic   Eyes:    PERRL, conjunctiva/corneas clear, EOM's intact   Ears:    Normal TM's and external ear canals, both ears   Nose:   Nares normal, septum midline, mucosa normal, no drainage   or sinus tenderness   Throat:   Lips, mucosa, and tongue normal; teeth and gums normal   Neck:   Supple, symmetrical, trachea midline, no adenopathy;        thyroid:  No enlargement/tenderness/nodules; no carotid    bruit or JVD   Back:     Symmetric, no curvature, ROM normal, no CVA tenderness   Lungs:     Clear to auscultation bilaterally, respirations unlabored   Chest wall:    No tenderness or deformity   Heart:    Regular rate and rhythm, S1 and S2 normal, no murmur,        rub or gallop   Abdomen:     Soft, non-tender, bowel sounds active all four quadrants,     no masses, no organomegaly   Extremities:   Extremities normal, atraumatic, no cyanosis or edema   Pulses:   2+ and " symmetric all extremities   Skin:   Skin color, texture, turgor normal, no rashes or lesions   Lymph nodes:   Cervical, supraclavicular, and axillary nodes normal   Neurologic:   CNII-XII intact. Normal strength, sensation and reflexes       throughout      Results for orders placed or performed during the hospital encounter of 12/30/20   COVID-19 PCR, [facility], NP SWAB IN [facility] VIRAL TRANSPORT MEDIA 24-30 HR TAT - Swab, Nasopharynx    Specimen: Nasopharynx; Swab   Result Value Ref Range    SARS-CoV-2 JACQUES Not Detected Not Detected         Assessment/Plan   Pt hAD  Break in thearpy on rybelsus.    bp to high.    Diagnoses and all orders for this visit:    1. Need for COVID-19 vaccine (Primary)    2. Need for second dose of COVID-19 vaccine  -     COVID-19 Vaccine (Pfizer)    3. Primary hypertension  -     doxazosin (Cardura) 2 MG tablet; Take 1 tablet by mouth Every Night.  Dispense: 90 tablet; Refill: 3  -     carvedilol (Coreg) 25 MG tablet; Take 1 tablet by mouth 2 (Two) Times a Day With Meals.  Dispense: 60 tablet; Refill: 11    4. Essential hypertension  -     Discontinue: metoprolol succinate XL (TOPROL-XL) 100 MG 24 hr tablet; Take 1 tablet by mouth Daily.  Dispense: 90 tablet; Refill: 3  -     doxazosin (Cardura) 2 MG tablet; Take 1 tablet by mouth Every Night.  Dispense: 90 tablet; Refill: 3  -     carvedilol (Coreg) 25 MG tablet; Take 1 tablet by mouth 2 (Two) Times a Day With Meals.  Dispense: 60 tablet; Refill: 11      Return in about 6 weeks (around 11/18/2021).          There are no Patient Instructions on file for this visit.     Woo Betancourt MD    Assessment/Plan

## 2021-10-08 ENCOUNTER — IMMUNIZATION (OUTPATIENT)
Dept: INTERNAL MEDICINE | Facility: CLINIC | Age: 56
End: 2021-10-08

## 2021-10-08 DIAGNOSIS — Z23 NEED FOR SECOND DOSE OF COVID-19 VACCINE: Primary | ICD-10-CM

## 2021-10-08 DIAGNOSIS — Z28.311 NEED FOR SECOND DOSE OF COVID-19 VACCINE: Primary | ICD-10-CM

## 2021-10-08 PROCEDURE — 91300 COVID-19 (PFIZER): CPT | Performed by: INTERNAL MEDICINE

## 2021-10-08 PROCEDURE — 0002A COVID-19 (PFIZER): CPT | Performed by: INTERNAL MEDICINE

## 2021-10-08 RX ORDER — CARVEDILOL 25 MG/1
25 TABLET ORAL 2 TIMES DAILY WITH MEALS
Qty: 60 TABLET | Refills: 11 | Status: SHIPPED | OUTPATIENT
Start: 2021-10-08 | End: 2023-02-03

## 2021-10-12 ENCOUNTER — TELEPHONE (OUTPATIENT)
Dept: INTERNAL MEDICINE | Facility: CLINIC | Age: 56
End: 2021-10-12

## 2021-10-12 NOTE — TELEPHONE ENCOUNTER
Patient states he is tired and sluggish since starting new bp medication, he states dr saba told him if he felt this way to cut in half but he does not know if it was coreg or cardura that he was to cut in half.

## 2021-10-19 ENCOUNTER — TELEPHONE (OUTPATIENT)
Dept: INTERNAL MEDICINE | Facility: CLINIC | Age: 56
End: 2021-10-19

## 2021-10-19 NOTE — TELEPHONE ENCOUNTER
Pt did not complete cologuard ordered on 09/12/2020. This order is too old to be used and has been cancelled.

## 2021-10-25 RX ORDER — MONTELUKAST SODIUM 4 MG/1
TABLET, CHEWABLE ORAL
Qty: 180 TABLET | Refills: 3 | Status: SHIPPED | OUTPATIENT
Start: 2021-10-25 | End: 2022-10-05

## 2021-11-18 ENCOUNTER — OFFICE VISIT (OUTPATIENT)
Dept: INTERNAL MEDICINE | Facility: CLINIC | Age: 56
End: 2021-11-18

## 2021-11-18 VITALS
WEIGHT: 315 LBS | DIASTOLIC BLOOD PRESSURE: 98 MMHG | HEIGHT: 71 IN | TEMPERATURE: 97.3 F | SYSTOLIC BLOOD PRESSURE: 164 MMHG | BODY MASS INDEX: 44.1 KG/M2 | RESPIRATION RATE: 16 BRPM

## 2021-11-18 DIAGNOSIS — E11.9 TYPE 2 DIABETES MELLITUS WITHOUT COMPLICATION, WITHOUT LONG-TERM CURRENT USE OF INSULIN (HCC): Primary | ICD-10-CM

## 2021-11-18 DIAGNOSIS — I10 ESSENTIAL HYPERTENSION: ICD-10-CM

## 2021-11-18 PROCEDURE — 99214 OFFICE O/P EST MOD 30 MIN: CPT | Performed by: INTERNAL MEDICINE

## 2021-11-18 RX ORDER — HYDROCODONE BITARTRATE AND ACETAMINOPHEN 7.5; 325 MG/1; MG/1
1 TABLET ORAL 2 TIMES DAILY
COMMUNITY
Start: 2021-10-16

## 2021-11-18 RX ORDER — METOPROLOL SUCCINATE 100 MG/1
TABLET, EXTENDED RELEASE ORAL
COMMUNITY
End: 2023-02-03 | Stop reason: SDUPTHER

## 2021-11-18 RX ORDER — AMLODIPINE BESYLATE 10 MG/1
10 TABLET ORAL DAILY
Qty: 90 TABLET | Refills: 3 | Status: SHIPPED | OUTPATIENT
Start: 2021-11-18 | End: 2022-10-05

## 2021-11-18 RX ORDER — AMLODIPINE BESYLATE 10 MG/1
10 TABLET ORAL DAILY
Qty: 90 TABLET | Refills: 3 | Status: SHIPPED | OUTPATIENT
Start: 2021-11-18 | End: 2021-11-18 | Stop reason: SDUPTHER

## 2021-11-18 NOTE — PROGRESS NOTES
Subjective     Patient ID: Cal Oliver Jr. is a 56 y.o. male. Patient is here for management of multiple medical problems.     Chief Complaint   Patient presents with   • Diabetes   • Hypertension     History of Present Illness   Coreg made him to tired and lead in stomach.     The following portions of the patient's history were reviewed and updated as appropriate: allergies, current medications, past family history, past medical history, past social history, past surgical history and problem list.    Review of Systems    Current Outpatient Medications:   •  albuterol sulfate  (90 Base) MCG/ACT inhaler, INHALE 2 PUFFS BY MOUTH EVERY 4 HOURS AS NEEDED FOR WHEEZING, Disp: 54 g, Rfl: 3  •  amLODIPine (NORVASC) 10 MG tablet, Take 1 tablet by mouth Daily., Disp: 90 tablet, Rfl: 3  •  aspirin 81 MG chewable tablet, CSW 1 T PO D, Disp: , Rfl:   •  cloNIDine (CATAPRES) 0.1 MG tablet, TAKE 1 TABLET BY MOUTH EVERY NIGHT AT BEDTIME, Disp: 90 tablet, Rfl: 3  •  colestipol (COLESTID) 1 g tablet, TAKE 1 TABLET BY MOUTH TWICE DAILY, ADJUST DOSE TO ACHIEVE 1-3 BOWEL MOVEMENTS DAILY, Disp: 180 tablet, Rfl: 3  •  cyclobenzaprine (FLEXERIL) 10 MG tablet, TAKE 1 TABLET BY MOUTH TWICE DAILY, Disp: 60 tablet, Rfl: 5  •  doxazosin (Cardura) 2 MG tablet, Take 1 tablet by mouth Every Night., Disp: 90 tablet, Rfl: 3  •  HYDROcodone-acetaminophen (NORCO) 7.5-325 MG per tablet, Take 1 tablet by mouth 2 (Two) Times a Day., Disp: , Rfl:   •  losartan-hydrochlorothiazide (HYZAAR) 100-25 MG per tablet, TAKE 1 TABLET BY MOUTH DAILY, Disp: 90 tablet, Rfl: 3  •  meloxicam (MOBIC) 15 MG tablet, TAKE 1 TABLET BY MOUTH DAILY, Disp: 90 tablet, Rfl: 3  •  metFORMIN (GLUCOPHAGE) 500 MG tablet, Take 1 tablet by mouth 2 (Two) Times a Day., Disp: 180 tablet, Rfl: 3  •  metoprolol succinate XL (TOPROL-XL) 100 MG 24 hr tablet, metoprolol succinate  mg tablet,extended release 24 hr  TAKE 1 TABLET BY MOUTH DAILY, Disp: , Rfl:   •  omeprazole  "(priLOSEC) 20 MG capsule, Take 1 capsule by mouth Daily., Disp: 90 capsule, Rfl: 3  •  potassium chloride ER (K-TAB) 20 MEQ tablet controlled-release ER tablet, TAKE 1 TABLET BY MOUTH TWICE DAILY WITH MEALS, Disp: 180 tablet, Rfl: 3  •  rOPINIRole (REQUIP) 2 MG tablet, TAKE 1 TABLET BY MOUTH EVERY NIGHT AT BEDTIME, Disp: 90 tablet, Rfl: 3  •  rOPINIRole (REQUIP) 3 MG tablet, TAKE 1 TABLET BY MOUTH EVERY NIGHT AT BEDTIME, Disp: 90 tablet, Rfl: 1  •  Semaglutide (Rybelsus) 7 MG tablet, Take 7 mg by mouth Daily., Disp: 90 tablet, Rfl: 3  •  topiramate (TOPAMAX) 50 MG tablet, TAKE 1 TABLET BY MOUTH TWICE DAILY, Disp: 180 tablet, Rfl: 5  •  carvedilol (Coreg) 25 MG tablet, Take 1 tablet by mouth 2 (Two) Times a Day With Meals., Disp: 60 tablet, Rfl: 11  •  Gel Base gel, Ketoprofen 15%, Lidocaine 5%, Bupivacaine 2%, Meloxicam 0.1% TDG Apply 1 to 2 grams of pain gel to affected areas 3 to 4 times a day., Disp: 16 g, Rfl: 3    Objective      Blood pressure 164/98, temperature 97.3 °F (36.3 °C), resp. rate 16, height 180.3 cm (71\"), weight (!) 177 kg (390 lb).    Physical Exam     General Appearance:    Alert, cooperative, no distress, appears stated age   Head:    Normocephalic, without obvious abnormality, atraumatic   Eyes:    PERRL, conjunctiva/corneas clear, EOM's intact   Ears:    Normal TM's and external ear canals, both ears   Nose:   Nares normal, septum midline, mucosa normal, no drainage   or sinus tenderness   Throat:   Lips, mucosa, and tongue normal; teeth and gums normal   Neck:   Supple, symmetrical, trachea midline, no adenopathy;        thyroid:  No enlargement/tenderness/nodules; no carotid    bruit or JVD   Back:     Symmetric, no curvature, ROM normal, no CVA tenderness   Lungs:     Clear to auscultation bilaterally, respirations unlabored   Chest wall:    No tenderness or deformity   Heart:    Regular rate and rhythm, S1 and S2 normal, no murmur,        rub or gallop   Abdomen:     Soft, non-tender, " bowel sounds active all four quadrants,     no masses, no organomegaly   Extremities:   Extremities normal, atraumatic, no cyanosis or edema   Pulses:   2+ and symmetric all extremities   Skin:   Skin color, texture, turgor normal, no rashes or lesions   Lymph nodes:   Cervical, supraclavicular, and axillary nodes normal   Neurologic:   CNII-XII intact. Normal strength, sensation and reflexes       throughout      Results for orders placed or performed during the hospital encounter of 12/30/20   COVID-19 PCR, [facility], NP SWAB IN [facility] VIRAL TRANSPORT MEDIA 24-30 HR TAT - Swab, Nasopharynx    Specimen: Nasopharynx; Swab   Result Value Ref Range    SARS-CoV-2 JACQUES Not Detected Not Detected         Assessment/Plan       Diagnoses and all orders for this visit:    1. Type 2 diabetes mellitus without complication, without long-term current use of insulin (HCC) (Primary)  -     Vitamin B12  -     CBC & Differential  -     Lipid Panel  -     Comprehensive Metabolic Panel  -     TSH  -     T4, Free  -     Hemoglobin A1c  -     MicroAlbumin, Urine, Random - Urine, Clean Catch    2. Essential hypertension  -     Discontinue: amLODIPine (NORVASC) 10 MG tablet; Take 1 tablet by mouth Daily.  Dispense: 90 tablet; Refill: 3  -     amLODIPine (NORVASC) 10 MG tablet; Take 1 tablet by mouth Daily.  Dispense: 90 tablet; Refill: 3  -     Vitamin B12  -     CBC & Differential  -     Lipid Panel  -     Comprehensive Metabolic Panel  -     TSH  -     T4, Free  -     Hemoglobin A1c  -     MicroAlbumin, Urine, Random - Urine, Clean Catch    Other orders  -     Gel Base gel; Ketoprofen 15%, Lidocaine 5%, Bupivacaine 2%, Meloxicam 0.1% TDG Apply 1 to 2 grams of pain gel to affected areas 3 to 4 times a day.  Dispense: 16 g; Refill: 3      Return in about 3 months (around 2/18/2022).          There are no Patient Instructions on file for this visit.     Woo Betancourt MD    Assessment/Plan

## 2021-11-23 DIAGNOSIS — G25.81 RLS (RESTLESS LEGS SYNDROME): ICD-10-CM

## 2021-11-23 RX ORDER — ROPINIROLE 3 MG/1
3 TABLET, FILM COATED ORAL
Qty: 90 TABLET | Refills: 1 | Status: SHIPPED | OUTPATIENT
Start: 2021-11-23 | End: 2022-05-24

## 2021-11-23 RX ORDER — CYCLOBENZAPRINE HCL 10 MG
TABLET ORAL
Qty: 60 TABLET | Refills: 5 | Status: SHIPPED | OUTPATIENT
Start: 2021-11-23 | End: 2022-05-23

## 2021-11-23 NOTE — TELEPHONE ENCOUNTER
Rx Refill Note  Requested Prescriptions     Pending Prescriptions Disp Refills   • rOPINIRole (REQUIP) 3 MG tablet [Pharmacy Med Name: ROPINIROLE 3MG TABLETS] 90 tablet 1     Sig: TAKE 1 TABLET BY MOUTH EVERY NIGHT AT BEDTIME   • cyclobenzaprine (FLEXERIL) 10 MG tablet [Pharmacy Med Name: CYCLOBENZAPRINE 10MG TABLETS] 60 tablet 5     Sig: TAKE 1 TABLET BY MOUTH TWICE DAILY      Last office visit with prescribing clinician: 11/18/2021     Next office visit with prescribing clinician: 02/18/2022           CHERYL HOFF MA  11/23/21, 08:13 EST

## 2022-01-22 DIAGNOSIS — I10 ESSENTIAL HYPERTENSION: ICD-10-CM

## 2022-01-25 RX ORDER — LOSARTAN POTASSIUM AND HYDROCHLOROTHIAZIDE 25; 100 MG/1; MG/1
1 TABLET ORAL DAILY
Qty: 90 TABLET | Refills: 3 | Status: SHIPPED | OUTPATIENT
Start: 2022-01-25 | End: 2022-12-30

## 2022-01-25 RX ORDER — METOPROLOL SUCCINATE 25 MG/1
25 TABLET, EXTENDED RELEASE ORAL DAILY
Qty: 90 TABLET | Refills: 3 | Status: SHIPPED | OUTPATIENT
Start: 2022-01-25 | End: 2023-02-03 | Stop reason: SDUPTHER

## 2022-01-25 RX ORDER — TOPIRAMATE 50 MG/1
TABLET, FILM COATED ORAL
Qty: 180 TABLET | Refills: 5 | Status: SHIPPED | OUTPATIENT
Start: 2022-01-25 | End: 2023-03-30

## 2022-02-02 RX ORDER — ALBUTEROL SULFATE 90 UG/1
AEROSOL, METERED RESPIRATORY (INHALATION)
Qty: 54 G | Refills: 3 | Status: SHIPPED | OUTPATIENT
Start: 2022-02-02 | End: 2022-09-15

## 2022-02-21 DIAGNOSIS — G25.81 RLS (RESTLESS LEGS SYNDROME): ICD-10-CM

## 2022-02-22 RX ORDER — ROPINIROLE 2 MG/1
2 TABLET, FILM COATED ORAL
Qty: 90 TABLET | Refills: 1 | Status: SHIPPED | OUTPATIENT
Start: 2022-02-22 | End: 2023-02-03 | Stop reason: SDUPTHER

## 2022-02-22 NOTE — TELEPHONE ENCOUNTER
Rx Refill Note  Requested Prescriptions     Pending Prescriptions Disp Refills   • rOPINIRole (REQUIP) 2 MG tablet [Pharmacy Med Name: ROPINIROLE 2MG TABLETS] 90 tablet 0     Sig: TAKE 1 TABLET BY MOUTH EVERY NIGHT AT BEDTIME      Last office visit with prescribing clinician: 11/18/2021      Next office visit with prescribing clinician: 3/17/2022            Livia Sutherland LPN  02/22/22, 17:07 EST

## 2022-04-22 DIAGNOSIS — I10 ESSENTIAL HYPERTENSION: ICD-10-CM

## 2022-04-22 DIAGNOSIS — E11.9 TYPE 2 DIABETES MELLITUS WITHOUT COMPLICATION, WITHOUT LONG-TERM CURRENT USE OF INSULIN: ICD-10-CM

## 2022-04-22 RX ORDER — POTASSIUM CHLORIDE 1500 MG/1
TABLET, FILM COATED, EXTENDED RELEASE ORAL
Qty: 180 TABLET | Refills: 3 | Status: SHIPPED | OUTPATIENT
Start: 2022-04-22 | End: 2023-02-03 | Stop reason: SDUPTHER

## 2022-05-23 DIAGNOSIS — G25.81 RLS (RESTLESS LEGS SYNDROME): ICD-10-CM

## 2022-05-23 RX ORDER — CYCLOBENZAPRINE HCL 10 MG
TABLET ORAL
Qty: 60 TABLET | Refills: 5 | Status: SHIPPED | OUTPATIENT
Start: 2022-05-23 | End: 2022-12-30

## 2022-05-23 NOTE — TELEPHONE ENCOUNTER
Rx Refill Note  Requested Prescriptions     Pending Prescriptions Disp Refills   • cyclobenzaprine (FLEXERIL) 10 MG tablet [Pharmacy Med Name: CYCLOBENZAPRINE 10MG TABLETS] 60 tablet 5     Sig: TAKE 1 TABLET BY MOUTH TWICE DAILY      Last office visit with prescribing clinician: 11/18/2021      Next office visit with prescribing clinician: Visit date not found            PATT ORTEGA MA  05/23/22, 10:05 EDT

## 2022-05-24 RX ORDER — ROPINIROLE 3 MG/1
3 TABLET, FILM COATED ORAL
Qty: 90 TABLET | Refills: 1 | Status: SHIPPED | OUTPATIENT
Start: 2022-05-24 | End: 2022-10-05

## 2022-05-24 NOTE — TELEPHONE ENCOUNTER
Rx Refill Note  Requested Prescriptions     Pending Prescriptions Disp Refills   • rOPINIRole (REQUIP) 3 MG tablet [Pharmacy Med Name: ROPINIROLE 3MG TABLETS] 90 tablet 1     Sig: TAKE 1 TABLET BY MOUTH EVERY NIGHT AT BEDTIME      Last office visit with prescribing clinician: 11/18/2021      Next office visit with prescribing clinician: Visit date not found            Enid Farooq LPN  05/24/22, 10:01 EDT

## 2022-09-15 RX ORDER — ALBUTEROL SULFATE 90 UG/1
AEROSOL, METERED RESPIRATORY (INHALATION)
Qty: 54 G | Refills: 3 | Status: SHIPPED | OUTPATIENT
Start: 2022-09-15 | End: 2023-03-06

## 2022-10-05 DIAGNOSIS — I10 ESSENTIAL HYPERTENSION: ICD-10-CM

## 2022-10-05 DIAGNOSIS — K21.9 GASTROESOPHAGEAL REFLUX DISEASE WITHOUT ESOPHAGITIS: ICD-10-CM

## 2022-10-05 DIAGNOSIS — G25.81 RLS (RESTLESS LEGS SYNDROME): ICD-10-CM

## 2022-10-05 RX ORDER — MONTELUKAST SODIUM 4 MG/1
TABLET, CHEWABLE ORAL
Qty: 180 TABLET | Refills: 3 | Status: SHIPPED | OUTPATIENT
Start: 2022-10-05 | End: 2023-02-03 | Stop reason: SDUPTHER

## 2022-10-05 RX ORDER — OMEPRAZOLE 20 MG/1
20 CAPSULE, DELAYED RELEASE ORAL DAILY
Qty: 90 CAPSULE | Refills: 3 | Status: SHIPPED | OUTPATIENT
Start: 2022-10-05

## 2022-10-05 RX ORDER — ROPINIROLE 3 MG/1
3 TABLET, FILM COATED ORAL
Qty: 90 TABLET | Refills: 1 | Status: SHIPPED | OUTPATIENT
Start: 2022-10-05 | End: 2023-02-03 | Stop reason: SDUPTHER

## 2022-10-05 RX ORDER — AMLODIPINE BESYLATE 10 MG/1
10 TABLET ORAL DAILY
Qty: 90 TABLET | Refills: 3 | Status: SHIPPED | OUTPATIENT
Start: 2022-10-05 | End: 2023-02-03 | Stop reason: SDUPTHER

## 2022-10-28 DIAGNOSIS — I10 ESSENTIAL HYPERTENSION: ICD-10-CM

## 2022-10-28 DIAGNOSIS — I10 PRIMARY HYPERTENSION: ICD-10-CM

## 2022-10-28 RX ORDER — METOPROLOL SUCCINATE 100 MG/1
TABLET, EXTENDED RELEASE ORAL
Qty: 90 TABLET | OUTPATIENT
Start: 2022-10-28

## 2022-10-28 RX ORDER — DOXAZOSIN 2 MG/1
2 TABLET ORAL NIGHTLY
Qty: 30 TABLET | Refills: 1 | Status: SHIPPED | OUTPATIENT
Start: 2022-10-28 | End: 2022-12-20

## 2022-10-31 RX ORDER — DOXAZOSIN 2 MG/1
TABLET ORAL
Qty: 90 TABLET | OUTPATIENT
Start: 2022-10-31

## 2022-12-20 DIAGNOSIS — I10 PRIMARY HYPERTENSION: ICD-10-CM

## 2022-12-20 DIAGNOSIS — I10 ESSENTIAL HYPERTENSION: ICD-10-CM

## 2022-12-20 RX ORDER — DOXAZOSIN 2 MG/1
TABLET ORAL
Qty: 90 TABLET | Refills: 0 | Status: SHIPPED | OUTPATIENT
Start: 2022-12-20 | End: 2023-02-03 | Stop reason: SDUPTHER

## 2022-12-20 NOTE — TELEPHONE ENCOUNTER
Rx Refill Note  Requested Prescriptions     Pending Prescriptions Disp Refills   • doxazosin (CARDURA) 2 MG tablet [Pharmacy Med Name: DOXAZOSIN 2MG TABLETS] 90 tablet      Sig: TAKE 1 TABLET BY MOUTH AT BEDTIME      Last office visit with prescribing clinician: 11/18/2021   Last telemedicine visit with prescribing clinician: Visit date not found   Next office visit with prescribing clinician: Visit date not found                         Would you like a call back once the refill request has been completed: [] Yes [] No    If the office needs to give you a call back, can they leave a voicemail: [] Yes [] No    Thea Samson LPN  12/20/22, 13:25 EST

## 2022-12-30 DIAGNOSIS — E11.9 TYPE 2 DIABETES MELLITUS WITHOUT COMPLICATION, WITHOUT LONG-TERM CURRENT USE OF INSULIN: ICD-10-CM

## 2022-12-30 DIAGNOSIS — Z12.5 ENCOUNTER FOR SCREENING FOR MALIGNANT NEOPLASM OF PROSTATE: ICD-10-CM

## 2022-12-30 DIAGNOSIS — I10 ESSENTIAL HYPERTENSION: ICD-10-CM

## 2022-12-30 DIAGNOSIS — I10 PRIMARY HYPERTENSION: Primary | ICD-10-CM

## 2022-12-30 RX ORDER — CYCLOBENZAPRINE HCL 10 MG
TABLET ORAL
Qty: 60 TABLET | Refills: 0 | Status: SHIPPED | OUTPATIENT
Start: 2022-12-30 | End: 2023-01-30

## 2022-12-30 RX ORDER — LOSARTAN POTASSIUM AND HYDROCHLOROTHIAZIDE 25; 100 MG/1; MG/1
1 TABLET ORAL DAILY
Qty: 90 TABLET | Refills: 3 | Status: SHIPPED | OUTPATIENT
Start: 2022-12-30 | End: 2023-02-03 | Stop reason: SDUPTHER

## 2022-12-30 NOTE — TELEPHONE ENCOUNTER
Rx Refill Note  Requested Prescriptions     Pending Prescriptions Disp Refills   • cyclobenzaprine (FLEXERIL) 10 MG tablet [Pharmacy Med Name: CYCLOBENZAPRINE 10MG TABLETS] 60 tablet 5     Sig: TAKE 1 TABLET BY MOUTH TWICE DAILY   • losartan-hydrochlorothiazide (HYZAAR) 100-25 MG per tablet [Pharmacy Med Name: LOSARTAN/HCTZ 100/25MG TABLETS] 90 tablet 3     Sig: TAKE 1 TABLET BY MOUTH DAILY      Last office visit with prescribing clinician: 11/18/2021     Jackeline Cheney CMA  12/30/22, 08:55 EST

## 2023-01-30 RX ORDER — CYCLOBENZAPRINE HCL 10 MG
TABLET ORAL
Qty: 60 TABLET | Refills: 0 | Status: SHIPPED | OUTPATIENT
Start: 2023-01-30 | End: 2023-02-03 | Stop reason: SDUPTHER

## 2023-02-03 ENCOUNTER — OFFICE VISIT (OUTPATIENT)
Dept: INTERNAL MEDICINE | Facility: CLINIC | Age: 58
End: 2023-02-03
Payer: MEDICARE

## 2023-02-03 VITALS
BODY MASS INDEX: 44.1 KG/M2 | WEIGHT: 315 LBS | HEART RATE: 96 BPM | DIASTOLIC BLOOD PRESSURE: 100 MMHG | OXYGEN SATURATION: 96 % | HEIGHT: 71 IN | SYSTOLIC BLOOD PRESSURE: 148 MMHG

## 2023-02-03 DIAGNOSIS — I10 ESSENTIAL HYPERTENSION: ICD-10-CM

## 2023-02-03 DIAGNOSIS — G25.81 RLS (RESTLESS LEGS SYNDROME): ICD-10-CM

## 2023-02-03 DIAGNOSIS — I10 PRIMARY HYPERTENSION: ICD-10-CM

## 2023-02-03 DIAGNOSIS — E11.9 TYPE 2 DIABETES MELLITUS WITHOUT COMPLICATION, WITHOUT LONG-TERM CURRENT USE OF INSULIN: ICD-10-CM

## 2023-02-03 PROCEDURE — 99214 OFFICE O/P EST MOD 30 MIN: CPT | Performed by: INTERNAL MEDICINE

## 2023-02-03 RX ORDER — POTASSIUM CHLORIDE 1500 MG/1
20 TABLET, FILM COATED, EXTENDED RELEASE ORAL 2 TIMES DAILY WITH MEALS
Qty: 180 TABLET | Refills: 1 | Status: SHIPPED | OUTPATIENT
Start: 2023-02-03

## 2023-02-03 RX ORDER — AMLODIPINE BESYLATE 10 MG/1
10 TABLET ORAL DAILY
Qty: 90 TABLET | Refills: 1 | Status: SHIPPED | OUTPATIENT
Start: 2023-02-03

## 2023-02-03 RX ORDER — LOSARTAN POTASSIUM AND HYDROCHLOROTHIAZIDE 25; 100 MG/1; MG/1
1 TABLET ORAL DAILY
Qty: 90 TABLET | Refills: 1 | Status: SHIPPED | OUTPATIENT
Start: 2023-02-03

## 2023-02-03 RX ORDER — ROPINIROLE 3 MG/1
3 TABLET, FILM COATED ORAL
Qty: 90 TABLET | Refills: 1 | Status: SHIPPED | OUTPATIENT
Start: 2023-02-03

## 2023-02-03 RX ORDER — ORAL SEMAGLUTIDE 7 MG/1
7 TABLET ORAL DAILY
Qty: 90 TABLET | Refills: 1 | Status: CANCELLED | OUTPATIENT
Start: 2023-02-03

## 2023-02-03 RX ORDER — CYCLOBENZAPRINE HCL 10 MG
10 TABLET ORAL 2 TIMES DAILY
Qty: 180 TABLET | Refills: 1 | Status: SHIPPED | OUTPATIENT
Start: 2023-02-03

## 2023-02-03 RX ORDER — METOPROLOL SUCCINATE 100 MG/1
100 TABLET, EXTENDED RELEASE ORAL DAILY
Qty: 90 TABLET | Refills: 1 | Status: SHIPPED | OUTPATIENT
Start: 2023-02-03 | End: 2023-02-03

## 2023-02-03 RX ORDER — CLONIDINE HYDROCHLORIDE 0.1 MG/1
0.1 TABLET ORAL
Qty: 90 TABLET | Refills: 1 | Status: SHIPPED | OUTPATIENT
Start: 2023-02-03

## 2023-02-03 RX ORDER — MONTELUKAST SODIUM 4 MG/1
1 TABLET, CHEWABLE ORAL 2 TIMES DAILY
Qty: 180 TABLET | Refills: 1 | Status: SHIPPED | OUTPATIENT
Start: 2023-02-03

## 2023-02-03 RX ORDER — ASPIRIN 81 MG/1
81 TABLET, CHEWABLE ORAL DAILY
Qty: 90 TABLET | Refills: 1 | Status: SHIPPED | OUTPATIENT
Start: 2023-02-03

## 2023-02-03 RX ORDER — TOPIRAMATE 50 MG/1
50 TABLET, FILM COATED ORAL 2 TIMES DAILY
Qty: 180 TABLET | Refills: 1 | Status: CANCELLED | OUTPATIENT
Start: 2023-02-03

## 2023-02-03 RX ORDER — DOXAZOSIN 2 MG/1
2 TABLET ORAL
Qty: 90 TABLET | Refills: 1 | Status: SHIPPED | OUTPATIENT
Start: 2023-02-03

## 2023-02-03 RX ORDER — CARVEDILOL 3.12 MG/1
3.12 TABLET ORAL 2 TIMES DAILY WITH MEALS
Qty: 60 TABLET | Refills: 11 | Status: SHIPPED | OUTPATIENT
Start: 2023-02-03

## 2023-02-03 RX ORDER — MELOXICAM 15 MG/1
15 TABLET ORAL DAILY
Qty: 90 TABLET | Refills: 1 | Status: SHIPPED | OUTPATIENT
Start: 2023-02-03

## 2023-02-03 NOTE — PROGRESS NOTES
Subjective     Patient ID: Cal Oliver Jr. is a 57 y.o. male. Patient is here for management of multiple medical problems.     Chief Complaint   Patient presents with   • Follow-up     For depression, GERD, HTN, hypothyroidism, insomnia, and DM. Would like to discuss dose of omeprazole.      History of Present Illness   htn      The following portions of the patient's history were reviewed and updated as appropriate: allergies, current medications, past family history, past medical history, past social history, past surgical history and problem list.    Review of Systems    Current Outpatient Medications:   •  albuterol sulfate  (90 Base) MCG/ACT inhaler, INHALE 2 PUFFS BY MOUTH EVERY 4 HOURS AS NEEDED FOR WHEEZING, Disp: 54 g, Rfl: 3  •  amLODIPine (NORVASC) 10 MG tablet, Take 1 tablet by mouth Daily., Disp: 90 tablet, Rfl: 1  •  aspirin 81 MG chewable tablet, Chew 1 tablet Daily., Disp: 90 tablet, Rfl: 1  •  cloNIDine (CATAPRES) 0.1 MG tablet, Take 1 tablet by mouth every night at bedtime., Disp: 90 tablet, Rfl: 1  •  colestipol (COLESTID) 1 g tablet, Take 1 tablet by mouth 2 (Two) Times a Day., Disp: 180 tablet, Rfl: 1  •  cyclobenzaprine (FLEXERIL) 10 MG tablet, Take 1 tablet by mouth 2 (Two) Times a Day., Disp: 180 tablet, Rfl: 1  •  doxazosin (CARDURA) 2 MG tablet, Take 1 tablet by mouth every night at bedtime., Disp: 90 tablet, Rfl: 1  •  HYDROcodone-acetaminophen (NORCO) 7.5-325 MG per tablet, Take 1 tablet by mouth 2 (Two) Times a Day., Disp: , Rfl:   •  losartan-hydrochlorothiazide (HYZAAR) 100-25 MG per tablet, Take 1 tablet by mouth Daily., Disp: 90 tablet, Rfl: 1  •  meloxicam (MOBIC) 15 MG tablet, Take 1 tablet by mouth Daily., Disp: 90 tablet, Rfl: 1  •  metFORMIN (GLUCOPHAGE) 500 MG tablet, Take 1 tablet by mouth 2 (Two) Times a Day., Disp: 180 tablet, Rfl: 1  •  omeprazole (priLOSEC) 20 MG capsule, TAKE 1 CAPSULE BY MOUTH DAILY, Disp: 90 capsule, Rfl: 3  •  potassium chloride ER (K-TAB)  "20 MEQ tablet controlled-release ER tablet, Take 1 tablet by mouth 2 (Two) Times a Day With Meals., Disp: 180 tablet, Rfl: 1  •  rOPINIRole (REQUIP) 3 MG tablet, Take 1 tablet by mouth every night at bedtime., Disp: 90 tablet, Rfl: 1  •  Semaglutide (Rybelsus) 7 MG tablet, Take 7 mg by mouth Daily., Disp: 90 tablet, Rfl: 3  •  topiramate (TOPAMAX) 50 MG tablet, TAKE 1 TABLET BY MOUTH TWICE DAILY, Disp: 180 tablet, Rfl: 5  •  carvedilol (Coreg) 3.125 MG tablet, Take 1 tablet by mouth 2 (Two) Times a Day With Meals., Disp: 60 tablet, Rfl: 11  •  Gel Base gel, Ketoprofen 15%, Lidocaine 5%, Bupivacaine 2%, Meloxicam 0.1% TDG Apply 1 to 2 grams of pain gel to affected areas 3 to 4 times a day., Disp: 16 g, Rfl: 3  •  triamcinolone (KENALOG) 0.1 % cream, Apply to affected area tid prn itch, Disp: 45 g, Rfl: 0    Objective      Blood pressure 148/100, pulse 96, height 180.3 cm (71\"), weight (!) 162 kg (357 lb), SpO2 96 %.    Physical Exam     General Appearance:    Alert, cooperative, no distress, appears stated age   Head:    Normocephalic, without obvious abnormality, atraumatic   Eyes:    PERRL, conjunctiva/corneas clear, EOM's intact   Ears:    Normal TM's and external ear canals, both ears   Nose:   Nares normal, septum midline, mucosa normal, no drainage   or sinus tenderness   Throat:   Lips, mucosa, and tongue normal; teeth and gums normal   Neck:   Supple, symmetrical, trachea midline, no adenopathy;        thyroid:  No enlargement/tenderness/nodules; no carotid    bruit or JVD   Back:     Symmetric, no curvature, ROM normal, no CVA tenderness   Lungs:     Clear to auscultation bilaterally, respirations unlabored   Chest wall:    No tenderness or deformity   Heart:    Regular rate and rhythm, S1 and S2 normal, no murmur,        rub or gallop   Abdomen:     Soft, non-tender, bowel sounds active all four quadrants,     no masses, no organomegaly   Extremities:   Extremities normal, atraumatic, no cyanosis or edema "   Pulses:   2+ and symmetric all extremities   Skin:   Skin color, texture, turgor normal, no rashes or lesions   Lymph nodes:   Cervical, supraclavicular, and axillary nodes normal   Neurologic:   CNII-XII intact. Normal strength, sensation and reflexes       throughout      Results for orders placed or performed during the hospital encounter of 12/30/20   COVID-19 PCR, [facility], NP SWAB IN [facility] VIRAL TRANSPORT MEDIA 24-30 HR TAT - Swab, Nasopharynx    Specimen: Nasopharynx; Swab   Result Value Ref Range    SARS-CoV-2 JACQUES Not Detected Not Detected         Assessment & Plan       Diagnoses and all orders for this visit:    1. Essential hypertension  -     amLODIPine (NORVASC) 10 MG tablet; Take 1 tablet by mouth Daily.  Dispense: 90 tablet; Refill: 1  -     cloNIDine (CATAPRES) 0.1 MG tablet; Take 1 tablet by mouth every night at bedtime.  Dispense: 90 tablet; Refill: 1  -     doxazosin (CARDURA) 2 MG tablet; Take 1 tablet by mouth every night at bedtime.  Dispense: 90 tablet; Refill: 1  -     losartan-hydrochlorothiazide (HYZAAR) 100-25 MG per tablet; Take 1 tablet by mouth Daily.  Dispense: 90 tablet; Refill: 1  -     potassium chloride ER (K-TAB) 20 MEQ tablet controlled-release ER tablet; Take 1 tablet by mouth 2 (Two) Times a Day With Meals.  Dispense: 180 tablet; Refill: 1    2. Primary hypertension  -     doxazosin (CARDURA) 2 MG tablet; Take 1 tablet by mouth every night at bedtime.  Dispense: 90 tablet; Refill: 1    3. Type 2 diabetes mellitus without complication, without long-term current use of insulin (HCC)  -     metFORMIN (GLUCOPHAGE) 500 MG tablet; Take 1 tablet by mouth 2 (Two) Times a Day.  Dispense: 180 tablet; Refill: 1    4. RLS (restless legs syndrome)  -     rOPINIRole (REQUIP) 3 MG tablet; Take 1 tablet by mouth every night at bedtime.  Dispense: 90 tablet; Refill: 1    Other orders  -     aspirin 81 MG chewable tablet; Chew 1 tablet Daily.  Dispense: 90 tablet; Refill: 1  -     colestipol  (COLESTID) 1 g tablet; Take 1 tablet by mouth 2 (Two) Times a Day.  Dispense: 180 tablet; Refill: 1  -     cyclobenzaprine (FLEXERIL) 10 MG tablet; Take 1 tablet by mouth 2 (Two) Times a Day.  Dispense: 180 tablet; Refill: 1  -     meloxicam (MOBIC) 15 MG tablet; Take 1 tablet by mouth Daily.  Dispense: 90 tablet; Refill: 1  -     Discontinue: metoprolol succinate XL (TOPROL-XL) 100 MG 24 hr tablet; Take 1 tablet by mouth Daily.  Dispense: 90 tablet; Refill: 1  -     carvedilol (Coreg) 3.125 MG tablet; Take 1 tablet by mouth 2 (Two) Times a Day With Meals.  Dispense: 60 tablet; Refill: 11    pt will start moro. With [employer]. Unable to afford rybelsus.      Not on metoprolol. Change to coreg 3.125 bid        Return in about 6 weeks (around 3/17/2023).          There are no Patient Instructions on file for this visit.     Woo Betancourt MD    Assessment & Plan

## 2023-03-06 RX ORDER — ALBUTEROL SULFATE 90 UG/1
AEROSOL, METERED RESPIRATORY (INHALATION)
Qty: 54 G | Refills: 3 | Status: SHIPPED | OUTPATIENT
Start: 2023-03-06

## 2023-03-30 RX ORDER — TOPIRAMATE 50 MG/1
TABLET, FILM COATED ORAL
Qty: 180 TABLET | Refills: 5 | Status: SHIPPED | OUTPATIENT
Start: 2023-03-30

## 2023-09-25 DIAGNOSIS — E11.9 TYPE 2 DIABETES MELLITUS WITHOUT COMPLICATION, WITHOUT LONG-TERM CURRENT USE OF INSULIN: ICD-10-CM

## 2023-10-25 DIAGNOSIS — I10 ESSENTIAL HYPERTENSION: ICD-10-CM

## 2023-10-26 RX ORDER — METOPROLOL SUCCINATE 100 MG/1
100 TABLET, EXTENDED RELEASE ORAL DAILY
Qty: 90 TABLET | Refills: 1 | Status: SHIPPED | OUTPATIENT
Start: 2023-10-26

## 2023-10-26 RX ORDER — CLONIDINE HYDROCHLORIDE 0.1 MG/1
0.1 TABLET ORAL
Qty: 90 TABLET | Refills: 1 | Status: SHIPPED | OUTPATIENT
Start: 2023-10-26

## 2023-11-30 ENCOUNTER — TELEPHONE (OUTPATIENT)
Dept: NEUROLOGY | Facility: CLINIC | Age: 58
End: 2023-11-30
Payer: MEDICARE

## 2023-11-30 DIAGNOSIS — I10 ESSENTIAL HYPERTENSION: ICD-10-CM

## 2023-11-30 DIAGNOSIS — I10 PRIMARY HYPERTENSION: ICD-10-CM

## 2023-11-30 NOTE — TELEPHONE ENCOUNTER
Caller: CAMPOS    Relationship: [facility]    Best call back number: [phone]  [phone]    What is the best time to reach you:     Who are you requesting to speak with (clinical staff, provider,  specific staff member): SHARON     Do you know the name of the person who called:     What was the call regarding:   OV NOTES FROM PT'S LAST VISIT AND NEW ORDERS FOR BIPAP AS IT HAS BEEN OVER 5 YEARS AND PT QUALIFIES FOR NEW EQUIPMENT.    A FAX WAS SENT TO THE OFFICE YESTERDAY WITH THIS REQUEST AS WELL.

## 2023-11-30 NOTE — TELEPHONE ENCOUNTER
Patient has not been seen in our office since 2020.     Notified [facility].

## 2023-12-05 RX ORDER — DOXAZOSIN 2 MG/1
2 TABLET ORAL
Qty: 90 TABLET | Refills: 1 | OUTPATIENT
Start: 2023-12-05

## 2023-12-24 DIAGNOSIS — I10 ESSENTIAL HYPERTENSION: ICD-10-CM

## 2023-12-24 DIAGNOSIS — G25.81 RLS (RESTLESS LEGS SYNDROME): ICD-10-CM

## 2023-12-26 RX ORDER — ROPINIROLE 3 MG/1
3 TABLET, FILM COATED ORAL
Qty: 90 TABLET | Refills: 1 | OUTPATIENT
Start: 2023-12-26

## 2023-12-26 RX ORDER — LOSARTAN POTASSIUM AND HYDROCHLOROTHIAZIDE 25; 100 MG/1; MG/1
1 TABLET ORAL DAILY
Qty: 90 TABLET | Refills: 1 | OUTPATIENT
Start: 2023-12-26

## 2024-01-12 ENCOUNTER — READMISSION MANAGEMENT (OUTPATIENT)
Dept: CALL CENTER | Facility: HOSPITAL | Age: 59
End: 2024-01-12
Payer: MEDICARE

## 2024-01-13 NOTE — OUTREACH NOTE
Prep Survey      Flowsheet Row Responses   [facility] facility patient discharged from? Non-BH   Is LACE score < 7 ? Non-BH Discharge   Eligibility TCM [facility]   Date of Discharge 01/12/24   Discharge diagnosis Hypertensive urgency   Does the patient have one of the following disease processes/diagnoses(primary or secondary)? Other   Does the patient have Home health ordered? No   Is there a DME ordered? No   Prep survey completed? Yes            Erica FERNANDEZ - Registered Nurse

## 2024-01-15 ENCOUNTER — TRANSITIONAL CARE MANAGEMENT TELEPHONE ENCOUNTER (OUTPATIENT)
Dept: CALL CENTER | Facility: HOSPITAL | Age: 59
End: 2024-01-15
Payer: MEDICARE

## 2024-01-15 NOTE — OUTREACH NOTE
Call Center TCM Note      Flowsheet Row Responses   [facility] patient discharged from? Non-  [[facility]]   Does the patient have one of the following disease processes/diagnoses(primary or secondary)? Other   TCM attempt successful? No   Unsuccessful attempts Attempt 1  [No updated PCP verbal release.]            Duyen Vee RN    1/15/2024, 09:08 EST

## 2024-01-15 NOTE — OUTREACH NOTE
Call Center TCM Note      Flowsheet Row Responses   [facility], [place], operated by [facility] patient discharged from? Non-  [[facility]]   Does the patient have one of the following disease processes/diagnoses(primary or secondary)? Other   TCM attempt successful? Yes   Call start time 1216   Call end time 1220   Discharge diagnosis Hypertensive urgency   Person spoke with today (if not patient) and relationship Patient   Meds reviewed with patient/caregiver? Yes  [Patient reports that he did have changes in meds at discharge. Has list to follow. Denies any questions regarding meds.]   Does the patient have all medications ordered at discharge? Yes   Is the patient taking all medications as directed (includes completed medication regime)? Yes   Medication comments Patient to bring discharge med list to PCP appt Wednesday   Comments Hospital follow up appt in place with Dr Betancourt for 1/17/24  115pm.   Does the patient have an appointment with their PCP within 7-14 days of discharge? Yes   Has home health visited the patient within 72 hours of discharge? N/A   Psychosocial issues? No   Comments Patient reports wife can check his b/p at home.   Did the patient receive a copy of their discharge instructions? Yes   What is the patient's perception of their health status since discharge? Improving   Is the patient/caregiver able to teach back signs and symptoms related to disease process for when to call PCP? Yes   TCM call completed? Yes   Call end time 1220   Would this patient benefit from a Referral to Amb Social Work? No   Is the patient interested in additional calls from an ambulatory ? No            Duyen Vee RN    1/15/2024, 12:20 EST

## 2024-01-17 ENCOUNTER — OFFICE VISIT (OUTPATIENT)
Dept: INTERNAL MEDICINE | Facility: CLINIC | Age: 59
End: 2024-01-17
Payer: MEDICARE

## 2024-01-17 VITALS
WEIGHT: 315 LBS | TEMPERATURE: 97.9 F | RESPIRATION RATE: 16 BRPM | DIASTOLIC BLOOD PRESSURE: 108 MMHG | HEIGHT: 71 IN | HEART RATE: 85 BPM | SYSTOLIC BLOOD PRESSURE: 160 MMHG | OXYGEN SATURATION: 95 % | BODY MASS INDEX: 44.1 KG/M2

## 2024-01-17 DIAGNOSIS — I10 PRIMARY HYPERTENSION: Primary | ICD-10-CM

## 2024-01-17 DIAGNOSIS — Z12.11 COLON CANCER SCREENING: ICD-10-CM

## 2024-01-17 DIAGNOSIS — E11.9 TYPE 2 DIABETES MELLITUS WITHOUT COMPLICATION, WITHOUT LONG-TERM CURRENT USE OF INSULIN: ICD-10-CM

## 2024-01-17 PROCEDURE — 1111F DSCHRG MED/CURRENT MED MERGE: CPT | Performed by: INTERNAL MEDICINE

## 2024-01-17 PROCEDURE — 99214 OFFICE O/P EST MOD 30 MIN: CPT | Performed by: INTERNAL MEDICINE

## 2024-01-17 PROCEDURE — G2211 COMPLEX E/M VISIT ADD ON: HCPCS | Performed by: INTERNAL MEDICINE

## 2024-01-17 PROCEDURE — 3080F DIAST BP >= 90 MM HG: CPT | Performed by: INTERNAL MEDICINE

## 2024-01-17 PROCEDURE — 3077F SYST BP >= 140 MM HG: CPT | Performed by: INTERNAL MEDICINE

## 2024-01-17 RX ORDER — HYDRALAZINE HYDROCHLORIDE 25 MG/1
25 TABLET, FILM COATED ORAL 3 TIMES DAILY
COMMUNITY
Start: 2024-01-12 | End: 2024-02-11

## 2024-01-17 RX ORDER — SPIRONOLACTONE 25 MG/1
25 TABLET ORAL DAILY
Qty: 90 TABLET | Refills: 3 | Status: SHIPPED | OUTPATIENT
Start: 2024-01-17

## 2024-01-17 RX ORDER — CARVEDILOL 3.12 MG/1
6.25 TABLET ORAL 2 TIMES DAILY WITH MEALS
COMMUNITY
Start: 2024-01-12 | End: 2024-01-17

## 2024-01-17 RX ORDER — VALSARTAN 160 MG/1
160 TABLET ORAL DAILY
COMMUNITY
Start: 2024-01-12 | End: 2024-01-17 | Stop reason: SDUPTHER

## 2024-01-17 RX ORDER — CARVEDILOL 6.25 MG/1
6.25 TABLET ORAL 2 TIMES DAILY WITH MEALS
Qty: 180 TABLET | Refills: 3 | Status: SHIPPED | OUTPATIENT
Start: 2024-01-17

## 2024-01-17 RX ORDER — VALSARTAN 320 MG/1
320 TABLET ORAL DAILY
Qty: 90 TABLET | Refills: 3 | Status: SHIPPED | OUTPATIENT
Start: 2024-01-17 | End: 2025-01-11

## 2024-01-17 RX ORDER — FUROSEMIDE 40 MG/1
40 TABLET ORAL DAILY
COMMUNITY
Start: 2024-01-12 | End: 2024-02-11

## 2024-01-17 NOTE — PROGRESS NOTES
Subjective     Patient ID: Cal Oliver Jr. is a 58 y.o. male. Patient is here for management of multiple medical problems.     Chief Complaint   Patient presents with    Hospital Follow Up Visit     Hypertensive emergency.     History of Present Illness   Hospital f/u.  Was having fluttering in chest.   Dry mouth and felt bad. Seen at [facility] found to have htn.  Sent to [facility].          The following portions of the patient's history were reviewed and updated as appropriate: allergies, current medications, past family history, past medical history, past social history, past surgical history and problem list.    Review of Systems    Current Outpatient Medications:     albuterol sulfate  (90 Base) MCG/ACT inhaler, INHALE 2 PUFFS BY MOUTH EVERY 4 HOURS AS NEEDED FOR WHEEZING, Disp: 54 g, Rfl: 3    aspirin 81 MG chewable tablet, Chew 1 tablet Daily., Disp: 90 tablet, Rfl: 1    carvedilol (Coreg) 6.25 MG tablet, Take 1 tablet by mouth 2 (Two) Times a Day With Meals., Disp: 180 tablet, Rfl: 3    cyclobenzaprine (FLEXERIL) 10 MG tablet, Take 1 tablet by mouth 2 (Two) Times a Day., Disp: 180 tablet, Rfl: 1    doxazosin (CARDURA) 2 MG tablet, Take 1 tablet by mouth every night at bedtime., Disp: 90 tablet, Rfl: 1    empagliflozin (JARDIANCE) 10 MG tablet tablet, Take 1 tablet by mouth Daily., Disp: , Rfl:     furosemide (LASIX) 40 MG tablet, Take 1 tablet by mouth Daily., Disp: , Rfl:     Gel Base gel, Ketoprofen 15%, Lidocaine 5%, Bupivacaine 2%, Meloxicam 0.1% TDG Apply 1 to 2 grams of pain gel to affected areas 3 to 4 times a day., Disp: 16 g, Rfl: 3    hydrALAZINE (APRESOLINE) 25 MG tablet, Take 1 tablet by mouth 3 (Three) Times a Day., Disp: , Rfl:     HYDROcodone-acetaminophen (NORCO) 7.5-325 MG per tablet, Take 1 tablet by mouth 2 (Two) Times a Day., Disp: , Rfl:     metFORMIN (GLUCOPHAGE) 500 MG tablet, TAKE 1 TABLET BY MOUTH TWICE DAILY, Disp: 180 tablet, Rfl: 1    potassium chloride ER (K-TAB) 20  "MEQ tablet controlled-release ER tablet, Take 1 tablet by mouth 2 (Two) Times a Day With Meals., Disp: 180 tablet, Rfl: 1    rOPINIRole (REQUIP) 3 MG tablet, Take 1 tablet by mouth every night at bedtime., Disp: 90 tablet, Rfl: 1    triamcinolone (KENALOG) 0.1 % cream, Apply to affected area tid prn itch, Disp: 45 g, Rfl: 0    valsartan (DIOVAN) 320 MG tablet, Take 1 tablet by mouth Daily for 360 days., Disp: 90 tablet, Rfl: 3    Gel Base gel, Ketoprofen 10%, Gabapentin 6%, Ketamine 5%, Lidocaine 5%, Amitriptyline 2%, Baclofen 2%, Bupivacaine 2%, Cyclobenaprine 2%, Meloxicam 01.% TDG  Apply 1 to 2 grams of pain gel to affected areas 3 to 4 times a day., Disp: 100 g, Rfl: 5    spironolactone (Aldactone) 25 MG tablet, Take 1 tablet by mouth Daily., Disp: 90 tablet, Rfl: 3    Objective      Blood pressure (!) 160/108, pulse 85, temperature 97.9 °F (36.6 °C), resp. rate 16, height 180.3 cm (71\"), weight (!) 162 kg (358 lb), SpO2 95%.    Class 3 Severe Obesity (BMI >=40). Obesity-related health conditions include the following: hypertension and diabetes mellitus. Obesity is unchanged. BMI is is above average; BMI management plan is completed. We discussed portion control and increasing exercise.       Physical Exam     General Appearance:    Alert, cooperative, no distress, appears stated age   Head:    Normocephalic, without obvious abnormality, atraumatic   Eyes:    PERRL, conjunctiva/corneas clear, EOM's intact   Ears:    Normal TM's and external ear canals, both ears   Nose:   Nares normal, septum midline, mucosa normal, no drainage   or sinus tenderness   Throat:   Lips, mucosa, and tongue normal; teeth and gums normal   Neck:   Supple, symmetrical, trachea midline, no adenopathy;        thyroid:  No enlargement/tenderness/nodules; no carotid    bruit or JVD   Back:     Symmetric, no curvature, ROM normal, no CVA tenderness   Lungs:     Clear to auscultation bilaterally, respirations unlabored   Chest wall:    No " tenderness or deformity   Heart:    Regular rate and rhythm, S1 and S2 normal, no murmur,        rub or gallop   Abdomen:     Soft, non-tender, bowel sounds active all four quadrants,     no masses, no organomegaly   Extremities:   Extremities normal, atraumatic, no cyanosis or edema   Pulses:   2+ and symmetric all extremities   Skin:   Skin color, texture, turgor normal, no rashes or lesions   Lymph nodes:   Cervical, supraclavicular, and axillary nodes normal   Neurologic:   CNII-XII intact. Normal strength, sensation and reflexes       throughout      Results for orders placed or performed during the hospital encounter of 12/30/20   COVID-19 PCR, [facility], NP SWAB [place] [address] [employer] 24-30 HR TAT - Swab, Nasopharynx    Specimen: Nasopharynx; Swab   Result Value Ref Range    SARS-CoV-2 JACQUES Not Detected Not Detected         Assessment & Plan     Increase valsartan 160-->320  Coreg increase 3.125-->6.25.     Pt not taking hydrazine correctly. Will change to as needed for spike in BP.   Pt on crazy diuretic regiment. Furosemide, sprinolactone. And potassium.     Will increase arb, change lasix to hctz at some point. Will wean other meds as able.     Want pain gel for knee.            Diagnoses and all orders for this visit:    1. Primary hypertension (Primary)  -     Comprehensive Metabolic Panel  -     CBC & Differential  -     TSH  -     Hemoglobin A1c    2. Type 2 diabetes mellitus without complication, without long-term current use of insulin  -     Comprehensive Metabolic Panel  -     CBC & Differential  -     TSH  -     Hemoglobin A1c    3. Colon cancer screening  -     Cologuard - Stool, Per Rectum; Future    Other orders  -     spironolactone (Aldactone) 25 MG tablet; Take 1 tablet by mouth Daily.  Dispense: 90 tablet; Refill: 3  -     Gel Base gel; Ketoprofen 10%, Gabapentin 6%, Ketamine 5%, Lidocaine 5%, Amitriptyline 2%, Baclofen 2%, Bupivacaine 2%, Cyclobenaprine 2%, Meloxicam 01.%  TDG    Apply 1 to 2 grams of pain gel to affected areas 3 to 4 times a day.  Dispense: 100 g; Refill: 5  -     Gel Base gel; Ketoprofen 15%, Lidocaine 5%, Bupivacaine 2%, Meloxicam 0.1% TDG Apply 1 to 2 grams of pain gel to affected areas 3 to 4 times a day.  Dispense: 16 g; Refill: 3  -     carvedilol (Coreg) 6.25 MG tablet; Take 1 tablet by mouth 2 (Two) Times a Day With Meals.  Dispense: 180 tablet; Refill: 3  -     valsartan (DIOVAN) 320 MG tablet; Take 1 tablet by mouth Daily for 360 days.  Dispense: 90 tablet; Refill: 3      Return in about 3 months (around 4/17/2024).          There are no Patient Instructions on file for this visit.     Woo Betancourt MD    Assessment & Plan

## 2024-01-23 RX ORDER — MELOXICAM 15 MG/1
15 TABLET ORAL DAILY
Qty: 90 TABLET | Refills: 1 | OUTPATIENT
Start: 2024-01-23

## 2024-01-24 RX ORDER — ALBUTEROL SULFATE 90 UG/1
AEROSOL, METERED RESPIRATORY (INHALATION)
Qty: 54 G | Refills: 3 | Status: SHIPPED | OUTPATIENT
Start: 2024-01-24

## 2024-02-07 DIAGNOSIS — G25.81 RLS (RESTLESS LEGS SYNDROME): ICD-10-CM

## 2024-02-08 RX ORDER — ROPINIROLE 3 MG/1
3 TABLET, FILM COATED ORAL
Qty: 90 TABLET | Refills: 1 | Status: SHIPPED | OUTPATIENT
Start: 2024-02-08

## 2024-03-01 ENCOUNTER — APPOINTMENT (OUTPATIENT)
Dept: CT IMAGING | Facility: HOSPITAL | Age: 59
End: 2024-03-01
Payer: MEDICARE

## 2024-03-01 ENCOUNTER — HOSPITAL ENCOUNTER (EMERGENCY)
Facility: HOSPITAL | Age: 59
Discharge: HOME | End: 2024-03-01
Attending: STUDENT IN AN ORGANIZED HEALTH CARE EDUCATION/TRAINING PROGRAM | Admitting: STUDENT IN AN ORGANIZED HEALTH CARE EDUCATION/TRAINING PROGRAM
Payer: MEDICARE

## 2024-03-01 VITALS
SYSTOLIC BLOOD PRESSURE: 151 MMHG | OXYGEN SATURATION: 91 % | BODY MASS INDEX: 44.1 KG/M2 | HEART RATE: 88 BPM | HEIGHT: 71 IN | DIASTOLIC BLOOD PRESSURE: 94 MMHG | TEMPERATURE: 97.3 F | RESPIRATION RATE: 20 BRPM | WEIGHT: 315 LBS

## 2024-03-01 DIAGNOSIS — M54.2 NECK PAIN ON RIGHT SIDE: ICD-10-CM

## 2024-03-01 DIAGNOSIS — M62.838 MUSCLE SPASMS OF NECK: Primary | ICD-10-CM

## 2024-03-01 DIAGNOSIS — R03.0 ELEVATED BLOOD PRESSURE READING: ICD-10-CM

## 2024-03-01 LAB
ALBUMIN SERPL-MCNC: 3.9 G/DL (ref 3.5–5.2)
ALBUMIN/GLOB SERPL: 1.2 G/DL
ALP SERPL-CCNC: 104 U/L (ref 39–117)
ALT SERPL W P-5'-P-CCNC: 18 U/L (ref 1–41)
ANION GAP SERPL CALCULATED.3IONS-SCNC: 11.2 MMOL/L (ref 5–15)
AST SERPL-CCNC: 13 U/L (ref 1–40)
BASOPHILS # BLD AUTO: 0.05 10*3/MM3 (ref 0–0.2)
BASOPHILS NFR BLD AUTO: 0.4 % (ref 0–1.5)
BILIRUB SERPL-MCNC: <0.2 MG/DL (ref 0–1.2)
BUN SERPL-MCNC: 13 MG/DL (ref 6–20)
BUN/CREAT SERPL: 17.1 (ref 7–25)
CALCIUM SPEC-SCNC: 9.6 MG/DL (ref 8.6–10.5)
CHLORIDE SERPL-SCNC: 100 MMOL/L (ref 98–107)
CO2 SERPL-SCNC: 27.8 MMOL/L (ref 22–29)
CREAT SERPL-MCNC: 0.76 MG/DL (ref 0.76–1.27)
DEPRECATED RDW RBC AUTO: 45.7 FL (ref 37–54)
EGFRCR SERPLBLD CKD-EPI 2021: 105.5 ML/MIN/1.73
EOSINOPHIL # BLD AUTO: 0.17 10*3/MM3 (ref 0–0.4)
EOSINOPHIL NFR BLD AUTO: 1.2 % (ref 0.3–6.2)
ERYTHROCYTE [DISTWIDTH] IN BLOOD BY AUTOMATED COUNT: 14.2 % (ref 12.3–15.4)
GLOBULIN UR ELPH-MCNC: 3.2 GM/DL
GLUCOSE SERPL-MCNC: 198 MG/DL (ref 65–99)
HCT VFR BLD AUTO: 49.3 % (ref 37.5–51)
HGB BLD-MCNC: 16.3 G/DL (ref 13–17.7)
IMM GRANULOCYTES # BLD AUTO: 0.04 10*3/MM3 (ref 0–0.05)
IMM GRANULOCYTES NFR BLD AUTO: 0.3 % (ref 0–0.5)
LYMPHOCYTES # BLD AUTO: 2.61 10*3/MM3 (ref 0.7–3.1)
LYMPHOCYTES NFR BLD AUTO: 18.8 % (ref 19.6–45.3)
MCH RBC QN AUTO: 29.3 PG (ref 26.6–33)
MCHC RBC AUTO-ENTMCNC: 33.1 G/DL (ref 31.5–35.7)
MCV RBC AUTO: 88.5 FL (ref 79–97)
MONOCYTES # BLD AUTO: 1.02 10*3/MM3 (ref 0.1–0.9)
MONOCYTES NFR BLD AUTO: 7.3 % (ref 5–12)
NEUTROPHILS NFR BLD AUTO: 10.01 10*3/MM3 (ref 1.7–7)
NEUTROPHILS NFR BLD AUTO: 72 % (ref 42.7–76)
NRBC BLD AUTO-RTO: 0 /100 WBC (ref 0–0.2)
PLATELET # BLD AUTO: 320 10*3/MM3 (ref 140–450)
PMV BLD AUTO: 9.5 FL (ref 6–12)
POTASSIUM SERPL-SCNC: 3.7 MMOL/L (ref 3.5–5.2)
PROT SERPL-MCNC: 7.1 G/DL (ref 6–8.5)
RBC # BLD AUTO: 5.57 10*6/MM3 (ref 4.14–5.8)
SODIUM SERPL-SCNC: 139 MMOL/L (ref 136–145)
WBC NRBC COR # BLD AUTO: 13.9 10*3/MM3 (ref 3.4–10.8)

## 2024-03-01 PROCEDURE — 99285 EMERGENCY DEPT VISIT HI MDM: CPT

## 2024-03-01 PROCEDURE — 25010000002 KETOROLAC TROMETHAMINE PER 15 MG: Performed by: STUDENT IN AN ORGANIZED HEALTH CARE EDUCATION/TRAINING PROGRAM

## 2024-03-01 PROCEDURE — 93005 ELECTROCARDIOGRAM TRACING: CPT | Performed by: STUDENT IN AN ORGANIZED HEALTH CARE EDUCATION/TRAINING PROGRAM

## 2024-03-01 PROCEDURE — 71275 CT ANGIOGRAPHY CHEST: CPT

## 2024-03-01 PROCEDURE — 70498 CT ANGIOGRAPHY NECK: CPT

## 2024-03-01 PROCEDURE — 25010000002 DEXAMETHASONE SODIUM PHOSPHATE 10 MG/ML SOLUTION: Performed by: STUDENT IN AN ORGANIZED HEALTH CARE EDUCATION/TRAINING PROGRAM

## 2024-03-01 PROCEDURE — 72125 CT NECK SPINE W/O DYE: CPT

## 2024-03-01 PROCEDURE — 25510000001 IOPAMIDOL 61 % SOLUTION: Performed by: STUDENT IN AN ORGANIZED HEALTH CARE EDUCATION/TRAINING PROGRAM

## 2024-03-01 PROCEDURE — 25010000002 LABETALOL 5 MG/ML SOLUTION: Performed by: STUDENT IN AN ORGANIZED HEALTH CARE EDUCATION/TRAINING PROGRAM

## 2024-03-01 PROCEDURE — 96374 THER/PROPH/DIAG INJ IV PUSH: CPT

## 2024-03-01 PROCEDURE — 96375 TX/PRO/DX INJ NEW DRUG ADDON: CPT

## 2024-03-01 PROCEDURE — 25010000002 MORPHINE PER 10 MG: Performed by: STUDENT IN AN ORGANIZED HEALTH CARE EDUCATION/TRAINING PROGRAM

## 2024-03-01 PROCEDURE — 85025 COMPLETE CBC W/AUTO DIFF WBC: CPT | Performed by: STUDENT IN AN ORGANIZED HEALTH CARE EDUCATION/TRAINING PROGRAM

## 2024-03-01 PROCEDURE — 80053 COMPREHEN METABOLIC PANEL: CPT | Performed by: STUDENT IN AN ORGANIZED HEALTH CARE EDUCATION/TRAINING PROGRAM

## 2024-03-01 RX ORDER — KETOROLAC TROMETHAMINE 30 MG/ML
30 INJECTION, SOLUTION INTRAMUSCULAR; INTRAVENOUS ONCE
Status: COMPLETED | OUTPATIENT
Start: 2024-03-01 | End: 2024-03-01

## 2024-03-01 RX ORDER — DEXAMETHASONE 6 MG/1
6 TABLET ORAL
Qty: 5 TABLET | Refills: 0 | Status: SHIPPED | OUTPATIENT
Start: 2024-03-01

## 2024-03-01 RX ORDER — METHOCARBAMOL 750 MG/1
1500 TABLET, FILM COATED ORAL ONCE
Status: COMPLETED | OUTPATIENT
Start: 2024-03-01 | End: 2024-03-01

## 2024-03-01 RX ORDER — METHOCARBAMOL 750 MG/1
750 TABLET, FILM COATED ORAL 3 TIMES DAILY PRN
Qty: 15 TABLET | Refills: 0 | Status: SHIPPED | OUTPATIENT
Start: 2024-03-01

## 2024-03-01 RX ORDER — LABETALOL HYDROCHLORIDE 5 MG/ML
10 INJECTION, SOLUTION INTRAVENOUS ONCE
Status: COMPLETED | OUTPATIENT
Start: 2024-03-01 | End: 2024-03-01

## 2024-03-01 RX ORDER — SODIUM CHLORIDE 0.9 % (FLUSH) 0.9 %
10 SYRINGE (ML) INJECTION AS NEEDED
Status: DISCONTINUED | OUTPATIENT
Start: 2024-03-01 | End: 2024-03-01 | Stop reason: HOSPADM

## 2024-03-01 RX ORDER — DEXAMETHASONE SODIUM PHOSPHATE 10 MG/ML
10 INJECTION, SOLUTION INTRAMUSCULAR; INTRAVENOUS ONCE
Status: COMPLETED | OUTPATIENT
Start: 2024-03-01 | End: 2024-03-01

## 2024-03-01 RX ADMIN — MORPHINE SULFATE 4 MG: 4 INJECTION, SOLUTION INTRAMUSCULAR; INTRAVENOUS at 02:45

## 2024-03-01 RX ADMIN — DEXAMETHASONE SODIUM PHOSPHATE 10 MG: 10 INJECTION INTRAMUSCULAR; INTRAVENOUS at 04:22

## 2024-03-01 RX ADMIN — KETOROLAC TROMETHAMINE 30 MG: 30 INJECTION, SOLUTION INTRAMUSCULAR; INTRAVENOUS at 04:22

## 2024-03-01 RX ADMIN — IOPAMIDOL 100 ML: 612 INJECTION, SOLUTION INTRAVENOUS at 03:16

## 2024-03-01 RX ADMIN — METHOCARBAMOL 1500 MG: 750 TABLET, FILM COATED ORAL at 02:33

## 2024-03-01 RX ADMIN — LABETALOL HYDROCHLORIDE 10 MG: 5 INJECTION, SOLUTION INTRAVENOUS at 03:24

## 2024-03-01 NOTE — ED PROVIDER NOTES
EMERGENCY DEPARTMENT ENCOUNTER    Pt Name: Cal Oliver Jr.  MRN: [id number]  Pt :   1965  Room Number:  19SF/19  Date of encounter:  3/1/2024  PCP: Woo Betancourt MD  ED Provider: Mihir Vazquez MD    Historian: Patient      HPI:  Chief Complaint: Neck pain        Context: Cal Oliver Jr. is a 58 y.o. male who presents to the ED c/o neck pain.  Patient states he has been having pain in the right side of his neck since Tuesday.  Pain would come and go but now has gotten much worse.  Has not been able to sleep all night due to severe pain.  Denies missing any doses of his prescribed medications.  Also took his previously prescribed Lortab for this pain with no improvement.  Pain worse with range of motion of neck, on right side.  Was going to see chiropractor tomorrow but pain was so bad tonight he came to emergency department.  Denies any fevers.  Denies any chest pain.  Denies any falls or trauma.  Thinks he slept on his neck wrong.      PAST MEDICAL HISTORY  Past Medical History:   Diagnosis Date    Asthma     Chronic back pain     Diabetes mellitus     High blood pressure     Hyperlipidemia     Migraine     Nausea and vomiting     Sleep apnea          PAST SURGICAL HISTORY  Past Surgical History:   Procedure Laterality Date    BACK SURGERY      GALLBLADDER SURGERY      KNEE SURGERY           FAMILY HISTORY  Family History   Problem Relation Age of Onset    Other Other         HIGH BLOOD PRESSURE    Hypertension Other     Heart disease Mother     Heart attack Mother     Heart disease Father          SOCIAL HISTORY  Social History     Socioeconomic History    Marital status:    Tobacco Use    Smoking status: Some Days     Packs/day: 0.25     Years: 20.00     Additional pack years: 0.00     Total pack years: 5.00     Types: Cigarettes    Smokeless tobacco: Never    Tobacco comments:     not smoked since2020   Vaping Use    Vaping Use: Never used   Substance and Sexual Activity     Alcohol use: No    Drug use: No    Sexual activity: Defer         ALLERGIES  Atorvastatin, Levothyroxine, Levothyroxine sodium, and Piroxicam        REVIEW OF SYSTEMS  Review of Systems     All systems reviewed and negative except for those discussed in HPI.       PHYSICAL EXAM    I have reviewed the triage vital signs and nursing notes.    ED Triage Vitals [03/01/24 0218]   Temp Heart Rate Resp BP SpO2   97.3 °F (36.3 °C) 102 20 (!) 205/142 92 %      Temp src Heart Rate Source Patient Position BP Location FiO2 (%)   Oral Monitor Sitting Left arm --       Physical Exam    General:  Awake, alert, obese, visible discomfort  HEENT: Atraumatic, normocephalic, EOMI, PERRLA, mucous membranes moist  NECK:  Supple, atraumatic, no palpable deformities on either side of posterior neck.  Reports right-sided trapezius neck pain on flexion in either direction of neck.  Cardiovascular:  Regular rate, regular rhythm, no murmurs, rubs, or gallops.  Extremities well perfused   Respiratory:  Regular rate, clear lungs to auscultation bilaterally.  No rhonchi, rales, wheezing  Abdominal:  Soft, nondistended, nontender.  No guarding or rebound.  No palpable masses  Extremity:  No visible bony abnormalities in all 4 extremities.  Full range of motion of all extremities.  Skin:  Warm and dry.  No rashes  Neuro:  AAOx3, GCS 15. Cranial nerves 2-12 grossly intact.  No focal strength or sensation deficits.  Psych:  Mood and affect appropriate.        LAB RESULTS  Recent Results (from the past 24 hour(s))   Comprehensive Metabolic Panel    Collection Time: 03/01/24  2:46 AM    Specimen: Blood   Result Value Ref Range    Glucose 198 (H) 65 - 99 mg/dL    BUN 13 6 - 20 mg/dL    Creatinine 0.76 0.76 - 1.27 mg/dL    Sodium 139 136 - 145 mmol/L    Potassium 3.7 3.5 - 5.2 mmol/L    Chloride 100 98 - 107 mmol/L    CO2 27.8 22.0 - 29.0 mmol/L    Calcium 9.6 8.6 - 10.5 mg/dL    Total Protein 7.1 6.0 - 8.5 g/dL    Albumin 3.9 3.5 - 5.2 g/dL    ALT  (SGPT) 18 1 - 41 U/L    AST (SGOT) 13 1 - 40 U/L    Alkaline Phosphatase 104 39 - 117 U/L    Total Bilirubin <0.2 0.0 - 1.2 mg/dL    Globulin 3.2 gm/dL    A/G Ratio 1.2 g/dL    BUN/Creatinine Ratio 17.1 7.0 - 25.0    Anion Gap 11.2 5.0 - 15.0 mmol/L    eGFR 105.5 >60.0 mL/min/1.73   CBC Auto Differential    Collection Time: 03/01/24  2:46 AM    Specimen: Blood   Result Value Ref Range    WBC 13.90 (H) 3.40 - 10.80 10*3/mm3    RBC 5.57 4.14 - 5.80 10*6/mm3    Hemoglobin 16.3 13.0 - 17.7 g/dL    Hematocrit 49.3 37.5 - 51.0 %    MCV 88.5 79.0 - 97.0 fL    MCH 29.3 26.6 - 33.0 pg    MCHC 33.1 31.5 - 35.7 g/dL    RDW 14.2 12.3 - 15.4 %    RDW-SD 45.7 37.0 - 54.0 fl    MPV 9.5 6.0 - 12.0 fL    Platelets 320 140 - 450 10*3/mm3    Neutrophil % 72.0 42.7 - 76.0 %    Lymphocyte % 18.8 (L) 19.6 - 45.3 %    Monocyte % 7.3 5.0 - 12.0 %    Eosinophil % 1.2 0.3 - 6.2 %    Basophil % 0.4 0.0 - 1.5 %    Immature Grans % 0.3 0.0 - 0.5 %    Neutrophils, Absolute 10.01 (H) 1.70 - 7.00 10*3/mm3    Lymphocytes, Absolute 2.61 0.70 - 3.10 10*3/mm3    Monocytes, Absolute 1.02 (H) 0.10 - 0.90 10*3/mm3    Eosinophils, Absolute 0.17 0.00 - 0.40 10*3/mm3    Basophils, Absolute 0.05 0.00 - 0.20 10*3/mm3    Immature Grans, Absolute 0.04 0.00 - 0.05 10*3/mm3    nRBC 0.0 0.0 - 0.2 /100 WBC       If labs were ordered, I independently reviewed the results and considered them in treating the patient.        RADIOLOGY  CT Angiogram Chest    Result Date: 3/1/2024  FINAL REPORT TECHNIQUE: null CLINICAL HISTORY: Severe right-sided neck pain with hypertension COMPARISON: null FINDINGS: CT angiography chest with contrast. 3D Postprocessing. Comparison: None Findings: Vascular: No pulmonary embolism. No thoracic aortic aneurysm or dissection. Mediastinum: No cardiomegaly. Lymph nodes: No adenopathy. Lungs: No focal infiltrates or masses. No edema. Pleura: No pneumothorax or effusion. Upper abdomen: Fatty liver. Bones: No acute fracture or dislocation.  Degenerative changes.     IMPRESSION: No thoracic aortic aneurysm or dissection. No pulmonary emboli. Authenticated and Electronically Signed by Joselyn Mcgrath MD on 03/01/2024 03:48:03 AM    CT Cervical Spine Without Contrast    Result Date: 3/1/2024  FINAL REPORT TECHNIQUE: null CLINICAL HISTORY: Right-sided neck pain COMPARISON: null FINDINGS: CT cervical spine without IV contrast. Comparison: None Findings: No acute fracture or dislocation. Multilevel advanced degenerative disc space narrowing with degenerative spurring. No high grade canal compromise. No suspicious osseous lesions. Paraspinal soft tissues unremarkable.     Impression: No acute fracture or dislocation Degenerative changes. Authenticated and Electronically Signed by Joselyn Mcgrath MD on 03/01/2024 03:29:40 AM     I ordered and independently reviewed the above noted radiographic studies.     See radiologist's dictation for official interpretation.        PROCEDURES    Procedures    ECG 12 Lead Tachycardia   Final Result          MEDICATIONS GIVEN IN ER    Medications   sodium chloride 0.9 % flush 10 mL (has no administration in time range)   morphine injection 4 mg (4 mg Intravenous Given 3/1/24 0245)   methocarbamol (ROBAXIN) tablet 1,500 mg (1,500 mg Oral Given 3/1/24 0233)   labetalol (NORMODYNE,TRANDATE) injection 10 mg (10 mg Intravenous Given 3/1/24 0324)   iopamidol (ISOVUE-300) 61 % injection 100 mL (100 mL Intravenous Given 3/1/24 0316)   ketorolac (TORADOL) injection 30 mg (30 mg Intravenous Given 3/1/24 0422)   dexAMETHasone sodium phosphate injection 10 mg (10 mg Intravenous Given 3/1/24 0422)         MEDICAL DECISION MAKING, PROGRESS, and CONSULTS    All labs, if obtained, have been independently reviewed by me.  All radiology studies, if obtained, have been reviewed by me and the radiologist dictating the report.  All EKG's, if obtained, have been independently viewed and interpreted by me.      Discussion below represents my  analysis of pertinent findings related to patient's condition, differential diagnosis, treatment plan and final disposition.    Cal Oliver Jr. is a 58 y.o. male who presents to the ED c/o neck pain.  Significantly hypertensive on arrival, possible pain response we will continue to monitor once pain is more under control.  Differential diagnosis includes but is not limited to neck spasm, spinal stenosis, aortic dissection, carotid artery dissection.  Extensive labs along with CT imaging ordered.  Patient given 4 mg of IV morphine for pain control along with 1500 mg of methocarbamol.  Only mild pain response to IV morphine, likely secondary to patient's chronic pain medications at home.  Labs show leukocytosis of 13, possibly reactive secondary to pain/stress response as patient has no secondary signs of infection on exam.  CT imaging shows no evidence of aortic dissection or acute vessel occlusion.  CTA of neck did show evidence of decreased contrast in right vertebral artery, which was interpreted by radiology as likely congenital as left vertebral artery has compensatory changes.  No signs of acute vessel occlusion or dissection per radiology.  Degenerative disc disease noted on CT of cervical spine but no fractures or critical stenosis.  Patient given IV Toradol and IV dexamethasone.      Pain significantly improved on reassessment following these medications along with improvement of blood pressure as well.  Discussed all results with patient along with need for follow-up with primary care provider.  Patient agreeable with this plan and was discharged.                                 Orders placed during this visit:  Orders Placed This Encounter   Procedures    CT Cervical Spine Without Contrast    CT Angiogram Chest    CT Angiogram Neck    Comprehensive Metabolic Panel    CBC Auto Differential    ECG 12 Lead Tachycardia    Insert Peripheral IV    CBC & Differential         ED Course:    Consultants:                   Shared Decision Making:  After my consideration of clinical presentation and any laboratory/radiology studies obtained, I discussed the findings with the patient/patient representative who is in agreement with the treatment plan and the final disposition.   Risks and benefits of discharge and/or observation/admission were discussed.      AS OF 06:04 EST VITALS:    BP - 151/94  HR - 88  TEMP - 97.3 °F (36.3 °C) (Oral)  O2 SATS - 91%                  DIAGNOSIS  Final diagnoses:   Muscle spasms of neck   Neck pain on right side   Elevated blood pressure reading         DISPOSITION  Discharge      Please note that portions of this document were completed with voice recognition software.        Mihir Vazquez MD  03/01/24 0604

## 2024-03-23 DIAGNOSIS — E11.9 TYPE 2 DIABETES MELLITUS WITHOUT COMPLICATION, WITHOUT LONG-TERM CURRENT USE OF INSULIN: ICD-10-CM

## 2024-03-23 DIAGNOSIS — I10 ESSENTIAL HYPERTENSION: ICD-10-CM

## 2024-03-25 RX ORDER — AMLODIPINE BESYLATE 10 MG/1
10 TABLET ORAL DAILY
Qty: 90 TABLET | Refills: 1 | Status: SHIPPED | OUTPATIENT
Start: 2024-03-25

## 2024-03-25 NOTE — TELEPHONE ENCOUNTER
Rx Refill Note  Requested Prescriptions     Pending Prescriptions Disp Refills    metFORMIN (GLUCOPHAGE) 500 MG tablet [Pharmacy Med Name: METFORMIN 500MG TABLETS] 180 tablet 1     Sig: TAKE 1 TABLET BY MOUTH TWICE DAILY    amLODIPine (NORVASC) 10 MG tablet [Pharmacy Med Name: AMLODIPINE BESYLATE 10MG TABLETS] 90 tablet 1     Sig: Take 1 tablet by mouth Daily.      Last office visit with prescribing clinician: 1/17/2024   Last telemedicine visit with prescribing clinician: Visit date not found   Next office visit with prescribing clinician: 4/17/2024       Aura Amos MA  03/25/24, 08:41 EDT

## 2024-04-17 ENCOUNTER — OFFICE VISIT (OUTPATIENT)
Dept: INTERNAL MEDICINE | Facility: CLINIC | Age: 59
End: 2024-04-17
Payer: MEDICARE

## 2024-04-17 VITALS
HEART RATE: 90 BPM | DIASTOLIC BLOOD PRESSURE: 90 MMHG | TEMPERATURE: 95.7 F | SYSTOLIC BLOOD PRESSURE: 160 MMHG | HEIGHT: 71 IN | BODY MASS INDEX: 44.1 KG/M2 | RESPIRATION RATE: 18 BRPM | OXYGEN SATURATION: 97 % | WEIGHT: 315 LBS

## 2024-04-17 DIAGNOSIS — I10 PRIMARY HYPERTENSION: ICD-10-CM

## 2024-04-17 DIAGNOSIS — G25.81 RLS (RESTLESS LEGS SYNDROME): ICD-10-CM

## 2024-04-17 DIAGNOSIS — Z12.5 PROSTATE CANCER SCREENING: ICD-10-CM

## 2024-04-17 DIAGNOSIS — E11.9 TYPE 2 DIABETES MELLITUS WITHOUT COMPLICATION, WITHOUT LONG-TERM CURRENT USE OF INSULIN: ICD-10-CM

## 2024-04-17 DIAGNOSIS — I10 HYPERTENSION, UNSPECIFIED TYPE: Primary | ICD-10-CM

## 2024-04-17 DIAGNOSIS — E55.9 VITAMIN D DEFICIENCY, UNSPECIFIED: ICD-10-CM

## 2024-04-17 DIAGNOSIS — I10 ESSENTIAL HYPERTENSION: ICD-10-CM

## 2024-04-17 PROCEDURE — 99214 OFFICE O/P EST MOD 30 MIN: CPT | Performed by: INTERNAL MEDICINE

## 2024-04-17 PROCEDURE — 3080F DIAST BP >= 90 MM HG: CPT | Performed by: INTERNAL MEDICINE

## 2024-04-17 PROCEDURE — 3077F SYST BP >= 140 MM HG: CPT | Performed by: INTERNAL MEDICINE

## 2024-04-17 RX ORDER — CARVEDILOL 12.5 MG/1
12.5 TABLET ORAL 2 TIMES DAILY WITH MEALS
Qty: 180 TABLET | Refills: 3 | Status: SHIPPED | OUTPATIENT
Start: 2024-04-17

## 2024-04-17 RX ORDER — ROPINIROLE 3 MG/1
3 TABLET, FILM COATED ORAL
Qty: 90 TABLET | Refills: 1 | Status: SHIPPED | OUTPATIENT
Start: 2024-04-17

## 2024-04-17 RX ORDER — CLONIDINE HYDROCHLORIDE 0.1 MG/1
0.1 TABLET ORAL 2 TIMES DAILY
COMMUNITY
End: 2024-04-22

## 2024-04-17 RX ORDER — METHOCARBAMOL 750 MG/1
750 TABLET, FILM COATED ORAL 3 TIMES DAILY PRN
Qty: 15 TABLET | Refills: 0 | Status: SHIPPED | OUTPATIENT
Start: 2024-04-17

## 2024-04-17 RX ORDER — OMEPRAZOLE 20 MG/1
20 CAPSULE, DELAYED RELEASE ORAL DAILY
Qty: 90 CAPSULE | Refills: 3 | Status: SHIPPED | OUTPATIENT
Start: 2024-04-17

## 2024-04-17 RX ORDER — OMEPRAZOLE 20 MG/1
20 CAPSULE, DELAYED RELEASE ORAL DAILY
COMMUNITY
End: 2024-04-17 | Stop reason: SDUPTHER

## 2024-04-17 NOTE — PROGRESS NOTES
Subjective     Patient ID: Cal Oliver Jr. is a 58 y.o. male. Patient is here for management of multiple medical problems.     Chief Complaint   Patient presents with    Hypertension     History of Present Illness       Htn    Working on wt loss. Making some progress.            The following portions of the patient's history were reviewed and updated as appropriate: allergies, current medications, past family history, past medical history, past social history, past surgical history and problem list.    Review of Systems    Current Outpatient Medications:     albuterol sulfate  (90 Base) MCG/ACT inhaler, INHALE 2 PUFFS BY MOUTH EVERY 4 HOURS AS NEEDED FOR WHEEZING, Disp: 54 g, Rfl: 3    amLODIPine (NORVASC) 10 MG tablet, TAKE 1 TABLET BY MOUTH DAILY, Disp: 90 tablet, Rfl: 1    aspirin 81 MG chewable tablet, Chew 1 tablet Daily., Disp: 90 tablet, Rfl: 1    carvedilol (Coreg) 12.5 MG tablet, Take 1 tablet by mouth 2 (Two) Times a Day With Meals., Disp: 180 tablet, Rfl: 3    cyclobenzaprine (FLEXERIL) 10 MG tablet, Take 1 tablet by mouth 2 (Two) Times a Day., Disp: 180 tablet, Rfl: 1    doxazosin (CARDURA) 2 MG tablet, Take 1 tablet by mouth every night at bedtime., Disp: 90 tablet, Rfl: 1    Gel Base gel, Ketoprofen 10%, Gabapentin 6%, Ketamine 5%, Lidocaine 5%, Amitriptyline 2%, Baclofen 2%, Bupivacaine 2%, Cyclobenaprine 2%, Meloxicam 01.% TDG  Apply 1 to 2 grams of pain gel to affected areas 3 to 4 times a day., Disp: 100 g, Rfl: 5    Gel Base gel, Ketoprofen 15%, Lidocaine 5%, Bupivacaine 2%, Meloxicam 0.1% TDG Apply 1 to 2 grams of pain gel to affected areas 3 to 4 times a day., Disp: 16 g, Rfl: 3    HYDROcodone-acetaminophen (NORCO) 7.5-325 MG per tablet, Take 1 tablet by mouth 2 (Two) Times a Day., Disp: , Rfl:     metFORMIN (GLUCOPHAGE) 500 MG tablet, TAKE 1 TABLET BY MOUTH TWICE DAILY, Disp: 180 tablet, Rfl: 1    methocarbamol (ROBAXIN) 750 MG tablet, Take 1 tablet by mouth 3 (Three) Times a Day  "As Needed for Muscle Spasms., Disp: 15 tablet, Rfl: 0    omeprazole (priLOSEC) 20 MG capsule, Take 1 capsule by mouth Daily., Disp: 90 capsule, Rfl: 3    potassium chloride ER (K-TAB) 20 MEQ tablet controlled-release ER tablet, Take 1 tablet by mouth 2 (Two) Times a Day With Meals., Disp: 180 tablet, Rfl: 1    rOPINIRole (REQUIP) 3 MG tablet, Take 1 tablet by mouth every night at bedtime., Disp: 90 tablet, Rfl: 1    spironolactone (Aldactone) 25 MG tablet, Take 1 tablet by mouth Daily., Disp: 90 tablet, Rfl: 3    triamcinolone (KENALOG) 0.1 % cream, Apply to affected area tid prn itch, Disp: 45 g, Rfl: 0    valsartan (DIOVAN) 320 MG tablet, Take 1 tablet by mouth Daily for 360 days., Disp: 90 tablet, Rfl: 3    cloNIDine (CATAPRES) 0.1 MG tablet, Take 1 tablet by mouth 2 (Two) Times a Day., Disp: , Rfl:     Objective      Blood pressure 160/90, pulse 90, temperature 95.7 °F (35.4 °C), resp. rate 18, height 180.3 cm (71\"), weight (!) 166 kg (365 lb), SpO2 97%.            Physical Exam     General Appearance:    Alert, cooperative, no distress, appears stated age   Head:    Normocephalic, without obvious abnormality, atraumatic   Eyes:    PERRL, conjunctiva/corneas clear, EOM's intact   Ears:    Normal TM's and external ear canals, both ears   Nose:   Nares normal, septum midline, mucosa normal, no drainage   or sinus tenderness   Throat:   Lips, mucosa, and tongue normal; teeth and gums normal   Neck:   Supple, symmetrical, trachea midline, no adenopathy;        thyroid:  No enlargement/tenderness/nodules; no carotid    bruit or JVD   Back:     Symmetric, no curvature, ROM normal, no CVA tenderness   Lungs:     Clear to auscultation bilaterally, respirations unlabored   Chest wall:    No tenderness or deformity   Heart:    Regular rate and rhythm, S1 and S2 normal, no murmur,        rub or gallop   Abdomen:     Soft, non-tender, bowel sounds active all four quadrants,     no masses, no organomegaly   Extremities:   " Extremities normal, atraumatic, no cyanosis or edema   Pulses:   2+ and symmetric all extremities   Skin:   Skin color, texture, turgor normal, no rashes or lesions   Lymph nodes:   Cervical, supraclavicular, and axillary nodes normal   Neurologic:   CNII-XII intact. Normal strength, sensation and reflexes       throughout      Results for orders placed or performed during the hospital encounter of 03/01/24   Comprehensive Metabolic Panel    Specimen: Blood   Result Value Ref Range    Glucose 198 (H) 65 - 99 mg/dL    BUN 13 6 - 20 mg/dL    Creatinine 0.76 0.76 - 1.27 mg/dL    Sodium 139 136 - 145 mmol/L    Potassium 3.7 3.5 - 5.2 mmol/L    Chloride 100 98 - 107 mmol/L    CO2 27.8 22.0 - 29.0 mmol/L    Calcium 9.6 8.6 - 10.5 mg/dL    Total Protein 7.1 6.0 - 8.5 g/dL    Albumin 3.9 3.5 - 5.2 g/dL    ALT (SGPT) 18 1 - 41 U/L    AST (SGOT) 13 1 - 40 U/L    Alkaline Phosphatase 104 39 - 117 U/L    Total Bilirubin <0.2 0.0 - 1.2 mg/dL    Globulin 3.2 gm/dL    A/G Ratio 1.2 g/dL    BUN/Creatinine Ratio 17.1 7.0 - 25.0    Anion Gap 11.2 5.0 - 15.0 mmol/L    eGFR 105.5 >60.0 mL/min/1.73   CBC Auto Differential    Specimen: Blood   Result Value Ref Range    WBC 13.90 (H) 3.40 - 10.80 10*3/mm3    RBC 5.57 4.14 - 5.80 10*6/mm3    Hemoglobin 16.3 13.0 - 17.7 g/dL    Hematocrit 49.3 37.5 - 51.0 %    MCV 88.5 79.0 - 97.0 fL    MCH 29.3 26.6 - 33.0 pg    MCHC 33.1 31.5 - 35.7 g/dL    RDW 14.2 12.3 - 15.4 %    RDW-SD 45.7 37.0 - 54.0 fl    MPV 9.5 6.0 - 12.0 fL    Platelets 320 140 - 450 10*3/mm3    Neutrophil % 72.0 42.7 - 76.0 %    Lymphocyte % 18.8 (L) 19.6 - 45.3 %    Monocyte % 7.3 5.0 - 12.0 %    Eosinophil % 1.2 0.3 - 6.2 %    Basophil % 0.4 0.0 - 1.5 %    Immature Grans % 0.3 0.0 - 0.5 %    Neutrophils, Absolute 10.01 (H) 1.70 - 7.00 10*3/mm3    Lymphocytes, Absolute 2.61 0.70 - 3.10 10*3/mm3    Monocytes, Absolute 1.02 (H) 0.10 - 0.90 10*3/mm3    Eosinophils, Absolute 0.17 0.00 - 0.40 10*3/mm3    Basophils, Absolute 0.05  0.00 - 0.20 10*3/mm3    Immature Grans, Absolute 0.04 0.00 - 0.05 10*3/mm3    nRBC 0.0 0.0 - 0.2 /100 WBC         Assessment & Plan       Diagnoses and all orders for this visit:    1. Hypertension, unspecified type (Primary)  -     rOPINIRole (REQUIP) 3 MG tablet; Take 1 tablet by mouth every night at bedtime.  Dispense: 90 tablet; Refill: 1  -     methocarbamol (ROBAXIN) 750 MG tablet; Take 1 tablet by mouth 3 (Three) Times a Day As Needed for Muscle Spasms.  Dispense: 15 tablet; Refill: 0  -     omeprazole (priLOSEC) 20 MG capsule; Take 1 capsule by mouth Daily.  Dispense: 90 capsule; Refill: 3  -     carvedilol (Coreg) 12.5 MG tablet; Take 1 tablet by mouth 2 (Two) Times a Day With Meals.  Dispense: 180 tablet; Refill: 3  -     Lipid Panel  -     CBC & Differential  -     Vitamin B12  -     TSH  -     T4, Free  -     Comprehensive Metabolic Panel  -     Vitamin D,25-Hydroxy  -     PSA Screen  -     Hemoglobin A1c    2. RLS (restless legs syndrome)  -     rOPINIRole (REQUIP) 3 MG tablet; Take 1 tablet by mouth every night at bedtime.  Dispense: 90 tablet; Refill: 1  -     methocarbamol (ROBAXIN) 750 MG tablet; Take 1 tablet by mouth 3 (Three) Times a Day As Needed for Muscle Spasms.  Dispense: 15 tablet; Refill: 0  -     omeprazole (priLOSEC) 20 MG capsule; Take 1 capsule by mouth Daily.  Dispense: 90 capsule; Refill: 3  -     carvedilol (Coreg) 12.5 MG tablet; Take 1 tablet by mouth 2 (Two) Times a Day With Meals.  Dispense: 180 tablet; Refill: 3  -     Lipid Panel  -     CBC & Differential  -     Vitamin B12  -     TSH  -     T4, Free  -     Comprehensive Metabolic Panel  -     Vitamin D,25-Hydroxy  -     PSA Screen  -     Hemoglobin A1c    3. Essential hypertension  -     rOPINIRole (REQUIP) 3 MG tablet; Take 1 tablet by mouth every night at bedtime.  Dispense: 90 tablet; Refill: 1  -     methocarbamol (ROBAXIN) 750 MG tablet; Take 1 tablet by mouth 3 (Three) Times a Day As Needed for Muscle Spasms.  Dispense:  15 tablet; Refill: 0  -     omeprazole (priLOSEC) 20 MG capsule; Take 1 capsule by mouth Daily.  Dispense: 90 capsule; Refill: 3  -     carvedilol (Coreg) 12.5 MG tablet; Take 1 tablet by mouth 2 (Two) Times a Day With Meals.  Dispense: 180 tablet; Refill: 3  -     Lipid Panel  -     CBC & Differential  -     Vitamin B12  -     TSH  -     T4, Free  -     Comprehensive Metabolic Panel  -     Vitamin D,25-Hydroxy  -     PSA Screen  -     Hemoglobin A1c    4. Type 2 diabetes mellitus without complication, without long-term current use of insulin  -     rOPINIRole (REQUIP) 3 MG tablet; Take 1 tablet by mouth every night at bedtime.  Dispense: 90 tablet; Refill: 1  -     methocarbamol (ROBAXIN) 750 MG tablet; Take 1 tablet by mouth 3 (Three) Times a Day As Needed for Muscle Spasms.  Dispense: 15 tablet; Refill: 0  -     omeprazole (priLOSEC) 20 MG capsule; Take 1 capsule by mouth Daily.  Dispense: 90 capsule; Refill: 3  -     carvedilol (Coreg) 12.5 MG tablet; Take 1 tablet by mouth 2 (Two) Times a Day With Meals.  Dispense: 180 tablet; Refill: 3  -     Lipid Panel  -     CBC & Differential  -     Vitamin B12  -     TSH  -     T4, Free  -     Comprehensive Metabolic Panel  -     Vitamin D,25-Hydroxy  -     PSA Screen  -     Hemoglobin A1c  -     Ambulatory Referral to Optometry    5. Primary hypertension  -     rOPINIRole (REQUIP) 3 MG tablet; Take 1 tablet by mouth every night at bedtime.  Dispense: 90 tablet; Refill: 1  -     methocarbamol (ROBAXIN) 750 MG tablet; Take 1 tablet by mouth 3 (Three) Times a Day As Needed for Muscle Spasms.  Dispense: 15 tablet; Refill: 0  -     omeprazole (priLOSEC) 20 MG capsule; Take 1 capsule by mouth Daily.  Dispense: 90 capsule; Refill: 3  -     carvedilol (Coreg) 12.5 MG tablet; Take 1 tablet by mouth 2 (Two) Times a Day With Meals.  Dispense: 180 tablet; Refill: 3  -     Lipid Panel  -     CBC & Differential  -     Vitamin B12  -     TSH  -     T4, Free  -     Comprehensive  Metabolic Panel  -     Vitamin D,25-Hydroxy  -     PSA Screen  -     Hemoglobin A1c    6. Prostate cancer screening  -     rOPINIRole (REQUIP) 3 MG tablet; Take 1 tablet by mouth every night at bedtime.  Dispense: 90 tablet; Refill: 1  -     methocarbamol (ROBAXIN) 750 MG tablet; Take 1 tablet by mouth 3 (Three) Times a Day As Needed for Muscle Spasms.  Dispense: 15 tablet; Refill: 0  -     omeprazole (priLOSEC) 20 MG capsule; Take 1 capsule by mouth Daily.  Dispense: 90 capsule; Refill: 3  -     carvedilol (Coreg) 12.5 MG tablet; Take 1 tablet by mouth 2 (Two) Times a Day With Meals.  Dispense: 180 tablet; Refill: 3  -     Lipid Panel  -     CBC & Differential  -     Vitamin B12  -     TSH  -     T4, Free  -     Comprehensive Metabolic Panel  -     Vitamin D,25-Hydroxy  -     PSA Screen  -     Hemoglobin A1c    7. Vitamin D deficiency, unspecified  -     Vitamin D,25-Hydroxy      Return in about 6 weeks (around 5/29/2024) for Annual.          There are no Patient Instructions on file for this visit.     Woo Betancourt MD    Assessment & Plan

## 2024-04-22 RX ORDER — CLONIDINE HYDROCHLORIDE 0.1 MG/1
0.1 TABLET ORAL
Qty: 90 TABLET | Refills: 3 | Status: SHIPPED | OUTPATIENT
Start: 2024-04-22

## 2024-05-24 DIAGNOSIS — I10 ESSENTIAL HYPERTENSION: ICD-10-CM

## 2024-05-24 RX ORDER — ASPIRIN 81 MG/1
81 TABLET, CHEWABLE ORAL DAILY
Qty: 90 TABLET | Refills: 1 | Status: SHIPPED | OUTPATIENT
Start: 2024-05-24

## 2024-05-24 RX ORDER — POTASSIUM CHLORIDE 1500 MG/1
20 TABLET, EXTENDED RELEASE ORAL 2 TIMES DAILY WITH MEALS
Qty: 180 TABLET | Refills: 1 | Status: SHIPPED | OUTPATIENT
Start: 2024-05-24

## 2024-08-16 RX ORDER — ALBUTEROL SULFATE 90 UG/1
AEROSOL, METERED RESPIRATORY (INHALATION)
Qty: 54 G | Refills: 3 | Status: SHIPPED | OUTPATIENT
Start: 2024-08-16

## 2024-09-20 RX ORDER — CARVEDILOL 3.12 MG/1
3.12 TABLET ORAL 2 TIMES DAILY WITH MEALS
Qty: 60 TABLET | Refills: 11 | OUTPATIENT
Start: 2024-09-20

## 2024-11-04 DIAGNOSIS — E11.9 TYPE 2 DIABETES MELLITUS WITHOUT COMPLICATION, WITHOUT LONG-TERM CURRENT USE OF INSULIN: ICD-10-CM

## 2024-11-12 ENCOUNTER — OFFICE VISIT (OUTPATIENT)
Dept: INTERNAL MEDICINE | Facility: CLINIC | Age: 59
End: 2024-11-12
Payer: MEDICARE

## 2024-11-12 VITALS
BODY MASS INDEX: 44.1 KG/M2 | RESPIRATION RATE: 17 BRPM | HEART RATE: 78 BPM | DIASTOLIC BLOOD PRESSURE: 100 MMHG | SYSTOLIC BLOOD PRESSURE: 178 MMHG | HEIGHT: 71 IN | WEIGHT: 315 LBS | OXYGEN SATURATION: 96 % | TEMPERATURE: 97.2 F

## 2024-11-12 DIAGNOSIS — I10 PRIMARY HYPERTENSION: ICD-10-CM

## 2024-11-12 DIAGNOSIS — I10 HYPERTENSION, UNSPECIFIED TYPE: ICD-10-CM

## 2024-11-12 DIAGNOSIS — E11.9 TYPE 2 DIABETES MELLITUS WITHOUT COMPLICATION, WITHOUT LONG-TERM CURRENT USE OF INSULIN: ICD-10-CM

## 2024-11-12 DIAGNOSIS — R82.994 HYPERCALCIURIA: ICD-10-CM

## 2024-11-12 DIAGNOSIS — I10 ESSENTIAL HYPERTENSION: ICD-10-CM

## 2024-11-12 DIAGNOSIS — Z12.5 PROSTATE CANCER SCREENING: ICD-10-CM

## 2024-11-12 DIAGNOSIS — G25.81 RLS (RESTLESS LEGS SYNDROME): ICD-10-CM

## 2024-11-12 DIAGNOSIS — Z00.00 ROUTINE GENERAL MEDICAL EXAMINATION AT A HEALTH CARE FACILITY: Primary | ICD-10-CM

## 2024-11-12 PROCEDURE — 1170F FXNL STATUS ASSESSED: CPT | Performed by: INTERNAL MEDICINE

## 2024-11-12 PROCEDURE — 99214 OFFICE O/P EST MOD 30 MIN: CPT | Performed by: INTERNAL MEDICINE

## 2024-11-12 PROCEDURE — 1125F AMNT PAIN NOTED PAIN PRSNT: CPT | Performed by: INTERNAL MEDICINE

## 2024-11-12 PROCEDURE — 3080F DIAST BP >= 90 MM HG: CPT | Performed by: INTERNAL MEDICINE

## 2024-11-12 PROCEDURE — 96160 PT-FOCUSED HLTH RISK ASSMT: CPT | Performed by: INTERNAL MEDICINE

## 2024-11-12 PROCEDURE — G0439 PPPS, SUBSEQ VISIT: HCPCS | Performed by: INTERNAL MEDICINE

## 2024-11-12 PROCEDURE — 3077F SYST BP >= 140 MM HG: CPT | Performed by: INTERNAL MEDICINE

## 2024-11-12 RX ORDER — VALSARTAN 320 MG/1
320 TABLET ORAL DAILY
Qty: 90 TABLET | Refills: 3 | Status: SHIPPED | OUTPATIENT
Start: 2024-11-12 | End: 2025-11-07

## 2024-11-12 RX ORDER — ROPINIROLE 3 MG/1
3 TABLET, FILM COATED ORAL
Qty: 90 TABLET | Refills: 1 | Status: SHIPPED | OUTPATIENT
Start: 2024-11-12

## 2024-11-12 RX ORDER — AMLODIPINE BESYLATE 10 MG/1
10 TABLET ORAL DAILY
Qty: 90 TABLET | Refills: 1 | Status: SHIPPED | OUTPATIENT
Start: 2024-11-12

## 2024-11-12 RX ORDER — CYCLOBENZAPRINE HCL 10 MG
10 TABLET ORAL 2 TIMES DAILY
Qty: 180 TABLET | Refills: 1 | Status: SHIPPED | OUTPATIENT
Start: 2024-11-12

## 2024-11-12 RX ORDER — DOXAZOSIN 2 MG/1
2 TABLET ORAL
Qty: 90 TABLET | Refills: 1 | Status: SHIPPED | OUTPATIENT
Start: 2024-11-12

## 2024-11-12 RX ORDER — POTASSIUM CHLORIDE 1500 MG/1
20 TABLET, EXTENDED RELEASE ORAL 2 TIMES DAILY WITH MEALS
Qty: 180 TABLET | Refills: 1 | Status: SHIPPED | OUTPATIENT
Start: 2024-11-12

## 2024-11-12 NOTE — PROGRESS NOTES
Subjective     Patient ID: Cal Oliver Jr. is a 59 y.o. male. Patient is here for management of multiple medical problems.       Htn        History of Present Illness       Pt is now with out ptc. Failed UDS.   Was eating THC dummies by accident.       On cpap.       Asking about testoterone.           The following portions of the patient's history were reviewed and updated as appropriate: allergies, current medications, past family history, past medical history, past social history, past surgical history and problem list.    Review of Systems    Current Outpatient Medications:     amLODIPine (NORVASC) 10 MG tablet, Take 1 tablet by mouth Daily., Disp: 90 tablet, Rfl: 1    aspirin 81 MG chewable tablet, CHEW AND SWALLOW 1 TABLET BY MOUTH DAILY, Disp: 90 tablet, Rfl: 1    carvedilol (Coreg) 12.5 MG tablet, Take 1 tablet by mouth 2 (Two) Times a Day With Meals., Disp: 180 tablet, Rfl: 3    cloNIDine (CATAPRES) 0.1 MG tablet, TAKE 1 TABLET BY MOUTH EVERY NIGHT AT BEDTIME, Disp: 90 tablet, Rfl: 3    cyclobenzaprine (FLEXERIL) 10 MG tablet, Take 1 tablet by mouth 2 (Two) Times a Day., Disp: 180 tablet, Rfl: 1    doxazosin (CARDURA) 2 MG tablet, Take 1 tablet by mouth every night at bedtime., Disp: 90 tablet, Rfl: 1    Gel Base gel, Ketoprofen 10%, Gabapentin 6%, Ketamine 5%, Lidocaine 5%, Amitriptyline 2%, Baclofen 2%, Bupivacaine 2%, Cyclobenaprine 2%, Meloxicam 01.% TDG  Apply 1 to 2 grams of pain gel to affected areas 3 to 4 times a day., Disp: 100 g, Rfl: 5    Gel Base gel, Ketoprofen 15%, Lidocaine 5%, Bupivacaine 2%, Meloxicam 0.1% TDG Apply 1 to 2 grams of pain gel to affected areas 3 to 4 times a day., Disp: 16 g, Rfl: 3    metFORMIN (GLUCOPHAGE) 500 MG tablet, TAKE 1 TABLET BY MOUTH TWICE DAILY, Disp: 180 tablet, Rfl: 1    methocarbamol (ROBAXIN) 750 MG tablet, Take 1 tablet by mouth 3 (Three) Times a Day As Needed for Muscle Spasms., Disp: 15 tablet, Rfl: 0    omeprazole (priLOSEC) 20 MG capsule, Take 1  "capsule by mouth Daily., Disp: 90 capsule, Rfl: 3    potassium chloride ER (K-TAB) 20 MEQ tablet controlled-release ER tablet, Take 1 tablet by mouth 2 (Two) Times a Day With Meals., Disp: 180 tablet, Rfl: 1    rOPINIRole (REQUIP) 3 MG tablet, Take 1 tablet by mouth every night at bedtime., Disp: 90 tablet, Rfl: 1    triamcinolone (KENALOG) 0.1 % cream, Apply to affected area tid prn itch, Disp: 45 g, Rfl: 0    valsartan (DIOVAN) 320 MG tablet, Take 1 tablet by mouth Daily for 360 days., Disp: 90 tablet, Rfl: 3    albuterol sulfate  (90 Base) MCG/ACT inhaler, INHALE 2 PUFFS BY MOUTH EVERY 4 HOURS AS NEEDED FOR WHEEZING, Disp: 54 g, Rfl: 3    Objective      Blood pressure 178/100, pulse 78, temperature 97.2 °F (36.2 °C), resp. rate 17, height 180.3 cm (71\"), weight (!) 162 kg (358 lb), SpO2 96%.            Physical Exam     General Appearance:    Alert, cooperative, no distress, appears stated age   Head:    Normocephalic, without obvious abnormality, atraumatic   Eyes:    PERRL, conjunctiva/corneas clear, EOM's intact   Ears:    Normal TM's and external ear canals, both ears   Nose:   Nares normal, septum midline, mucosa normal, no drainage   or sinus tenderness   Throat:   Lips, mucosa, and tongue normal; teeth and gums normal   Neck:   Supple, symmetrical, trachea midline, no adenopathy;        thyroid:  No enlargement/tenderness/nodules; no carotid    bruit or JVD   Back:     Symmetric, no curvature, ROM normal, no CVA tenderness   Lungs:     Clear to auscultation bilaterally, respirations unlabored   Chest wall:    No tenderness or deformity   Heart:    Regular rate and rhythm, S1 and S2 normal, no murmur,        rub or gallop   Abdomen:     Soft, non-tender, bowel sounds active all four quadrants,     no masses, no organomegaly   Extremities:   Extremities normal, atraumatic, no cyanosis or edema   Pulses:   2+ and symmetric all extremities   Skin:   Skin color, texture, turgor normal, no rashes or " lesions   Lymph nodes:   Cervical, supraclavicular, and axillary nodes normal   Neurologic:   CNII-XII intact. Normal strength, sensation and reflexes       throughout      Results for orders placed or performed during the hospital encounter of 03/01/24   Comprehensive Metabolic Panel    Collection Time: 03/01/24  2:46 AM    Specimen: Blood   Result Value Ref Range    Glucose 198 (H) 65 - 99 mg/dL    BUN 13 6 - 20 mg/dL    Creatinine 0.76 0.76 - 1.27 mg/dL    Sodium 139 136 - 145 mmol/L    Potassium 3.7 3.5 - 5.2 mmol/L    Chloride 100 98 - 107 mmol/L    CO2 27.8 22.0 - 29.0 mmol/L    Calcium 9.6 8.6 - 10.5 mg/dL    Total Protein 7.1 6.0 - 8.5 g/dL    Albumin 3.9 3.5 - 5.2 g/dL    ALT (SGPT) 18 1 - 41 U/L    AST (SGOT) 13 1 - 40 U/L    Alkaline Phosphatase 104 39 - 117 U/L    Total Bilirubin <0.2 0.0 - 1.2 mg/dL    Globulin 3.2 gm/dL    A/G Ratio 1.2 g/dL    BUN/Creatinine Ratio 17.1 7.0 - 25.0    Anion Gap 11.2 5.0 - 15.0 mmol/L    eGFR 105.5 >60.0 mL/min/1.73   CBC Auto Differential    Collection Time: 03/01/24  2:46 AM    Specimen: Blood   Result Value Ref Range    WBC 13.90 (H) 3.40 - 10.80 10*3/mm3    RBC 5.57 4.14 - 5.80 10*6/mm3    Hemoglobin 16.3 13.0 - 17.7 g/dL    Hematocrit 49.3 37.5 - 51.0 %    MCV 88.5 79.0 - 97.0 fL    MCH 29.3 26.6 - 33.0 pg    MCHC 33.1 31.5 - 35.7 g/dL    RDW 14.2 12.3 - 15.4 %    RDW-SD 45.7 37.0 - 54.0 fl    MPV 9.5 6.0 - 12.0 fL    Platelets 320 140 - 450 10*3/mm3    Neutrophil % 72.0 42.7 - 76.0 %    Lymphocyte % 18.8 (L) 19.6 - 45.3 %    Monocyte % 7.3 5.0 - 12.0 %    Eosinophil % 1.2 0.3 - 6.2 %    Basophil % 0.4 0.0 - 1.5 %    Immature Grans % 0.3 0.0 - 0.5 %    Neutrophils, Absolute 10.01 (H) 1.70 - 7.00 10*3/mm3    Lymphocytes, Absolute 2.61 0.70 - 3.10 10*3/mm3    Monocytes, Absolute 1.02 (H) 0.10 - 0.90 10*3/mm3    Eosinophils, Absolute 0.17 0.00 - 0.40 10*3/mm3    Basophils, Absolute 0.05 0.00 - 0.20 10*3/mm3    Immature Grans, Absolute 0.04 0.00 - 0.05 10*3/mm3    nRBC  0.0 0.0 - 0.2 /100 WBC         Assessment & Plan     Htn not well controlled.  Out of norvasc.      Diagnoses and all orders for this visit:    1. Routine general medical examination at a health care facility (Primary)    2. Essential hypertension  -     doxazosin (CARDURA) 2 MG tablet; Take 1 tablet by mouth every night at bedtime.  Dispense: 90 tablet; Refill: 1  -     amLODIPine (NORVASC) 10 MG tablet; Take 1 tablet by mouth Daily.  Dispense: 90 tablet; Refill: 1  -     potassium chloride ER (K-TAB) 20 MEQ tablet controlled-release ER tablet; Take 1 tablet by mouth 2 (Two) Times a Day With Meals.  Dispense: 180 tablet; Refill: 1  -     rOPINIRole (REQUIP) 3 MG tablet; Take 1 tablet by mouth every night at bedtime.  Dispense: 90 tablet; Refill: 1  -     Comprehensive Metabolic Panel  -     Vitamin B12  -     CBC & Differential  -     Lipid Panel  -     PSA Screen  -     TSH  -     Hemoglobin A1c  -     MicroAlbumin, Urine, Random - Urine, Clean Catch  -     Vitamin D,25-Hydroxy    3. Primary hypertension  -     doxazosin (CARDURA) 2 MG tablet; Take 1 tablet by mouth every night at bedtime.  Dispense: 90 tablet; Refill: 1  -     rOPINIRole (REQUIP) 3 MG tablet; Take 1 tablet by mouth every night at bedtime.  Dispense: 90 tablet; Refill: 1  -     Comprehensive Metabolic Panel  -     Vitamin B12  -     CBC & Differential  -     Lipid Panel  -     PSA Screen  -     TSH  -     Hemoglobin A1c  -     MicroAlbumin, Urine, Random - Urine, Clean Catch  -     Vitamin D,25-Hydroxy    4. RLS (restless legs syndrome)  -     rOPINIRole (REQUIP) 3 MG tablet; Take 1 tablet by mouth every night at bedtime.  Dispense: 90 tablet; Refill: 1  -     Comprehensive Metabolic Panel  -     Vitamin B12  -     CBC & Differential  -     Lipid Panel  -     PSA Screen  -     TSH  -     Hemoglobin A1c  -     MicroAlbumin, Urine, Random - Urine, Clean Catch  -     Vitamin D,25-Hydroxy    5. Hypertension, unspecified type  -     rOPINIRole (REQUIP)  3 MG tablet; Take 1 tablet by mouth every night at bedtime.  Dispense: 90 tablet; Refill: 1  -     Comprehensive Metabolic Panel  -     Vitamin B12  -     CBC & Differential  -     Lipid Panel  -     PSA Screen  -     TSH  -     Hemoglobin A1c  -     MicroAlbumin, Urine, Random - Urine, Clean Catch  -     Vitamin D,25-Hydroxy    6. Type 2 diabetes mellitus without complication, without long-term current use of insulin  -     rOPINIRole (REQUIP) 3 MG tablet; Take 1 tablet by mouth every night at bedtime.  Dispense: 90 tablet; Refill: 1  -     Comprehensive Metabolic Panel  -     Vitamin B12  -     CBC & Differential  -     Lipid Panel  -     PSA Screen  -     TSH  -     Hemoglobin A1c  -     MicroAlbumin, Urine, Random - Urine, Clean Catch  -     Vitamin D,25-Hydroxy    7. Prostate cancer screening  -     rOPINIRole (REQUIP) 3 MG tablet; Take 1 tablet by mouth every night at bedtime.  Dispense: 90 tablet; Refill: 1  -     Comprehensive Metabolic Panel  -     Vitamin B12  -     CBC & Differential  -     Lipid Panel  -     PSA Screen  -     TSH  -     Hemoglobin A1c  -     MicroAlbumin, Urine, Random - Urine, Clean Catch  -     Vitamin D,25-Hydroxy    8. Hypercalciuria  -     Vitamin D,25-Hydroxy    Other orders  -     cyclobenzaprine (FLEXERIL) 10 MG tablet; Take 1 tablet by mouth 2 (Two) Times a Day.  Dispense: 180 tablet; Refill: 1  -     valsartan (DIOVAN) 320 MG tablet; Take 1 tablet by mouth Daily for 360 days.  Dispense: 90 tablet; Refill: 3        Diet and exercise discussed.        Return in about 4 weeks (around 12/10/2024).          There are no Patient Instructions on file for this visit.     Woo Betancourt MD    Assessment & Plan

## 2024-11-12 NOTE — ASSESSMENT & PLAN NOTE
Hypertension is stable and controlled  Continue current treatment regimen.  Blood pressure will be reassessed in 6 months.    Orders:    doxazosin (CARDURA) 2 MG tablet; Take 1 tablet by mouth every night at bedtime.    rOPINIRole (REQUIP) 3 MG tablet; Take 1 tablet by mouth every night at bedtime.    Comprehensive Metabolic Panel    Vitamin B12    CBC & Differential    Lipid Panel    PSA Screen    TSH    Hemoglobin A1c    MicroAlbumin, Urine, Random - Urine, Clean Catch    Vitamin D,25-Hydroxy

## 2024-11-12 NOTE — ASSESSMENT & PLAN NOTE
Diabetes is stable.   Continue current treatment regimen.  Diabetes will be reassessed in 6 months    Orders:    rOPINIRole (REQUIP) 3 MG tablet; Take 1 tablet by mouth every night at bedtime.    Comprehensive Metabolic Panel    Vitamin B12    CBC & Differential    Lipid Panel    PSA Screen    TSH    Hemoglobin A1c    MicroAlbumin, Urine, Random - Urine, Clean Catch    Vitamin D,25-Hydroxy

## 2024-11-12 NOTE — PROGRESS NOTES
Subjective   The ABCs of the Annual Wellness Visit  Medicare Wellness Visit      Cal Oliver Jr. is a 59 y.o. patient who presents for a Medicare Wellness Visit.    The following portions of the patient's history were reviewed and   updated as appropriate: allergies, current medications, past family history, past medical history, past social history, past surgical history, and problem list.    Compared to one year ago, the patient's physical   health is worse.  Compared to one year ago, the patient's mental   health is the same.    Recent Hospitalizations:  He was not admitted to the hospital during the last year.     Current Medical Providers:  Patient Care Team:  Woo Betancourt MD as PCP - General (Internal Medicine)    Outpatient Medications Prior to Visit   Medication Sig Dispense Refill    aspirin 81 MG chewable tablet CHEW AND SWALLOW 1 TABLET BY MOUTH DAILY 90 tablet 1    carvedilol (Coreg) 12.5 MG tablet Take 1 tablet by mouth 2 (Two) Times a Day With Meals. 180 tablet 3    cloNIDine (CATAPRES) 0.1 MG tablet TAKE 1 TABLET BY MOUTH EVERY NIGHT AT BEDTIME 90 tablet 3    Gel Base gel Ketoprofen 10%, Gabapentin 6%, Ketamine 5%, Lidocaine 5%, Amitriptyline 2%, Baclofen 2%, Bupivacaine 2%, Cyclobenaprine 2%, Meloxicam 01.% TDG    Apply 1 to 2 grams of pain gel to affected areas 3 to 4 times a day. 100 g 5    Gel Base gel Ketoprofen 15%, Lidocaine 5%, Bupivacaine 2%, Meloxicam 0.1% TDG Apply 1 to 2 grams of pain gel to affected areas 3 to 4 times a day. 16 g 3    metFORMIN (GLUCOPHAGE) 500 MG tablet TAKE 1 TABLET BY MOUTH TWICE DAILY 180 tablet 1    methocarbamol (ROBAXIN) 750 MG tablet Take 1 tablet by mouth 3 (Three) Times a Day As Needed for Muscle Spasms. 15 tablet 0    omeprazole (priLOSEC) 20 MG capsule Take 1 capsule by mouth Daily. 90 capsule 3    triamcinolone (KENALOG) 0.1 % cream Apply to affected area tid prn itch 45 g 0    amLODIPine (NORVASC) 10 MG tablet TAKE 1 TABLET BY MOUTH DAILY 90  tablet 1    cyclobenzaprine (FLEXERIL) 10 MG tablet Take 1 tablet by mouth 2 (Two) Times a Day. 180 tablet 1    doxazosin (CARDURA) 2 MG tablet Take 1 tablet by mouth every night at bedtime. 90 tablet 1    HYDROcodone-acetaminophen (NORCO) 7.5-325 MG per tablet Take 1 tablet by mouth 2 (Two) Times a Day.      potassium chloride ER (K-TAB) 20 MEQ tablet controlled-release ER tablet TAKE 1 TABLET BY MOUTH TWICE DAILY WITH MEALS 180 tablet 1    rOPINIRole (REQUIP) 3 MG tablet Take 1 tablet by mouth every night at bedtime. 90 tablet 1    spironolactone (Aldactone) 25 MG tablet Take 1 tablet by mouth Daily. 90 tablet 3    valsartan (DIOVAN) 320 MG tablet Take 1 tablet by mouth Daily for 360 days. 90 tablet 3    albuterol sulfate  (90 Base) MCG/ACT inhaler INHALE 2 PUFFS BY MOUTH EVERY 4 HOURS AS NEEDED FOR WHEEZING 54 g 3     No facility-administered medications prior to visit.     No opioid medication identified on active medication list. I have reviewed chart for other potential  high risk medication/s and harmful drug interactions in the elderly.      Aspirin is on active medication list. Aspirin use is indicated based on review of current medical condition/s. Pros and cons of this therapy have been discussed today. Benefits of this medication outweigh potential harm.  Patient has been encouraged to continue taking this medication.  .      Patient Active Problem List   Diagnosis    Asthma    Mild chronic obstructive pulmonary disease    Dental abscess    Depression    Diarrhea    Gastroesophageal reflux disease    Hypercholesterolemia    Hypertension    Hypokalemia    Hypothyroidism    Insomnia    Muscle pain    Nausea and vomiting    SHUKRI on CPAP    Restless legs syndrome    Type 2 diabetes mellitus    Vitamin D deficiency    Abnormal liver function tests    Chronic back pain    Morbid obesity    Encounter for screening colonoscopy    Routine general medical examination at a health care facility    Chronic pain  "of right knee    Sleep apnea    Osteoarthritis of both knees     Advance Care Planning Advance Directive is not on file.  ACP discussion was held with the patient during this visit. Patient does not have an advance directive, declines further assistance.            Objective   Vitals:    24 0934   BP: 178/100   Pulse: 78   Resp: 17   Temp: 97.2 °F (36.2 °C)   SpO2: 96%   Weight: (!) 162 kg (358 lb)   Height: 180.3 cm (71\")   PainSc:   8       Estimated body mass index is 49.93 kg/m² as calculated from the following:    Height as of this encounter: 180.3 cm (71\").    Weight as of this encounter: 162 kg (358 lb).            Does the patient have evidence of cognitive impairment? No                                                                                                Health  Risk Assessment    Smoking Status:  Social History     Tobacco Use   Smoking Status Some Days    Current packs/day: 0.25    Average packs/day: 0.3 packs/day for 20.0 years (5.0 ttl pk-yrs)    Types: Cigarettes   Smokeless Tobacco Never   Tobacco Comments    not smoked since2020     Alcohol Consumption:  Social History     Substance and Sexual Activity   Alcohol Use No       Fall Risk Screen  STEADI Fall Risk Assessment was completed, and patient is at MODERATE risk for falls. Assessment completed on:2024    Depression Screening   Little interest or pleasure in doing things? Not at all   Feeling down, depressed, or hopeless? Several days   PHQ-2 Total Score 1      Health Habits and Functional and Cognitive Screenin/12/2024     9:38 AM   Functional & Cognitive Status   Do you have difficulty preparing food and eating? No   Do you have difficulty bathing yourself, getting dressed or grooming yourself? No   Do you have difficulty using the toilet? No   Do you have difficulty moving around from place to place? No   Do you have trouble with steps or getting out of a bed or a chair? Yes   Current Diet Other   Dental " Exam Not up to date   Eye Exam Not up to date   Exercise (times per week) 1 times per week   Current Exercises Include Walking   Do you need help using the phone?  No   Are you deaf or do you have serious difficulty hearing?  No   Do you need help to go to places out of walking distance? Yes   Do you need help shopping? No   Do you need help preparing meals?  No   Do you need help with housework?  No   Do you need help with laundry? No   Do you need help taking your medications? No   Do you need help managing money? No   Do you ever drive or ride in a car without wearing a seat belt? No   Have you felt unusual stress, anger or loneliness in the last month? No   Who do you live with? Spouse   If you need help, do you have trouble finding someone available to you? No   Have you been bothered in the last four weeks by sexual problems? No   Do you have difficulty concentrating, remembering or making decisions? No           Age-appropriate Screening Schedule:  Refer to the list below for future screening recommendations based on patient's age, sex and/or medical conditions. Orders for these recommended tests are listed in the plan section. The patient has been provided with a written plan.    Health Maintenance List  Health Maintenance   Topic Date Due    Pneumococcal Vaccine 0-64 (1 of 2 - PCV) Never done    DIABETIC EYE EXAM  12/14/2018    ANNUAL WELLNESS VISIT  09/11/2021    COLORECTAL CANCER SCREENING  03/08/2022    LIPID PANEL  06/11/2022    HEMOGLOBIN A1C  07/12/2024    TDAP/TD VACCINES (2 - Td or Tdap) 11/12/2024 (Originally 12/13/2022)    ZOSTER VACCINE (1 of 2) 11/12/2024 (Originally 5/23/2017)    COVID-19 Vaccine (3 - 2024-25 season) 11/14/2024 (Originally 9/1/2024)    INFLUENZA VACCINE  03/31/2025 (Originally 8/1/2024)    BMI FOLLOWUP  01/17/2025    HEPATITIS C SCREENING  Completed    Hepatitis B  Discontinued    URINE MICROALBUMIN  Discontinued                                                                    "                                                                             CMS Preventative Services Quick Reference  Risk Factors Identified During Encounter  Fall Risk-High or Moderate: Discussed Fall Prevention in the home and Information on Fall Prevention Shared in After Visit Summary    The above risks/problems have been discussed with the patient.  Pertinent information has been shared with the patient in the After Visit Summary.  An After Visit Summary and PPPS were made available to the patient.    Follow Up:   Next Medicare Wellness visit to be scheduled in 1 year.         Additional E&M Note during same encounter follows:  Patient has additional, significant, and separately identifiable condition(s)/problem(s) that require work above and beyond the Medicare Wellness Visit     Chief Complaint  No chief complaint on file.    Subjective   HPI  Cal is also being seen today for an annual adult preventative physical exam.                 Objective   Vital Signs:  /100   Pulse 78   Temp 97.2 °F (36.2 °C)   Resp 17   Ht 180.3 cm (71\")   Wt (!) 162 kg (358 lb)   SpO2 96%   BMI 49.93 kg/m²   Physical Exam    The following data was reviewed by: Woo Betancourt MD on 11/12/2024:  Narrative & Impression   FINAL REPORT     TECHNIQUE:  null     CLINICAL HISTORY:  .     COMPARISON:  null     FINDINGS:  Exam: CTA neck with IV contrast. 3d MIP reconstructions.     Comparison: None     Findings:     Right common carotid artery: No significant stenosis, occlusion, dissection, or aneurysm.     Right internal carotid artery: No significant stenosis, occlusion, dissection, or aneurysm.     Left common carotid artery: No significant stenosis, occlusion, dissection, or aneurysm.     Left internal carotid artery: No significant stenosis, occlusion, dissection, or aneurysm.     Vertebral arteries: No significant stenosis, occlusion, dissection, or aneurysm bilaterally.     Soft tissues: No acute pathology.   "   Bones: No acute pathology. Degenerative spinal changes.     IMPRESSION:  IMPRESSION:     No acute pathology.     No significant stenosis, occlusion, dissection, or aneurysm in the neck.     <10 percent ICA stenosis bilaterally per NASCET criteria.     Authenticated and Electronically Signed by Joselyn Mcgrath MD  on 03/01/2024 03:33:18 AM     Imaging    CMP          3/1/2024    02:46   CMP   Glucose 198    BUN 13    Creatinine 0.76    EGFR 105.5    Sodium 139    Potassium 3.7    Chloride 100    Calcium 9.6    Total Protein 7.1    Albumin 3.9    Globulin 3.2    Total Bilirubin <0.2    Alkaline Phosphatase 104    AST (SGOT) 13    ALT (SGPT) 18    Albumin/Globulin Ratio 1.2    BUN/Creatinine Ratio 17.1    Anion Gap 11.2              Assessment and Plan Additional age appropriate preventative wellness advice topics were discussed during today's preventative wellness exam(some topics already addressed during AWV portion of the note above):    Physical Activity: Advised cardiovascular activity 150 minutes per week as tolerated. (example brisk walk for 30 minutes, 5 days a week).     Nutrition: Discussed nutrition plan with patient. Information shared in after visit summary. Goal is for a well balanced diet to enhance overall health.           Essential hypertension  Hypertension is stable and controlled  Continue current treatment regimen.  Blood pressure will be reassessed in 6 months.    Orders:    doxazosin (CARDURA) 2 MG tablet; Take 1 tablet by mouth every night at bedtime.    amLODIPine (NORVASC) 10 MG tablet; Take 1 tablet by mouth Daily.    potassium chloride ER (K-TAB) 20 MEQ tablet controlled-release ER tablet; Take 1 tablet by mouth 2 (Two) Times a Day With Meals.    rOPINIRole (REQUIP) 3 MG tablet; Take 1 tablet by mouth every night at bedtime.    Comprehensive Metabolic Panel    Vitamin B12    CBC & Differential    Lipid Panel    PSA Screen    TSH    Hemoglobin A1c    MicroAlbumin, Urine, Random - Urine,  Clean Catch    Vitamin D,25-Hydroxy    Primary hypertension  Hypertension is stable and controlled  Continue current treatment regimen.  Blood pressure will be reassessed in 6 months.    Orders:    doxazosin (CARDURA) 2 MG tablet; Take 1 tablet by mouth every night at bedtime.    rOPINIRole (REQUIP) 3 MG tablet; Take 1 tablet by mouth every night at bedtime.    Comprehensive Metabolic Panel    Vitamin B12    CBC & Differential    Lipid Panel    PSA Screen    TSH    Hemoglobin A1c    MicroAlbumin, Urine, Random - Urine, Clean Catch    Vitamin D,25-Hydroxy    RLS (restless legs syndrome)    Orders:    rOPINIRole (REQUIP) 3 MG tablet; Take 1 tablet by mouth every night at bedtime.    Comprehensive Metabolic Panel    Vitamin B12    CBC & Differential    Lipid Panel    PSA Screen    TSH    Hemoglobin A1c    MicroAlbumin, Urine, Random - Urine, Clean Catch    Vitamin D,25-Hydroxy    Hypertension, unspecified type  Hypertension is stable and controlled  Continue current treatment regimen.  Blood pressure will be reassessed in 6 months.    Orders:    rOPINIRole (REQUIP) 3 MG tablet; Take 1 tablet by mouth every night at bedtime.    Comprehensive Metabolic Panel    Vitamin B12    CBC & Differential    Lipid Panel    PSA Screen    TSH    Hemoglobin A1c    MicroAlbumin, Urine, Random - Urine, Clean Catch    Vitamin D,25-Hydroxy    Type 2 diabetes mellitus without complication, without long-term current use of insulin  Diabetes is stable.   Continue current treatment regimen.  Diabetes will be reassessed in 6 months    Orders:    rOPINIRole (REQUIP) 3 MG tablet; Take 1 tablet by mouth every night at bedtime.    Comprehensive Metabolic Panel    Vitamin B12    CBC & Differential    Lipid Panel    PSA Screen    TSH    Hemoglobin A1c    MicroAlbumin, Urine, Random - Urine, Clean Catch    Vitamin D,25-Hydroxy    Prostate cancer screening    Orders:    rOPINIRole (REQUIP) 3 MG tablet; Take 1 tablet by mouth every night at bedtime.     Comprehensive Metabolic Panel    Vitamin B12    CBC & Differential    Lipid Panel    PSA Screen    TSH    Hemoglobin A1c    MicroAlbumin, Urine, Random - Urine, Clean Catch    Vitamin D,25-Hydroxy    Hypercalciuria    Orders:    Vitamin D,25-Hydroxy    Routine general medical examination at a health care facility               I spent 11   minutes caring for Cal on this date of service. This time includes time spent by me in the following activities:preparing for the visit, reviewing tests, and obtaining and/or reviewing a separately obtained history  Follow Up   Return in about 4 weeks (around 12/10/2024).  Patient was given instructions and counseling regarding his condition or for health maintenance advice. Please see specific information pulled into the AVS if appropriate.

## 2024-11-12 NOTE — ASSESSMENT & PLAN NOTE
Hypertension is stable and controlled  Continue current treatment regimen.  Blood pressure will be reassessed in 6 months.    Orders:    rOPINIRole (REQUIP) 3 MG tablet; Take 1 tablet by mouth every night at bedtime.    Comprehensive Metabolic Panel    Vitamin B12    CBC & Differential    Lipid Panel    PSA Screen    TSH    Hemoglobin A1c    MicroAlbumin, Urine, Random - Urine, Clean Catch    Vitamin D,25-Hydroxy

## 2024-11-19 RX ORDER — ASPIRIN 81 MG/1
81 TABLET, CHEWABLE ORAL DAILY
Qty: 90 TABLET | Refills: 1 | Status: SHIPPED | OUTPATIENT
Start: 2024-11-19

## 2025-01-01 ENCOUNTER — HOSPITAL ENCOUNTER (INPATIENT)
Facility: HOSPITAL | Age: 60
LOS: 9 days | Discharge: ANOTHER HOSPITAL | End: 2025-01-10
Attending: EMERGENCY MEDICINE | Admitting: FAMILY MEDICINE
Payer: MEDICARE

## 2025-01-01 ENCOUNTER — APPOINTMENT (OUTPATIENT)
Dept: GENERAL RADIOLOGY | Facility: HOSPITAL | Age: 60
End: 2025-01-01
Payer: MEDICARE

## 2025-01-01 ENCOUNTER — APPOINTMENT (OUTPATIENT)
Dept: CT IMAGING | Facility: HOSPITAL | Age: 60
End: 2025-01-01
Payer: MEDICARE

## 2025-01-01 ENCOUNTER — APPOINTMENT (OUTPATIENT)
Dept: CARDIOLOGY | Facility: HOSPITAL | Age: 60
End: 2025-01-01
Payer: MEDICARE

## 2025-01-01 DIAGNOSIS — G47.33 OSA TREATED WITH BIPAP: ICD-10-CM

## 2025-01-01 DIAGNOSIS — G47.33 OBSTRUCTIVE SLEEP APNEA SYNDROME: ICD-10-CM

## 2025-01-01 DIAGNOSIS — J96.01 ACUTE HYPOXIC RESPIRATORY FAILURE: Primary | ICD-10-CM

## 2025-01-01 DIAGNOSIS — J44.1 COPD EXACERBATION: ICD-10-CM

## 2025-01-01 LAB
ALBUMIN SERPL-MCNC: 3.7 G/DL (ref 3.5–5.2)
ALBUMIN/GLOB SERPL: 1.1 G/DL
ALP SERPL-CCNC: 128 U/L (ref 39–117)
ALT SERPL W P-5'-P-CCNC: 24 U/L (ref 1–41)
ANION GAP SERPL CALCULATED.3IONS-SCNC: 9.7 MMOL/L (ref 5–15)
AST SERPL-CCNC: 18 U/L (ref 1–40)
AV MEAN PRESS GRAD SYS DOP V1V2: 11 MMHG
AV VMAX SYS DOP: 214 CM/SEC
B PARAPERT DNA SPEC QL NAA+PROBE: NOT DETECTED
B PERT DNA SPEC QL NAA+PROBE: NOT DETECTED
BASOPHILS # BLD AUTO: 0.04 10*3/MM3 (ref 0–0.2)
BASOPHILS NFR BLD AUTO: 0.3 % (ref 0–1.5)
BH CV ECHO MEAS - AO MAX PG: 18.3 MMHG
BH CV ECHO MEAS - AO ROOT DIAM: 3.6 CM
BH CV ECHO MEAS - AO V2 VTI: 44.5 CM
BH CV ECHO MEAS - AVA(I,D): 2.8 CM2
BH CV ECHO MEAS - EDV(CUBED): 202.3 ML
BH CV ECHO MEAS - EDV(MOD-SP2): 124 ML
BH CV ECHO MEAS - EDV(MOD-SP4): 142 ML
BH CV ECHO MEAS - EF(MOD-SP2): 63.5 %
BH CV ECHO MEAS - EF(MOD-SP4): 60.1 %
BH CV ECHO MEAS - ESV(CUBED): 54.4 ML
BH CV ECHO MEAS - ESV(MOD-SP2): 45.3 ML
BH CV ECHO MEAS - ESV(MOD-SP4): 56.7 ML
BH CV ECHO MEAS - FS: 35.4 %
BH CV ECHO MEAS - IVS/LVPW: 1.09 CM
BH CV ECHO MEAS - IVSD: 1.38 CM
BH CV ECHO MEAS - LA DIMENSION: 5.4 CM
BH CV ECHO MEAS - LAT PEAK E' VEL: 7.8 CM/SEC
BH CV ECHO MEAS - LV DIASTOLIC VOL/BSA (35-75): 51.9 CM2
BH CV ECHO MEAS - LV MASS(C)D: 346.9 GRAMS
BH CV ECHO MEAS - LV MAX PG: 5.3 MMHG
BH CV ECHO MEAS - LV MEAN PG: 3 MMHG
BH CV ECHO MEAS - LV SYSTOLIC VOL/BSA (12-30): 20.7 CM2
BH CV ECHO MEAS - LV V1 MAX: 115 CM/SEC
BH CV ECHO MEAS - LV V1 VTI: 26.1 CM
BH CV ECHO MEAS - LVIDD: 5.9 CM
BH CV ECHO MEAS - LVIDS: 3.8 CM
BH CV ECHO MEAS - LVOT AREA: 4.8 CM2
BH CV ECHO MEAS - LVOT DIAM: 2.47 CM
BH CV ECHO MEAS - LVPWD: 1.27 CM
BH CV ECHO MEAS - MED PEAK E' VEL: 7.7 CM/SEC
BH CV ECHO MEAS - MV A MAX VEL: 96.2 CM/SEC
BH CV ECHO MEAS - MV DEC SLOPE: 502 CM/SEC2
BH CV ECHO MEAS - MV DEC TIME: 0.2 SEC
BH CV ECHO MEAS - MV E MAX VEL: 101 CM/SEC
BH CV ECHO MEAS - MV E/A: 1.05
BH CV ECHO MEAS - MV MAX PG: 4.6 MMHG
BH CV ECHO MEAS - MV MEAN PG: 2 MMHG
BH CV ECHO MEAS - MV V2 VTI: 28.6 CM
BH CV ECHO MEAS - MVA(VTI): 4.4 CM2
BH CV ECHO MEAS - PA ACC TIME: 0.08 SEC
BH CV ECHO MEAS - PA V2 MAX: 132 CM/SEC
BH CV ECHO MEAS - RV MAX PG: 3 MMHG
BH CV ECHO MEAS - RV V1 MAX: 86.7 CM/SEC
BH CV ECHO MEAS - RV V1 VTI: 14.7 CM
BH CV ECHO MEAS - SV(LVOT): 125.1 ML
BH CV ECHO MEAS - SV(MOD-SP2): 78.7 ML
BH CV ECHO MEAS - SV(MOD-SP4): 85.3 ML
BH CV ECHO MEAS - SVI(LVOT): 45.7 ML/M2
BH CV ECHO MEAS - SVI(MOD-SP2): 28.8 ML/M2
BH CV ECHO MEAS - SVI(MOD-SP4): 31.2 ML/M2
BH CV ECHO MEAS - TAPSE (>1.6): 3.9 CM
BH CV ECHO MEASUREMENTS AVERAGE E/E' RATIO: 13.03
BH CV XLRA - RV BASE: 4.7 CM
BH CV XLRA - RV LENGTH: 10.1 CM
BH CV XLRA - TDI S': 19.1 CM/SEC
BILIRUB SERPL-MCNC: 0.4 MG/DL (ref 0–1.2)
BUN SERPL-MCNC: 9 MG/DL (ref 6–20)
BUN/CREAT SERPL: 11.8 (ref 7–25)
C PNEUM DNA NPH QL NAA+NON-PROBE: NOT DETECTED
CALCIUM SPEC-SCNC: 9 MG/DL (ref 8.6–10.5)
CHLORIDE SERPL-SCNC: 92 MMOL/L (ref 98–107)
CO2 SERPL-SCNC: 37.3 MMOL/L (ref 22–29)
CREAT SERPL-MCNC: 0.76 MG/DL (ref 0.76–1.27)
D DIMER PPP FEU-MCNC: 1.99 MCGFEU/ML (ref 0–0.57)
D-LACTATE SERPL-SCNC: 1.2 MMOL/L (ref 0.5–2)
DEPRECATED RDW RBC AUTO: 44.6 FL (ref 37–54)
EGFRCR SERPLBLD CKD-EPI 2021: 104.8 ML/MIN/1.73
EOSINOPHIL # BLD AUTO: 0.23 10*3/MM3 (ref 0–0.4)
EOSINOPHIL NFR BLD AUTO: 1.5 % (ref 0.3–6.2)
ERYTHROCYTE [DISTWIDTH] IN BLOOD BY AUTOMATED COUNT: 13.4 % (ref 12.3–15.4)
FLUAV RNA RESP QL NAA+PROBE: NOT DETECTED
FLUAV SUBTYP SPEC NAA+PROBE: NOT DETECTED
FLUBV RNA ISLT QL NAA+PROBE: NOT DETECTED
FLUBV RNA RESP QL NAA+PROBE: NOT DETECTED
GEN 5 1HR TROPONIN T REFLEX: 24 NG/L
GLOBULIN UR ELPH-MCNC: 3.4 GM/DL
GLUCOSE BLDC GLUCOMTR-MCNC: 230 MG/DL (ref 70–130)
GLUCOSE BLDC GLUCOMTR-MCNC: 259 MG/DL (ref 70–130)
GLUCOSE BLDC GLUCOMTR-MCNC: 327 MG/DL (ref 70–130)
GLUCOSE BLDC GLUCOMTR-MCNC: 348 MG/DL (ref 70–130)
GLUCOSE SERPL-MCNC: 244 MG/DL (ref 65–99)
HADV DNA SPEC NAA+PROBE: NOT DETECTED
HCOV 229E RNA SPEC QL NAA+PROBE: NOT DETECTED
HCOV HKU1 RNA SPEC QL NAA+PROBE: NOT DETECTED
HCOV NL63 RNA SPEC QL NAA+PROBE: NOT DETECTED
HCOV OC43 RNA SPEC QL NAA+PROBE: NOT DETECTED
HCT VFR BLD AUTO: 45.8 % (ref 37.5–51)
HGB BLD-MCNC: 14.8 G/DL (ref 13–17.7)
HMPV RNA NPH QL NAA+NON-PROBE: NOT DETECTED
HOLD SPECIMEN: NORMAL
HOLD SPECIMEN: NORMAL
HPIV1 RNA ISLT QL NAA+PROBE: NOT DETECTED
HPIV2 RNA SPEC QL NAA+PROBE: NOT DETECTED
HPIV3 RNA NPH QL NAA+PROBE: NOT DETECTED
HPIV4 P GENE NPH QL NAA+PROBE: NOT DETECTED
IMM GRANULOCYTES # BLD AUTO: 0.05 10*3/MM3 (ref 0–0.05)
IMM GRANULOCYTES NFR BLD AUTO: 0.3 % (ref 0–0.5)
LEFT ATRIUM VOLUME INDEX: 33.6 ML/M2
LV EF BIPLANE MOD: 61.6 %
LYMPHOCYTES # BLD AUTO: 1.5 10*3/MM3 (ref 0.7–3.1)
LYMPHOCYTES NFR BLD AUTO: 9.9 % (ref 19.6–45.3)
M PNEUMO IGG SER IA-ACNC: NOT DETECTED
MCH RBC QN AUTO: 29 PG (ref 26.6–33)
MCHC RBC AUTO-ENTMCNC: 32.3 G/DL (ref 31.5–35.7)
MCV RBC AUTO: 89.6 FL (ref 79–97)
MONOCYTES # BLD AUTO: 1.08 10*3/MM3 (ref 0.1–0.9)
MONOCYTES NFR BLD AUTO: 7.2 % (ref 5–12)
NEUTROPHILS NFR BLD AUTO: 12.18 10*3/MM3 (ref 1.7–7)
NEUTROPHILS NFR BLD AUTO: 80.8 % (ref 42.7–76)
NRBC BLD AUTO-RTO: 0 /100 WBC (ref 0–0.2)
NT-PROBNP SERPL-MCNC: 111.6 PG/ML (ref 0–900)
PLATELET # BLD AUTO: 256 10*3/MM3 (ref 140–450)
PMV BLD AUTO: 9.2 FL (ref 6–12)
POTASSIUM SERPL-SCNC: 3.5 MMOL/L (ref 3.5–5.2)
PROCALCITONIN SERPL-MCNC: 0.05 NG/ML (ref 0–0.25)
PROT SERPL-MCNC: 7.1 G/DL (ref 6–8.5)
RBC # BLD AUTO: 5.11 10*6/MM3 (ref 4.14–5.8)
RHINOVIRUS RNA SPEC NAA+PROBE: NOT DETECTED
RSV RNA NPH QL NAA+NON-PROBE: NOT DETECTED
SARS-COV-2 RNA NPH QL NAA+NON-PROBE: NOT DETECTED
SARS-COV-2 RNA RESP QL NAA+PROBE: NOT DETECTED
SODIUM SERPL-SCNC: 139 MMOL/L (ref 136–145)
TROPONIN T % DELTA: 0 %
TROPONIN T NUMERIC DELTA: 0 NG/L
TROPONIN T SERPL HS-MCNC: 24 NG/L
WBC NRBC COR # BLD AUTO: 15.08 10*3/MM3 (ref 3.4–10.8)
WHOLE BLOOD HOLD COAG: NORMAL
WHOLE BLOOD HOLD SPECIMEN: NORMAL

## 2025-01-01 PROCEDURE — 94640 AIRWAY INHALATION TREATMENT: CPT

## 2025-01-01 PROCEDURE — 83605 ASSAY OF LACTIC ACID: CPT | Performed by: EMERGENCY MEDICINE

## 2025-01-01 PROCEDURE — 25010000002 METHYLPREDNISOLONE PER 125 MG: Performed by: EMERGENCY MEDICINE

## 2025-01-01 PROCEDURE — 63710000001 INSULIN LISPRO (HUMAN) PER 5 UNITS: Performed by: FAMILY MEDICINE

## 2025-01-01 PROCEDURE — 93306 TTE W/DOPPLER COMPLETE: CPT

## 2025-01-01 PROCEDURE — 25010000002 METHYLPREDNISOLONE PER 125 MG: Performed by: NURSE PRACTITIONER

## 2025-01-01 PROCEDURE — 25010000002 ENOXAPARIN PER 10 MG: Performed by: EMERGENCY MEDICINE

## 2025-01-01 PROCEDURE — 94660 CPAP INITIATION&MGMT: CPT

## 2025-01-01 PROCEDURE — 94799 UNLISTED PULMONARY SVC/PX: CPT

## 2025-01-01 PROCEDURE — 82948 REAGENT STRIP/BLOOD GLUCOSE: CPT | Performed by: FAMILY MEDICINE

## 2025-01-01 PROCEDURE — 94664 DEMO&/EVAL PT USE INHALER: CPT

## 2025-01-01 PROCEDURE — 0202U NFCT DS 22 TRGT SARS-COV-2: CPT | Performed by: NURSE PRACTITIONER

## 2025-01-01 PROCEDURE — 25010000002 FUROSEMIDE PER 20 MG: Performed by: EMERGENCY MEDICINE

## 2025-01-01 PROCEDURE — 87040 BLOOD CULTURE FOR BACTERIA: CPT | Performed by: EMERGENCY MEDICINE

## 2025-01-01 PROCEDURE — 80053 COMPREHEN METABOLIC PANEL: CPT | Performed by: EMERGENCY MEDICINE

## 2025-01-01 PROCEDURE — 83880 ASSAY OF NATRIURETIC PEPTIDE: CPT | Performed by: EMERGENCY MEDICINE

## 2025-01-01 PROCEDURE — 84145 PROCALCITONIN (PCT): CPT | Performed by: EMERGENCY MEDICINE

## 2025-01-01 PROCEDURE — 93306 TTE W/DOPPLER COMPLETE: CPT | Performed by: INTERNAL MEDICINE

## 2025-01-01 PROCEDURE — 85379 FIBRIN DEGRADATION QUANT: CPT | Performed by: EMERGENCY MEDICINE

## 2025-01-01 PROCEDURE — 82948 REAGENT STRIP/BLOOD GLUCOSE: CPT

## 2025-01-01 PROCEDURE — 85025 COMPLETE CBC W/AUTO DIFF WBC: CPT | Performed by: EMERGENCY MEDICINE

## 2025-01-01 PROCEDURE — 71275 CT ANGIOGRAPHY CHEST: CPT

## 2025-01-01 PROCEDURE — 25010000002 ENOXAPARIN PER 10 MG: Performed by: FAMILY MEDICINE

## 2025-01-01 PROCEDURE — 93005 ELECTROCARDIOGRAM TRACING: CPT | Performed by: EMERGENCY MEDICINE

## 2025-01-01 PROCEDURE — 25010000002 SULFUR HEXAFLUORIDE MICROSPH 60.7-25 MG RECONSTITUTED SUSPENSION: Performed by: FAMILY MEDICINE

## 2025-01-01 PROCEDURE — 84484 ASSAY OF TROPONIN QUANT: CPT | Performed by: EMERGENCY MEDICINE

## 2025-01-01 PROCEDURE — 99285 EMERGENCY DEPT VISIT HI MDM: CPT | Performed by: EMERGENCY MEDICINE

## 2025-01-01 PROCEDURE — 94761 N-INVAS EAR/PLS OXIMETRY MLT: CPT

## 2025-01-01 PROCEDURE — 99223 1ST HOSP IP/OBS HIGH 75: CPT | Performed by: FAMILY MEDICINE

## 2025-01-01 PROCEDURE — 25510000001 IOPAMIDOL 61 % SOLUTION: Performed by: FAMILY MEDICINE

## 2025-01-01 PROCEDURE — 87636 SARSCOV2 & INF A&B AMP PRB: CPT | Performed by: EMERGENCY MEDICINE

## 2025-01-01 PROCEDURE — 93005 ELECTROCARDIOGRAM TRACING: CPT

## 2025-01-01 PROCEDURE — 71045 X-RAY EXAM CHEST 1 VIEW: CPT

## 2025-01-01 RX ORDER — NICOTINE POLACRILEX 4 MG
15 LOZENGE BUCCAL
Status: DISCONTINUED | OUTPATIENT
Start: 2025-01-01 | End: 2025-01-10 | Stop reason: HOSPADM

## 2025-01-01 RX ORDER — IOPAMIDOL 612 MG/ML
100 INJECTION, SOLUTION INTRAVASCULAR
Status: COMPLETED | OUTPATIENT
Start: 2025-01-01 | End: 2025-01-01

## 2025-01-01 RX ORDER — ASPIRIN 81 MG/1
324 TABLET, CHEWABLE ORAL ONCE
Status: COMPLETED | OUTPATIENT
Start: 2025-01-01 | End: 2025-01-01

## 2025-01-01 RX ORDER — METHYLPREDNISOLONE SODIUM SUCCINATE 125 MG/2ML
125 INJECTION, POWDER, LYOPHILIZED, FOR SOLUTION INTRAMUSCULAR; INTRAVENOUS ONCE
Status: COMPLETED | OUTPATIENT
Start: 2025-01-01 | End: 2025-01-01

## 2025-01-01 RX ORDER — METHYLPREDNISOLONE SODIUM SUCCINATE 40 MG/ML
40 INJECTION, POWDER, LYOPHILIZED, FOR SOLUTION INTRAMUSCULAR; INTRAVENOUS EVERY 12 HOURS
Status: DISCONTINUED | OUTPATIENT
Start: 2025-01-01 | End: 2025-01-01

## 2025-01-01 RX ORDER — VALSARTAN 80 MG/1
320 TABLET ORAL DAILY
Status: DISCONTINUED | OUTPATIENT
Start: 2025-01-01 | End: 2025-01-10 | Stop reason: HOSPADM

## 2025-01-01 RX ORDER — PANTOPRAZOLE SODIUM 40 MG/1
40 TABLET, DELAYED RELEASE ORAL
Status: DISCONTINUED | OUTPATIENT
Start: 2025-01-01 | End: 2025-01-03 | Stop reason: ALTCHOICE

## 2025-01-01 RX ORDER — SODIUM CHLORIDE 0.9 % (FLUSH) 0.9 %
10 SYRINGE (ML) INJECTION AS NEEDED
Status: DISCONTINUED | OUTPATIENT
Start: 2025-01-01 | End: 2025-01-10 | Stop reason: HOSPADM

## 2025-01-01 RX ORDER — ALUMINA, MAGNESIA, AND SIMETHICONE 2400; 2400; 240 MG/30ML; MG/30ML; MG/30ML
15 SUSPENSION ORAL EVERY 6 HOURS PRN
Status: DISCONTINUED | OUTPATIENT
Start: 2025-01-01 | End: 2025-01-10 | Stop reason: HOSPADM

## 2025-01-01 RX ORDER — FUROSEMIDE 10 MG/ML
80 INJECTION INTRAMUSCULAR; INTRAVENOUS ONCE
Status: COMPLETED | OUTPATIENT
Start: 2025-01-01 | End: 2025-01-01

## 2025-01-01 RX ORDER — NITROGLYCERIN 0.4 MG/1
0.4 TABLET SUBLINGUAL
Status: DISCONTINUED | OUTPATIENT
Start: 2025-01-01 | End: 2025-01-10 | Stop reason: HOSPADM

## 2025-01-01 RX ORDER — ASPIRIN 81 MG/1
81 TABLET, CHEWABLE ORAL DAILY
Status: DISCONTINUED | OUTPATIENT
Start: 2025-01-02 | End: 2025-01-10 | Stop reason: HOSPADM

## 2025-01-01 RX ORDER — ENOXAPARIN SODIUM 100 MG/ML
60 INJECTION SUBCUTANEOUS EVERY 12 HOURS
Status: DISCONTINUED | OUTPATIENT
Start: 2025-01-01 | End: 2025-01-10 | Stop reason: HOSPADM

## 2025-01-01 RX ORDER — ACETAMINOPHEN 325 MG/1
650 TABLET ORAL EVERY 4 HOURS PRN
Status: DISCONTINUED | OUTPATIENT
Start: 2025-01-01 | End: 2025-01-10 | Stop reason: HOSPADM

## 2025-01-01 RX ORDER — ENOXAPARIN SODIUM 100 MG/ML
1 INJECTION SUBCUTANEOUS ONCE
Status: COMPLETED | OUTPATIENT
Start: 2025-01-01 | End: 2025-01-01

## 2025-01-01 RX ORDER — POLYETHYLENE GLYCOL 3350 17 G/17G
17 POWDER, FOR SOLUTION ORAL DAILY PRN
Status: DISCONTINUED | OUTPATIENT
Start: 2025-01-01 | End: 2025-01-10 | Stop reason: HOSPADM

## 2025-01-01 RX ORDER — BISACODYL 5 MG/1
5 TABLET, DELAYED RELEASE ORAL DAILY PRN
Status: DISCONTINUED | OUTPATIENT
Start: 2025-01-01 | End: 2025-01-10 | Stop reason: HOSPADM

## 2025-01-01 RX ORDER — TERAZOSIN 1 MG/1
1 CAPSULE ORAL NIGHTLY
Status: DISCONTINUED | OUTPATIENT
Start: 2025-01-01 | End: 2025-01-06

## 2025-01-01 RX ORDER — INSULIN LISPRO 100 [IU]/ML
2-9 INJECTION, SOLUTION INTRAVENOUS; SUBCUTANEOUS
Status: DISCONTINUED | OUTPATIENT
Start: 2025-01-01 | End: 2025-01-10 | Stop reason: HOSPADM

## 2025-01-01 RX ORDER — ROPINIROLE 1 MG/1
3 TABLET, FILM COATED ORAL NIGHTLY
Status: DISCONTINUED | OUTPATIENT
Start: 2025-01-01 | End: 2025-01-10

## 2025-01-01 RX ORDER — AMLODIPINE BESYLATE 5 MG/1
10 TABLET ORAL DAILY
Status: DISCONTINUED | OUTPATIENT
Start: 2025-01-01 | End: 2025-01-10 | Stop reason: HOSPADM

## 2025-01-01 RX ORDER — SODIUM CHLORIDE 9 MG/ML
40 INJECTION, SOLUTION INTRAVENOUS AS NEEDED
Status: DISCONTINUED | OUTPATIENT
Start: 2025-01-01 | End: 2025-01-10 | Stop reason: HOSPADM

## 2025-01-01 RX ORDER — CARVEDILOL 12.5 MG/1
12.5 TABLET ORAL 2 TIMES DAILY WITH MEALS
Status: DISCONTINUED | OUTPATIENT
Start: 2025-01-01 | End: 2025-01-10

## 2025-01-01 RX ORDER — METHYLPREDNISOLONE SODIUM SUCCINATE 125 MG/2ML
60 INJECTION, POWDER, LYOPHILIZED, FOR SOLUTION INTRAMUSCULAR; INTRAVENOUS EVERY 12 HOURS
Status: DISCONTINUED | OUTPATIENT
Start: 2025-01-01 | End: 2025-01-05

## 2025-01-01 RX ORDER — NICOTINE 21 MG/24HR
1 PATCH, TRANSDERMAL 24 HOURS TRANSDERMAL EVERY 24 HOURS
Status: DISCONTINUED | OUTPATIENT
Start: 2025-01-01 | End: 2025-01-10 | Stop reason: HOSPADM

## 2025-01-01 RX ORDER — ACETAMINOPHEN 650 MG/1
650 SUPPOSITORY RECTAL EVERY 4 HOURS PRN
Status: DISCONTINUED | OUTPATIENT
Start: 2025-01-01 | End: 2025-01-10 | Stop reason: HOSPADM

## 2025-01-01 RX ORDER — BUDESONIDE 0.5 MG/2ML
0.5 INHALANT ORAL
Status: DISCONTINUED | OUTPATIENT
Start: 2025-01-01 | End: 2025-01-10 | Stop reason: HOSPADM

## 2025-01-01 RX ORDER — ONDANSETRON 4 MG/1
4 TABLET, ORALLY DISINTEGRATING ORAL EVERY 6 HOURS PRN
Status: DISCONTINUED | OUTPATIENT
Start: 2025-01-01 | End: 2025-01-10 | Stop reason: HOSPADM

## 2025-01-01 RX ORDER — AMOXICILLIN 250 MG
2 CAPSULE ORAL 2 TIMES DAILY PRN
Status: DISCONTINUED | OUTPATIENT
Start: 2025-01-01 | End: 2025-01-10 | Stop reason: HOSPADM

## 2025-01-01 RX ORDER — IPRATROPIUM BROMIDE AND ALBUTEROL SULFATE 2.5; .5 MG/3ML; MG/3ML
3 SOLUTION RESPIRATORY (INHALATION)
Status: DISCONTINUED | OUTPATIENT
Start: 2025-01-01 | End: 2025-01-10 | Stop reason: HOSPADM

## 2025-01-01 RX ORDER — NITROGLYCERIN 20 MG/100ML
5-200 INJECTION INTRAVENOUS
Status: DISCONTINUED | OUTPATIENT
Start: 2025-01-01 | End: 2025-01-01

## 2025-01-01 RX ORDER — ONDANSETRON 2 MG/ML
4 INJECTION INTRAMUSCULAR; INTRAVENOUS EVERY 6 HOURS PRN
Status: DISCONTINUED | OUTPATIENT
Start: 2025-01-01 | End: 2025-01-10 | Stop reason: HOSPADM

## 2025-01-01 RX ORDER — DEXTROSE MONOHYDRATE 25 G/50ML
25 INJECTION, SOLUTION INTRAVENOUS
Status: DISCONTINUED | OUTPATIENT
Start: 2025-01-01 | End: 2025-01-10 | Stop reason: HOSPADM

## 2025-01-01 RX ORDER — METHOCARBAMOL 500 MG/1
750 TABLET, FILM COATED ORAL 3 TIMES DAILY PRN
Status: DISCONTINUED | OUTPATIENT
Start: 2025-01-01 | End: 2025-01-10 | Stop reason: HOSPADM

## 2025-01-01 RX ORDER — BISACODYL 10 MG
10 SUPPOSITORY, RECTAL RECTAL DAILY PRN
Status: DISCONTINUED | OUTPATIENT
Start: 2025-01-01 | End: 2025-01-10 | Stop reason: HOSPADM

## 2025-01-01 RX ORDER — CLONIDINE HYDROCHLORIDE 0.2 MG/1
0.1 TABLET ORAL EVERY 12 HOURS SCHEDULED
Status: DISCONTINUED | OUTPATIENT
Start: 2025-01-01 | End: 2025-01-02

## 2025-01-01 RX ORDER — IPRATROPIUM BROMIDE AND ALBUTEROL SULFATE 2.5; .5 MG/3ML; MG/3ML
9 SOLUTION RESPIRATORY (INHALATION) ONCE
Status: COMPLETED | OUTPATIENT
Start: 2025-01-01 | End: 2025-01-01

## 2025-01-01 RX ORDER — SODIUM CHLORIDE 0.9 % (FLUSH) 0.9 %
10 SYRINGE (ML) INJECTION EVERY 12 HOURS SCHEDULED
Status: DISCONTINUED | OUTPATIENT
Start: 2025-01-01 | End: 2025-01-10 | Stop reason: HOSPADM

## 2025-01-01 RX ORDER — GUAIFENESIN 600 MG/1
600 TABLET, EXTENDED RELEASE ORAL EVERY 12 HOURS SCHEDULED
Status: DISCONTINUED | OUTPATIENT
Start: 2025-01-01 | End: 2025-01-10 | Stop reason: HOSPADM

## 2025-01-01 RX ORDER — ACETAMINOPHEN 160 MG/5ML
650 SOLUTION ORAL EVERY 4 HOURS PRN
Status: DISCONTINUED | OUTPATIENT
Start: 2025-01-01 | End: 2025-01-10 | Stop reason: HOSPADM

## 2025-01-01 RX ADMIN — CLONIDINE HYDROCHLORIDE 0.1 MG: 0.1 TABLET ORAL at 21:04

## 2025-01-01 RX ADMIN — AMLODIPINE BESYLATE 10 MG: 5 TABLET ORAL at 08:24

## 2025-01-01 RX ADMIN — ENOXAPARIN SODIUM 60 MG: 60 INJECTION SUBCUTANEOUS at 18:06

## 2025-01-01 RX ADMIN — CARVEDILOL 12.5 MG: 6.25 TABLET, FILM COATED ORAL at 18:07

## 2025-01-01 RX ADMIN — INSULIN LISPRO 6 UNITS: 100 INJECTION, SOLUTION INTRAVENOUS; SUBCUTANEOUS at 11:50

## 2025-01-01 RX ADMIN — SULFUR HEXAFLUORIDE 2 ML: KIT at 08:12

## 2025-01-01 RX ADMIN — INSULIN LISPRO 7 UNITS: 100 INJECTION, SOLUTION INTRAVENOUS; SUBCUTANEOUS at 21:03

## 2025-01-01 RX ADMIN — CARVEDILOL 12.5 MG: 6.25 TABLET, FILM COATED ORAL at 08:24

## 2025-01-01 RX ADMIN — GUAIFENESIN 600 MG: 600 TABLET, EXTENDED RELEASE ORAL at 21:04

## 2025-01-01 RX ADMIN — BUDESONIDE 0.5 MG: 0.5 INHALANT RESPIRATORY (INHALATION) at 13:18

## 2025-01-01 RX ADMIN — INSULIN LISPRO 7 UNITS: 100 INJECTION, SOLUTION INTRAVENOUS; SUBCUTANEOUS at 18:06

## 2025-01-01 RX ADMIN — FUROSEMIDE 80 MG: 10 INJECTION, SOLUTION INTRAMUSCULAR; INTRAVENOUS at 05:12

## 2025-01-01 RX ADMIN — IPRATROPIUM BROMIDE AND ALBUTEROL SULFATE 3 ML: 2.5; .5 SOLUTION RESPIRATORY (INHALATION) at 06:58

## 2025-01-01 RX ADMIN — Medication 10 ML: at 21:04

## 2025-01-01 RX ADMIN — ROPINIROLE HYDROCHLORIDE 3 MG: 1 TABLET, FILM COATED ORAL at 21:04

## 2025-01-01 RX ADMIN — TERAZOSIN HYDROCHLORIDE 1 MG: 1 CAPSULE ORAL at 21:04

## 2025-01-01 RX ADMIN — INSULIN LISPRO 4 UNITS: 100 INJECTION, SOLUTION INTRAVENOUS; SUBCUTANEOUS at 08:23

## 2025-01-01 RX ADMIN — IPRATROPIUM BROMIDE AND ALBUTEROL SULFATE 3 ML: 2.5; .5 SOLUTION RESPIRATORY (INHALATION) at 19:37

## 2025-01-01 RX ADMIN — IPRATROPIUM BROMIDE AND ALBUTEROL SULFATE 3 ML: 2.5; .5 SOLUTION RESPIRATORY (INHALATION) at 13:17

## 2025-01-01 RX ADMIN — CLONIDINE HYDROCHLORIDE 0.1 MG: 0.1 TABLET ORAL at 08:24

## 2025-01-01 RX ADMIN — METHYLPREDNISOLONE SODIUM SUCCINATE 60 MG: 125 INJECTION, POWDER, FOR SOLUTION INTRAMUSCULAR; INTRAVENOUS at 18:06

## 2025-01-01 RX ADMIN — GUAIFENESIN 600 MG: 600 TABLET, EXTENDED RELEASE ORAL at 08:24

## 2025-01-01 RX ADMIN — PANTOPRAZOLE SODIUM 40 MG: 40 TABLET, DELAYED RELEASE ORAL at 06:41

## 2025-01-01 RX ADMIN — Medication 10 ML: at 08:23

## 2025-01-01 RX ADMIN — ENOXAPARIN SODIUM 160 MG: 100 INJECTION SUBCUTANEOUS at 05:39

## 2025-01-01 RX ADMIN — IOPAMIDOL 100 ML: 612 INJECTION, SOLUTION INTRAVENOUS at 15:16

## 2025-01-01 RX ADMIN — VALSARTAN 320 MG: 80 TABLET, FILM COATED ORAL at 08:23

## 2025-01-01 RX ADMIN — ASPIRIN 81 MG 324 MG: 81 TABLET ORAL at 05:13

## 2025-01-01 RX ADMIN — IPRATROPIUM BROMIDE AND ALBUTEROL SULFATE 9 ML: 2.5; .5 SOLUTION RESPIRATORY (INHALATION) at 05:37

## 2025-01-01 RX ADMIN — METHYLPREDNISOLONE SODIUM SUCCINATE 125 MG: 125 INJECTION, POWDER, FOR SOLUTION INTRAMUSCULAR; INTRAVENOUS at 05:39

## 2025-01-01 RX ADMIN — BUDESONIDE 0.5 MG: 0.5 INHALANT RESPIRATORY (INHALATION) at 19:37

## 2025-01-01 NOTE — PLAN OF CARE
Patient currently on 15L hi olamide NC. Medications administered per orders. Respiratory panel collected.   Problem: Adult Inpatient Plan of Care  Goal: Absence of Hospital-Acquired Illness or Injury  Intervention: Identify and Manage Fall Risk  Recent Flowsheet Documentation  Taken 1/1/2025 1400 by Rachel Santiago RN  Safety Promotion/Fall Prevention: safety round/check completed  Taken 1/1/2025 1200 by Rachel Santiago RN  Safety Promotion/Fall Prevention: safety round/check completed  Taken 1/1/2025 0800 by Rachel Santiago RN  Safety Promotion/Fall Prevention: safety round/check completed  Intervention: Prevent Skin Injury  Recent Flowsheet Documentation  Taken 1/1/2025 1400 by Rachel Santiago RN  Body Position: sitting up in bed  Taken 1/1/2025 1200 by Rachel Santiago RN  Body Position:   right   side-lying  Taken 1/1/2025 1000 by Rachel Santiago RN  Body Position: side-lying  Taken 1/1/2025 0800 by Rachel Santiago RN  Body Position:   side-lying   left  Goal: Optimal Comfort and Wellbeing  Intervention: Provide Person-Centered Care  Recent Flowsheet Documentation  Taken 1/1/2025 0800 by Rachel Santiago RN  Trust Relationship/Rapport:   care explained   choices provided     Problem: Gas Exchange Impaired  Goal: Optimal Gas Exchange  Intervention: Optimize Oxygenation and Ventilation  Recent Flowsheet Documentation  Taken 1/1/2025 1400 by Rachel Santiago RN  Head of Bed (HOB) Positioning: HOB elevated  Taken 1/1/2025 1200 by Rachel Santiago RN  Head of Bed (HOB) Positioning: HOB lowered  Taken 1/1/2025 1000 by Rachel Santiago RN  Head of Bed (HOB) Positioning: HOB elevated     Problem: Pain Acute  Goal: Optimal Pain Control and Function  Intervention: Optimize Psychosocial Wellbeing  Recent Flowsheet Documentation  Taken 1/1/2025 0800 by Rachel Santiago RN  Supportive Measures: active listening utilized  Diversional Activities: television     Problem: Sepsis/Septic Shock  Goal: Optimal Coping  Intervention: Support Patient and  Family Response  Recent Flowsheet Documentation  Taken 1/1/2025 0800 by Rachel Santiago RN  Supportive Measures: active listening utilized  Family/Support System Care: support provided  Goal: Absence of Infection Signs and Symptoms  Intervention: Promote Recovery  Recent Flowsheet Documentation  Taken 1/1/2025 1400 by Rachel Santiago, RN  Activity Management: activity encouraged  Taken 1/1/2025 1200 by Rachel Santiago RN  Activity Management: activity encouraged  Taken 1/1/2025 1000 by Rachel Santiago RN  Activity Management: activity encouraged  Taken 1/1/2025 0800 by Rachel Santiago RN  Activity Management: activity encouraged   Goal Outcome Evaluation:

## 2025-01-01 NOTE — ED NOTES
Patient mouth breathing, patient reports that he always mouth breathes, requests a mask vs nasal cannula. Patient 91% on 5L via simple mask. Spo2 in high 80s while coughing.

## 2025-01-01 NOTE — NURSING NOTE
"Patient arrived on unit with ER tech. Patient walked from stretcher to the bathroom independently. Patient then walked to the bed and sat down. Patient oxygen was connected to wall and set to 5L simple mask. Patient comfortable and sating 90% O2 when not talking. Patient answered orientation and admission questions with RN at this time. Patient asked for \"sprite\" to be able to take his morning medications. Patient swallowed morning medication from RN without incident. Patient calm and told by RN to call out to nurses station if anything was needed at this time. Will continue course of treatment.   "

## 2025-01-01 NOTE — PROGRESS NOTES
"Pharmacy Consult - Enoxaparin Dosing    Cal Oliver Jr. is a 59 y.o. male who has been consulted to dose enoxaparin for VTE prophylaxis.     Allergies    Atorvastatin, Levothyroxine, Levothyroxine sodium, and Piroxicam    Relevant clinical data and objective history reviewed:     [Ht: 180.3 cm (71\"); Wt: (!) 168 kg (369 lb 14.9 oz)]  Body mass index is 51.6 kg/m².    Estimated Creatinine Clearance: 169.9 mL/min (by C-G formula based on SCr of 0.76 mg/dL).    Results from last 7 days   Lab Units 01/01/25  0357   HEMOGLOBIN g/dL 14.8   HEMATOCRIT % 45.8   PLATELETS 10*3/mm3 256   CREATININE mg/dL 0.76       Asessment/Plan    Initiate enoxaparin 60 mg SQ every 12 hours  Pharmacy will monitor Mr. Oliver's renal function and clinical status and adjust the enoxaparin dose and/or frequency as needed.    Thank you,  Yana Sanford RPH,PharmD  1/1/2025  06:47 EST      "

## 2025-01-01 NOTE — PROGRESS NOTES
[facility] HOSPITALIST    PROGRESS NOTE    Name:  Cal Oliver Jr.   Age:  59 y.o.  Sex:  male  :  1965  MRN:  [id number]   Visit Number:  [id number]  Admission Date:  2025  Date Of Service:  25  Primary Care Physician:  Woo Betancourt MD     LOS: 0 days :    Chief Complaint:      Shortness of air    Subjective:    Patient seen and examined.  States feeling better.  Does have known sleep apnea and wears CPAP at night.  States diabetes controlled but he knows of.  Only smokes when he is at work.  Denied recent fever cough and congestion.    Hospital Course:    Morbidly obese 59-year-old history of chronic tobacco abuse, uncontrolled high blood pressure, chronic back pain, diabetes, sleep apnea with home BiPAP use, who presented from home with complaints of shortness of breath.  Notes symptoms over the last 3 to 4 days, worsening.  Not been able to catch his breath with exertion, lying flat.  Noticed some swelling.  Has not been checking blood pressure.  No chest pain or palpitations.  Continues to smoke.  Unknown sick contacts.     In the ER he was hypertensive, heart rate in the 90s, afebrile, and hypoxic on room air.  CMP unremarkable.  proBNP of 110.  Troponins mildly elevated.  White count of 15 hemoglobin 14 platelets of 256.  Normal procalcitonin.  Negative flu COVID testing.  Chest x-ray with some interstitial edema.  Patient given DuoNeb, Solu-Medrol, Lasix, started on BiPAP.  Hospitalist consulted for further medical management.  Echo obtained.    Review of Systems:     All systems were reviewed and negative except as mentioned in subjective, assessment and plan.    Vital Signs:    Temp:  [98.4 °F (36.9 °C)-98.6 °F (37 °C)] 98.6 °F (37 °C)  Heart Rate:  [77-89] 77  Resp:  [20-26] 20  BP: (112-182)/() 112/58    Intake and output:    No intake/output data recorded.  I/O this shift:  In: 240 [P.O.:240]  Out: -     Physical Examination:    General Appearance:   "Alert and cooperative.  Chronically ill middle-age male.  Obese.   Head:  Atraumatic and normocephalic.   Eyes: Conjunctivae and sclerae normal, no icterus. No pallor.   Throat: No oral lesions, no thrush, oral mucosa moist.   Neck: Supple, trachea midline, no thyromegaly.   Lungs:   Breath sounds heard bilaterally equally.  Unlabored on CPAP.  Expiratory wheezes noted scattered.   Heart:  Normal S1 and S2, murmur, no gallop, no rub. No JVD.   Abdomen:   Normal bowel sounds, no masses, no organomegaly. Soft, nontender, nondistended, no rebound tenderness.   Extremities: Supple, trace bilateral lower extremity edema, no cyanosis, no clubbing.   Skin: No bleeding or rash.   Neurologic: Alert and oriented x 3. No facial asymmetry. Moves all four limbs. No tremors.      Laboratory results:    Results from last 7 days   Lab Units 01/01/25  0357   SODIUM mmol/L 139   POTASSIUM mmol/L 3.5   CHLORIDE mmol/L 92*   CO2 mmol/L 37.3*   BUN mg/dL 9   CREATININE mg/dL 0.76   CALCIUM mg/dL 9.0   BILIRUBIN mg/dL 0.4   ALK PHOS U/L 128*   ALT (SGPT) U/L 24   AST (SGOT) U/L 18   GLUCOSE mg/dL 244*     Results from last 7 days   Lab Units 01/01/25  0357   WBC 10*3/mm3 15.08*   HEMOGLOBIN g/dL 14.8   HEMATOCRIT % 45.8   PLATELETS 10*3/mm3 256         Results from last 7 days   Lab Units 01/01/25  0521 01/01/25  0357   HSTROP T ng/L 24* 24*         No results for input(s): \"PHART\", \"YTT9WVI\", \"PO2ART\", \"HIB0NXV\", \"BASEEXCESS\" in the last 8760 hours.   I have reviewed the patient's laboratory results.    Radiology results:    XR Chest 1 View    Result Date: 1/1/2025  PROCEDURE: XR CHEST 1 VW-  INDICATION:  SOA Triage Protocol  FINDINGS:  A portable view of the chest was obtained.  There is no prior exam for comparison. Heart size is normal. Lung volumes are low which likely accounts for fullness of the inferior right hilum. Increased interstitial markings are likely related to vascular crowding. No focal infiltrate, pleural effusion, or " pneumothorax.      Impression: Low lung volumes which likely accounts for fullness of the inferior right hilum and vascular crowding. Consider upright PA and lateral chest x-ray.   This report was signed and finalized on 1/1/2025 7:44 AM by Stefanie Bishop MD.     I have reviewed the patient's radiology reports.    Medication Review:     I have reviewed the patient's active and prn medications.     Problem List:      Acute respiratory failure with hypoxia    COPD exacerbation      Assessment:    COPD with acute exacerbation, POA  Possible diastolic heart failure, workup pending, POA  Hypertensive urgency, POA  Chronic tobacco abuse  Type 2 diabetes  Morbid obesity  Obstructive sleep apnea    Plan:    COPD:  -Solu-Medrol, Pulmicort, oxygen and continue BiPAP.  Wean as tolerated.  NicoDerm patch ordered     CHF?:  -Does have elevated blood pressures, some peripheral edema, interstitial edema noted on chest x-ray.    -Course of Lasix ordered.   -Echo pending.     Hypertension/tobacco use/diabetes/obesity/SHUKRI:  -continue home medications.  - Has had uncontrolled high blood pressure chronically it appears.    -Sliding scale insulin ordered.    -Monitor on Solu-Medrol.    -Continue with BiPAP.    I have reviewed the copied text and it is accurate as of 1/1/2025      DVT Prophylaxis: Enoxaparin   Code Status: Full  Diet: Diabetic  Discharge Plan: Likely home in 1-2 days.    Enid Hartley, APRN  01/01/25  12:21 EST    Dictated utilizing Dragon dictation.

## 2025-01-01 NOTE — PLAN OF CARE
Problem: Noninvasive Ventilation Acute  Goal: Effective Unassisted Ventilation and Oxygenation  Outcome: Progressing   Goal Outcome Evaluation: Patient wearing Bipap QHS and PRN.    RT EQUIPMENT DEVICE RELATED - SKIN ASSESSMENT    RT Medical Equipment/Device:  NIV Mask: Full-face  size: l    Skin Assessment: Nose: Intact    Device Skin Pressure Protection: Pressure points protected    Nurse Notification: Yisel Carolina, RRT

## 2025-01-01 NOTE — PLAN OF CARE
Problem: Noninvasive Ventilation Acute  Goal: Effective Unassisted Ventilation and Oxygenation  Reactivated     Problem: Adult Inpatient Plan of Care  Goal: Plan of Care Review  Reactivated  Goal: Patient-Specific Goal (Individualized)  Reactivated  Goal: Absence of Hospital-Acquired Illness or Injury  Reactivated  Goal: Optimal Comfort and Wellbeing  Reactivated  Goal: Readiness for Transition of Care  Reactivated     Problem: Gas Exchange Impaired  Goal: Optimal Gas Exchange  Reactivated     Problem: Pain Acute  Goal: Optimal Pain Control and Function  Reactivated   Goal Outcome Evaluation:

## 2025-01-01 NOTE — ED PROVIDER NOTES
EMERGENCY DEPARTMENT ENCOUNTER    Pt Name: Cal Oliver Jr.  MRN: [id number]  Pt :   1965  Room Number:  431/1  Date of encounter:  2025  PCP: Woo Betancourt MD  ED Provider: Dave Escamilla MD    Historian: Patient      HPI:  Chief Complaint: Dyspnea        Context: Cal Oliver Jr. is a 59 y.o. male who presents to the ED c/o dyspnea.  Patient has past history significant for hypertension and type 2 diabetes.  Patient says for the past 3 days has been feeling increasingly short of breath along with nonproductive cough.  Has associated orthopnea.  No fever, headache or sore throat.  Denies chest pain.      PAST MEDICAL HISTORY  Past Medical History:   Diagnosis Date    Asthma     Chronic back pain     Diabetes mellitus     High blood pressure     Hyperlipidemia     Migraine     Nausea and vomiting     Sleep apnea          PAST SURGICAL HISTORY  Past Surgical History:   Procedure Laterality Date    BACK SURGERY      GALLBLADDER SURGERY      KNEE SURGERY           FAMILY HISTORY  Family History   Problem Relation Age of Onset    Other Other         HIGH BLOOD PRESSURE    Hypertension Other     Heart disease Mother     Heart attack Mother     Heart disease Father          SOCIAL HISTORY  Social History     Socioeconomic History    Marital status:    Tobacco Use    Smoking status: Some Days     Current packs/day: 0.25     Average packs/day: 0.3 packs/day for 20.0 years (5.0 ttl pk-yrs)     Types: Cigarettes    Smokeless tobacco: Never    Tobacco comments:     not smoked since2020   Vaping Use    Vaping status: Never Used   Substance and Sexual Activity    Alcohol use: No    Drug use: No    Sexual activity: Defer         ALLERGIES  Atorvastatin, Levothyroxine, Levothyroxine sodium, and Piroxicam        REVIEW OF SYSTEMS    All systems reviewed and negative except for those discussed in HPI.       PHYSICAL EXAM    I have reviewed the triage vital signs and nursing notes.    ED Triage  Vitals [01/01/25 0340]   Temp Heart Rate Resp BP SpO2   98.4 °F (36.9 °C) 89 26 (!) 139/102 (!) 86 %      Temp src Heart Rate Source Patient Position BP Location FiO2 (%)   Oral Monitor Sitting Left arm --         General: Moderate acute distress  Skin: normal color, warm and dry  Head: normocephalic, atraumatic  Nose: normal nasal mucosa, no visible deformity.  Mouth: moist mucous membranes.  Neck: supple.  Chest: no retractions, no visible deformity  Cardiovascular: Regular rate and rhythm.  No murmur.    Lungs: Decreased bilateral breath sounds  Abdomen: soft, non-tender, non-distended. No rebound tenderness, no guarding.  Extremities: 1+ edema about the bilateral distal lower extremities.  Palpable radial pulses bilaterally.  Neuro:  alert and oriented x3, no focal neurological deficits.  Psych:  appropriate mood and behavior.        LAB RESULTS  Recent Results (from the past 24 hours)   Comprehensive Metabolic Panel    Collection Time: 01/01/25  3:57 AM    Specimen: Blood   Result Value Ref Range    Glucose 244 (H) 65 - 99 mg/dL    BUN 9 6 - 20 mg/dL    Creatinine 0.76 0.76 - 1.27 mg/dL    Sodium 139 136 - 145 mmol/L    Potassium 3.5 3.5 - 5.2 mmol/L    Chloride 92 (L) 98 - 107 mmol/L    CO2 37.3 (H) 22.0 - 29.0 mmol/L    Calcium 9.0 8.6 - 10.5 mg/dL    Total Protein 7.1 6.0 - 8.5 g/dL    Albumin 3.7 3.5 - 5.2 g/dL    ALT (SGPT) 24 1 - 41 U/L    AST (SGOT) 18 1 - 40 U/L    Alkaline Phosphatase 128 (H) 39 - 117 U/L    Total Bilirubin 0.4 0.0 - 1.2 mg/dL    Globulin 3.4 gm/dL    A/G Ratio 1.1 g/dL    BUN/Creatinine Ratio 11.8 7.0 - 25.0    Anion Gap 9.7 5.0 - 15.0 mmol/L    eGFR 104.8 >60.0 mL/min/1.73   BNP    Collection Time: 01/01/25  3:57 AM    Specimen: Blood   Result Value Ref Range    proBNP 111.6 0.0 - 900.0 pg/mL   High Sensitivity Troponin T    Collection Time: 01/01/25  3:57 AM    Specimen: Blood   Result Value Ref Range    HS Troponin T 24 (H) <22 ng/L   Green Top (Gel)    Collection Time: 01/01/25   3:57 AM   Result Value Ref Range    Extra Tube Hold for add-ons.    Lavender Top    Collection Time: 01/01/25  3:57 AM   Result Value Ref Range    Extra Tube hold for add-on    Gold Top - SST    Collection Time: 01/01/25  3:57 AM   Result Value Ref Range    Extra Tube Hold for add-ons.    Light Blue Top    Collection Time: 01/01/25  3:57 AM   Result Value Ref Range    Extra Tube Hold for add-ons.    CBC Auto Differential    Collection Time: 01/01/25  3:57 AM    Specimen: Blood   Result Value Ref Range    WBC 15.08 (H) 3.40 - 10.80 10*3/mm3    RBC 5.11 4.14 - 5.80 10*6/mm3    Hemoglobin 14.8 13.0 - 17.7 g/dL    Hematocrit 45.8 37.5 - 51.0 %    MCV 89.6 79.0 - 97.0 fL    MCH 29.0 26.6 - 33.0 pg    MCHC 32.3 31.5 - 35.7 g/dL    RDW 13.4 12.3 - 15.4 %    RDW-SD 44.6 37.0 - 54.0 fl    MPV 9.2 6.0 - 12.0 fL    Platelets 256 140 - 450 10*3/mm3    Neutrophil % 80.8 (H) 42.7 - 76.0 %    Lymphocyte % 9.9 (L) 19.6 - 45.3 %    Monocyte % 7.2 5.0 - 12.0 %    Eosinophil % 1.5 0.3 - 6.2 %    Basophil % 0.3 0.0 - 1.5 %    Immature Grans % 0.3 0.0 - 0.5 %    Neutrophils, Absolute 12.18 (H) 1.70 - 7.00 10*3/mm3    Lymphocytes, Absolute 1.50 0.70 - 3.10 10*3/mm3    Monocytes, Absolute 1.08 (H) 0.10 - 0.90 10*3/mm3    Eosinophils, Absolute 0.23 0.00 - 0.40 10*3/mm3    Basophils, Absolute 0.04 0.00 - 0.20 10*3/mm3    Immature Grans, Absolute 0.05 0.00 - 0.05 10*3/mm3    nRBC 0.0 0.0 - 0.2 /100 WBC   Procalcitonin    Collection Time: 01/01/25  3:57 AM    Specimen: Blood   Result Value Ref Range    Procalcitonin 0.05 0.00 - 0.25 ng/mL   D-dimer, Quantitative    Collection Time: 01/01/25  3:57 AM    Specimen: Blood   Result Value Ref Range    D-Dimer, Quantitative 1.99 (H) 0.00 - 0.57 MCGFEU/mL   COVID-19 and FLU A/B PCR, 1 HR TAT - Swab, Nasopharynx    Collection Time: 01/01/25  4:00 AM    Specimen: Nasopharynx; Swab   Result Value Ref Range    COVID19 Not Detected Not Detected - Ref. Range    Influenza A PCR Not Detected Not Detected     Influenza B PCR Not Detected Not Detected   Lactic Acid, Plasma    Collection Time: 01/01/25  5:21 AM    Specimen: Blood   Result Value Ref Range    Lactate 1.2 0.5 - 2.0 mmol/L   High Sensitivity Troponin T 1Hr    Collection Time: 01/01/25  5:21 AM    Specimen: Blood   Result Value Ref Range    HS Troponin T 24 (H) <22 ng/L    Troponin T Numeric Delta 0 ng/L    Troponin T % Delta 0 Abnormal if >/= 20% %       If labs were ordered, I independently reviewed the results and considered them in treating the patient.  See medical decision making discussion section for my interpretation of lab results.        RADIOLOGY  No Radiology Exams Resulted Within Past 24 Hours    I ordered and independently reviewed the above noted radiographic studies.  See radiologist's dictation for official interpretation.    Per my independent reading: Chest radiograph obtained and shows cardiomegaly and pulmonary vascular congestion based on my independent reading.            PROCEDURES    Procedures    ECG 12 Lead ED Triage Standing Order; SOA   Final Result          MEDICATIONS GIVEN IN ER    Medications   sodium chloride 0.9 % flush 10 mL (has no administration in time range)   amLODIPine (NORVASC) tablet 10 mg (has no administration in time range)   aspirin chewable tablet 81 mg (has no administration in time range)   carvedilol (COREG) tablet 12.5 mg (has no administration in time range)   cloNIDine (CATAPRES) tablet 0.1 mg (has no administration in time range)   terazosin (HYTRIN) capsule 1 mg (has no administration in time range)   methocarbamol (ROBAXIN) tablet 750 mg (has no administration in time range)   pantoprazole (PROTONIX) EC tablet 40 mg (40 mg Oral Given 1/1/25 0641)   rOPINIRole (REQUIP) tablet 3 mg (has no administration in time range)   valsartan (DIOVAN) tablet 320 mg (has no administration in time range)   sodium chloride 0.9 % flush 10 mL (has no administration in time range)   sodium chloride 0.9 % flush 10 mL (has  no administration in time range)   sodium chloride 0.9 % infusion 40 mL (has no administration in time range)   dextrose (GLUTOSE) oral gel 15 g (has no administration in time range)   dextrose (D50W) (25 g/50 mL) IV injection 25 g (has no administration in time range)   glucagon (GLUCAGEN) injection 1 mg (has no administration in time range)   Insulin Lispro (humaLOG) injection 2-9 Units (has no administration in time range)   Pharmacy to Dose enoxaparin (LOVENOX) (has no administration in time range)   nitroglycerin (NITROSTAT) SL tablet 0.4 mg (has no administration in time range)   ipratropium-albuterol (DUO-NEB) nebulizer solution 3 mL (3 mL Nebulization Given 1/1/25 0668)   methylPREDNISolone sodium succinate (SOLU-Medrol) injection 40 mg (has no administration in time range)   acetaminophen (TYLENOL) tablet 650 mg (has no administration in time range)     Or   acetaminophen (TYLENOL) 160 MG/5ML oral solution 650 mg (has no administration in time range)     Or   acetaminophen (TYLENOL) suppository 650 mg (has no administration in time range)   sennosides-docusate (PERICOLACE) 8.6-50 MG per tablet 2 tablet (has no administration in time range)     And   polyethylene glycol (MIRALAX) packet 17 g (has no administration in time range)     And   bisacodyl (DULCOLAX) EC tablet 5 mg (has no administration in time range)     And   bisacodyl (DULCOLAX) suppository 10 mg (has no administration in time range)   ondansetron ODT (ZOFRAN-ODT) disintegrating tablet 4 mg (has no administration in time range)     Or   ondansetron (ZOFRAN) injection 4 mg (has no administration in time range)   aluminum-magnesium hydroxide-simethicone (MAALOX MAX) 400-400-40 MG/5ML suspension 15 mL (has no administration in time range)   nicotine (NICODERM CQ) 21 MG/24HR patch 1 patch (1 patch Transdermal Not Given 1/1/25 0531)   nicotine polacrilex (NICORETTE) gum 4 mg (has no administration in time range)   guaiFENesin (MUCINEX) 12 hr tablet  600 mg (has no administration in time range)   Enoxaparin Sodium (LOVENOX) syringe 60 mg (has no administration in time range)   furosemide (LASIX) injection 80 mg (80 mg Intravenous Given 1/1/25 0512)   aspirin chewable tablet 324 mg (324 mg Oral Given 1/1/25 0513)   ipratropium-albuterol (DUO-NEB) nebulizer solution 9 mL (9 mL Nebulization Given 1/1/25 0537)   methylPREDNISolone sodium succinate (SOLU-Medrol) injection 125 mg (125 mg Intravenous Given 1/1/25 0539)   Enoxaparin Sodium (LOVENOX) syringe 160 mg (160 mg Subcutaneous Given 1/1/25 0539)         MEDICAL DECISION MAKING, PROGRESS, and CONSULTS    All labs, if obtained, have been independently reviewed by me.  All radiology studies, if obtained, have been reviewed by me and the radiologist dictating the report.  All EKG's, if obtained, have been independently viewed and interpreted by me/my attending physician.      Discussion below represents my analysis of pertinent findings related to patient's condition, differential diagnosis, treatment plan and final disposition.                         Differential diagnosis:    Differential diagnosis for this patient includes COPD, heart failure, viral syndrome, acute coronary syndrome, pneumothorax, pulmonary embolism, pericarditis, myocarditis, cardiac tamponade, pericardial effusion, aortic dissection, Boerhaave syndrome, other acute emergency.    Medical Decision Making Discussion:    Vitals reviewed demonstrate hypertension and hypoxia.  Patient placed initially on 3 L of oxygen by nasal cannula.  This was transition to BiPAP.    EKG obtained based my independent reading shows normal sinus rhythm.  No acute ischemic changes.  Normal OR, QRS and QT intervals.    Given patient's significant hypertension with cardiomegaly and pulmonary vascular congestion on chest radiograph there is concern for heart failure.  He was given 80 mg of IV Lasix.  Nitroglycerin drip was ordered given accelerated blood pressure  however this improved to the 150 systolic without nitroglycerin and was not started.  Patient does have a smoking history and therefore was also given DuoNebs and IV Solu-Medrol given the possibility of undiagnosed underlying COPD.    D-dimer was sent and is elevated.  Patient is unable to tolerate CT imaging at this time given significant orthopnea.  In the meantime he is given empiric dose of therapeutic Lovenox.    Case discussed with Dr. Paz who has accepted patient for hospitalization.    60 minutes of critical care provided. This time excludes other billable procedures. Time does include preparation of documents, medical consultations, review of old records, and direct bedside care. Patient is at high risk for life-threatening deterioration due to acute hypoxic respiratory failure.      Additional sources:    - External (non-ED) record review: Clinic note from November 12, 2024 documenting history of hypertension, type 2 diabetes.    - Chronic or social conditions impacting care: Hypertension, diabetes    Shared Decision Making:  After my consideration of clinical presentation and any laboratory/radiology studies obtained, I discussed the findings with the patient/patient representative who is in agreement with the treatment plan and the final disposition.   Risks and benefits of discharge and/or observation/admission were discussed.    Orders placed during this visit:  Orders Placed This Encounter   Procedures    COVID-19 and FLU A/B PCR, 1 HR TAT - Swab, Nasopharynx    Blood Culture - Blood,    Blood Culture - Blood,    XR Chest 1 View    Rochelle Draw    Comprehensive Metabolic Panel    BNP    High Sensitivity Troponin T    CBC Auto Differential    Procalcitonin    Lactic Acid, Plasma    High Sensitivity Troponin T 1Hr    D-dimer, Quantitative    Basic Metabolic Panel    Diet: Diabetic; Consistent Carbohydrate; Fluid Consistency: Thin (IDDSI 0)    Undress & Gown    Continuous Pulse Oximetry    Vital  Signs    Reason for COPD Admission: New Oxygen Requirements; Indicate COPD Diagnosis For Problem List: COPD exacerbation    Tobacco Cessation Education    Respiratory Treatment Education (MDI / Spacer / Nebulizer)    COPD Education    Cough / Deep Breathe    Vital Signs    Intake & Output    Weigh Patient    Oral Care    Maintain IV Access    Telemetry - Place Orders & Notify Provider of Results When Patient Experiences Acute Chest Pain, Dysrhythmia or Respiratory Distress    May Be Off Telemetry for Tests    Code Status and Medical Interventions: CPR (Attempt to Resuscitate); Full Support    Oxygen Therapy- Nasal Cannula; Titrate 1-6 LPM Per SpO2; 90 - 95%    NIPPV (CPAP or BIPAP)    Document Pulse Oximetry - On Room Air / Home O2 Level    Oscillating Positive Expiratory Pressure (OPEP)    POC Glucose 4x Daily Before Meals & at Bedtime    ECG 12 Lead ED Triage Standing Order; SOA    Adult Transthoracic Echo Complete w/ Color, Spectral and Contrast if necessary per protocol    Insert Peripheral IV    Insert Peripheral IV    Inpatient Admission    CBC & Differential    Green Top (Gel)    Lavender Top    Gold Top - SST    Light Blue Top       AS OF 07:30 EST VITALS:    BP - 173/97  HR - 87  TEMP - 98.6 °F (37 °C) (Oral)  O2 SATS - 91%                  DIAGNOSIS  Final diagnoses:   Acute hypoxic respiratory failure         DISPOSITION  Admit      Please note that portions of this document were completed with voice recognition software.        Dave Escamilla MD  01/01/25 [id number]

## 2025-01-01 NOTE — H&P
[facility]   [facility]ISTORY [facility]      Name:  Cal Oliver Jr.   Age:  59 y.o.  Sex:  male  :  1965  MRN:  [id number]   Visit Number:  [id number]  Admission Date:  2025  Date Of Service:  25  Primary Care Physician:  Woo Betancourt MD    Chief Complaint:     Short of breath    History Of Presenting Illness:      Morbidly obese 59-year-old history of chronic tobacco abuse, uncontrolled high blood pressure, chronic back pain, diabetes, sleep apnea with home BiPAP use, who presented from home with complaints of shortness of breath.  Notes symptoms over the last 3 to 4 days, worsening.  Not been able to catch his breath with exertion, lying flat.  Noticed some swelling.  Has not been checking blood pressure.  No chest pain or palpitations.  Continues to smoke.  Unknown sick contacts.    In the ER he was hypertensive, heart rate in the 90s, afebrile, and hypoxic on room air.  CMP unremarkable.  proBNP of 110.  Troponins mildly elevated.  White count of 15 hemoglobin 14 platelets of 256.  Normal procalcitonin.  Negative flu COVID testing.  Chest x-ray with some interstitial edema.  Patient given DuoNeb, Solu-Medrol, Lasix, started on BiPAP.  Will admit for workup    Review Of Systems:    All systems were reviewed and negative except as mentioned in history of presenting illness, assessment and plan.    Past Medical History: Patient  has a past medical history of Asthma, Chronic back pain, Diabetes mellitus, High blood pressure, Hyperlipidemia, Migraine, Nausea and vomiting, and Sleep apnea.    Past Surgical History: Patient  has a past surgical history that includes Gallbladder surgery; Back surgery; and Knee surgery.    Social History: Patient  reports that he has been smoking cigarettes. He has a 5 pack-year smoking history. He has never used smokeless tobacco. He reports that he does not drink alcohol and does not use drugs.    Family History:  Patient's family  history has been reviewed and found to be noncontributory.     Allergies:      Atorvastatin, Levothyroxine, Levothyroxine sodium, and Piroxicam    Home Medications:    Prior to Admission Medications       Prescriptions Last Dose Informant Patient Reported? Taking?    albuterol sulfate  (90 Base) MCG/ACT inhaler   No No    INHALE 2 PUFFS BY MOUTH EVERY 4 HOURS AS NEEDED FOR WHEEZING    amLODIPine (NORVASC) 10 MG tablet   No No    Take 1 tablet by mouth Daily.    aspirin 81 MG chewable tablet   No No    CHEW AND SWALLOW 1 TABLET BY MOUTH DAILY    carvedilol (Coreg) 12.5 MG tablet   No No    Take 1 tablet by mouth 2 (Two) Times a Day With Meals.    cloNIDine (CATAPRES) 0.1 MG tablet   No No    TAKE 1 TABLET BY MOUTH EVERY NIGHT AT BEDTIME    cyclobenzaprine (FLEXERIL) 10 MG tablet   No No    Take 1 tablet by mouth 2 (Two) Times a Day.    doxazosin (CARDURA) 2 MG tablet   No No    Take 1 tablet by mouth every night at bedtime.    Gel Base gel   No No    Ketoprofen 10%, Gabapentin 6%, Ketamine 5%, Lidocaine 5%, Amitriptyline 2%, Baclofen 2%, Bupivacaine 2%, Cyclobenaprine 2%, Meloxicam 01.% TDG    Apply 1 to 2 grams of pain gel to affected areas 3 to 4 times a day.    Gel Base gel   No No    Ketoprofen 15%, Lidocaine 5%, Bupivacaine 2%, Meloxicam 0.1% TDG Apply 1 to 2 grams of pain gel to affected areas 3 to 4 times a day.    metFORMIN (GLUCOPHAGE) 500 MG tablet   No No    TAKE 1 TABLET BY MOUTH TWICE DAILY    methocarbamol (ROBAXIN) 750 MG tablet   No No    Take 1 tablet by mouth 3 (Three) Times a Day As Needed for Muscle Spasms.    omeprazole (priLOSEC) 20 MG capsule   No No    Take 1 capsule by mouth Daily.    potassium chloride ER (K-TAB) 20 MEQ tablet controlled-release ER tablet   No No    Take 1 tablet by mouth 2 (Two) Times a Day With Meals.    rOPINIRole (REQUIP) 3 MG tablet   No No    Take 1 tablet by mouth every night at bedtime.    triamcinolone (KENALOG) 0.1 % cream   No No    Apply to affected area  "tid prn itch    valsartan (DIOVAN) 320 MG tablet   No No    Take 1 tablet by mouth Daily for 360 days.          ED Medications:    Medications   sodium chloride 0.9 % flush 10 mL (has no administration in time range)   nitroglycerin (TRIDIL) 200 mcg/ml infusion (0 mcg/min Intravenous Hold 1/1/25 0506)   furosemide (LASIX) injection 80 mg (80 mg Intravenous Given 1/1/25 0512)   aspirin chewable tablet 324 mg (324 mg Oral Given 1/1/25 0513)   ipratropium-albuterol (DUO-NEB) nebulizer solution 9 mL (9 mL Nebulization Given 1/1/25 0537)   methylPREDNISolone sodium succinate (SOLU-Medrol) injection 125 mg (125 mg Intravenous Given 1/1/25 0539)   Enoxaparin Sodium (LOVENOX) syringe 160 mg (160 mg Subcutaneous Given 1/1/25 0539)     Vital Signs:  Temp:  [98.4 °F (36.9 °C)] 98.4 °F (36.9 °C)  Heart Rate:  [80-89] 80  Resp:  [26] 26  BP: (139-182)/() 149/82        01/01/25  0340   Weight: (!) 165 kg (363 lb)     Body mass index is 50.63 kg/m².    Physical Exam:     Most recent vital Signs: /82 (BP Location: Left arm, Patient Position: Sitting)   Pulse 80   Temp 98.4 °F (36.9 °C) (Oral)   Resp 26   Ht 180.3 cm (71\")   Wt (!) 165 kg (363 lb)   SpO2 91%   BMI 50.63 kg/m²     Physical Exam  Constitutional:       Appearance: He is ill-appearing.   HENT:      Mouth/Throat:      Mouth: Mucous membranes are moist.      Pharynx: Oropharynx is clear.   Eyes:      Extraocular Movements: Extraocular movements intact.      Pupils: Pupils are equal, round, and reactive to light.   Cardiovascular:      Rate and Rhythm: Regular rhythm. Tachycardia present.      Pulses: Normal pulses.      Heart sounds: Murmur heard.   Pulmonary:      Effort: Respiratory distress present.      Breath sounds: Wheezing and rales present.   Abdominal:      General: Bowel sounds are normal. There is no distension.      Tenderness: There is no abdominal tenderness.   Musculoskeletal:         General: Normal range of motion.      Right lower " "leg: Edema present.      Left lower leg: Edema present.   Skin:     General: Skin is warm.      Capillary Refill: Capillary refill takes less than 2 seconds.   Neurological:      General: No focal deficit present.      Mental Status: He is alert.      Motor: Weakness present.         Laboratory data:    I have reviewed the labs done in the emergency room.    Results from last 7 days   Lab Units 01/01/25  0357   SODIUM mmol/L 139   POTASSIUM mmol/L 3.5   CHLORIDE mmol/L 92*   CO2 mmol/L 37.3*   BUN mg/dL 9   CREATININE mg/dL 0.76   CALCIUM mg/dL 9.0   BILIRUBIN mg/dL 0.4   ALK PHOS U/L 128*   ALT (SGPT) U/L 24   AST (SGOT) U/L 18   GLUCOSE mg/dL 244*     Results from last 7 days   Lab Units 01/01/25  0357   WBC 10*3/mm3 15.08*   HEMOGLOBIN g/dL 14.8   HEMATOCRIT % 45.8   PLATELETS 10*3/mm3 256         Results from last 7 days   Lab Units 01/01/25  0521 01/01/25  0357   HSTROP T ng/L 24* 24*     Results from last 7 days   Lab Units 01/01/25  0357   PROBNP pg/mL 111.6                       Invalid input(s): \"USDES\", \"NITRITITE\", \"BACT\", \"EP\"    Pain Management Panel           No data to display                EKG:      This is a sinus rhythm with a regular rate, no acute ST changes    Radiology:    No radiology results for the last 3 days    Assessment:    COPD with acute exacerbation, POA  Possible diastolic heart failure, workup pending, POA  Hypertensive urgency, POA  Chronic tobacco abuse  Type 2 diabetes  Morbid obesity  Obstructive sleep apnea    Plan:    Admit to telemetry    COPD:  Will place on bronchodilators, Solu-Medrol, Symbicort, oxygen and continue BiPAP.  Wean as tolerated.  NicoDerm patch ordered    CHF?:  Does have elevated blood pressures, some peripheral edema, interstitial edema noted on chest x-ray.  Course of Lasix ordered.  Echo will be ordered.    Hypertension/tobacco use/diabetes/obesity/SHUKRI:  Will restart blood pressure medications.  Has had uncontrolled high blood pressure chronically it " appears.  Sliding scale insulin ordered.  Monitor on Solu-Medrol.  Continue with BiPAP.    Further recommendations depend upon clinical course    Risk Assessment: High  DVT Prophylaxis: Enoxaparin   Code Status: Full  Diet: Diabetic    Advance Care Planning   ACP discussion was held with the patient during this visit. Patient does not have an advance directive, declines further assistance.           Puja Paz DO  01/01/25  05:53 EST    Dictated utilizing Dragon dictation.

## 2025-01-01 NOTE — CASE MANAGEMENT/SOCIAL WORK
Discharge Planning Assessment   [place]     Patient Name: Cal Oliver Jr.  MRN: [id number]  Today's Date: 1/1/2025    Admit Date: 1/1/2025    Plan: DCP Wants to return home with wife. Will consider Home Health.   Discharge Needs Assessment       Row Name 01/01/25 1658       Living Environment    People in Home spouse    Name(s) of People in Home Janelle/Wife    Current Living Arrangements home    Duration at Residence 3 years    Potentially Unsafe Housing Conditions unable to assess    In the past 12 months has the electric, gas, oil, or water company threatened to shut off services in your home? No    Primary Care Provided by self    Provides Primary Care For no one    Family Caregiver if Needed spouse    Family Caregiver Names Janelle/Wife    Quality of Family Relationships unable to assess    Able to Return to Prior Arrangements yes       Resource/Environmental Concerns    Resource/Environmental Concerns none    Transportation Concerns none       Transportation Needs    In the past 12 months, has lack of transportation kept you from medical appointments or from getting medications? no    In the past 12 months, has lack of transportation kept you from meetings, work, or from getting things needed for daily living? No       Food Insecurity    Within the past 12 months, you worried that your food would run out before you got the money to buy more. Never true    Within the past 12 months, the food you bought just didn't last and you didn't have money to get more. Never true       Transition Planning    Patient/Family Anticipates Transition to home with family    Patient/Family Anticipated Services at Transition home health care    Transportation Anticipated family or friend will provide       Discharge Needs Assessment    Readmission Within the Last 30 Days no previous admission in last 30 days    Current Outpatient/Agency/Support Group homecare agency    Equipment Currently Used at Home cpap    Concerns  "to be Addressed denies needs/concerns at this time    Do you want help finding or keeping work or a job? I do not need or want help    Do you want help with school or training? For example, starting or completing job training or getting a high school diploma, GED or equivalent No    Equipment Needed After Discharge other (see comments)  To be determined.    Outpatient/Agency/Support Group Needs homecare agency    Discharge Facility/Level of Care Needs home with home health    Provided Post Acute Provider List? N/A                   Discharge Plan       Row Name 25 1513       Plan    Plan DCP Wants to return home with wife. Will consider Home Health.    Plan Comments CM spoke with pt at bedside. Permission given to discuss DCP. Pt confirmed name,,address, and states \"My wife has my insurance cards\". No LW, No POA, No  service. PCP confirmed as GALINA Betancourt, last seen \"4 weeks ago\". Pharmacy used "[email or web address]. Agreed to meds to bed. DME listed above. Pt. states (I) with ADL's and (I) for mobility. Has a C-Pap with [employer]. States \"Uses at night\" Lives with wife at home.  Pt states still drives self for all appointments. DCP Return home with wife. Will consider Home Health if needed.                  Continued Care and Services - Admitted Since 2025    No active coordination exists for this encounter.          Demographic Summary       Row Name 25 9641       General Information    Admission Type inpatient    Arrived From emergency department    Referral Source admission list    Reason for Consult discharge planning    Preferred Language English       Contact Information    Permission Granted to Share Info With                    Functional Status       Row Name 25 5409       Functional Status    Usual Activity Tolerance fair    Current Activity Tolerance fair       Physical Activity    On average, how many days per week do you engage in moderate to strenuous exercise (like " "a brisk walk)? 0 days    On average, how many minutes do you engage in exercise at this level? 0 min    Number of minutes of exercise per week 0       Assessment of Health Literacy    How often do you have someone help you read hospital materials? Occasionally    How often do you have problems learning about your medical condition because of difficulty understanding written information? Occasionally    How often do you have a problem understanding what is told to you about your medical condition? Occasionally    How confident are you filling out medical forms by yourself? Quite a bit    Health Literacy Good       Functional Status, IADL    Medications independent    Meal Preparation assistive person    Housekeeping assistive person    Laundry assistive person    Shopping assistive person    If for any reason you need help with day-to-day activities such as bathing, preparing meals, shopping, managing finances, etc., do you get the help you need? I don't need any help    IADL Comments Wife assist with most IADL's       Mental Status    General Appearance WDL WDL       Mental Status Summary    Recent Changes in Mental Status/Cognitive Functioning no changes       Employment/    Employment Status disabled    Current or Previous Occupation traveling/driving    Employment/ Comments \"[employer] \"                   Psychosocial       Row Name 01/01/25 1573       Developmental Stage (Eriksson's Stages of Development)    Developmental Stage Stage 7 (35-65 years/Middle Adulthood) Generativity vs. Stagnation                   Abuse/Neglect    No documentation.                  Legal    No documentation.                  Substance Abuse    No documentation.                  Patient Forms    No documentation.                     Ella Barnhart RN    "

## 2025-01-02 LAB
A-A DO2: 276.2 MMHG
A-A DO2: 390.7 MMHG
ANION GAP SERPL CALCULATED.3IONS-SCNC: 6.5 MMOL/L (ref 5–15)
ARTERIAL PATENCY WRIST A: POSITIVE
ARTERIAL PATENCY WRIST A: POSITIVE
ATMOSPHERIC PRESS: 738 MMHG
ATMOSPHERIC PRESS: 741 MMHG
BASE EXCESS BLDA CALC-SCNC: 14.6 MMOL/L (ref 0–2)
BASE EXCESS BLDA CALC-SCNC: 15.5 MMOL/L (ref 0–2)
BASOPHILS # BLD AUTO: 0.03 10*3/MM3 (ref 0–0.2)
BASOPHILS NFR BLD AUTO: 0.1 % (ref 0–1.5)
BDY SITE: ABNORMAL
BDY SITE: ABNORMAL
BUN SERPL-MCNC: 13 MG/DL (ref 6–20)
BUN/CREAT SERPL: 15.9 (ref 7–25)
CALCIUM SPEC-SCNC: 8.7 MG/DL (ref 8.6–10.5)
CHLORIDE SERPL-SCNC: 94 MMOL/L (ref 98–107)
CO2 SERPL-SCNC: 42.5 MMOL/L (ref 22–29)
COHGB MFR BLD: 0.9 % (ref 0–2)
COHGB MFR BLD: 1 % (ref 0–2)
CREAT SERPL-MCNC: 0.82 MG/DL (ref 0.76–1.27)
DEPRECATED RDW RBC AUTO: 46.6 FL (ref 37–54)
EGFRCR SERPLBLD CKD-EPI 2021: 102.5 ML/MIN/1.73
EOSINOPHIL # BLD AUTO: 0.01 10*3/MM3 (ref 0–0.4)
EOSINOPHIL NFR BLD AUTO: 0 % (ref 0.3–6.2)
EPAP: 6
ERYTHROCYTE [DISTWIDTH] IN BLOOD BY AUTOMATED COUNT: 13.5 % (ref 12.3–15.4)
GLUCOSE BLDC GLUCOMTR-MCNC: 216 MG/DL (ref 70–130)
GLUCOSE BLDC GLUCOMTR-MCNC: 226 MG/DL (ref 70–130)
GLUCOSE BLDC GLUCOMTR-MCNC: 234 MG/DL (ref 70–130)
GLUCOSE BLDC GLUCOMTR-MCNC: 238 MG/DL (ref 70–130)
GLUCOSE SERPL-MCNC: 228 MG/DL (ref 65–99)
HCO3 BLDA-SCNC: 45.9 MMOL/L (ref 22–28)
HCO3 BLDA-SCNC: 47 MMOL/L (ref 22–28)
HCT VFR BLD AUTO: 43.7 % (ref 37.5–51)
HCT VFR BLD CALC: 42.5 %
HCT VFR BLD CALC: 43.5 %
HGB BLD-MCNC: 13.3 G/DL (ref 13–17.7)
IMM GRANULOCYTES # BLD AUTO: 0.14 10*3/MM3 (ref 0–0.05)
IMM GRANULOCYTES NFR BLD AUTO: 0.7 % (ref 0–0.5)
INHALED O2 CONCENTRATION: 65 %
INHALED O2 CONCENTRATION: 80 %
IPAP: 22
LYMPHOCYTES # BLD AUTO: 0.7 10*3/MM3 (ref 0.7–3.1)
LYMPHOCYTES NFR BLD AUTO: 3.4 % (ref 19.6–45.3)
Lab: ABNORMAL
MCH RBC QN AUTO: 28.7 PG (ref 26.6–33)
MCHC RBC AUTO-ENTMCNC: 30.4 G/DL (ref 31.5–35.7)
MCV RBC AUTO: 94.2 FL (ref 79–97)
METHGB BLD QL: 0.5 % (ref 0–1.5)
METHGB BLD QL: 0.6 % (ref 0–1.5)
MODALITY: ABNORMAL
MODALITY: ABNORMAL
MONOCYTES # BLD AUTO: 0.81 10*3/MM3 (ref 0.1–0.9)
MONOCYTES NFR BLD AUTO: 3.9 % (ref 5–12)
NEUTROPHILS NFR BLD AUTO: 19.08 10*3/MM3 (ref 1.7–7)
NEUTROPHILS NFR BLD AUTO: 91.9 % (ref 42.7–76)
NOTIFIED BY: ABNORMAL
NOTIFIED BY: ABNORMAL
NOTIFIED WHO: ABNORMAL
NOTIFIED WHO: ABNORMAL
NRBC BLD AUTO-RTO: 0 /100 WBC (ref 0–0.2)
OXYHGB MFR BLDV: 91.3 % (ref 94–99)
OXYHGB MFR BLDV: 93.3 % (ref 94–99)
PCO2 BLDA: 95.6 MM HG (ref 35–45)
PCO2 BLDA: 96.2 MM HG (ref 35–45)
PCO2 TEMP ADJ BLD: ABNORMAL MM[HG]
PCO2 TEMP ADJ BLD: ABNORMAL MM[HG]
PH BLDA: 7.29 PH UNITS (ref 7.3–7.5)
PH BLDA: 7.3 PH UNITS (ref 7.3–7.5)
PH, TEMP CORRECTED: ABNORMAL
PH, TEMP CORRECTED: ABNORMAL
PLATELET # BLD AUTO: 259 10*3/MM3 (ref 140–450)
PMV BLD AUTO: 9.5 FL (ref 6–12)
PO2 BLDA: 65.2 MM HG (ref 75–100)
PO2 BLDA: 72.1 MM HG (ref 75–100)
PO2 TEMP ADJ BLD: ABNORMAL MM[HG]
PO2 TEMP ADJ BLD: ABNORMAL MM[HG]
POTASSIUM SERPL-SCNC: 3.5 MMOL/L (ref 3.5–5.2)
PROCALCITONIN SERPL-MCNC: 0.05 NG/ML (ref 0–0.25)
RBC # BLD AUTO: 4.64 10*6/MM3 (ref 4.14–5.8)
SAO2 % BLDCOA: 92.7 % (ref 94–100)
SAO2 % BLDCOA: 94.7 % (ref 94–100)
SET MECH RESP RATE: 18
SODIUM SERPL-SCNC: 143 MMOL/L (ref 136–145)
VENTILATOR MODE: ABNORMAL
VENTILATOR MODE: ABNORMAL
WBC NRBC COR # BLD AUTO: 20.77 10*3/MM3 (ref 3.4–10.8)

## 2025-01-02 PROCEDURE — 99233 SBSQ HOSP IP/OBS HIGH 50: CPT | Performed by: NURSE PRACTITIONER

## 2025-01-02 PROCEDURE — 83050 HGB METHEMOGLOBIN QUAN: CPT

## 2025-01-02 PROCEDURE — 84145 PROCALCITONIN (PCT): CPT | Performed by: NURSE PRACTITIONER

## 2025-01-02 PROCEDURE — 85025 COMPLETE CBC W/AUTO DIFF WBC: CPT | Performed by: NURSE PRACTITIONER

## 2025-01-02 PROCEDURE — 94799 UNLISTED PULMONARY SVC/PX: CPT

## 2025-01-02 PROCEDURE — 82948 REAGENT STRIP/BLOOD GLUCOSE: CPT

## 2025-01-02 PROCEDURE — 82375 ASSAY CARBOXYHB QUANT: CPT

## 2025-01-02 PROCEDURE — 36600 WITHDRAWAL OF ARTERIAL BLOOD: CPT

## 2025-01-02 PROCEDURE — 94761 N-INVAS EAR/PLS OXIMETRY MLT: CPT

## 2025-01-02 PROCEDURE — 82948 REAGENT STRIP/BLOOD GLUCOSE: CPT | Performed by: FAMILY MEDICINE

## 2025-01-02 PROCEDURE — 80048 BASIC METABOLIC PNL TOTAL CA: CPT | Performed by: FAMILY MEDICINE

## 2025-01-02 PROCEDURE — 82805 BLOOD GASES W/O2 SATURATION: CPT

## 2025-01-02 PROCEDURE — 94664 DEMO&/EVAL PT USE INHALER: CPT

## 2025-01-02 PROCEDURE — 25010000002 FUROSEMIDE PER 20 MG: Performed by: NURSE PRACTITIONER

## 2025-01-02 PROCEDURE — 94660 CPAP INITIATION&MGMT: CPT

## 2025-01-02 PROCEDURE — 25010000002 METHYLPREDNISOLONE PER 125 MG: Performed by: NURSE PRACTITIONER

## 2025-01-02 PROCEDURE — 63710000001 INSULIN LISPRO (HUMAN) PER 5 UNITS: Performed by: FAMILY MEDICINE

## 2025-01-02 PROCEDURE — 25010000002 ENOXAPARIN PER 10 MG: Performed by: FAMILY MEDICINE

## 2025-01-02 RX ORDER — DEXMEDETOMIDINE HYDROCHLORIDE 4 UG/ML
0.2 INJECTION, SOLUTION INTRAVENOUS
Status: DISCONTINUED | OUTPATIENT
Start: 2025-01-02 | End: 2025-01-10

## 2025-01-02 RX ORDER — FUROSEMIDE 10 MG/ML
40 INJECTION INTRAMUSCULAR; INTRAVENOUS ONCE
Status: COMPLETED | OUTPATIENT
Start: 2025-01-02 | End: 2025-01-02

## 2025-01-02 RX ORDER — BENZONATATE 100 MG/1
100 CAPSULE ORAL 3 TIMES DAILY PRN
Status: DISCONTINUED | OUTPATIENT
Start: 2025-01-02 | End: 2025-01-10 | Stop reason: HOSPADM

## 2025-01-02 RX ORDER — FUROSEMIDE 10 MG/ML
80 INJECTION INTRAMUSCULAR; INTRAVENOUS ONCE
Status: DISCONTINUED | OUTPATIENT
Start: 2025-01-02 | End: 2025-01-02

## 2025-01-02 RX ADMIN — VALSARTAN 320 MG: 80 TABLET, FILM COATED ORAL at 08:06

## 2025-01-02 RX ADMIN — ENOXAPARIN SODIUM 60 MG: 60 INJECTION SUBCUTANEOUS at 18:44

## 2025-01-02 RX ADMIN — GUAIFENESIN 600 MG: 600 TABLET, EXTENDED RELEASE ORAL at 20:57

## 2025-01-02 RX ADMIN — INSULIN LISPRO 4 UNITS: 100 INJECTION, SOLUTION INTRAVENOUS; SUBCUTANEOUS at 20:57

## 2025-01-02 RX ADMIN — IPRATROPIUM BROMIDE AND ALBUTEROL SULFATE 3 ML: 2.5; .5 SOLUTION RESPIRATORY (INHALATION) at 00:05

## 2025-01-02 RX ADMIN — INSULIN LISPRO 4 UNITS: 100 INJECTION, SOLUTION INTRAVENOUS; SUBCUTANEOUS at 11:54

## 2025-01-02 RX ADMIN — IPRATROPIUM BROMIDE AND ALBUTEROL SULFATE 3 ML: 2.5; .5 SOLUTION RESPIRATORY (INHALATION) at 13:22

## 2025-01-02 RX ADMIN — ENOXAPARIN SODIUM 60 MG: 60 INJECTION SUBCUTANEOUS at 06:41

## 2025-01-02 RX ADMIN — IPRATROPIUM BROMIDE AND ALBUTEROL SULFATE 3 ML: 2.5; .5 SOLUTION RESPIRATORY (INHALATION) at 19:09

## 2025-01-02 RX ADMIN — INSULIN LISPRO 4 UNITS: 100 INJECTION, SOLUTION INTRAVENOUS; SUBCUTANEOUS at 16:35

## 2025-01-02 RX ADMIN — BENZONATATE 100 MG: 100 CAPSULE ORAL at 04:33

## 2025-01-02 RX ADMIN — DEXMEDETOMIDINE HYDROCHLORIDE 0.2 MCG/KG/HR: 400 INJECTION INTRAVENOUS at 22:12

## 2025-01-02 RX ADMIN — METHYLPREDNISOLONE SODIUM SUCCINATE 60 MG: 125 INJECTION, POWDER, FOR SOLUTION INTRAMUSCULAR; INTRAVENOUS at 18:45

## 2025-01-02 RX ADMIN — METHYLPREDNISOLONE SODIUM SUCCINATE 60 MG: 125 INJECTION, POWDER, FOR SOLUTION INTRAMUSCULAR; INTRAVENOUS at 06:41

## 2025-01-02 RX ADMIN — BUDESONIDE 0.5 MG: 0.5 INHALANT RESPIRATORY (INHALATION) at 19:09

## 2025-01-02 RX ADMIN — IPRATROPIUM BROMIDE AND ALBUTEROL SULFATE 3 ML: 2.5; .5 SOLUTION RESPIRATORY (INHALATION) at 07:06

## 2025-01-02 RX ADMIN — AMLODIPINE BESYLATE 10 MG: 5 TABLET ORAL at 08:06

## 2025-01-02 RX ADMIN — ASPIRIN 81 MG CHEWABLE TABLET 81 MG: 81 TABLET CHEWABLE at 08:06

## 2025-01-02 RX ADMIN — INSULIN LISPRO 4 UNITS: 100 INJECTION, SOLUTION INTRAVENOUS; SUBCUTANEOUS at 08:06

## 2025-01-02 RX ADMIN — FUROSEMIDE 40 MG: 10 INJECTION, SOLUTION INTRAMUSCULAR; INTRAVENOUS at 16:35

## 2025-01-02 RX ADMIN — CLONIDINE HYDROCHLORIDE 0.1 MG: 0.1 TABLET ORAL at 08:06

## 2025-01-02 RX ADMIN — Medication 10 ML: at 08:07

## 2025-01-02 RX ADMIN — PANTOPRAZOLE SODIUM 40 MG: 40 TABLET, DELAYED RELEASE ORAL at 06:41

## 2025-01-02 RX ADMIN — BUDESONIDE 0.5 MG: 0.5 INHALANT RESPIRATORY (INHALATION) at 07:06

## 2025-01-02 RX ADMIN — ROPINIROLE HYDROCHLORIDE 3 MG: 1 TABLET, FILM COATED ORAL at 20:57

## 2025-01-02 RX ADMIN — CARVEDILOL 12.5 MG: 6.25 TABLET, FILM COATED ORAL at 08:06

## 2025-01-02 RX ADMIN — GUAIFENESIN 600 MG: 600 TABLET, EXTENDED RELEASE ORAL at 08:06

## 2025-01-02 NOTE — PLAN OF CARE
Problem: Noninvasive Ventilation Acute  Goal: Effective Unassisted Ventilation and Oxygenation  Outcome: Progressing     Problem: Adult Inpatient Plan of Care  Goal: Plan of Care Review  Outcome: Progressing  Flowsheets  Taken 1/2/2025 1007 by Kristin Coles RN  Progress: no change  Taken 1/2/2025 0606 by Cici Cordon RN  Plan of Care Reviewed With: patient  Goal: Patient-Specific Goal (Individualized)  Outcome: Progressing  Goal: Absence of Hospital-Acquired Illness or Injury  Outcome: Progressing  Intervention: Identify and Manage Fall Risk  Recent Flowsheet Documentation  Taken 1/2/2025 0800 by Kristin Coles RN  Safety Promotion/Fall Prevention: safety round/check completed  Intervention: Prevent Skin Injury  Recent Flowsheet Documentation  Taken 1/2/2025 0800 by Kristin Coles RN  Body Position: position changed independently  Intervention: Prevent Infection  Recent Flowsheet Documentation  Taken 1/2/2025 0800 by Kristin Coles RN  Infection Prevention: environmental surveillance performed  Goal: Optimal Comfort and Wellbeing  Outcome: Progressing  Goal: Readiness for Transition of Care  Outcome: Progressing     Problem: Gas Exchange Impaired  Goal: Optimal Gas Exchange  Outcome: Progressing  Intervention: Optimize Oxygenation and Ventilation  Recent Flowsheet Documentation  Taken 1/2/2025 0800 by Kristin Coles RN  Head of Bed (HOB) Positioning: HOB elevated     Problem: Pain Acute  Goal: Optimal Pain Control and Function  Outcome: Progressing  Intervention: Prevent or Manage Pain  Recent Flowsheet Documentation  Taken 1/2/2025 0800 by Kristin Coles RN  Medication Review/Management: medications reviewed     Problem: Sepsis/Septic Shock  Goal: Optimal Coping  Outcome: Progressing  Goal: Absence of Bleeding  Outcome: Progressing  Goal: Blood Glucose Level Within Target Range  Outcome: Progressing  Goal: Absence of Infection Signs and Symptoms  Outcome: Progressing  Intervention: Initiate Sepsis  Management  Recent Flowsheet Documentation  Taken 1/2/2025 0800 by Kristin Coles, RN  Infection Prevention: environmental surveillance performed  Intervention: Promote Recovery  Recent Flowsheet Documentation  Taken 1/2/2025 0800 by Kristin Coles, RN  Activity Management: activity encouraged  Goal: Optimal Nutrition Delivery  Outcome: Progressing   Goal Outcome Evaluation:           Progress: no change

## 2025-01-02 NOTE — PLAN OF CARE
Goal Outcome Evaluation:  Plan of Care Reviewed With: patient        Progress: no change  Outcome Evaluation: PT ON BIPAP AT 80% O2 TO KEEP SATS >90%.   BP AND HR STABLE,  PT ALERT, ORIENTED X4.

## 2025-01-02 NOTE — PLAN OF CARE
Problem: Noninvasive Ventilation Acute  Goal: Effective Unassisted Ventilation and Oxygenation  Outcome: Progressing   Goal Outcome Evaluation:                      RT EQUIPMENT DEVICE RELATED - SKIN ASSESSMENT    Julien Score:  Julien Score: 21     RT Medical Equipment/Device:     NIV Mask:  Full-face    size: l    Skin Assessment:      Nose:  Intact    Device Skin Pressure Protection:  Pressure points protected    Nurse Notification:  No    Janelle Angel, CRT

## 2025-01-02 NOTE — PROGRESS NOTES
[facility] HOSPITALIST    PROGRESS NOTE    Name:  Cal Oliver Jr.   Age:  59 y.o.  Sex:  male  :  1965  MRN:  [id number]   Visit Number:  [id number]  Admission Date:  2025  Date Of Service:  25  Primary Care Physician:  Woo Betancourt MD     LOS: 1 day :    Chief Complaint:      Shortness of air    Subjective:    Patient seen and examined.  Has remained on 80% BiPAP throughout the night with worsening hypercapnia noted.  Patient currently alert and able to answer most questions.  Updated wife via phone.  Per nurse at bedside patient unable to come off of BiPAP    Hospital Course:    Morbidly obese 59-year-old history of chronic tobacco abuse, uncontrolled high blood pressure, chronic back pain, diabetes, sleep apnea with home BiPAP use, who presented from home with complaints of shortness of breath.  Notes symptoms over the last 3 to 4 days, worsening.  Not been able to catch his breath with exertion, lying flat.  Noticed some swelling.  Has not been checking blood pressure.  No chest pain or palpitations.  Continues to smoke.  Unknown sick contacts.     In the ER he was hypertensive, heart rate in the 90s, afebrile, and hypoxic on room air.  CMP unremarkable.  proBNP of 110.  Troponins mildly elevated.  White count of 15 hemoglobin 14 platelets of 256.  Normal procalcitonin.  Negative flu COVID testing.  Chest x-ray with some interstitial edema.  Patient given DuoNeb, Solu-Medrol, Lasix, started on BiPAP.  Hospitalist consulted for further medical management.  Echo obtained noting preserved EF with grade 2 diastolic dysfunction.  Patient appears euvolemic at this time.  CT rule out PE was obtained with no PE noted with bilateral segmental/subsegmental consolidation less likely favored to be pneumonia and more atelectasis at this time.    Patient continued on Solu-Medrol/DuoNeb/Pulmicort.  Patient unfortunately continues to require BiPAP 80% continuously.    Review of  Systems:     All systems were reviewed and negative except as mentioned in subjective, assessment and plan.    Vital Signs:    Temp:  [97 °F (36.1 °C)-98 °F (36.7 °C)] 97.7 °F (36.5 °C)  Heart Rate:  [71-96] 73  Resp:  [16-26] 22  BP: (121-148)/(68-85) 121/75    Intake and output:    I/O last 3 completed shifts:  In: 1200 [P.O.:1200]  Out: -   I/O this shift:  In: 240 [P.O.:240]  Out: -     Physical Examination:    General Appearance:  Alert and cooperative.  Chronically ill middle-age male.  Obese.   Head:  Atraumatic and normocephalic.   Eyes: Conjunctivae and sclerae normal, no icterus. No pallor.   Throat: No oral lesions, no thrush, oral mucosa moist.   Neck: Supple, trachea midline, no thyromegaly.   Lungs:   Breath sounds heard bilaterally equally.  Unlabored on BiPAP.  Diminished throughout.   Heart:  Normal S1 and S2, murmur, no gallop, no rub. No JVD.   Abdomen:   Normal bowel sounds, no masses, no organomegaly. Soft, nontender, nondistended, no rebound tenderness.   Extremities: Supple, no edema, no cyanosis, no clubbing.   Skin: No bleeding or rash.   Neurologic: Alert and oriented x 3. No facial asymmetry. Moves all four limbs. No tremors.      Laboratory results:    Results from last 7 days   Lab Units 01/02/25  0510 01/01/25  0357   SODIUM mmol/L 143 139   POTASSIUM mmol/L 3.5 3.5   CHLORIDE mmol/L 94* 92*   CO2 mmol/L 42.5* 37.3*   BUN mg/dL 13 9   CREATININE mg/dL 0.82 0.76   CALCIUM mg/dL 8.7 9.0   BILIRUBIN mg/dL  --  0.4   ALK PHOS U/L  --  128*   ALT (SGPT) U/L  --  24   AST (SGOT) U/L  --  18   GLUCOSE mg/dL 228* 244*     Results from last 7 days   Lab Units 01/01/25  0357   WBC 10*3/mm3 15.08*   HEMOGLOBIN g/dL 14.8   HEMATOCRIT % 45.8   PLATELETS 10*3/mm3 256         Results from last 7 days   Lab Units 01/01/25  0521 01/01/25  0357   HSTROP T ng/L 24* 24*     Results from last 7 days   Lab Units 01/01/25  0518   BLOODCX  No growth at 24 hours  No growth at 24 hours     Recent Labs      01/02/25  0926   PHART 7.297*   TTT0BHF 96.2*   PO2ART 65.2*   [id number] 47.0*   BASEEXCESS 15.5*      I have reviewed the patient's laboratory results.    Radiology results:    CT Angiogram Chest Pulmonary Embolism    Result Date: 1/1/2025  FINAL REPORT TECHNIQUE: null CLINICAL HISTORY: hypoxia COMPARISON: null FINDINGS: CTA chest with 3-D postprocessing Comparison: 3/1/24 Findings: Study quality is adequate for the diagnosis of pulmonary embolism. No pulmonary embolism. Cardiomegaly. RV/LV ratio is normal. Mild calcified coronary artery disease. No aortic dissection or aneurysm. No calcified atherosclerotic disease. No lymphadenopathy. Bilateral segmental/subsegmental consolidation. No pneumothorax or pleural effusion. No acute osseous or soft tissue abnormality. No acute pathology in the imaged portion of the upper abdomen. Status post cholecystectomy.     Impression: Impression: No pulmonary embolism. Bilateral segmental/subsegmental consolidation which is favored to be atelectasis. Pneumonia is considered less likely. Authenticated and Electronically Signed by Marie June MD on 01/01/2025 05:30:50 PM    XR Chest 1 View    Result Date: 1/1/2025  PROCEDURE: XR CHEST 1 VW-  INDICATION:  SOA Triage Protocol  FINDINGS:  A portable view of the chest was obtained.  There is no prior exam for comparison. Heart size is normal. Lung volumes are low which likely accounts for fullness of the inferior right hilum. Increased interstitial markings are likely related to vascular crowding. No focal infiltrate, pleural effusion, or pneumothorax.      Impression: Low lung volumes which likely accounts for fullness of the inferior right hilum and vascular crowding. Consider upright PA and lateral chest x-ray.   This report was signed and finalized on 1/1/2025 7:44 AM by Stefanie Bishop MD.     I have reviewed the patient's radiology reports.    Medication Review:     I have reviewed the patient's active and prn medications.      Problem List:      Acute respiratory failure with hypoxia    COPD exacerbation      Assessment:    Respiratory failure with hypoxia/hypercapnia, POA  COPD with acute exacerbation, POA  Possible diastolic heart failure, workup pending, POA  Hypertensive urgency, POA  Chronic tobacco abuse  Type 2 diabetes  Morbid obesity  Obstructive sleep apnea    Plan:    Respiratory failure with hypoxia/hypercapnia/COPD/CHF  -Solu-Medrol, Pulmicort, oxygen and continue BiPAP.  Wean as tolerated.  NicoDerm patch ordered  -Respiratory panel negative.  -CT noting consolidation with likely atelectasis.  Will repeat CBC/procalcitonin.  Patient remains afebrile.  Prior procalcitonin negative.  -Will continue Lasix as well.  BNP normal upon admission.  Echo did note grade 2 diastolic dysfunction with preserved EF.  -Will repeat ABG as patient CO2 levels 96.  Patient will be transferred to ICU.  Given no pulmonology available will also reach out to [facility] for possible transfer.     Hypertension/tobacco use/diabetes/obesity/SHUKRI:  -continue home medications.  - Has had uncontrolled high blood pressure chronically it appears.    -Sliding scale insulin ordered.     -Discussed with attending provider.  Patient high risk for decline and will be transferred to ICU for closer observation pending transfer for pulmonology consult.    Addendum 1400-contacted [facility] with patient placed on wait list at this time.    I have reviewed the copied text and it is accurate as of 1/2/2025      DVT Prophylaxis: Enoxaparin   Code Status: Full  Diet: Diabetic  Discharge Plan: TBD    Endi Hartley, APRN  01/02/25  14:05 EST    Dictated utilizing Dragon dictation.

## 2025-01-02 NOTE — NURSING NOTE
Transferred to the ICU this shift. A&Ox4, nsr, bipap at 65%. Pt appeared anxious and restless upon initial assessment, but is now resting comfortably. Precedex gtt held att per MD.

## 2025-01-03 ENCOUNTER — APPOINTMENT (OUTPATIENT)
Dept: GENERAL RADIOLOGY | Facility: HOSPITAL | Age: 60
End: 2025-01-03
Payer: MEDICARE

## 2025-01-03 LAB
A-A DO2: 295 MMHG
A-A DO2: ABNORMAL
ANION GAP SERPL CALCULATED.3IONS-SCNC: 8.4 MMOL/L (ref 5–15)
ARTERIAL PATENCY WRIST A: ABNORMAL
ARTERIAL PATENCY WRIST A: POSITIVE
ATMOSPHERIC PRESS: 737 MMHG
ATMOSPHERIC PRESS: 739 MMHG
BASE EXCESS BLDA CALC-SCNC: 13.7 MMOL/L (ref 0–2)
BASE EXCESS BLDA CALC-SCNC: 14 MMOL/L (ref 0–2)
BASOPHILS # BLD AUTO: 0.02 10*3/MM3 (ref 0–0.2)
BASOPHILS NFR BLD AUTO: 0.1 % (ref 0–1.5)
BDY SITE: ABNORMAL
BDY SITE: ABNORMAL
BUN SERPL-MCNC: 26 MG/DL (ref 6–20)
BUN/CREAT SERPL: 25.7 (ref 7–25)
CALCIUM SPEC-SCNC: 8.6 MG/DL (ref 8.6–10.5)
CHLORIDE SERPL-SCNC: 91 MMOL/L (ref 98–107)
CO2 SERPL-SCNC: 36.6 MMOL/L (ref 22–29)
COHGB MFR BLD: 0.8 % (ref 0–2)
COHGB MFR BLD: 0.9 % (ref 0–2)
CREAT SERPL-MCNC: 1.01 MG/DL (ref 0.76–1.27)
DEPRECATED RDW RBC AUTO: 47.2 FL (ref 37–54)
EGFRCR SERPLBLD CKD-EPI 2021: 86.7 ML/MIN/1.73
EOSINOPHIL # BLD AUTO: 0.02 10*3/MM3 (ref 0–0.4)
EOSINOPHIL NFR BLD AUTO: 0.1 % (ref 0.3–6.2)
EPAP: 6
ERYTHROCYTE [DISTWIDTH] IN BLOOD BY AUTOMATED COUNT: 13.4 % (ref 12.3–15.4)
GAS FLOW AIRWAY: 12 LPM
GLUCOSE BLDC GLUCOMTR-MCNC: 218 MG/DL (ref 70–130)
GLUCOSE BLDC GLUCOMTR-MCNC: 239 MG/DL (ref 70–130)
GLUCOSE BLDC GLUCOMTR-MCNC: 242 MG/DL (ref 70–130)
GLUCOSE BLDC GLUCOMTR-MCNC: 263 MG/DL (ref 70–130)
GLUCOSE SERPL-MCNC: 237 MG/DL (ref 65–99)
HCO3 BLDA-SCNC: 43.5 MMOL/L (ref 22–28)
HCO3 BLDA-SCNC: 44.8 MMOL/L (ref 22–28)
HCT VFR BLD AUTO: 45.1 % (ref 37.5–51)
HCT VFR BLD CALC: 44.1 %
HCT VFR BLD CALC: 44.6 %
HGB BLD-MCNC: 13.7 G/DL (ref 13–17.7)
IMM GRANULOCYTES # BLD AUTO: 0.09 10*3/MM3 (ref 0–0.05)
IMM GRANULOCYTES NFR BLD AUTO: 0.5 % (ref 0–0.5)
INHALED O2 CONCENTRATION: 70 %
IPAP: 22
LYMPHOCYTES # BLD AUTO: 1.24 10*3/MM3 (ref 0.7–3.1)
LYMPHOCYTES NFR BLD AUTO: 7.3 % (ref 19.6–45.3)
Lab: ABNORMAL
MCH RBC QN AUTO: 29 PG (ref 26.6–33)
MCHC RBC AUTO-ENTMCNC: 30.4 G/DL (ref 31.5–35.7)
MCV RBC AUTO: 95.3 FL (ref 79–97)
METHGB BLD QL: 0 % (ref 0–1.5)
METHGB BLD QL: 0.5 % (ref 0–1.5)
MODALITY: ABNORMAL
MODALITY: ABNORMAL
MONOCYTES # BLD AUTO: 1.19 10*3/MM3 (ref 0.1–0.9)
MONOCYTES NFR BLD AUTO: 7 % (ref 5–12)
NEUTROPHILS NFR BLD AUTO: 14.41 10*3/MM3 (ref 1.7–7)
NEUTROPHILS NFR BLD AUTO: 85 % (ref 42.7–76)
NOTIFIED BY: ABNORMAL
NOTIFIED BY: ABNORMAL
NOTIFIED WHO: ABNORMAL
NOTIFIED WHO: ABNORMAL
NRBC BLD AUTO-RTO: 0 /100 WBC (ref 0–0.2)
OXYHGB MFR BLDV: 91.2 % (ref 94–99)
OXYHGB MFR BLDV: 96.9 % (ref 94–99)
PCO2 BLDA: 77.1 MM HG (ref 35–45)
PCO2 BLDA: 90.9 MM HG (ref 35–45)
PCO2 TEMP ADJ BLD: ABNORMAL MM[HG]
PCO2 TEMP ADJ BLD: ABNORMAL MM[HG]
PH BLDA: 7.3 PH UNITS (ref 7.3–7.5)
PH BLDA: 7.36 PH UNITS (ref 7.3–7.5)
PH, TEMP CORRECTED: ABNORMAL
PH, TEMP CORRECTED: ABNORMAL
PLATELET # BLD AUTO: 244 10*3/MM3 (ref 140–450)
PMV BLD AUTO: 9.8 FL (ref 6–12)
PO2 BLDA: 61.8 MM HG (ref 75–100)
PO2 BLDA: 92.4 MM HG (ref 75–100)
PO2 TEMP ADJ BLD: ABNORMAL MM[HG]
PO2 TEMP ADJ BLD: ABNORMAL MM[HG]
POTASSIUM SERPL-SCNC: 4.3 MMOL/L (ref 3.5–5.2)
RBC # BLD AUTO: 4.73 10*6/MM3 (ref 4.14–5.8)
SAO2 % BLDCOA: 92.4 % (ref 94–100)
SAO2 % BLDCOA: 97.9 % (ref 94–100)
SET MECH RESP RATE: 20
SODIUM SERPL-SCNC: 136 MMOL/L (ref 136–145)
VENTILATOR MODE: ABNORMAL
VENTILATOR MODE: ABNORMAL
WBC NRBC COR # BLD AUTO: 16.97 10*3/MM3 (ref 3.4–10.8)

## 2025-01-03 PROCEDURE — 82948 REAGENT STRIP/BLOOD GLUCOSE: CPT

## 2025-01-03 PROCEDURE — 25010000002 LORAZEPAM PER 2 MG: Performed by: FAMILY MEDICINE

## 2025-01-03 PROCEDURE — 25810000003 SODIUM CHLORIDE 0.9 % SOLUTION: Performed by: INTERNAL MEDICINE

## 2025-01-03 PROCEDURE — 94799 UNLISTED PULMONARY SVC/PX: CPT

## 2025-01-03 PROCEDURE — 71045 X-RAY EXAM CHEST 1 VIEW: CPT

## 2025-01-03 PROCEDURE — 36600 WITHDRAWAL OF ARTERIAL BLOOD: CPT

## 2025-01-03 PROCEDURE — 25010000002 ENOXAPARIN PER 10 MG: Performed by: FAMILY MEDICINE

## 2025-01-03 PROCEDURE — 94660 CPAP INITIATION&MGMT: CPT

## 2025-01-03 PROCEDURE — 63710000001 INSULIN LISPRO (HUMAN) PER 5 UNITS: Performed by: FAMILY MEDICINE

## 2025-01-03 PROCEDURE — 82948 REAGENT STRIP/BLOOD GLUCOSE: CPT | Performed by: FAMILY MEDICINE

## 2025-01-03 PROCEDURE — 25010000002 METHYLPREDNISOLONE PER 125 MG: Performed by: NURSE PRACTITIONER

## 2025-01-03 PROCEDURE — 80048 BASIC METABOLIC PNL TOTAL CA: CPT | Performed by: NURSE PRACTITIONER

## 2025-01-03 PROCEDURE — 83050 HGB METHEMOGLOBIN QUAN: CPT

## 2025-01-03 PROCEDURE — 85025 COMPLETE CBC W/AUTO DIFF WBC: CPT | Performed by: NURSE PRACTITIONER

## 2025-01-03 PROCEDURE — 82805 BLOOD GASES W/O2 SATURATION: CPT

## 2025-01-03 PROCEDURE — 82375 ASSAY CARBOXYHB QUANT: CPT

## 2025-01-03 PROCEDURE — 99232 SBSQ HOSP IP/OBS MODERATE 35: CPT | Performed by: STUDENT IN AN ORGANIZED HEALTH CARE EDUCATION/TRAINING PROGRAM

## 2025-01-03 RX ORDER — LORAZEPAM 2 MG/ML
2 INJECTION INTRAMUSCULAR EVERY 4 HOURS PRN
Status: DISPENSED | OUTPATIENT
Start: 2025-01-03 | End: 2025-01-08

## 2025-01-03 RX ORDER — SODIUM CHLORIDE 9 MG/ML
100 INJECTION, SOLUTION INTRAVENOUS CONTINUOUS
Status: DISCONTINUED | OUTPATIENT
Start: 2025-01-03 | End: 2025-01-04

## 2025-01-03 RX ORDER — HALOPERIDOL 5 MG/ML
1 INJECTION INTRAMUSCULAR EVERY 6 HOURS PRN
Status: DISCONTINUED | OUTPATIENT
Start: 2025-01-03 | End: 2025-01-05

## 2025-01-03 RX ORDER — PANTOPRAZOLE SODIUM 40 MG/10ML
40 INJECTION, POWDER, LYOPHILIZED, FOR SOLUTION INTRAVENOUS
Status: DISCONTINUED | OUTPATIENT
Start: 2025-01-03 | End: 2025-01-10 | Stop reason: HOSPADM

## 2025-01-03 RX ADMIN — ASPIRIN 81 MG CHEWABLE TABLET 81 MG: 81 TABLET CHEWABLE at 11:53

## 2025-01-03 RX ADMIN — ENOXAPARIN SODIUM 60 MG: 60 INJECTION SUBCUTANEOUS at 17:46

## 2025-01-03 RX ADMIN — IPRATROPIUM BROMIDE AND ALBUTEROL SULFATE 3 ML: 2.5; .5 SOLUTION RESPIRATORY (INHALATION) at 01:25

## 2025-01-03 RX ADMIN — LORAZEPAM 2 MG: 2 INJECTION INTRAMUSCULAR; INTRAVENOUS at 21:59

## 2025-01-03 RX ADMIN — DEXMEDETOMIDINE HYDROCHLORIDE 0.6 MCG/KG/HR: 400 INJECTION INTRAVENOUS at 05:16

## 2025-01-03 RX ADMIN — CARVEDILOL 12.5 MG: 6.25 TABLET, FILM COATED ORAL at 17:50

## 2025-01-03 RX ADMIN — MUPIROCIN 1 APPLICATION: 20 OINTMENT TOPICAL at 21:10

## 2025-01-03 RX ADMIN — AMLODIPINE BESYLATE 10 MG: 5 TABLET ORAL at 11:53

## 2025-01-03 RX ADMIN — METHYLPREDNISOLONE SODIUM SUCCINATE 60 MG: 125 INJECTION, POWDER, FOR SOLUTION INTRAMUSCULAR; INTRAVENOUS at 17:46

## 2025-01-03 RX ADMIN — DEXMEDETOMIDINE HYDROCHLORIDE 0.5 MCG/KG/HR: 400 INJECTION INTRAVENOUS at 21:38

## 2025-01-03 RX ADMIN — PANTOPRAZOLE SODIUM 40 MG: 40 INJECTION, POWDER, FOR SOLUTION INTRAVENOUS at 06:25

## 2025-01-03 RX ADMIN — METHYLPREDNISOLONE SODIUM SUCCINATE 60 MG: 125 INJECTION, POWDER, FOR SOLUTION INTRAMUSCULAR; INTRAVENOUS at 06:25

## 2025-01-03 RX ADMIN — INSULIN LISPRO 4 UNITS: 100 INJECTION, SOLUTION INTRAVENOUS; SUBCUTANEOUS at 08:13

## 2025-01-03 RX ADMIN — ENOXAPARIN SODIUM 60 MG: 60 INJECTION SUBCUTANEOUS at 06:26

## 2025-01-03 RX ADMIN — GUAIFENESIN 600 MG: 600 TABLET, EXTENDED RELEASE ORAL at 21:10

## 2025-01-03 RX ADMIN — GUAIFENESIN 600 MG: 600 TABLET, EXTENDED RELEASE ORAL at 11:53

## 2025-01-03 RX ADMIN — VALSARTAN 320 MG: 80 TABLET, FILM COATED ORAL at 11:54

## 2025-01-03 RX ADMIN — IPRATROPIUM BROMIDE AND ALBUTEROL SULFATE 3 ML: 2.5; .5 SOLUTION RESPIRATORY (INHALATION) at 18:39

## 2025-01-03 RX ADMIN — INSULIN LISPRO 4 UNITS: 100 INJECTION, SOLUTION INTRAVENOUS; SUBCUTANEOUS at 21:10

## 2025-01-03 RX ADMIN — INSULIN LISPRO 4 UNITS: 100 INJECTION, SOLUTION INTRAVENOUS; SUBCUTANEOUS at 11:53

## 2025-01-03 RX ADMIN — IPRATROPIUM BROMIDE AND ALBUTEROL SULFATE 3 ML: 2.5; .5 SOLUTION RESPIRATORY (INHALATION) at 12:59

## 2025-01-03 RX ADMIN — TERAZOSIN HYDROCHLORIDE 1 MG: 1 CAPSULE ORAL at 21:10

## 2025-01-03 RX ADMIN — Medication 10 ML: at 08:13

## 2025-01-03 RX ADMIN — Medication 10 ML: at 21:11

## 2025-01-03 RX ADMIN — DEXMEDETOMIDINE HYDROCHLORIDE 0.6 MCG/KG/HR: 400 INJECTION INTRAVENOUS at 02:22

## 2025-01-03 RX ADMIN — SODIUM CHLORIDE 100 ML/HR: 9 INJECTION, SOLUTION INTRAVENOUS at 21:39

## 2025-01-03 RX ADMIN — MUPIROCIN 1 APPLICATION: 20 OINTMENT TOPICAL at 08:13

## 2025-01-03 RX ADMIN — LORAZEPAM 2 MG: 2 INJECTION INTRAMUSCULAR; INTRAVENOUS at 04:13

## 2025-01-03 RX ADMIN — INSULIN LISPRO 4 UNITS: 100 INJECTION, SOLUTION INTRAVENOUS; SUBCUTANEOUS at 17:46

## 2025-01-03 RX ADMIN — IPRATROPIUM BROMIDE AND ALBUTEROL SULFATE 3 ML: 2.5; .5 SOLUTION RESPIRATORY (INHALATION) at 07:31

## 2025-01-03 RX ADMIN — ROPINIROLE HYDROCHLORIDE 3 MG: 1 TABLET, FILM COATED ORAL at 21:10

## 2025-01-03 RX ADMIN — NICOTINE 1 PATCH: 21 PATCH TRANSDERMAL at 06:26

## 2025-01-03 RX ADMIN — LORAZEPAM 2 MG: 2 INJECTION INTRAMUSCULAR; INTRAVENOUS at 17:46

## 2025-01-03 RX ADMIN — SODIUM CHLORIDE 100 ML/HR: 9 INJECTION, SOLUTION INTRAVENOUS at 11:54

## 2025-01-03 NOTE — PLAN OF CARE
Problem: Noninvasive Ventilation Acute  Goal: Effective Unassisted Ventilation and Oxygenation  Outcome: Progressing  Intervention: Monitor and Manage Noninvasive Ventilation  Flowsheets (Taken 1/3/2025 6205)  Airway/Ventilation Management:   airway patency maintained   positive pressure ventilation provided   oxygen therapy provided  NPPV/CPAP Maintenance:   full face mask   proper fit/secure   Goal Outcome Evaluation:

## 2025-01-03 NOTE — PROGRESS NOTES
RT EQUIPMENT DEVICE RELATED - SKIN ASSESSMENT    Julien Score:  Julien Score: 14     RT Medical Equipment/Device:     NIV Mask:  Full-face    size: l    Skin Assessment:      Cheek:  Intact  Nose:  Intact    Device Skin Pressure Protection:  Pressure points protected    Nurse Notification:  Yisel Cole, CRT

## 2025-01-03 NOTE — PROGRESS NOTES
RT EQUIPMENT DEVICE RELATED - SKIN ASSESSMENT    Julien Score:  Julien Score: 18     RT Medical Equipment/Device:     NIV Mask:  Full-face    size: lg    Skin Assessment:      Nose:  Intact    Device Skin Pressure Protection:   none    Nurse Notification:  No    Dilia Justice, CRT

## 2025-01-03 NOTE — PLAN OF CARE
Goal Outcome Evaluation:      Pt became increasing more awake and oriented during morning. Tolerated being on 15L HFNC during meals. Pt continues to see visual hallucinations and started becoming restless later in shift. Precedex restarted for agitation. Epstein placed per nephrology

## 2025-01-03 NOTE — CONSULTS
[facility]      Nephrology Consultation      Referring Provider:   No ref. provider found    Reason for Consultation:  Oliguria and not responding to diuretics    Subjective:  Chief complaint   Chief Complaint   Patient presents with    Shortness of Breath     History of present illness:    Patient is a morbidly obese 59-year-old male with longstanding history of tobacco use and abuse, uncontrolled blood pressure, chronic back pain, type 2 diabetes and obstructive sleep apnea with home BiPAP use.  He presented from home with significant worsening of his shortness of breath, it started about 3 to 4 days ago and on the day of admission he felt bad that made him come to the ER.  He does feel that there was some increased lower extremity edema.  In the ER he was noted to have proBNP of 110 troponin mildly elevated with increased white count and a hemoglobin of 14.  Negative flu and COVID testing.  He was aggressively treated with Solu-Medrol Lasix and BiPAP as well as DuoNebs.  He was admitted through the hospitalist service for further evaluation and treatment.  Since admission he has received another dose of Lasix and has only made 100 cc of urine overnight.  Serum creatinine has been gradually increasing from 0.76-0.82 and today 1.01.  The initial ABG shows pCO2 of 96.2 and this morning it is down to 90.9.  His pH is improved to 7.30 today.  Currently patient is on BiPAP no meaningful interaction.  I have reviewed labs/imaging/records from this hospitalization, including ER staff and admitting/attending physicians H/P's and progress notes to establish a comprehensive understanding of this patient's clinical hospital course, as well as to establish plan of care appropriately.     Past Medical History:   Diagnosis Date    Asthma     Chronic back pain     Diabetes mellitus     High blood pressure     Hyperlipidemia     Migraine     Nausea and vomiting     Sleep apnea        Past Surgical History:    Procedure Laterality Date    BACK SURGERY      GALLBLADDER SURGERY      KNEE SURGERY       Family History   Problem Relation Age of Onset    Other Other         HIGH BLOOD PRESSURE    Hypertension Other     Heart disease Mother     Heart attack Mother     Heart disease Father        Social History     Tobacco Use    Smoking status: Some Days     Current packs/day: 0.25     Average packs/day: 0.3 packs/day for 20.0 years (5.0 ttl pk-yrs)     Types: Cigarettes    Smokeless tobacco: Never    Tobacco comments:     not smoked since12/28/2020   Vaping Use    Vaping status: Never Used   Substance Use Topics    Alcohol use: No    Drug use: No     Home medications:   Prior to Admission Medications       Prescriptions Last Dose Informant Patient Reported? Taking?    albuterol sulfate  (90 Base) MCG/ACT inhaler   No No    INHALE 2 PUFFS BY MOUTH EVERY 4 HOURS AS NEEDED FOR WHEEZING    amLODIPine (NORVASC) 10 MG tablet   No No    Take 1 tablet by mouth Daily.    aspirin 81 MG chewable tablet   No No    CHEW AND SWALLOW 1 TABLET BY MOUTH DAILY    carvedilol (Coreg) 12.5 MG tablet   No No    Take 1 tablet by mouth 2 (Two) Times a Day With Meals.    cloNIDine (CATAPRES) 0.1 MG tablet   No No    TAKE 1 TABLET BY MOUTH EVERY NIGHT AT BEDTIME    cyclobenzaprine (FLEXERIL) 10 MG tablet   No No    Take 1 tablet by mouth 2 (Two) Times a Day.    doxazosin (CARDURA) 2 MG tablet   No No    Take 1 tablet by mouth every night at bedtime.    Gel Base gel   No No    Ketoprofen 10%, Gabapentin 6%, Ketamine 5%, Lidocaine 5%, Amitriptyline 2%, Baclofen 2%, Bupivacaine 2%, Cyclobenaprine 2%, Meloxicam 01.% TDG    Apply 1 to 2 grams of pain gel to affected areas 3 to 4 times a day.    Gel Base gel   No No    Ketoprofen 15%, Lidocaine 5%, Bupivacaine 2%, Meloxicam 0.1% TDG Apply 1 to 2 grams of pain gel to affected areas 3 to 4 times a day.    metFORMIN (GLUCOPHAGE) 500 MG tablet   No No    TAKE 1 TABLET BY MOUTH TWICE DAILY     methocarbamol (ROBAXIN) 750 MG tablet   No No    Take 1 tablet by mouth 3 (Three) Times a Day As Needed for Muscle Spasms.    omeprazole (priLOSEC) 20 MG capsule   No No    Take 1 capsule by mouth Daily.    potassium chloride ER (K-TAB) 20 MEQ tablet controlled-release ER tablet   No No    Take 1 tablet by mouth 2 (Two) Times a Day With Meals.    rOPINIRole (REQUIP) 3 MG tablet   No No    Take 1 tablet by mouth every night at bedtime.    triamcinolone (KENALOG) 0.1 % cream   No No    Apply to affected area tid prn itch    valsartan (DIOVAN) 320 MG tablet   No No    Take 1 tablet by mouth Daily for 360 days.          Emergency department medications:   Medications   sodium chloride 0.9 % flush 10 mL (has no administration in time range)   amLODIPine (NORVASC) tablet 10 mg (10 mg Oral Given 1/2/25 0806)   aspirin chewable tablet 81 mg (81 mg Oral Given 1/2/25 0806)   carvedilol (COREG) tablet 12.5 mg (12.5 mg Oral Not Given 1/2/25 1745)   terazosin (HYTRIN) capsule 1 mg (1 mg Oral Not Given 1/2/25 2224)   methocarbamol (ROBAXIN) tablet 750 mg (has no administration in time range)   rOPINIRole (REQUIP) tablet 3 mg (3 mg Oral Given 1/2/25 2057)   valsartan (DIOVAN) tablet 320 mg (320 mg Oral Given 1/2/25 0806)   sodium chloride 0.9 % flush 10 mL (10 mL Intravenous Not Given 1/2/25 2224)   sodium chloride 0.9 % flush 10 mL (has no administration in time range)   sodium chloride 0.9 % infusion 40 mL (has no administration in time range)   dextrose (GLUTOSE) oral gel 15 g (has no administration in time range)   dextrose (D50W) (25 g/50 mL) IV injection 25 g (has no administration in time range)   glucagon (GLUCAGEN) injection 1 mg (has no administration in time range)   Insulin Lispro (humaLOG) injection 2-9 Units (4 Units Subcutaneous Given 1/2/25 2057)   Pharmacy to Dose enoxaparin (LOVENOX) (has no administration in time range)   nitroglycerin (NITROSTAT) SL tablet 0.4 mg (has no administration in time range)    ipratropium-albuterol (DUO-NEB) nebulizer solution 3 mL (3 mL Nebulization Given 1/3/25 0731)   acetaminophen (TYLENOL) tablet 650 mg (has no administration in time range)     Or   acetaminophen (TYLENOL) 160 MG/5ML oral solution 650 mg (has no administration in time range)     Or   acetaminophen (TYLENOL) suppository 650 mg (has no administration in time range)   sennosides-docusate (PERICOLACE) 8.6-50 MG per tablet 2 tablet (has no administration in time range)     And   polyethylene glycol (MIRALAX) packet 17 g (has no administration in time range)     And   bisacodyl (DULCOLAX) EC tablet 5 mg (has no administration in time range)     And   bisacodyl (DULCOLAX) suppository 10 mg (has no administration in time range)   ondansetron ODT (ZOFRAN-ODT) disintegrating tablet 4 mg (has no administration in time range)     Or   ondansetron (ZOFRAN) injection 4 mg (has no administration in time range)   aluminum-magnesium hydroxide-simethicone (MAALOX MAX) 400-400-40 MG/5ML suspension 15 mL (has no administration in time range)   nicotine (NICODERM CQ) 21 MG/24HR patch 1 patch (1 patch Transdermal Medication Applied 1/3/25 0626)   nicotine polacrilex (NICORETTE) gum 4 mg (has no administration in time range)   guaiFENesin (MUCINEX) 12 hr tablet 600 mg (600 mg Oral Given 1/2/25 2057)   Enoxaparin Sodium (LOVENOX) syringe 60 mg (60 mg Subcutaneous Given 1/3/25 0626)   budesonide (PULMICORT) nebulizer solution 0.5 mg (0.5 mg Nebulization Not Given 1/3/25 0731)   methylPREDNISolone sodium succinate (SOLU-Medrol) injection 60 mg (60 mg Intravenous Given 1/3/25 0625)   benzonatate (TESSALON) capsule 100 mg (100 mg Oral Given 1/2/25 0433)   dexmedetomidine (PRECEDEX) 400 mcg in 100 mL NS infusion (0 mcg/kg/hr × 170 kg Intravenous Stopped 1/3/25 0635)   mupirocin (BACTROBAN) 2 % nasal ointment 1 Application (has no administration in time range)   LORazepam (ATIVAN) injection 2 mg (2 mg Intravenous Given 1/3/25 2626)  "  pantoprazole (PROTONIX) injection 40 mg (40 mg Intravenous Given 1/3/25 0625)   furosemide (LASIX) injection 80 mg (80 mg Intravenous Given 1/1/25 0512)   aspirin chewable tablet 324 mg (324 mg Oral Given 1/1/25 0513)   ipratropium-albuterol (DUO-NEB) nebulizer solution 9 mL (9 mL Nebulization Given 1/1/25 0537)   methylPREDNISolone sodium succinate (SOLU-Medrol) injection 125 mg (125 mg Intravenous Given 1/1/25 0539)   Enoxaparin Sodium (LOVENOX) syringe 160 mg (160 mg Subcutaneous Given 1/1/25 0539)   Sulfur Hexafluoride Microsph (LUMASON) 60.7-25 MG IV reconstituted suspension reconstituted suspension 2 mL (2 mL Intravenous Given 1/1/25 0812)   iopamidol (ISOVUE-300) 61 % injection 100 mL (100 mL Intravenous Given 1/1/25 1516)   furosemide (LASIX) injection 40 mg (40 mg Intravenous Given 1/2/25 1635)       Allergies:  Atorvastatin, Levothyroxine, Levothyroxine sodium, and Piroxicam    Review of Systems  Patient is unresponsive and sedated no meaningful review of system is possible.      Physical Exam:  Objective:  Vital Signs  /87   Pulse 57   Temp 97.2 °F (36.2 °C) (Axillary)   Resp 23   Ht 180.3 cm (71\")   Wt (!) 170 kg (375 lb 10.6 oz)   SpO2 96%   BMI 52.39 kg/m²   Objective    No intake/output data recorded.    Intake/Output Summary (Last 24 hours) at 1/3/2025 0746  Last data filed at 1/3/2025 0300  Gross per 24 hour   Intake 480 ml   Output 550 ml   Net -70 ml        Physical Exam     Constitutional: Drowsy and sleepy  Eyes: sclerae anicteric, no conjunctival injection  HEENT: mucous membranes dry  Neck: Supple, no thyromegaly, no lymphadenopathy, trachea midline, No JVD  Respiratory: Currently on BiPAP with fair air movement.  No wheezing or rhonchi heard  Cardiovascular: RRR, no murmurs, rubs, or gallops.  Gastrointestinal: Positive bowel sounds, obese, soft, nontender, nondistended  Musculoskeletal: Trace edema, no clubbing or cyanosis  Psychiatric: Appropriate affect, " "cooperative  Neurologic: Randomly moving all extremities.  Skin: warm and dry, no rashes            Results Review:   Results from last 7 days   Lab Units 01/03/25  0511 01/02/25  0510 01/01/25  0357   SODIUM mmol/L 136 143 139   POTASSIUM mmol/L 4.3 3.5 3.5   CHLORIDE mmol/L 91* 94* 92*   CO2 mmol/L 36.6* 42.5* 37.3*   BUN mg/dL 26* 13 9   CREATININE mg/dL 1.01 0.82 0.76   CALCIUM mg/dL 8.6 8.7 9.0   ALBUMIN g/dL  --   --  3.7   BILIRUBIN mg/dL  --   --  0.4   ALK PHOS U/L  --   --  128*   ALT (SGPT) U/L  --   --  24   AST (SGOT) U/L  --   --  18   GLUCOSE mg/dL 237* 228* 244*     Estimated Creatinine Clearance: 129 mL/min (by C-G formula based on SCr of 1.01 mg/dL).          Results from last 7 days   Lab Units 01/03/25  0511 01/02/25  1416 01/01/25  0357   WBC 10*3/mm3 16.97* 20.77* 15.08*   HEMOGLOBIN g/dL 13.7 13.3 14.8   PLATELETS 10*3/mm3 244 259 256         Brief Urine Lab Results       None          No results found for: \"UTPCR\"  Imaging Results (Last 24 Hours)       Procedure Component Value Units Date/Time    XR Chest 1 View [[id number]] Collected: 01/03/25 0722     Updated: 01/03/25 0725    Narrative:      PROCEDURE: XR CHEST 1 VW-     HISTORY: f/u hypoxia; J96.01-Acute respiratory failure with hypoxia     COMPARISON: 2 days prior.     FINDINGS: The heart is normal in size. Decreased inspiratory effort  noted. There is new bilateral perihilar linear densities consistent with  atelectasis. There is no new bibasilar atelectasis or infiltrate.. The  mediastinum is unremarkable. There is no pneumothorax.  There are no  acute osseous abnormalities. Apical lordotic positioning noted.        Impression:      New bilateral perihilar atelectasis and new bibasilar  disease, atelectasis versus infiltrate; recommend follow-up to document  clearing..              This report was signed and finalized on 1/3/2025 7:23 AM by Krysten Rogers MD.             amLODIPine, 10 mg, Oral, Daily  aspirin, 81 mg, Oral, " Daily  budesonide, 0.5 mg, Nebulization, BID - RT  carvedilol, 12.5 mg, Oral, BID With Meals  enoxaparin, 60 mg, Subcutaneous, Q12H  guaiFENesin, 600 mg, Oral, Q12H  insulin lispro, 2-9 Units, Subcutaneous, 4x Daily AC & at Bedtime  ipratropium-albuterol, 3 mL, Nebulization, Q6H - RT  methylPREDNISolone sodium succinate, 60 mg, Intravenous, Q12H  mupirocin, 1 Application, Each Nare, BID  nicotine, 1 patch, Transdermal, Q24H  pantoprazole, 40 mg, Intravenous, Q AM  rOPINIRole, 3 mg, Oral, Nightly  sodium chloride, 10 mL, Intravenous, Q12H  terazosin, 1 mg, Oral, Nightly  valsartan, 320 mg, Oral, Daily      dexmedetomidine, 0.2 mcg/kg/hr, Last Rate: Stopped (01/03/25 0635)  Pharmacy to Dose enoxaparin (LOVENOX),         Assessment/Plan:      Acute respiratory failure with hypoxia    COPD exacerbation    Oliguria: Patient is only 100 cc of urine output overnight, he did receive 40 mg of Lasix IV.  Clinically does not appear to have any significant fluid.  Hypercapnic respiratory failure: Patient's pCO2 greater than 90, has been on BiPAP since admission with minimal improvement.  Volume status: Clinically appears to be on the dry side.  Hypertension: Blood pressure appears to be on the low side in the 120s, initially it was as high as 175 systolic.  Obstructive sleep apnea: It is not clear if he was using his BiPAP at home, currently on BiPAP.  Type 2 diabetes: Continue with the sliding scale  Morbid obesity: Complicates all forms of care.      Risk and complexity: High       Plan:  I will go ahead and put a Epstein catheter in to actually have amount of urine output.  I will go ahead and give him normal saline at 100 cc an hour for next 20 hours so that we can get 2 L of fluid.  Depending on his volume status tomorrow and urine output we will have to be assessed for his volume again, may end up needing diuretics in the next day or so.  Continue with rest of the current treatment plan and surveillance labs.  Details were  discussed with the patient no family in the room.    Details were also discussed with the hospitalist service.   Further recommendations will depend on clinical course of the patient during the current hospitalization.    I also discussed the details with the nursing staff.  Rest as ordered.    In closing, I sincerely appreciate opportunity to participate in care of this patient. If I can be of any further assistance with the management of this patient, please don’t hesitate to contact me.    Gio Candelario MD, YAMILEXN    01/03/25  07:46 EST    Dictated using Dragon.

## 2025-01-03 NOTE — CASE MANAGEMENT/SOCIAL WORK
DCP; Home with wife. Pt is not medically stable for discharge at this time. CM continues to follow for any needs.

## 2025-01-03 NOTE — PLAN OF CARE
Problem: Noninvasive Ventilation Acute  Goal: Effective Unassisted Ventilation and Oxygenation  Outcome: Progressing  Intervention: Monitor and Manage Noninvasive Ventilation  Flowsheets (Taken 1/3/2025 1721)  Airway/Ventilation Management: airway patency maintained  NPPV/CPAP Maintenance: adjusted   Goal Outcome Evaluation:         RT EQUIPMENT DEVICE RELATED - SKIN ASSESSMENT    Julien Score:  Julien Score: 14     RT Medical Equipment/Device:     NIV Mask:  Full-face    size: lg    Skin Assessment:      Cheek:  Intact  Chin:  Intact  Nose:  Intact    Device Skin Pressure Protection:        Nurse Notification:  No    Erica Flaherty, RRT

## 2025-01-03 NOTE — PROGRESS NOTES
[facility] HOSPITALIST    PROGRESS NOTE    Name:  Cal Oliver Jr.   Age:  59 y.o.  Sex:  male  :  1965  MRN:  [id number]   Visit Number:  [id number]  Admission Date:  2025  Date Of Service:  25  Primary Care Physician:  Woo Betancourt MD     LOS: 2 days :    Chief Complaint:      Shortness of air    Subjective:    Says he still feels rough, but breathing a little better.  Denies any specific pain.    Hospital Course:    Cal Oliver Jr is a  59-year-old man with past medical history of morbid obesity BMI 52, chronic tobacco abuse, uncontrolled high blood pressure, chronic back pain, diabetes, sleep apnea with home BiPAP use, who presented from home with complaints of shortness of breath.  Notes symptoms over the last 3 to 4 days, worsening.  Not been able to catch his breath with exertion, lying flat.  Noticed some swelling.  Has not been checking blood pressure.  No chest pain or palpitations.  Continues to smoke.  Unknown sick contacts.     In the ER he was hypertensive, heart rate in the 90s, afebrile, and hypoxic on room air.  CMP unremarkable.  proBNP of 110.  Troponins mildly elevated.  White count of 15 hemoglobin 14 platelets of 256.  Normal procalcitonin.  Negative flu COVID testing.  Chest x-ray with some interstitial edema.  Patient given DuoNeb, Solu-Medrol, Lasix, started on BiPAP.  Hospitalist consulted for further medical management.  Echo obtained noting preserved EF with grade 2 diastolic dysfunction.  Patient appears euvolemic at this time.  CT rule out PE was obtained with no PE noted with bilateral segmental/subsegmental consolidation less likely favored to be pneumonia and more atelectasis at this time.    Patient continued on Solu-Medrol/DuoNeb/Pulmicort.  Patient unfortunately continues to require BiPAP 80% continuously.    Review of Systems:     All systems were reviewed and negative except as mentioned in subjective, assessment and plan.    Vital  Signs:    Temp:  [97.2 °F (36.2 °C)-98.4 °F (36.9 °C)] 98.4 °F (36.9 °C)  Heart Rate:  [54-90] 90  Resp:  [20-26] 26  BP: (108-153)/(60-98) 153/95    Intake and output:    I/O last 3 completed shifts:  In: 720 [P.O.:720]  Out: 550 [Urine:550]  I/O this shift:  In: 0   Out: 575 [Urine:575]    Physical Examination:    General Appearance:  Alert and cooperative.  Chronically ill middle-age male.  Obese.   Head:  Atraumatic and normocephalic.   Eyes: Conjunctivae and sclerae normal, no icterus. No pallor.   Throat: No oral lesions, no thrush, oral mucosa moist.   Neck: Supple, trachea midline, no thyromegaly.   Lungs:   Breath sounds heard bilaterally equally.  Unlabored on BiPAP.  Diminished throughout.   Heart:  Normal S1 and S2, murmur, no gallop, no rub. No JVD.   Abdomen:   Normal bowel sounds, no masses, no organomegaly. Soft, nontender, nondistended, no rebound tenderness.   Extremities: Supple, no edema, no cyanosis, no clubbing.   Skin: Warm.   Neurologic: Alert and oriented x 3. No facial asymmetry. Moves all four limbs. No tremors.      Laboratory results:    Results from last 7 days   Lab Units 01/03/25  0511 01/02/25  0510 01/01/25  0357   SODIUM mmol/L 136 143 139   POTASSIUM mmol/L 4.3 3.5 3.5   CHLORIDE mmol/L 91* 94* 92*   CO2 mmol/L 36.6* 42.5* 37.3*   BUN mg/dL 26* 13 9   CREATININE mg/dL 1.01 0.82 0.76   CALCIUM mg/dL 8.6 8.7 9.0   BILIRUBIN mg/dL  --   --  0.4   ALK PHOS U/L  --   --  128*   ALT (SGPT) U/L  --   --  24   AST (SGOT) U/L  --   --  18   GLUCOSE mg/dL 237* 228* 244*     Results from last 7 days   Lab Units 01/03/25  0511 01/02/25  1416 01/01/25  0357   WBC 10*3/mm3 16.97* 20.77* 15.08*   HEMOGLOBIN g/dL 13.7 13.3 14.8   HEMATOCRIT % 45.1 43.7 45.8   PLATELETS 10*3/mm3 244 259 256         Results from last 7 days   Lab Units 01/01/25  0521 01/01/25  0357   HSTROP T ng/L 24* 24*     Results from last 7 days   Lab Units 01/01/25  0518   BLOODCX  No growth at 2 days  No growth at 2 days      Recent Labs     01/02/25  0926 01/02/25  1523 01/03/25  0509   PHART 7.297* 7.290* 7.300   DHX3KIY 96.2* 95.6* 90.9*   PO2ART 65.2* 72.1* 92.4   WAA8TEV 47.0* 45.9* 44.8*   BASEEXCESS 15.5* 14.6* 13.7*      I have reviewed the patient's laboratory results.    Radiology results:    XR Chest 1 View    Result Date: 1/3/2025  PROCEDURE: XR CHEST 1 VW-  HISTORY: f/u hypoxia; J96.01-Acute respiratory failure with hypoxia  COMPARISON: 2 days prior.  FINDINGS: The heart is normal in size. Decreased inspiratory effort noted. There is new bilateral perihilar linear densities consistent with atelectasis. There is no new bibasilar atelectasis or infiltrate.. The mediastinum is unremarkable. There is no pneumothorax.  There are no acute osseous abnormalities. Apical lordotic positioning noted.      Impression: New bilateral perihilar atelectasis and new bibasilar disease, atelectasis versus infiltrate; recommend follow-up to document clearing..     This report was signed and finalized on 1/3/2025 7:23 AM by Krysten Rogers MD.      CT Angiogram Chest Pulmonary Embolism    Result Date: 1/1/2025  FINAL REPORT TECHNIQUE: null CLINICAL HISTORY: hypoxia COMPARISON: null FINDINGS: CTA chest with 3-D postprocessing Comparison: 3/1/24 Findings: Study quality is adequate for the diagnosis of pulmonary embolism. No pulmonary embolism. Cardiomegaly. RV/LV ratio is normal. Mild calcified coronary artery disease. No aortic dissection or aneurysm. No calcified atherosclerotic disease. No lymphadenopathy. Bilateral segmental/subsegmental consolidation. No pneumothorax or pleural effusion. No acute osseous or soft tissue abnormality. No acute pathology in the imaged portion of the upper abdomen. Status post cholecystectomy.     Impression: Impression: No pulmonary embolism. Bilateral segmental/subsegmental consolidation which is favored to be atelectasis. Pneumonia is considered less likely. Authenticated and Electronically Signed by Marie  St. Rafia MD on 01/01/2025 05:30:50 PM   I have reviewed the patient's radiology reports.    Medication Review:     I have reviewed the patient's active and prn medications.     Problem List:      Acute respiratory failure with hypoxia    COPD exacerbation      Assessment:    Respiratory failure with hypoxia/hypercapnia, POA  COPD with acute exacerbation, POA  Possible diastolic heart failure, workup pending, POA  Hypertensive urgency, POA  Chronic tobacco abuse  Type 2 diabetes  Morbid obesity  Obstructive sleep apnea    Plan:    Respiratory failure with hypoxia/hypercapnia/COPD/CHF  -Solu-Medrol, Pulmicort, oxygen and continue BiPAP.  Wean as tolerated.  NicoDerm patch ordered  -Respiratory panel negative.  -CT noting consolidation with likely atelectasis.  Will repeat CBC/procalcitonin.  Patient remains afebrile.  Prior procalcitonin negative.  -Will continue Lasix as well.  BNP normal upon admission.  Echo did note grade 2 diastolic dysfunction with preserved EF.  -Will repeat ABG as patient CO2 levels 96.  Patient will be transferred to ICU.  Given no pulmonology available will also reach out to [facility] for possible transfer.     Hypertension/tobacco use/diabetes/obesity/SHUKRI:  -continue home medications.  - Has had uncontrolled high blood pressure chronically it appears.    -Sliding scale insulin ordered.     Oliguria  Urinary retention  -Nephrology consulted, recommendations appreciated.  Epstein catheter placed for strict I's and O's and urinary retention.  Diuretics.    -Discussed with attending provider.  Patient high risk for decline and will be transferred to ICU for closer observation pending transfer for pulmonology consult.    [facility] with patient placed on wait list at this time.    I have reviewed the copied text and it is accurate as of 1/3/2025      DVT Prophylaxis: Enoxaparin   Code Status: Full  Diet: Diabetic  Discharge Plan: TBD Brian Joseph Kerley,   01/03/25  14:45  EST    Dictated utilizing Dragon dictation.

## 2025-01-04 LAB
A-A DO2: 296.6 MMHG
AMPHET+METHAMPHET UR QL: NEGATIVE
AMPHETAMINES UR QL: NEGATIVE
ANION GAP SERPL CALCULATED.3IONS-SCNC: 7.2 MMOL/L (ref 5–15)
ARTERIAL PATENCY WRIST A: POSITIVE
ATMOSPHERIC PRESS: 740 MMHG
BACTERIA UR QL AUTO: ABNORMAL /HPF
BARBITURATES UR QL SCN: NEGATIVE
BASE EXCESS BLDA CALC-SCNC: 15.2 MMOL/L (ref 0–2)
BASOPHILS # BLD AUTO: 0.02 10*3/MM3 (ref 0–0.2)
BASOPHILS NFR BLD AUTO: 0.1 % (ref 0–1.5)
BDY SITE: ABNORMAL
BENZODIAZ UR QL SCN: POSITIVE
BILIRUB UR QL STRIP: NEGATIVE
BUN SERPL-MCNC: 22 MG/DL (ref 6–20)
BUN/CREAT SERPL: 29.7 (ref 7–25)
BUPRENORPHINE SERPL-MCNC: NEGATIVE NG/ML
CALCIUM SPEC-SCNC: 8.4 MG/DL (ref 8.6–10.5)
CANNABINOIDS SERPL QL: NEGATIVE
CHLORIDE SERPL-SCNC: 95 MMOL/L (ref 98–107)
CLARITY UR: CLEAR
CO2 SERPL-SCNC: 36.8 MMOL/L (ref 22–29)
COCAINE UR QL: NEGATIVE
COHGB MFR BLD: 0.9 % (ref 0–2)
COLOR UR: ABNORMAL
CREAT SERPL-MCNC: 0.74 MG/DL (ref 0.76–1.27)
DEPRECATED RDW RBC AUTO: 45.4 FL (ref 37–54)
EGFRCR SERPLBLD CKD-EPI 2021: 105.7 ML/MIN/1.73
EOSINOPHIL # BLD AUTO: 0.02 10*3/MM3 (ref 0–0.4)
EOSINOPHIL NFR BLD AUTO: 0.1 % (ref 0.3–6.2)
EPAP: 6
ERYTHROCYTE [DISTWIDTH] IN BLOOD BY AUTOMATED COUNT: 13.3 % (ref 12.3–15.4)
FENTANYL UR-MCNC: NEGATIVE NG/ML
GLUCOSE BLDC GLUCOMTR-MCNC: 185 MG/DL (ref 70–130)
GLUCOSE BLDC GLUCOMTR-MCNC: 264 MG/DL (ref 70–130)
GLUCOSE BLDC GLUCOMTR-MCNC: 280 MG/DL (ref 70–130)
GLUCOSE SERPL-MCNC: 199 MG/DL (ref 65–99)
GLUCOSE UR STRIP-MCNC: ABNORMAL MG/DL
HCO3 BLDA-SCNC: 44.8 MMOL/L (ref 22–28)
HCT VFR BLD AUTO: 45.2 % (ref 37.5–51)
HCT VFR BLD CALC: 44.5 %
HGB BLD-MCNC: 14 G/DL (ref 13–17.7)
HGB UR QL STRIP.AUTO: ABNORMAL
HYALINE CASTS UR QL AUTO: ABNORMAL /LPF
IMM GRANULOCYTES # BLD AUTO: 0.07 10*3/MM3 (ref 0–0.05)
IMM GRANULOCYTES NFR BLD AUTO: 0.5 % (ref 0–0.5)
INHALED O2 CONCENTRATION: 65 %
IPAP: 22
KETONES UR QL STRIP: NEGATIVE
LEUKOCYTE ESTERASE UR QL STRIP.AUTO: NEGATIVE
LYMPHOCYTES # BLD AUTO: 1.02 10*3/MM3 (ref 0.7–3.1)
LYMPHOCYTES NFR BLD AUTO: 6.6 % (ref 19.6–45.3)
Lab: ABNORMAL
Lab: ABNORMAL
MCH RBC QN AUTO: 28.7 PG (ref 26.6–33)
MCHC RBC AUTO-ENTMCNC: 31 G/DL (ref 31.5–35.7)
MCV RBC AUTO: 92.8 FL (ref 79–97)
METHADONE UR QL SCN: NEGATIVE
METHGB BLD QL: 0.2 % (ref 0–1.5)
MODALITY: ABNORMAL
MONOCYTES # BLD AUTO: 1.19 10*3/MM3 (ref 0.1–0.9)
MONOCYTES NFR BLD AUTO: 7.7 % (ref 5–12)
MUCOUS THREADS URNS QL MICRO: ABNORMAL /HPF
NEUTROPHILS NFR BLD AUTO: 13.13 10*3/MM3 (ref 1.7–7)
NEUTROPHILS NFR BLD AUTO: 85 % (ref 42.7–76)
NITRITE UR QL STRIP: NEGATIVE
NOTIFIED BY: ABNORMAL
NOTIFIED WHO: ABNORMAL
NRBC BLD AUTO-RTO: 0 /100 WBC (ref 0–0.2)
OPIATES UR QL: NEGATIVE
OXYCODONE UR QL SCN: NEGATIVE
OXYHGB MFR BLDV: 93.8 % (ref 94–99)
PCO2 BLDA: 78.5 MM HG (ref 35–45)
PCO2 TEMP ADJ BLD: ABNORMAL MM[HG]
PCP UR QL SCN: NEGATIVE
PH BLDA: 7.37 PH UNITS (ref 7.3–7.5)
PH UR STRIP.AUTO: 5.5 [PH] (ref 5–8)
PH, TEMP CORRECTED: ABNORMAL
PLATELET # BLD AUTO: 245 10*3/MM3 (ref 140–450)
PMV BLD AUTO: 9.6 FL (ref 6–12)
PO2 BLDA: 70.9 MM HG (ref 75–100)
PO2 TEMP ADJ BLD: ABNORMAL MM[HG]
POTASSIUM SERPL-SCNC: 3.8 MMOL/L (ref 3.5–5.2)
PROCALCITONIN SERPL-MCNC: 0.04 NG/ML (ref 0–0.25)
PROT UR QL STRIP: NEGATIVE
RBC # BLD AUTO: 4.87 10*6/MM3 (ref 4.14–5.8)
RBC # UR STRIP: ABNORMAL /HPF
REF LAB TEST METHOD: ABNORMAL
SAO2 % BLDCOA: 94.8 % (ref 94–100)
SET MECH RESP RATE: 22
SODIUM SERPL-SCNC: 139 MMOL/L (ref 136–145)
SP GR UR STRIP: 1.01 (ref 1–1.03)
SQUAMOUS #/AREA URNS HPF: ABNORMAL /HPF
TRICYCLICS UR QL SCN: NEGATIVE
UROBILINOGEN UR QL STRIP: ABNORMAL
VENTILATOR MODE: ABNORMAL
WBC # UR STRIP: ABNORMAL /HPF
WBC NRBC COR # BLD AUTO: 15.45 10*3/MM3 (ref 3.4–10.8)

## 2025-01-04 PROCEDURE — 94799 UNLISTED PULMONARY SVC/PX: CPT

## 2025-01-04 PROCEDURE — 99232 SBSQ HOSP IP/OBS MODERATE 35: CPT | Performed by: STUDENT IN AN ORGANIZED HEALTH CARE EDUCATION/TRAINING PROGRAM

## 2025-01-04 PROCEDURE — 84145 PROCALCITONIN (PCT): CPT | Performed by: STUDENT IN AN ORGANIZED HEALTH CARE EDUCATION/TRAINING PROGRAM

## 2025-01-04 PROCEDURE — 80307 DRUG TEST PRSMV CHEM ANLYZR: CPT | Performed by: STUDENT IN AN ORGANIZED HEALTH CARE EDUCATION/TRAINING PROGRAM

## 2025-01-04 PROCEDURE — 94664 DEMO&/EVAL PT USE INHALER: CPT

## 2025-01-04 PROCEDURE — 25010000002 LORAZEPAM PER 2 MG: Performed by: FAMILY MEDICINE

## 2025-01-04 PROCEDURE — 82948 REAGENT STRIP/BLOOD GLUCOSE: CPT

## 2025-01-04 PROCEDURE — 94660 CPAP INITIATION&MGMT: CPT

## 2025-01-04 PROCEDURE — 82375 ASSAY CARBOXYHB QUANT: CPT

## 2025-01-04 PROCEDURE — 25010000002 METHYLPREDNISOLONE PER 125 MG: Performed by: NURSE PRACTITIONER

## 2025-01-04 PROCEDURE — 25010000002 ENOXAPARIN PER 10 MG: Performed by: FAMILY MEDICINE

## 2025-01-04 PROCEDURE — 63710000001 INSULIN LISPRO (HUMAN) PER 5 UNITS: Performed by: FAMILY MEDICINE

## 2025-01-04 PROCEDURE — 80048 BASIC METABOLIC PNL TOTAL CA: CPT | Performed by: STUDENT IN AN ORGANIZED HEALTH CARE EDUCATION/TRAINING PROGRAM

## 2025-01-04 PROCEDURE — 81001 URINALYSIS AUTO W/SCOPE: CPT | Performed by: STUDENT IN AN ORGANIZED HEALTH CARE EDUCATION/TRAINING PROGRAM

## 2025-01-04 PROCEDURE — 82948 REAGENT STRIP/BLOOD GLUCOSE: CPT | Performed by: FAMILY MEDICINE

## 2025-01-04 PROCEDURE — 94761 N-INVAS EAR/PLS OXIMETRY MLT: CPT

## 2025-01-04 PROCEDURE — 85025 COMPLETE CBC W/AUTO DIFF WBC: CPT | Performed by: STUDENT IN AN ORGANIZED HEALTH CARE EDUCATION/TRAINING PROGRAM

## 2025-01-04 PROCEDURE — 82805 BLOOD GASES W/O2 SATURATION: CPT

## 2025-01-04 PROCEDURE — 83050 HGB METHEMOGLOBIN QUAN: CPT

## 2025-01-04 PROCEDURE — 25010000002 BUMETANIDE PER 0.5 MG: Performed by: INTERNAL MEDICINE

## 2025-01-04 PROCEDURE — 36600 WITHDRAWAL OF ARTERIAL BLOOD: CPT

## 2025-01-04 RX ORDER — MIDODRINE HYDROCHLORIDE 5 MG/1
5 TABLET ORAL EVERY 8 HOURS PRN
Status: DISCONTINUED | OUTPATIENT
Start: 2025-01-04 | End: 2025-01-06

## 2025-01-04 RX ORDER — BUMETANIDE 0.25 MG/ML
2 INJECTION, SOLUTION INTRAMUSCULAR; INTRAVENOUS ONCE
Status: COMPLETED | OUTPATIENT
Start: 2025-01-04 | End: 2025-01-04

## 2025-01-04 RX ORDER — HYDRALAZINE HYDROCHLORIDE 20 MG/ML
10 INJECTION INTRAMUSCULAR; INTRAVENOUS EVERY 4 HOURS PRN
Status: DISCONTINUED | OUTPATIENT
Start: 2025-01-04 | End: 2025-01-10 | Stop reason: HOSPADM

## 2025-01-04 RX ADMIN — IPRATROPIUM BROMIDE AND ALBUTEROL SULFATE 3 ML: 2.5; .5 SOLUTION RESPIRATORY (INHALATION) at 07:03

## 2025-01-04 RX ADMIN — AMLODIPINE BESYLATE 10 MG: 5 TABLET ORAL at 08:06

## 2025-01-04 RX ADMIN — CARVEDILOL 12.5 MG: 6.25 TABLET, FILM COATED ORAL at 08:06

## 2025-01-04 RX ADMIN — ASPIRIN 81 MG CHEWABLE TABLET 81 MG: 81 TABLET CHEWABLE at 08:06

## 2025-01-04 RX ADMIN — DEXMEDETOMIDINE HYDROCHLORIDE 0.2 MCG/KG/HR: 400 INJECTION INTRAVENOUS at 11:39

## 2025-01-04 RX ADMIN — MUPIROCIN 1 APPLICATION: 20 OINTMENT TOPICAL at 08:06

## 2025-01-04 RX ADMIN — INSULIN LISPRO 6 UNITS: 100 INJECTION, SOLUTION INTRAVENOUS; SUBCUTANEOUS at 11:39

## 2025-01-04 RX ADMIN — METHYLPREDNISOLONE SODIUM SUCCINATE 60 MG: 125 INJECTION, POWDER, FOR SOLUTION INTRAMUSCULAR; INTRAVENOUS at 05:42

## 2025-01-04 RX ADMIN — GUAIFENESIN 600 MG: 600 TABLET, EXTENDED RELEASE ORAL at 20:09

## 2025-01-04 RX ADMIN — GUAIFENESIN 600 MG: 600 TABLET, EXTENDED RELEASE ORAL at 08:06

## 2025-01-04 RX ADMIN — BUDESONIDE 0.5 MG: 0.5 INHALANT RESPIRATORY (INHALATION) at 18:47

## 2025-01-04 RX ADMIN — INSULIN LISPRO 6 UNITS: 100 INJECTION, SOLUTION INTRAVENOUS; SUBCUTANEOUS at 17:33

## 2025-01-04 RX ADMIN — BUMETANIDE 2 MG: 0.25 INJECTION INTRAMUSCULAR; INTRAVENOUS at 09:43

## 2025-01-04 RX ADMIN — NICOTINE 1 PATCH: 21 PATCH TRANSDERMAL at 05:42

## 2025-01-04 RX ADMIN — IPRATROPIUM BROMIDE AND ALBUTEROL SULFATE 3 ML: 2.5; .5 SOLUTION RESPIRATORY (INHALATION) at 13:13

## 2025-01-04 RX ADMIN — PANTOPRAZOLE SODIUM 40 MG: 40 INJECTION, POWDER, FOR SOLUTION INTRAVENOUS at 05:41

## 2025-01-04 RX ADMIN — ENOXAPARIN SODIUM 60 MG: 60 INJECTION SUBCUTANEOUS at 17:33

## 2025-01-04 RX ADMIN — ENOXAPARIN SODIUM 60 MG: 60 INJECTION SUBCUTANEOUS at 05:42

## 2025-01-04 RX ADMIN — CARVEDILOL 12.5 MG: 6.25 TABLET, FILM COATED ORAL at 17:33

## 2025-01-04 RX ADMIN — METHOCARBAMOL 750 MG: 500 TABLET ORAL at 20:11

## 2025-01-04 RX ADMIN — INSULIN LISPRO 2 UNITS: 100 INJECTION, SOLUTION INTRAVENOUS; SUBCUTANEOUS at 08:06

## 2025-01-04 RX ADMIN — MUPIROCIN 1 APPLICATION: 20 OINTMENT TOPICAL at 20:09

## 2025-01-04 RX ADMIN — DEXMEDETOMIDINE HYDROCHLORIDE 0.4 MCG/KG/HR: 400 INJECTION INTRAVENOUS at 20:10

## 2025-01-04 RX ADMIN — BENZONATATE 100 MG: 100 CAPSULE ORAL at 04:40

## 2025-01-04 RX ADMIN — Medication 10 ML: at 20:09

## 2025-01-04 RX ADMIN — IPRATROPIUM BROMIDE AND ALBUTEROL SULFATE 3 ML: 2.5; .5 SOLUTION RESPIRATORY (INHALATION) at 00:02

## 2025-01-04 RX ADMIN — METHYLPREDNISOLONE SODIUM SUCCINATE 60 MG: 125 INJECTION, POWDER, FOR SOLUTION INTRAMUSCULAR; INTRAVENOUS at 17:41

## 2025-01-04 RX ADMIN — VALSARTAN 320 MG: 80 TABLET, FILM COATED ORAL at 08:06

## 2025-01-04 RX ADMIN — IPRATROPIUM BROMIDE AND ALBUTEROL SULFATE 3 ML: 2.5; .5 SOLUTION RESPIRATORY (INHALATION) at 18:47

## 2025-01-04 RX ADMIN — TERAZOSIN HYDROCHLORIDE 1 MG: 1 CAPSULE ORAL at 20:09

## 2025-01-04 RX ADMIN — ROPINIROLE HYDROCHLORIDE 3 MG: 1 TABLET, FILM COATED ORAL at 20:09

## 2025-01-04 RX ADMIN — INSULIN LISPRO 2 UNITS: 100 INJECTION, SOLUTION INTRAVENOUS; SUBCUTANEOUS at 20:09

## 2025-01-04 RX ADMIN — LORAZEPAM 2 MG: 2 INJECTION INTRAMUSCULAR; INTRAVENOUS at 21:33

## 2025-01-04 RX ADMIN — LORAZEPAM 2 MG: 2 INJECTION INTRAMUSCULAR; INTRAVENOUS at 04:40

## 2025-01-04 RX ADMIN — BUDESONIDE 0.5 MG: 0.5 INHALANT RESPIRATORY (INHALATION) at 07:02

## 2025-01-04 RX ADMIN — DEXMEDETOMIDINE HYDROCHLORIDE 0.6 MCG/KG/HR: 400 INJECTION INTRAVENOUS at 02:13

## 2025-01-04 NOTE — PLAN OF CARE
Goal Outcome Evaluation:  Plan of Care Reviewed With: patient        Progress: improving  Outcome Evaluation: Patient still requiring BIPAP at this time. He is able to tolerate 15L HFNC when eating. Family at bedside majority of the day. Patient is on precedex drip for anxiety/agitation and is tolerating well. He is still forgetful and needs reorienting often but nephrology preferred precedex rather then ativan.

## 2025-01-04 NOTE — NURSING NOTE
"During discussion with pt he stated that he used cocaine \"a few times every now and then\". Pt stated he believed the last time he used was Monday. MD notified.  "

## 2025-01-04 NOTE — PLAN OF CARE
Goal Outcome Evaluation:              Outcome Evaluation: pt on BIPAP at 65% FIO2 overnight maintaining O2 sat >90%.SR on monitor. Worsening hallucinations, anxiety and agitation overnight. MD notified. Precedex gtt continued and PRN medications administered (SEE MAR).                              Reason for call:  Patient reporting a symptom    Symptom or request: FEVER,  THROAT PAIN    Duration (how long have symptoms been present): Unknown    Have you been treated for this before? No    Additional comments: Mother carlos manuel solomon to speak with a nurse.    Phone Number patient can be reached at:  Home number on file [phone] (home)    Best Time:  ASAP    Can we leave a detailed message on this number:  NO    Call taken on 12/16/2019 at 9:22 AM by Marbella Germain

## 2025-01-04 NOTE — PROGRESS NOTES
RT EQUIPMENT DEVICE RELATED - SKIN ASSESSMENT    Julien Score:  Julien Score: 13     RT Medical Equipment/Device:     NIV Mask:  Full-face    size: l    Skin Assessment:      Cheek:  Intact  Nose:  Intact    Device Skin Pressure Protection:  Pressure points protected    Nurse Notification:  Yisel Cole, CRT

## 2025-01-04 NOTE — PLAN OF CARE
Problem: Noninvasive Ventilation Acute  Goal: Effective Unassisted Ventilation and Oxygenation  Outcome: Progressing  Intervention: Monitor and Manage Noninvasive Ventilation  Flowsheets (Taken 1/4/2025 6810)  Airway/Ventilation Management:   oxygen therapy provided   positive pressure ventilation provided   airway patency maintained  NPPV/CPAP Maintenance:   proper fit/secure   full face mask   Goal Outcome Evaluation:

## 2025-01-04 NOTE — PROGRESS NOTES
[facility] HOSPITALIST    PROGRESS NOTE    Name:  Cal Oliver Jr.   Age:  59 y.o.  Sex:  male  :  1965  MRN:  [id number]   Visit Number:  [id number]  Admission Date:  2025  Date Of Service:  25  Primary Care Physician:  Woo Betancourt MD     LOS: 3 days :    Chief Complaint:      Shortness of air    Subjective:    Says his breathing is feeling better this morning, still short of air though.  Denies any specific pain.    Hospital Course:    Cal Oliver Jr is a  59-year-old man with past medical history of morbid obesity BMI 52, chronic tobacco abuse, uncontrolled high blood pressure, chronic back pain, diabetes, sleep apnea with home BiPAP use, who presented from home with complaints of shortness of breath.  Notes symptoms over the last 3 to 4 days, worsening.  Not been able to catch his breath with exertion, lying flat.  Noticed some swelling.  Has not been checking blood pressure.  No chest pain or palpitations.  Continues to smoke.  Unknown sick contacts.     In the ER he was hypertensive, heart rate in the 90s, afebrile, and hypoxic on room air.  CMP unremarkable.  proBNP of 110.  Troponins mildly elevated.  White count of 15 hemoglobin 14 platelets of 256.  Normal procalcitonin.  Negative flu COVID testing.  Chest x-ray with some interstitial edema.  Patient given DuoNeb, Solu-Medrol, Lasix, started on BiPAP.  Hospitalist consulted for further medical management.  Echo obtained noting preserved EF with grade 2 diastolic dysfunction.  Patient appears euvolemic at this time.  CT rule out PE was obtained with no PE noted with bilateral segmental/subsegmental consolidation less likely favored to be pneumonia and more atelectasis at this time.    Review of Systems:     All systems were reviewed and negative except as mentioned in subjective, assessment and plan.    Vital Signs:    Temp:  [97.4 °F (36.3 °C)-98.8 °F (37.1 °C)] 97.5 °F (36.4 °C)  Heart Rate:  [58-93]  71  Resp:  [20-32] 32  BP: (103-175)/() 158/92    Intake and output:    I/O last 3 completed shifts:  In: 2255.2 [P.O.:240; I.V.:2015.2]  Out: 3315 [Urine:3315]  I/O this shift:  In: 720 [P.O.:720]  Out: 1925 [Urine:1925]    Physical Examination:    General Appearance:  Alert and cooperative.  Chronically ill middle-age male.  Obese.   Head:  Atraumatic and normocephalic.   Eyes: Conjunctivae and sclerae normal, no icterus. No pallor.   Throat: No oral lesions, no thrush, oral mucosa moist.   Neck: Supple, trachea midline, no thyromegaly.   Lungs:   Breath sounds heard bilaterally equally.  Unlabored on BiPAP.  Diminished throughout.   Heart:  Normal S1 and S2, murmur, no gallop, no rub. No JVD.   Abdomen:   Normal bowel sounds, no masses, no organomegaly. Soft, nontender, nondistended, no rebound tenderness.   Extremities: Supple, no edema, no cyanosis, no clubbing.   Skin: Warm.   Neurologic: Alert and oriented x 3. No facial asymmetry. Moves all four limbs. No tremors.      Laboratory results:    Results from last 7 days   Lab Units 01/04/25  0704 01/03/25  0511 01/02/25  0510 01/01/25  0357   SODIUM mmol/L 139 136 143 139   POTASSIUM mmol/L 3.8 4.3 3.5 3.5   CHLORIDE mmol/L 95* 91* 94* 92*   CO2 mmol/L 36.8* 36.6* 42.5* 37.3*   BUN mg/dL 22* 26* 13 9   CREATININE mg/dL 0.74* 1.01 0.82 0.76   CALCIUM mg/dL 8.4* 8.6 8.7 9.0   BILIRUBIN mg/dL  --   --   --  0.4   ALK PHOS U/L  --   --   --  128*   ALT (SGPT) U/L  --   --   --  24   AST (SGOT) U/L  --   --   --  18   GLUCOSE mg/dL 199* 237* 228* 244*     Results from last 7 days   Lab Units 01/04/25  0705 01/03/25  0511 01/02/25  1416   WBC 10*3/mm3 15.45* 16.97* 20.77*   HEMOGLOBIN g/dL 14.0 13.7 13.3   HEMATOCRIT % 45.2 45.1 43.7   PLATELETS 10*3/mm3 245 244 259         Results from last 7 days   Lab Units 01/01/25  0521 01/01/25  0357   HSTROP T ng/L 24* 24*     Results from last 7 days   Lab Units 01/01/25  0518   BLOODCX  No growth at 3 days  No growth  at 3 days     Recent Labs     01/03/25  0509 01/03/25  2345 01/04/25  0426   PHART 7.300 7.360 7.365   KSG8IFV 90.9* 77.1* 78.5*   PO2ART 92.4 61.8* 70.9*   RFZ9EGG 44.8* 43.5* 44.8*   BASEEXCESS 13.7* 14.0* 15.2*      I have reviewed the patient's laboratory results.    Radiology results:    XR Chest 1 View    Result Date: 1/3/2025  PROCEDURE: XR CHEST 1 VW-  HISTORY: f/u hypoxia; J96.01-Acute respiratory failure with hypoxia  COMPARISON: 2 days prior.  FINDINGS: The heart is normal in size. Decreased inspiratory effort noted. There is new bilateral perihilar linear densities consistent with atelectasis. There is no new bibasilar atelectasis or infiltrate.. The mediastinum is unremarkable. There is no pneumothorax.  There are no acute osseous abnormalities. Apical lordotic positioning noted.      Impression: New bilateral perihilar atelectasis and new bibasilar disease, atelectasis versus infiltrate; recommend follow-up to document clearing..     This report was signed and finalized on 1/3/2025 7:23 AM by Krysten Rogers MD.     I have reviewed the patient's radiology reports.    Medication Review:     I have reviewed the patient's active and prn medications.     Problem List:      Acute respiratory failure with hypoxia    COPD exacerbation      Assessment:    Respiratory failure with hypoxia/hypercapnia, POA  COPD with acute exacerbation, POA  Possible diastolic heart failure, workup pending, POA  Hypertensive urgency, POA  Chronic tobacco abuse  Type 2 diabetes  Morbid obesity  Obstructive sleep apnea    Plan:    ABG still shows frequent CO2 retention, compensated.  He does appear to be getting better each day, more awake and alert now during the day.    Respiratory failure with hypoxia/hypercapnia/COPD/CHF  -Solu-Medrol, Pulmicort, oxygen and continue BiPAP.  Wean as tolerated.  NicoDerm patch ordered  -Respiratory panel negative.  -CT noting consolidation with likely atelectasis.  Prior procalcitonin  negative.  -Will continue Lasix as well.  BNP normal upon admission.  Echo did note grade 2 diastolic dysfunction with preserved EF.  -Will repeat ABG as patient CO2 levels 96.  Patient will be transferred to ICU.  Given no pulmonology available will also reach out to [facility] for possible transfer.     Hypertension/tobacco use/diabetes/obesity/SHUKRI:  -continue home medications.  - Has had uncontrolled high blood pressure chronically it appears.    -Sliding scale insulin ordered.     Oliguria  Urinary retention  -Nephrology consulted, recommendations appreciated.  Epstein catheter placed for strict I's and O's and urinary retention.  Diuretics.    Was placed on waiting list for [facility] due to respiratory decline on continuous BiPAP without pulmonology availability at [facility] on 1/2.  As patient is improving, likely would not qualify for ICU placement at [facility].    I have reviewed the copied text and it is accurate as of 1/4/2025      DVT Prophylaxis: Enoxaparin   Code Status: Full  Diet: Diabetic  Discharge Plan: TBD    Updated wife Janelle during course.    Brian Joseph Kerley, DO  01/04/25  12:37 EST    Dictated utilizing Dragon dictation.

## 2025-01-04 NOTE — PROGRESS NOTES
[facility] Progress Note      Patient Name: Cal Oliver Jr.  : 1965  MRN: [id number]  Primary Care Physician:  Woo Betancourt MD  Date of admission: 2025    Subjective     Interval History:     Overnight night   Agitated , placed on precedex  On BIPAP   Epstein in place w UO > 3 liters     Review of Systems:   As noted above    Objective     Vitals:   Temp:  [97.4 °F (36.3 °C)-98.5 °F (36.9 °C)] 98.3 °F (36.8 °C)  Heart Rate:  [58-93] 65  Resp:  [20-32] 32  BP: (133-175)/() 133/76  Flow (L/min) (Oxygen Therapy):  [15] 15    Intake/Output Summary (Last 24 hours) at 2025 0836  Last data filed at 2025 0557  Gross per 24 hour   Intake 2015.22 ml   Output 3115 ml   Net -1099.78 ml       Physical Exam:    General Appearance: confused on BiPAP . Morbid obese   Skin: warm and dry  HEENT: oral mucosa normal, nonicteric sclera  Neck: supple, no JVD  Lungs: [place]   Heart: RRR, normal S1 and S2  Abdomen: soft, nontender, nondistended  : no palpable bladder  Extremities: no edema, cyanosis or clubbing  Neuro: no focalization     Scheduled Meds:     amLODIPine, 10 mg, Oral, Daily  aspirin, 81 mg, Oral, Daily  budesonide, 0.5 mg, Nebulization, BID - RT  bumetanide, 2 mg, Intravenous, Once  carvedilol, 12.5 mg, Oral, BID With Meals  enoxaparin, 60 mg, Subcutaneous, Q12H  guaiFENesin, 600 mg, Oral, Q12H  insulin lispro, 2-9 Units, Subcutaneous, 4x Daily AC & at Bedtime  ipratropium-albuterol, 3 mL, Nebulization, Q6H - RT  methylPREDNISolone sodium succinate, 60 mg, Intravenous, Q12H  mupirocin, 1 Application, Each Nare, BID  nicotine, 1 patch, Transdermal, Q24H  pantoprazole, 40 mg, Intravenous, Q AM  rOPINIRole, 3 mg, Oral, Nightly  sodium chloride, 10 mL, Intravenous, Q12H  terazosin, 1 mg, Oral, Nightly  valsartan, 320 mg, Oral, Daily      IV Meds:   dexmedetomidine, 0.2 mcg/kg/hr, Last Rate: 0.2 mcg/kg/hr (25 0810)  Pharmacy to Dose enoxaparin (LOVENOX),          Results Reviewed:   I have personally reviewed the results from the time of this admission to 1/4/2025 08:36 EST     Results from last 7 days   Lab Units 01/04/25  0704 01/03/25  0511 01/02/25  0510 01/01/25  0357   SODIUM mmol/L 139 136 143 139   POTASSIUM mmol/L 3.8 4.3 3.5 3.5   CHLORIDE mmol/L 95* 91* 94* 92*   CO2 mmol/L 36.8* 36.6* 42.5* 37.3*   BUN mg/dL 22* 26* 13 9   CREATININE mg/dL 0.74* 1.01 0.82 0.76   CALCIUM mg/dL 8.4* 8.6 8.7 9.0   BILIRUBIN mg/dL  --   --   --  0.4   ALK PHOS U/L  --   --   --  128*   ALT (SGPT) U/L  --   --   --  24   AST (SGOT) U/L  --   --   --  18   GLUCOSE mg/dL 199* 237* 228* 244*       Estimated Creatinine Clearance: 176 mL/min (A) (by C-G formula based on SCr of 0.74 mg/dL (L)).                Results from last 7 days   Lab Units 01/04/25  0705 01/03/25  0511 01/02/25  1416 01/01/25  0357   WBC 10*3/mm3 15.45* 16.97* 20.77* 15.08*   HEMOGLOBIN g/dL 14.0 13.7 13.3 14.8   PLATELETS 10*3/mm3 245 244 259 256             Assessment / Plan     ASSESSMENT:    Oliguria: Patient is only 100 cc of urine output overnight, he did receive 40 mg of Lasix IV.   Received IVF challenge w > 3 liters of UO  Urinary retention ; Epstein in place , monitor I/O   Hypercapnic respiratory failure: Patient's pCO2 greater than 90, has been on BiPAP since admission with minimal improvement.  Hypertension: on home regimen   Obstructive sleep apnea: It is not clear if he was using his BiPAP at home, currently on BiPAP.  Type 2 diabetes: Continue with the sliding scale  Morbid obesity: Complicates all forms of care.       PLAN:   Hold IVF    Order bumetanide 2 mg   Continue medical care   Placed on precedex , if BP drops added midodrine prn .and reduction of antihypertensive medications , might be indicated        Thank you for involving us in the care of Cal Oliver Jr..  Please feel free to call with any questions.    Robert Martinez MD  01/04/25  08:36 [facility]    [facility]\""  [phone]    Please note that portions of this note were completed with a voice recognition program.

## 2025-01-04 NOTE — PLAN OF CARE
Problem: Noninvasive Ventilation Acute  Goal: Effective Unassisted Ventilation and Oxygenation  Outcome: Not Progressing  Intervention: Monitor and Manage Noninvasive Ventilation  Flowsheets (Taken 1/4/2025 3824)  Airway/Ventilation Management: airway patency maintained  NPPV/CPAP Maintenance: adjusted   Goal Outcome Evaluation:   RT EQUIPMENT DEVICE RELATED - SKIN ASSESSMENT    Julien Score:  Julien Score: 13     RT Medical Equipment/Device:     NIV Mask:  Full-face    size: lg    Skin Assessment:      Cheek:  Intact  Chin:  Intact  Nose:  Intact    Device Skin Pressure Protection:        Nurse Notification:  No    Erica Flaherty, RRT

## 2025-01-05 ENCOUNTER — APPOINTMENT (OUTPATIENT)
Dept: GENERAL RADIOLOGY | Facility: HOSPITAL | Age: 60
End: 2025-01-05
Payer: MEDICARE

## 2025-01-05 ENCOUNTER — APPOINTMENT (OUTPATIENT)
Dept: ULTRASOUND IMAGING | Facility: HOSPITAL | Age: 60
End: 2025-01-05
Payer: MEDICARE

## 2025-01-05 LAB
A-A DO2: 302 MMHG
AMMONIA BLD-SCNC: 71 UMOL/L (ref 16–60)
ANION GAP SERPL CALCULATED.3IONS-SCNC: 7.3 MMOL/L (ref 5–15)
ARTERIAL PATENCY WRIST A: POSITIVE
ATMOSPHERIC PRESS: 738 MMHG
BASE EXCESS BLDA CALC-SCNC: 17.8 MMOL/L (ref 0–2)
BASOPHILS # BLD AUTO: 0.02 10*3/MM3 (ref 0–0.2)
BASOPHILS NFR BLD AUTO: 0.2 % (ref 0–1.5)
BDY SITE: ABNORMAL
BUN SERPL-MCNC: 21 MG/DL (ref 6–20)
BUN/CREAT SERPL: 32.3 (ref 7–25)
CALCIUM SPEC-SCNC: 8.5 MG/DL (ref 8.6–10.5)
CHLORIDE SERPL-SCNC: 95 MMOL/L (ref 98–107)
CO2 SERPL-SCNC: 40.7 MMOL/L (ref 22–29)
COHGB MFR BLD: 1 % (ref 0–2)
CREAT SERPL-MCNC: 0.65 MG/DL (ref 0.76–1.27)
DEPRECATED RDW RBC AUTO: 45.1 FL (ref 37–54)
EGFRCR SERPLBLD CKD-EPI 2021: 109.9 ML/MIN/1.73
EOSINOPHIL # BLD AUTO: 0.02 10*3/MM3 (ref 0–0.4)
EOSINOPHIL NFR BLD AUTO: 0.2 % (ref 0.3–6.2)
EPAP: 6
ERYTHROCYTE [DISTWIDTH] IN BLOOD BY AUTOMATED COUNT: 13.2 % (ref 12.3–15.4)
GLUCOSE BLDC GLUCOMTR-MCNC: 197 MG/DL (ref 70–130)
GLUCOSE BLDC GLUCOMTR-MCNC: 206 MG/DL (ref 70–130)
GLUCOSE BLDC GLUCOMTR-MCNC: 253 MG/DL (ref 70–130)
GLUCOSE SERPL-MCNC: 218 MG/DL (ref 65–99)
HCO3 BLDA-SCNC: 47.4 MMOL/L (ref 22–28)
HCT VFR BLD AUTO: 46.3 % (ref 37.5–51)
HCT VFR BLD CALC: 43.7 %
HGB BLD-MCNC: 14.4 G/DL (ref 13–17.7)
IMM GRANULOCYTES # BLD AUTO: 0.05 10*3/MM3 (ref 0–0.05)
IMM GRANULOCYTES NFR BLD AUTO: 0.4 % (ref 0–0.5)
INHALED O2 CONCENTRATION: 65 %
IPAP: 22
LYMPHOCYTES # BLD AUTO: 0.77 10*3/MM3 (ref 0.7–3.1)
LYMPHOCYTES NFR BLD AUTO: 5.8 % (ref 19.6–45.3)
Lab: ABNORMAL
Lab: ABNORMAL
MCH RBC QN AUTO: 28.9 PG (ref 26.6–33)
MCHC RBC AUTO-ENTMCNC: 31.1 G/DL (ref 31.5–35.7)
MCV RBC AUTO: 93 FL (ref 79–97)
METHGB BLD QL: 0.3 % (ref 0–1.5)
MODALITY: ABNORMAL
MONOCYTES # BLD AUTO: 0.63 10*3/MM3 (ref 0.1–0.9)
MONOCYTES NFR BLD AUTO: 4.7 % (ref 5–12)
NEUTROPHILS NFR BLD AUTO: 11.82 10*3/MM3 (ref 1.7–7)
NEUTROPHILS NFR BLD AUTO: 88.7 % (ref 42.7–76)
NOTIFIED BY: ABNORMAL
NOTIFIED WHO: ABNORMAL
NRBC BLD AUTO-RTO: 0 /100 WBC (ref 0–0.2)
OXYHGB MFR BLDV: 91.9 % (ref 94–99)
PCO2 BLDA: 79.1 MM HG (ref 35–45)
PCO2 TEMP ADJ BLD: ABNORMAL MM[HG]
PH BLDA: 7.39 PH UNITS (ref 7.3–7.5)
PH, TEMP CORRECTED: ABNORMAL
PLATELET # BLD AUTO: 237 10*3/MM3 (ref 140–450)
PMV BLD AUTO: 9.9 FL (ref 6–12)
PO2 BLDA: 63.6 MM HG (ref 75–100)
PO2 TEMP ADJ BLD: ABNORMAL MM[HG]
POTASSIUM SERPL-SCNC: 3.9 MMOL/L (ref 3.5–5.2)
PROCALCITONIN SERPL-MCNC: 0.04 NG/ML (ref 0–0.25)
RBC # BLD AUTO: 4.98 10*6/MM3 (ref 4.14–5.8)
SAO2 % BLDCOA: 93.1 % (ref 94–100)
SET MECH RESP RATE: 22
SODIUM SERPL-SCNC: 143 MMOL/L (ref 136–145)
VENTILATOR MODE: ABNORMAL
WBC NRBC COR # BLD AUTO: 13.31 10*3/MM3 (ref 3.4–10.8)

## 2025-01-05 PROCEDURE — 94799 UNLISTED PULMONARY SVC/PX: CPT

## 2025-01-05 PROCEDURE — 99232 SBSQ HOSP IP/OBS MODERATE 35: CPT | Performed by: STUDENT IN AN ORGANIZED HEALTH CARE EDUCATION/TRAINING PROGRAM

## 2025-01-05 PROCEDURE — 82375 ASSAY CARBOXYHB QUANT: CPT

## 2025-01-05 PROCEDURE — 25010000002 ZIPRASIDONE MESYLATE PER 10 MG: Performed by: INTERNAL MEDICINE

## 2025-01-05 PROCEDURE — 36600 WITHDRAWAL OF ARTERIAL BLOOD: CPT

## 2025-01-05 PROCEDURE — 94660 CPAP INITIATION&MGMT: CPT

## 2025-01-05 PROCEDURE — 82948 REAGENT STRIP/BLOOD GLUCOSE: CPT

## 2025-01-05 PROCEDURE — 71045 X-RAY EXAM CHEST 1 VIEW: CPT

## 2025-01-05 PROCEDURE — 25010000002 HALOPERIDOL LACTATE PER 5 MG: Performed by: INTERNAL MEDICINE

## 2025-01-05 PROCEDURE — 25010000002 METHYLPREDNISOLONE PER 125 MG: Performed by: NURSE PRACTITIONER

## 2025-01-05 PROCEDURE — 25010000002 BUMETANIDE PER 0.5 MG: Performed by: INTERNAL MEDICINE

## 2025-01-05 PROCEDURE — 83050 HGB METHEMOGLOBIN QUAN: CPT

## 2025-01-05 PROCEDURE — 63710000001 INSULIN LISPRO (HUMAN) PER 5 UNITS: Performed by: FAMILY MEDICINE

## 2025-01-05 PROCEDURE — 94761 N-INVAS EAR/PLS OXIMETRY MLT: CPT

## 2025-01-05 PROCEDURE — 25010000002 LORAZEPAM PER 2 MG: Performed by: FAMILY MEDICINE

## 2025-01-05 PROCEDURE — 25010000002 METHYLPREDNISOLONE PER 40 MG: Performed by: STUDENT IN AN ORGANIZED HEALTH CARE EDUCATION/TRAINING PROGRAM

## 2025-01-05 PROCEDURE — 85025 COMPLETE CBC W/AUTO DIFF WBC: CPT | Performed by: STUDENT IN AN ORGANIZED HEALTH CARE EDUCATION/TRAINING PROGRAM

## 2025-01-05 PROCEDURE — 84145 PROCALCITONIN (PCT): CPT | Performed by: STUDENT IN AN ORGANIZED HEALTH CARE EDUCATION/TRAINING PROGRAM

## 2025-01-05 PROCEDURE — 25010000002 ENOXAPARIN PER 10 MG: Performed by: FAMILY MEDICINE

## 2025-01-05 PROCEDURE — 82948 REAGENT STRIP/BLOOD GLUCOSE: CPT | Performed by: FAMILY MEDICINE

## 2025-01-05 PROCEDURE — 82805 BLOOD GASES W/O2 SATURATION: CPT

## 2025-01-05 PROCEDURE — 80048 BASIC METABOLIC PNL TOTAL CA: CPT | Performed by: STUDENT IN AN ORGANIZED HEALTH CARE EDUCATION/TRAINING PROGRAM

## 2025-01-05 PROCEDURE — 82140 ASSAY OF AMMONIA: CPT | Performed by: STUDENT IN AN ORGANIZED HEALTH CARE EDUCATION/TRAINING PROGRAM

## 2025-01-05 PROCEDURE — 76705 ECHO EXAM OF ABDOMEN: CPT

## 2025-01-05 PROCEDURE — 25010000002 HYDRALAZINE PER 20 MG: Performed by: INTERNAL MEDICINE

## 2025-01-05 RX ORDER — BUMETANIDE 0.25 MG/ML
2 INJECTION, SOLUTION INTRAMUSCULAR; INTRAVENOUS ONCE
Status: DISCONTINUED | OUTPATIENT
Start: 2025-01-05 | End: 2025-01-05

## 2025-01-05 RX ORDER — METHYLPREDNISOLONE SODIUM SUCCINATE 40 MG/ML
40 INJECTION, POWDER, LYOPHILIZED, FOR SOLUTION INTRAMUSCULAR; INTRAVENOUS EVERY 12 HOURS
Status: COMPLETED | OUTPATIENT
Start: 2025-01-05 | End: 2025-01-06

## 2025-01-05 RX ORDER — ZIPRASIDONE MESYLATE 20 MG/ML
20 INJECTION, POWDER, LYOPHILIZED, FOR SOLUTION INTRAMUSCULAR EVERY 4 HOURS PRN
Status: DISCONTINUED | OUTPATIENT
Start: 2025-01-05 | End: 2025-01-10 | Stop reason: HOSPADM

## 2025-01-05 RX ORDER — BUMETANIDE 0.25 MG/ML
2 INJECTION, SOLUTION INTRAMUSCULAR; INTRAVENOUS ONCE
Status: COMPLETED | OUTPATIENT
Start: 2025-01-05 | End: 2025-01-05

## 2025-01-05 RX ORDER — LACTULOSE 10 G/15ML
30 SOLUTION ORAL 3 TIMES DAILY
Status: DISCONTINUED | OUTPATIENT
Start: 2025-01-05 | End: 2025-01-09

## 2025-01-05 RX ADMIN — AMLODIPINE BESYLATE 10 MG: 5 TABLET ORAL at 08:38

## 2025-01-05 RX ADMIN — LORAZEPAM 2 MG: 2 INJECTION INTRAMUSCULAR; INTRAVENOUS at 16:14

## 2025-01-05 RX ADMIN — IPRATROPIUM BROMIDE AND ALBUTEROL SULFATE 3 ML: 2.5; .5 SOLUTION RESPIRATORY (INHALATION) at 07:19

## 2025-01-05 RX ADMIN — CARVEDILOL 12.5 MG: 6.25 TABLET, FILM COATED ORAL at 08:37

## 2025-01-05 RX ADMIN — LACTULOSE 30 G: 20 SOLUTION ORAL at 08:37

## 2025-01-05 RX ADMIN — ENOXAPARIN SODIUM 60 MG: 60 INJECTION SUBCUTANEOUS at 17:20

## 2025-01-05 RX ADMIN — BUMETANIDE 2 MG: 0.25 INJECTION INTRAMUSCULAR; INTRAVENOUS at 08:38

## 2025-01-05 RX ADMIN — DEXMEDETOMIDINE HYDROCHLORIDE 1 MCG/KG/HR: 400 INJECTION INTRAVENOUS at 20:39

## 2025-01-05 RX ADMIN — ACETAMINOPHEN 650 MG: 325 TABLET, FILM COATED ORAL at 08:38

## 2025-01-05 RX ADMIN — NICOTINE 1 PATCH: 21 PATCH TRANSDERMAL at 06:50

## 2025-01-05 RX ADMIN — GUAIFENESIN 600 MG: 600 TABLET, EXTENDED RELEASE ORAL at 20:39

## 2025-01-05 RX ADMIN — DEXMEDETOMIDINE HYDROCHLORIDE 0.4 MCG/KG/HR: 400 INJECTION INTRAVENOUS at 02:02

## 2025-01-05 RX ADMIN — INSULIN LISPRO 4 UNITS: 100 INJECTION, SOLUTION INTRAVENOUS; SUBCUTANEOUS at 08:36

## 2025-01-05 RX ADMIN — DEXMEDETOMIDINE HYDROCHLORIDE 0.6 MCG/KG/HR: 400 INJECTION INTRAVENOUS at 13:17

## 2025-01-05 RX ADMIN — METHOCARBAMOL 750 MG: 500 TABLET ORAL at 20:39

## 2025-01-05 RX ADMIN — ASPIRIN 81 MG CHEWABLE TABLET 81 MG: 81 TABLET CHEWABLE at 08:37

## 2025-01-05 RX ADMIN — LORAZEPAM 2 MG: 2 INJECTION INTRAMUSCULAR; INTRAVENOUS at 04:00

## 2025-01-05 RX ADMIN — CARVEDILOL 12.5 MG: 6.25 TABLET, FILM COATED ORAL at 17:20

## 2025-01-05 RX ADMIN — PANTOPRAZOLE SODIUM 40 MG: 40 INJECTION, POWDER, FOR SOLUTION INTRAVENOUS at 06:48

## 2025-01-05 RX ADMIN — IPRATROPIUM BROMIDE AND ALBUTEROL SULFATE 3 ML: 2.5; .5 SOLUTION RESPIRATORY (INHALATION) at 19:24

## 2025-01-05 RX ADMIN — HALOPERIDOL LACTATE 1 MG: 5 INJECTION, SOLUTION INTRAMUSCULAR at 19:18

## 2025-01-05 RX ADMIN — DEXMEDETOMIDINE HYDROCHLORIDE 1.5 MCG/KG/HR: 400 INJECTION INTRAVENOUS at 22:55

## 2025-01-05 RX ADMIN — ENOXAPARIN SODIUM 60 MG: 60 INJECTION SUBCUTANEOUS at 06:48

## 2025-01-05 RX ADMIN — METHYLPREDNISOLONE SODIUM SUCCINATE 60 MG: 125 INJECTION, POWDER, FOR SOLUTION INTRAMUSCULAR; INTRAVENOUS at 06:49

## 2025-01-05 RX ADMIN — GUAIFENESIN 600 MG: 600 TABLET, EXTENDED RELEASE ORAL at 08:38

## 2025-01-05 RX ADMIN — VALSARTAN 320 MG: 80 TABLET, FILM COATED ORAL at 08:37

## 2025-01-05 RX ADMIN — ZIPRASIDONE MESYLATE 20 MG: 20 INJECTION, POWDER, LYOPHILIZED, FOR SOLUTION INTRAMUSCULAR at 23:48

## 2025-01-05 RX ADMIN — MUPIROCIN 1 APPLICATION: 20 OINTMENT TOPICAL at 08:38

## 2025-01-05 RX ADMIN — TERAZOSIN HYDROCHLORIDE 1 MG: 1 CAPSULE ORAL at 20:39

## 2025-01-05 RX ADMIN — MUPIROCIN 1 APPLICATION: 20 OINTMENT TOPICAL at 20:38

## 2025-01-05 RX ADMIN — IPRATROPIUM BROMIDE AND ALBUTEROL SULFATE 3 ML: 2.5; .5 SOLUTION RESPIRATORY (INHALATION) at 00:41

## 2025-01-05 RX ADMIN — METHYLPREDNISOLONE SODIUM SUCCINATE 40 MG: 40 INJECTION, POWDER, FOR SOLUTION INTRAMUSCULAR; INTRAVENOUS at 19:19

## 2025-01-05 RX ADMIN — LACTULOSE 30 G: 20 SOLUTION ORAL at 16:13

## 2025-01-05 RX ADMIN — INSULIN LISPRO 6 UNITS: 100 INJECTION, SOLUTION INTRAVENOUS; SUBCUTANEOUS at 17:20

## 2025-01-05 RX ADMIN — LORAZEPAM 2 MG: 2 INJECTION INTRAMUSCULAR; INTRAVENOUS at 08:38

## 2025-01-05 RX ADMIN — ROPINIROLE HYDROCHLORIDE 3 MG: 1 TABLET, FILM COATED ORAL at 20:39

## 2025-01-05 RX ADMIN — Medication 10 ML: at 08:37

## 2025-01-05 RX ADMIN — BUDESONIDE 0.5 MG: 0.5 INHALANT RESPIRATORY (INHALATION) at 07:19

## 2025-01-05 RX ADMIN — HYDRALAZINE HYDROCHLORIDE 10 MG: 20 INJECTION INTRAMUSCULAR; INTRAVENOUS at 22:51

## 2025-01-05 RX ADMIN — Medication 10 ML: at 20:59

## 2025-01-05 RX ADMIN — BUDESONIDE 0.5 MG: 0.5 INHALANT RESPIRATORY (INHALATION) at 19:24

## 2025-01-05 RX ADMIN — INSULIN LISPRO 4 UNITS: 100 INJECTION, SOLUTION INTRAVENOUS; SUBCUTANEOUS at 13:17

## 2025-01-05 RX ADMIN — DEXMEDETOMIDINE HYDROCHLORIDE 0.6 MCG/KG/HR: 400 INJECTION INTRAVENOUS at 08:36

## 2025-01-05 RX ADMIN — LORAZEPAM 2 MG: 2 INJECTION INTRAMUSCULAR; INTRAVENOUS at 20:54

## 2025-01-05 RX ADMIN — LACTULOSE 30 G: 20 SOLUTION ORAL at 20:38

## 2025-01-05 NOTE — PROGRESS NOTES
[facility] Progress Note      Patient Name: Cal Oliver Jr.  : 1965  MRN: [id number]  Primary Care Physician:  Woo Betancourt MD  Date of admission: 2025    Subjective     Interval History:     Overnight night   On BIPAP   Somnolent   Epstein in place w UO > 3.9 liters     Review of Systems:   As noted above    Objective     Vitals:   Temp:  [97.5 °F (36.4 °C)-98.6 °F (37 °C)] 97.9 °F (36.6 °C)  Heart Rate:  [59-91] 63  Resp:  [22-32] 30  BP: (100-168)/() 168/109  Flow (L/min) (Oxygen Therapy):  [15] 15    Intake/Output Summary (Last 24 hours) at 2025 0812  Last data filed at 2025 0600  Gross per 24 hour   Intake 1092.8 ml   Output 3685 ml   Net -2592.2 ml       Physical Exam:    General Appearance:  Somnolent  BiPAP . Morbid obese   Skin: warm and dry  HEENT: oral mucosa normal, nonicteric sclera  Neck: supple, no JVD  Lungs: [place]   Heart: RRR, normal S1 and S2  Abdomen: soft, nontender, nondistended  : no palpable bladder  Extremities: no edema, cyanosis or clubbing  Neuro: no focalization     Scheduled Meds:     amLODIPine, 10 mg, Oral, Daily  aspirin, 81 mg, Oral, Daily  budesonide, 0.5 mg, Nebulization, BID - RT  bumetanide, 2 mg, Intravenous, Once  carvedilol, 12.5 mg, Oral, BID With Meals  enoxaparin, 60 mg, Subcutaneous, Q12H  guaiFENesin, 600 mg, Oral, Q12H  insulin lispro, 2-9 Units, Subcutaneous, 4x Daily AC & at Bedtime  ipratropium-albuterol, 3 mL, Nebulization, Q6H - RT  lactulose, 30 g, Oral, TID  methylPREDNISolone sodium succinate, 40 mg, Intravenous, Q12H  mupirocin, 1 Application, Each Nare, BID  nicotine, 1 patch, Transdermal, Q24H  pantoprazole, 40 mg, Intravenous, Q AM  rOPINIRole, 3 mg, Oral, Nightly  sodium chloride, 10 mL, Intravenous, Q12H  terazosin, 1 mg, Oral, Nightly  valsartan, 320 mg, Oral, Daily      IV Meds:   dexmedetomidine, 0.2 mcg/kg/hr, Last Rate: 0.4 mcg/kg/hr (25 0202)  Pharmacy to Dose enoxaparin  (LOVENOX),         Results Reviewed:   I have personally reviewed the results from the time of this admission to 1/5/2025 08:12 EST     Results from last 7 days   Lab Units 01/05/25  0407 01/04/25  0704 01/03/25  0511 01/02/25  0510 01/01/25  0357   SODIUM mmol/L 143 139 136   < > 139   POTASSIUM mmol/L 3.9 3.8 4.3   < > 3.5   CHLORIDE mmol/L 95* 95* 91*   < > 92*   CO2 mmol/L 40.7* 36.8* 36.6*   < > 37.3*   BUN mg/dL 21* 22* 26*   < > 9   CREATININE mg/dL 0.65* 0.74* 1.01   < > 0.76   CALCIUM mg/dL 8.5* 8.4* 8.6   < > 9.0   BILIRUBIN mg/dL  --   --   --   --  0.4   ALK PHOS U/L  --   --   --   --  128*   ALT (SGPT) U/L  --   --   --   --  24   AST (SGOT) U/L  --   --   --   --  18   GLUCOSE mg/dL 218* 199* 237*   < > 244*    < > = values in this interval not displayed.       Estimated Creatinine Clearance: 198.6 mL/min (A) (by C-G formula based on SCr of 0.65 mg/dL (L)).                Results from last 7 days   Lab Units 01/05/25  0407 01/04/25  0705 01/03/25  0511 01/02/25  1416 01/01/25  0357   WBC 10*3/mm3 13.31* 15.45* 16.97* 20.77* 15.08*   HEMOGLOBIN g/dL 14.4 14.0 13.7 13.3 14.8   PLATELETS 10*3/mm3 237 245 244 259 256     Site   Date Value Ref Range Status   01/05/2025 Right Radial  Final     Shamar's Test   Date Value Ref Range Status   01/05/2025 Positive  Final     pH, Arterial   Date Value Ref Range Status   01/05/2025 7.386 7.300 - 7.500 pH units Final     pCO2, Arterial   Date Value Ref Range Status   01/05/2025 79.1 (C) 35.0 - 45.0 mm Hg Final     Comment:     86 Value above critical limit     pO2, Arterial   Date Value Ref Range Status   01/05/2025 63.6 (L) 75.0 - 100.0 mm Hg Final     Comment:     84 Value below reference range     HCO3, Arterial   Date Value Ref Range Status   01/05/2025 47.4 (H) 22.0 - 28.0 mmol/L Final     Comment:     83 Value above reference range     Base Excess, Arterial   Date Value Ref Range Status   01/05/2025 17.8 (H) 0.0 - 2.0 mmol/L Final     Comment:     83 Value  above reference range     O2 Saturation, Arterial   Date Value Ref Range Status   01/05/2025 93.1 (L) 94.0 - 100.0 % Final     Comment:     84 Value below reference range     Hematocrit, Blood Gas   Date Value Ref Range Status   01/05/2025 43.7 % Final     Oxyhemoglobin   Date Value Ref Range Status   01/05/2025 91.9 (L) 94 - 99 % Final     Comment:     84 Value below reference range     Methemoglobin   Date Value Ref Range Status   01/05/2025 0.30 0.00 - 1.50 % Final     Carboxyhemoglobin   Date Value Ref Range Status   01/05/2025 1.0 0 - 2 % Final     Barometric Pressure for Blood Gas   Date Value Ref Range Status   01/05/2025 738 mmHg Final     Modality   Date Value Ref Range Status   01/05/2025 BiPap  Final     FIO2   Date Value Ref Range Status   01/05/2025 65 % Final             Assessment / Plan     ASSESSMENT:    Oliguria: Patient is only 100 cc of urine output overnight, he did receive 40 mg of Lasix IV.   Received IVF challenge w > 3 liters of UO  Urinary retention ; Epstein in place , monitor I/O   Hypercapnic respiratory failure: Patient's pCO2 greater than 90, has been on BiPAP since admission with minimal improvement.  Hypertension: on home regimen   Obstructive sleep apnea: It is not clear if he was using his BiPAP at home, currently on BiPAP.  Type 2 diabetes: Continue with the sliding scale  Morbid obesity: Complicates all forms of care.       PLAN:   Will resumed bumetanide 1 mg  IV x 1 .  In the last 24 hours 3.9 L of urine output continue to monitor diuretic toxicity   repeat ABG w/ persistent CO2 retention  Continue medical care     Discussed with ICU nursing staff    Discussed w / Dr Kerley     Thank you for involving us in the care of Cal Oliver .  Please feel free to call with any questions.    Robert Martinez MD  01/05/25  08:12 [facility]    [facility]  [phone]    Please note that portions of this note were completed with a voice recognition program.

## 2025-01-05 NOTE — PLAN OF CARE
Goal Outcome Evaluation:  Plan of Care Reviewed With: patient        Progress: no change  Outcome Evaluation: Patient still requiring bipap, confused at times, rests better when given ativan than with precedex alone, patient still restless, tolerating bipap, VSS

## 2025-01-05 NOTE — PROGRESS NOTES
[facility]    Name:  Cal Oliver Jr.   Age:  59 y.o.  Sex:  male  :  1965  MRN:  [id number]   Visit Number:  [id number]  Admission Date:  2025  Date Of Service:  25  Primary Care Physician:  Woo Betancourt MD     LOS: 4 days :    Chief Complaint:      Shortness of air    Subjective:    Says his breathing is feeling better this morning, still short of air though.  Denies any specific pain.    Hospital Course:    Cal Oliver Jr is a  59-year-old man with past medical history of morbid obesity BMI 52, chronic tobacco abuse, uncontrolled high blood pressure, chronic back pain, diabetes, sleep apnea with home BiPAP use, who presented from home with complaints of shortness of breath.  Notes symptoms over the last 3 to 4 days, worsening.  Not been able to catch his breath with exertion, lying flat.  Noticed some swelling.  Has not been checking blood pressure.  No chest pain or palpitations.  Continues to smoke.  Unknown sick contacts.     In the ER he was hypertensive, heart rate in the 90s, afebrile, and hypoxic on room air.  CMP unremarkable.  proBNP of 110.  Troponins mildly elevated.  White count of 15 hemoglobin 14 platelets of 256.  Normal procalcitonin.  Negative flu COVID testing.  Chest x-ray with some interstitial edema.  Patient given DuoNeb, Solu-Medrol, Lasix, started on BiPAP.  Hospitalist consulted for further medical management.  Echo obtained noting preserved EF with grade 2 diastolic dysfunction.  Patient appears euvolemic at this time.  CT rule out PE was obtained with no PE noted with bilateral segmental/subsegmental consolidation less likely favored to be pneumonia and more atelectasis at this time.    Review of Systems:     All systems were reviewed and negative except as mentioned in subjective, assessment and plan.    Vital Signs:    Temp:  [97.8 °F (36.6 °C)-98.6 °F (37 °C)] 98 °F (36.7 °C)  Heart Rate:  [55-85] 55  Resp:   [22-32] 26  BP: (115-172)/() 161/111    Intake and output:    I/O last 3 completed shifts:  In: 3468 [P.O.:1080; I.V.:2388]  Out: 4925 [Urine:4925]  I/O this shift:  In: 360 [P.O.:360]  Out: 1100 [Urine:1100]    Physical Examination:    General Appearance:  Alert and cooperative.  Chronically ill middle-age male.  Obese.   Head:  Atraumatic and normocephalic.   Eyes: Conjunctivae and sclerae normal, no icterus. No pallor.   Throat: No oral lesions, no thrush, oral mucosa moist.   Neck: Supple, trachea midline, no thyromegaly.   Lungs:   Breath sounds heard bilaterally equally.  Unlabored on BiPAP.  Diminished throughout.   Heart:  Normal S1 and S2, murmur, no gallop, no rub. No JVD.   Abdomen:   Normal bowel sounds, no masses, no organomegaly. Soft, nontender, nondistended, no rebound tenderness.   Extremities: Supple, no edema, no cyanosis, no clubbing.   Skin: Warm.   Neurologic: Alert and oriented x 3. No facial asymmetry. Moves all four limbs. No tremors.      Laboratory results:    Results from last 7 days   Lab Units 01/05/25  0407 01/04/25  0704 01/03/25  0511 01/02/25  0510 01/01/25  0357   SODIUM mmol/L 143 139 136   < > 139   POTASSIUM mmol/L 3.9 3.8 4.3   < > 3.5   CHLORIDE mmol/L 95* 95* 91*   < > 92*   CO2 mmol/L 40.7* 36.8* 36.6*   < > 37.3*   BUN mg/dL 21* 22* 26*   < > 9   CREATININE mg/dL 0.65* 0.74* 1.01   < > 0.76   CALCIUM mg/dL 8.5* 8.4* 8.6   < > 9.0   BILIRUBIN mg/dL  --   --   --   --  0.4   ALK PHOS U/L  --   --   --   --  128*   ALT (SGPT) U/L  --   --   --   --  24   AST (SGOT) U/L  --   --   --   --  18   GLUCOSE mg/dL 218* 199* 237*   < > 244*    < > = values in this interval not displayed.     Results from last 7 days   Lab Units 01/05/25  0407 01/04/25  0705 01/03/25  0511   WBC 10*3/mm3 13.31* 15.45* 16.97*   HEMOGLOBIN g/dL 14.4 14.0 13.7   HEMATOCRIT % 46.3 45.2 45.1   PLATELETS 10*3/mm3 237 245 244         Results from last 7 days   Lab Units 01/01/25  0521 01/01/25  0357    HSTROP T ng/L 24* 24*     Results from last 7 days   Lab Units 01/01/25  0518   BLOODCX  No growth at 4 days  No growth at 4 days     Recent Labs     01/03/25  2345 01/04/25  0426 01/05/25  0755   PHART 7.360 7.365 7.386   DVA1SYW 77.1* 78.5* 79.1*   PO2ART 61.8* 70.9* 63.6*   UAM5MQD 43.5* 44.8* 47.4*   BASEEXCESS 14.0* 15.2* 17.8*      I have reviewed the patient's laboratory results.    Radiology results:    XR Chest 1 View    Result Date: 1/5/2025  PROCEDURE: XR CHEST 1 VW-  HISTORY: hypoxia f/u; J96.01-Acute respiratory failure with hypoxia, acute respiratory failure.  COMPARISON: 2 days prior.  FINDINGS: The heart is normal in size. There is decreased inspiratory effort. Again noted are bilateral perihilar linear densities, suspect atelectasis. There is pulmonary vascular congestion and bilateral interstitial disease, similar to prior. No effusion seen. There is improved aeration of the lung bases bilaterally compared to previous exam.. The mediastinum is unremarkable. There is no pneumothorax.  There are no acute osseous abnormalities. Apical lordotic positioning noted.      Impression: Improved bibasilar atelectasis or infiltrate compared to prior..     This report was signed and finalized on 1/5/2025 8:20 AM by Krysten Rogers MD.     I have reviewed the patient's radiology reports.    Medication Review:     I have reviewed the patient's active and prn medications.     Problem List:      Acute respiratory failure with hypoxia    COPD exacerbation      Assessment:    Respiratory failure with hypoxia/hypercapnia, POA  COPD with acute exacerbation, POA  Possible diastolic heart failure, workup pending, POA  Hypertensive urgency, POA  Chronic tobacco abuse  Type 2 diabetes  Morbid obesity  Obstructive sleep apnea    Plan:    ABG still shows frequent CO2 retention, compensated.  He does appear to be getting better each day, more awake and alert now during the day.    Respiratory failure with  hypoxia/hypercapnia/COPD/CHF  -Solu-Medrol, Pulmicort, oxygen and continue BiPAP.  Wean as tolerated.  NicoDerm patch ordered  -Respiratory panel negative.  -CT noting consolidation with likely atelectasis.  Prior procalcitonin negative.  -Will continue Lasix as well.  BNP normal upon admission.  Echo did note grade 2 diastolic dysfunction with preserved EF.  -Will repeat ABG as patient CO2 levels 96.  Patient will be transferred to ICU.  Given no pulmonology available will also reach out to [facility] for possible transfer.     Hypertension/tobacco use/diabetes/obesity/SHUKRI:  -continue home medications.  - Has had uncontrolled high blood pressure chronically it appears.    -Sliding scale insulin ordered.     Oliguria  Urinary retention  -Nephrology consulted, recommendations appreciated.  Epstein catheter placed for strict I's and O's and urinary retention.  Diuretics.    Was placed on waiting list for [facility] due to respiratory decline on continuous BiPAP without pulmonology availability at [facility] on 1/2.  As patient is improving, likely would not qualify for ICU placement at [facility].    I have reviewed the copied text and it is accurate as of 1/5/2025      DVT Prophylaxis: Enoxaparin   Code Status: Full  Diet: Diabetic  Discharge Plan: TBD    Updated wife Janelle during course.    Brian Joseph Kerley, DO  01/05/25  14:57 EST    Dictated utilizing Dragon dictation.

## 2025-01-05 NOTE — PLAN OF CARE
Problem: Noninvasive Ventilation Acute  Goal: Effective Unassisted Ventilation and Oxygenation  Outcome: Progressing  Intervention: Monitor and Manage Noninvasive Ventilation  Flowsheets (Taken 1/5/2025 0222)  Airway/Ventilation Management:   airway patency maintained   oxygen therapy provided   positive pressure ventilation provided  NPPV/CPAP Maintenance: full face mask   Goal Outcome Evaluation:

## 2025-01-06 ENCOUNTER — APPOINTMENT (OUTPATIENT)
Dept: GENERAL RADIOLOGY | Facility: HOSPITAL | Age: 60
End: 2025-01-06
Payer: MEDICARE

## 2025-01-06 LAB
A-A DO2: 203.8 MMHG
ANION GAP SERPL CALCULATED.3IONS-SCNC: 7.6 MMOL/L (ref 5–15)
ARTERIAL PATENCY WRIST A: ABNORMAL
ATMOSPHERIC PRESS: 724 MMHG
BACTERIA SPEC AEROBE CULT: NORMAL
BACTERIA SPEC AEROBE CULT: NORMAL
BASE EXCESS BLDA CALC-SCNC: 17.9 MMOL/L (ref 0–2)
BASOPHILS # BLD AUTO: 0.02 10*3/MM3 (ref 0–0.2)
BASOPHILS NFR BLD AUTO: 0.1 % (ref 0–1.5)
BDY SITE: ABNORMAL
BUN SERPL-MCNC: 15 MG/DL (ref 6–20)
BUN/CREAT SERPL: 24.2 (ref 7–25)
CALCIUM SPEC-SCNC: 8.5 MG/DL (ref 8.6–10.5)
CHLORIDE SERPL-SCNC: 94 MMOL/L (ref 98–107)
CO2 SERPL-SCNC: 39.4 MMOL/L (ref 22–29)
COHGB MFR BLD: 1.1 % (ref 0–2)
CREAT SERPL-MCNC: 0.62 MG/DL (ref 0.76–1.27)
D-LACTATE SERPL-SCNC: 0.8 MMOL/L (ref 0.5–2)
DEPRECATED RDW RBC AUTO: 43.9 FL (ref 37–54)
EGFRCR SERPLBLD CKD-EPI 2021: 111.5 ML/MIN/1.73
EOSINOPHIL # BLD AUTO: 0.01 10*3/MM3 (ref 0–0.4)
EOSINOPHIL NFR BLD AUTO: 0.1 % (ref 0.3–6.2)
ERYTHROCYTE [DISTWIDTH] IN BLOOD BY AUTOMATED COUNT: 13.2 % (ref 12.3–15.4)
GLUCOSE BLDC GLUCOMTR-MCNC: 155 MG/DL (ref 70–130)
GLUCOSE BLDC GLUCOMTR-MCNC: 170 MG/DL (ref 70–130)
GLUCOSE BLDC GLUCOMTR-MCNC: 179 MG/DL (ref 70–130)
GLUCOSE BLDC GLUCOMTR-MCNC: 187 MG/DL (ref 70–130)
GLUCOSE SERPL-MCNC: 198 MG/DL (ref 65–99)
HCO3 BLDA-SCNC: 46 MMOL/L (ref 22–28)
HCT VFR BLD AUTO: 45 % (ref 37.5–51)
HCT VFR BLD CALC: 44.3 %
HGB BLD-MCNC: 14.3 G/DL (ref 13–17.7)
IMM GRANULOCYTES # BLD AUTO: 0.08 10*3/MM3 (ref 0–0.05)
IMM GRANULOCYTES NFR BLD AUTO: 0.5 % (ref 0–0.5)
INHALED O2 CONCENTRATION: 50 %
LYMPHOCYTES # BLD AUTO: 1.44 10*3/MM3 (ref 0.7–3.1)
LYMPHOCYTES NFR BLD AUTO: 9 % (ref 19.6–45.3)
Lab: ABNORMAL
Lab: ABNORMAL
MCH RBC QN AUTO: 28.8 PG (ref 26.6–33)
MCHC RBC AUTO-ENTMCNC: 31.8 G/DL (ref 31.5–35.7)
MCV RBC AUTO: 90.7 FL (ref 79–97)
METHGB BLD QL: 0.6 % (ref 0–1.5)
MODALITY: ABNORMAL
MONOCYTES # BLD AUTO: 1.33 10*3/MM3 (ref 0.1–0.9)
MONOCYTES NFR BLD AUTO: 8.3 % (ref 5–12)
NEUTROPHILS NFR BLD AUTO: 13.15 10*3/MM3 (ref 1.7–7)
NEUTROPHILS NFR BLD AUTO: 82 % (ref 42.7–76)
NOTIFIED BY: ABNORMAL
NOTIFIED WHO: ABNORMAL
NRBC BLD AUTO-RTO: 0 /100 WBC (ref 0–0.2)
OXYHGB MFR BLDV: 91.6 % (ref 94–99)
PCO2 BLDA: 67.4 MM HG (ref 35–45)
PCO2 TEMP ADJ BLD: ABNORMAL MM[HG]
PH BLDA: 7.44 PH UNITS (ref 7.3–7.5)
PH, TEMP CORRECTED: ABNORMAL
PLATELET # BLD AUTO: 254 10*3/MM3 (ref 140–450)
PMV BLD AUTO: 9.9 FL (ref 6–12)
PO2 BLDA: 61.8 MM HG (ref 75–100)
PO2 TEMP ADJ BLD: ABNORMAL MM[HG]
POTASSIUM SERPL-SCNC: 3.7 MMOL/L (ref 3.5–5.2)
PROCALCITONIN SERPL-MCNC: 0.05 NG/ML (ref 0–0.25)
RBC # BLD AUTO: 4.96 10*6/MM3 (ref 4.14–5.8)
SAO2 % BLDCOA: 93.2 % (ref 94–100)
SODIUM SERPL-SCNC: 141 MMOL/L (ref 136–145)
VENTILATOR MODE: ABNORMAL
WBC NRBC COR # BLD AUTO: 16.03 10*3/MM3 (ref 3.4–10.8)

## 2025-01-06 PROCEDURE — 82948 REAGENT STRIP/BLOOD GLUCOSE: CPT | Performed by: FAMILY MEDICINE

## 2025-01-06 PROCEDURE — 85025 COMPLETE CBC W/AUTO DIFF WBC: CPT | Performed by: STUDENT IN AN ORGANIZED HEALTH CARE EDUCATION/TRAINING PROGRAM

## 2025-01-06 PROCEDURE — 94799 UNLISTED PULMONARY SVC/PX: CPT

## 2025-01-06 PROCEDURE — 82375 ASSAY CARBOXYHB QUANT: CPT

## 2025-01-06 PROCEDURE — 82948 REAGENT STRIP/BLOOD GLUCOSE: CPT

## 2025-01-06 PROCEDURE — 63710000001 INSULIN LISPRO (HUMAN) PER 5 UNITS: Performed by: FAMILY MEDICINE

## 2025-01-06 PROCEDURE — 25010000002 THIAMINE HCL 200 MG/2ML SOLUTION: Performed by: STUDENT IN AN ORGANIZED HEALTH CARE EDUCATION/TRAINING PROGRAM

## 2025-01-06 PROCEDURE — 25010000002 ENOXAPARIN PER 10 MG: Performed by: FAMILY MEDICINE

## 2025-01-06 PROCEDURE — 94660 CPAP INITIATION&MGMT: CPT

## 2025-01-06 PROCEDURE — 25010000002 PHENOBARBITAL PER 120 MG: Performed by: STUDENT IN AN ORGANIZED HEALTH CARE EDUCATION/TRAINING PROGRAM

## 2025-01-06 PROCEDURE — 25010000002 AMPICILLIN-SULBACTAM PER 1.5 G: Performed by: STUDENT IN AN ORGANIZED HEALTH CARE EDUCATION/TRAINING PROGRAM

## 2025-01-06 PROCEDURE — 82805 BLOOD GASES W/O2 SATURATION: CPT

## 2025-01-06 PROCEDURE — 83050 HGB METHEMOGLOBIN QUAN: CPT

## 2025-01-06 PROCEDURE — 25010000002 LORAZEPAM PER 2 MG: Performed by: FAMILY MEDICINE

## 2025-01-06 PROCEDURE — 25010000002 LORAZEPAM PER 2 MG: Performed by: STUDENT IN AN ORGANIZED HEALTH CARE EDUCATION/TRAINING PROGRAM

## 2025-01-06 PROCEDURE — 80048 BASIC METABOLIC PNL TOTAL CA: CPT | Performed by: STUDENT IN AN ORGANIZED HEALTH CARE EDUCATION/TRAINING PROGRAM

## 2025-01-06 PROCEDURE — 25010000002 METHYLPREDNISOLONE PER 40 MG: Performed by: STUDENT IN AN ORGANIZED HEALTH CARE EDUCATION/TRAINING PROGRAM

## 2025-01-06 PROCEDURE — 71045 X-RAY EXAM CHEST 1 VIEW: CPT

## 2025-01-06 PROCEDURE — 99232 SBSQ HOSP IP/OBS MODERATE 35: CPT | Performed by: STUDENT IN AN ORGANIZED HEALTH CARE EDUCATION/TRAINING PROGRAM

## 2025-01-06 PROCEDURE — 25010000002 ZIPRASIDONE MESYLATE PER 10 MG: Performed by: INTERNAL MEDICINE

## 2025-01-06 PROCEDURE — 25010000002 HYDRALAZINE PER 20 MG: Performed by: INTERNAL MEDICINE

## 2025-01-06 PROCEDURE — 84145 PROCALCITONIN (PCT): CPT | Performed by: STUDENT IN AN ORGANIZED HEALTH CARE EDUCATION/TRAINING PROGRAM

## 2025-01-06 PROCEDURE — 83605 ASSAY OF LACTIC ACID: CPT | Performed by: STUDENT IN AN ORGANIZED HEALTH CARE EDUCATION/TRAINING PROGRAM

## 2025-01-06 PROCEDURE — 36600 WITHDRAWAL OF ARTERIAL BLOOD: CPT

## 2025-01-06 PROCEDURE — 87641 MR-STAPH DNA AMP PROBE: CPT | Performed by: STUDENT IN AN ORGANIZED HEALTH CARE EDUCATION/TRAINING PROGRAM

## 2025-01-06 PROCEDURE — 94761 N-INVAS EAR/PLS OXIMETRY MLT: CPT

## 2025-01-06 RX ORDER — LORAZEPAM 2 MG/ML
2 INJECTION INTRAMUSCULAR
Status: DISCONTINUED | OUTPATIENT
Start: 2025-01-06 | End: 2025-01-10 | Stop reason: HOSPADM

## 2025-01-06 RX ORDER — TERAZOSIN 5 MG/1
5 CAPSULE ORAL NIGHTLY
Status: DISCONTINUED | OUTPATIENT
Start: 2025-01-06 | End: 2025-01-07

## 2025-01-06 RX ORDER — PHENOBARBITAL 32.4 MG/1
32.4 TABLET ORAL ONCE
Status: COMPLETED | OUTPATIENT
Start: 2025-01-09 | End: 2025-01-09

## 2025-01-06 RX ORDER — METHYLPREDNISOLONE SODIUM SUCCINATE 40 MG/ML
40 INJECTION, POWDER, LYOPHILIZED, FOR SOLUTION INTRAMUSCULAR; INTRAVENOUS EVERY 12 HOURS
Status: DISCONTINUED | OUTPATIENT
Start: 2025-01-06 | End: 2025-01-08

## 2025-01-06 RX ORDER — LORAZEPAM 2 MG/1
2 TABLET ORAL
Status: DISCONTINUED | OUTPATIENT
Start: 2025-01-06 | End: 2025-01-10 | Stop reason: HOSPADM

## 2025-01-06 RX ORDER — PHENOBARBITAL 32.4 MG/1
32.4 TABLET ORAL ONCE
Status: COMPLETED | OUTPATIENT
Start: 2025-01-08 | End: 2025-01-08

## 2025-01-06 RX ORDER — PHENOBARBITAL SODIUM 65 MG/ML
65 INJECTION, SOLUTION INTRAMUSCULAR; INTRAVENOUS ONCE
Status: COMPLETED | OUTPATIENT
Start: 2025-01-07 | End: 2025-01-07

## 2025-01-06 RX ORDER — LORAZEPAM 0.5 MG/1
1 TABLET ORAL
Status: DISCONTINUED | OUTPATIENT
Start: 2025-01-06 | End: 2025-01-10 | Stop reason: HOSPADM

## 2025-01-06 RX ORDER — LORAZEPAM 2 MG/ML
1 INJECTION INTRAMUSCULAR
Status: DISCONTINUED | OUTPATIENT
Start: 2025-01-06 | End: 2025-01-10 | Stop reason: HOSPADM

## 2025-01-06 RX ORDER — THIAMINE HYDROCHLORIDE 100 MG/ML
200 INJECTION, SOLUTION INTRAMUSCULAR; INTRAVENOUS EVERY 8 HOURS SCHEDULED
Status: DISCONTINUED | OUTPATIENT
Start: 2025-01-06 | End: 2025-01-10 | Stop reason: HOSPADM

## 2025-01-06 RX ADMIN — MUPIROCIN 1 APPLICATION: 20 OINTMENT TOPICAL at 08:18

## 2025-01-06 RX ADMIN — IPRATROPIUM BROMIDE AND ALBUTEROL SULFATE 3 ML: 2.5; .5 SOLUTION RESPIRATORY (INHALATION) at 19:21

## 2025-01-06 RX ADMIN — LORAZEPAM 2 MG: 2 INJECTION INTRAMUSCULAR; INTRAVENOUS at 10:11

## 2025-01-06 RX ADMIN — HYDRALAZINE HYDROCHLORIDE 10 MG: 20 INJECTION INTRAMUSCULAR; INTRAVENOUS at 16:27

## 2025-01-06 RX ADMIN — FOLIC ACID 1 MG: 5 INJECTION, SOLUTION INTRAMUSCULAR; INTRAVENOUS; SUBCUTANEOUS at 12:13

## 2025-01-06 RX ADMIN — ENOXAPARIN SODIUM 60 MG: 60 INJECTION SUBCUTANEOUS at 17:38

## 2025-01-06 RX ADMIN — MUPIROCIN 1 APPLICATION: 20 OINTMENT TOPICAL at 21:06

## 2025-01-06 RX ADMIN — INSULIN LISPRO 2 UNITS: 100 INJECTION, SOLUTION INTRAVENOUS; SUBCUTANEOUS at 21:09

## 2025-01-06 RX ADMIN — LORAZEPAM 2 MG: 2 INJECTION INTRAMUSCULAR; INTRAVENOUS at 12:35

## 2025-01-06 RX ADMIN — DEXMEDETOMIDINE HYDROCHLORIDE 0.8 MCG/KG/HR: 400 INJECTION INTRAVENOUS at 19:03

## 2025-01-06 RX ADMIN — ENOXAPARIN SODIUM 60 MG: 60 INJECTION SUBCUTANEOUS at 06:43

## 2025-01-06 RX ADMIN — LORAZEPAM 2 MG: 2 INJECTION INTRAMUSCULAR; INTRAVENOUS at 11:19

## 2025-01-06 RX ADMIN — GUAIFENESIN 600 MG: 600 TABLET, EXTENDED RELEASE ORAL at 09:07

## 2025-01-06 RX ADMIN — DEXMEDETOMIDINE HYDROCHLORIDE 1.5 MCG/KG/HR: 400 INJECTION INTRAVENOUS at 00:51

## 2025-01-06 RX ADMIN — THIAMINE HYDROCHLORIDE 200 MG: 100 INJECTION, SOLUTION INTRAMUSCULAR; INTRAVENOUS at 13:59

## 2025-01-06 RX ADMIN — NICOTINE 1 PATCH: 21 PATCH TRANSDERMAL at 06:43

## 2025-01-06 RX ADMIN — AMPICILLIN SODIUM AND SULBACTAM SODIUM 3 G: 2; 1 INJECTION, POWDER, FOR SOLUTION INTRAMUSCULAR; INTRAVENOUS at 21:05

## 2025-01-06 RX ADMIN — DEXMEDETOMIDINE HYDROCHLORIDE 1.5 MCG/KG/HR: 400 INJECTION INTRAVENOUS at 04:06

## 2025-01-06 RX ADMIN — AMPICILLIN SODIUM AND SULBACTAM SODIUM 3 G: 2; 1 INJECTION, POWDER, FOR SOLUTION INTRAMUSCULAR; INTRAVENOUS at 16:27

## 2025-01-06 RX ADMIN — BUDESONIDE 0.5 MG: 0.5 INHALANT RESPIRATORY (INHALATION) at 06:59

## 2025-01-06 RX ADMIN — HYDRALAZINE HYDROCHLORIDE 10 MG: 20 INJECTION INTRAMUSCULAR; INTRAVENOUS at 06:00

## 2025-01-06 RX ADMIN — DEXMEDETOMIDINE HYDROCHLORIDE 1 MCG/KG/HR: 400 INJECTION INTRAVENOUS at 13:59

## 2025-01-06 RX ADMIN — IPRATROPIUM BROMIDE AND ALBUTEROL SULFATE 3 ML: 2.5; .5 SOLUTION RESPIRATORY (INHALATION) at 12:15

## 2025-01-06 RX ADMIN — ASPIRIN 81 MG CHEWABLE TABLET 81 MG: 81 TABLET CHEWABLE at 09:07

## 2025-01-06 RX ADMIN — DEXMEDETOMIDINE HYDROCHLORIDE 0.2 MCG/KG/HR: 400 INJECTION INTRAVENOUS at 16:35

## 2025-01-06 RX ADMIN — CARVEDILOL 12.5 MG: 6.25 TABLET, FILM COATED ORAL at 17:38

## 2025-01-06 RX ADMIN — LACTULOSE 30 G: 20 SOLUTION ORAL at 09:07

## 2025-01-06 RX ADMIN — DEXMEDETOMIDINE HYDROCHLORIDE 1.5 MCG/KG/HR: 400 INJECTION INTRAVENOUS at 02:22

## 2025-01-06 RX ADMIN — DEXMEDETOMIDINE HYDROCHLORIDE 1.4 MCG/KG/HR: 400 INJECTION INTRAVENOUS at 23:37

## 2025-01-06 RX ADMIN — PHENOBARBITAL SODIUM 146.25 MG: 65 INJECTION INTRAMUSCULAR; INTRAVENOUS at 13:17

## 2025-01-06 RX ADMIN — METHYLPREDNISOLONE SODIUM SUCCINATE 40 MG: 40 INJECTION, POWDER, LYOPHILIZED, FOR SOLUTION INTRAMUSCULAR; INTRAVENOUS at 16:28

## 2025-01-06 RX ADMIN — LORAZEPAM 2 MG: 2 INJECTION INTRAMUSCULAR; INTRAVENOUS at 10:27

## 2025-01-06 RX ADMIN — DEXMEDETOMIDINE HYDROCHLORIDE 0.2 MCG/KG/HR: 400 INJECTION INTRAVENOUS at 10:51

## 2025-01-06 RX ADMIN — THIAMINE HYDROCHLORIDE 200 MG: 100 INJECTION, SOLUTION INTRAMUSCULAR; INTRAVENOUS at 08:18

## 2025-01-06 RX ADMIN — Medication 10 ML: at 21:06

## 2025-01-06 RX ADMIN — PANTOPRAZOLE SODIUM 40 MG: 40 INJECTION, POWDER, FOR SOLUTION INTRAVENOUS at 06:43

## 2025-01-06 RX ADMIN — LORAZEPAM 1 MG: 2 INJECTION INTRAMUSCULAR; INTRAVENOUS at 08:18

## 2025-01-06 RX ADMIN — IPRATROPIUM BROMIDE AND ALBUTEROL SULFATE 3 ML: 2.5; .5 SOLUTION RESPIRATORY (INHALATION) at 00:49

## 2025-01-06 RX ADMIN — CARVEDILOL 12.5 MG: 6.25 TABLET, FILM COATED ORAL at 09:07

## 2025-01-06 RX ADMIN — IPRATROPIUM BROMIDE AND ALBUTEROL SULFATE 3 ML: 2.5; .5 SOLUTION RESPIRATORY (INHALATION) at 06:59

## 2025-01-06 RX ADMIN — AMLODIPINE BESYLATE 10 MG: 5 TABLET ORAL at 09:07

## 2025-01-06 RX ADMIN — THIAMINE HYDROCHLORIDE 200 MG: 100 INJECTION, SOLUTION INTRAMUSCULAR; INTRAVENOUS at 21:06

## 2025-01-06 RX ADMIN — INSULIN LISPRO 2 UNITS: 100 INJECTION, SOLUTION INTRAVENOUS; SUBCUTANEOUS at 16:36

## 2025-01-06 RX ADMIN — VALSARTAN 320 MG: 80 TABLET, FILM COATED ORAL at 09:07

## 2025-01-06 RX ADMIN — PHENOBARBITAL SODIUM 146.25 MG: 65 INJECTION INTRAMUSCULAR; INTRAVENOUS at 19:48

## 2025-01-06 RX ADMIN — LORAZEPAM 2 MG: 2 INJECTION INTRAMUSCULAR; INTRAVENOUS at 09:07

## 2025-01-06 RX ADMIN — PHENOBARBITAL SODIUM 146.25 MG: 65 INJECTION INTRAMUSCULAR; INTRAVENOUS at 17:38

## 2025-01-06 RX ADMIN — LORAZEPAM 2 MG: 2 INJECTION INTRAMUSCULAR; INTRAVENOUS at 00:31

## 2025-01-06 RX ADMIN — METHYLPREDNISOLONE SODIUM SUCCINATE 40 MG: 40 INJECTION, POWDER, FOR SOLUTION INTRAMUSCULAR; INTRAVENOUS at 06:43

## 2025-01-06 RX ADMIN — INSULIN LISPRO 2 UNITS: 100 INJECTION, SOLUTION INTRAVENOUS; SUBCUTANEOUS at 08:17

## 2025-01-06 RX ADMIN — LORAZEPAM 2 MG: 2 INJECTION INTRAMUSCULAR; INTRAVENOUS at 06:00

## 2025-01-06 RX ADMIN — BUDESONIDE 0.5 MG: 0.5 INHALANT RESPIRATORY (INHALATION) at 19:21

## 2025-01-06 RX ADMIN — Medication 10 ML: at 08:18

## 2025-01-06 RX ADMIN — ZIPRASIDONE MESYLATE 20 MG: 20 INJECTION, POWDER, LYOPHILIZED, FOR SOLUTION INTRAMUSCULAR at 12:20

## 2025-01-06 RX ADMIN — AMPICILLIN SODIUM AND SULBACTAM SODIUM 3 G: 2; 1 INJECTION, POWDER, FOR SOLUTION INTRAMUSCULAR; INTRAVENOUS at 08:17

## 2025-01-06 RX ADMIN — DEXMEDETOMIDINE HYDROCHLORIDE 1.4 MCG/KG/HR: 400 INJECTION INTRAVENOUS at 21:52

## 2025-01-06 NOTE — PHARMACY RECOMMENDATION
Pharmacokinetic Consult - Unasyn Dosing  Cal Oliver Jr. is a 59 y.o. male who has been consulted to dose Unasyn (ampicillin/sulbactam) for  aspiration pneumonia .    Current Antimicrobial Therapy    Anti-Infectives (From admission, onward)      Ordered     Dose/Rate Route Frequency Start Stop    01/06/25 0758  ampicillin-sulbactam (UNASYN) 3 g in sodium chloride 0.9 % 100 mL IVPB-VTB        Ordering Provider: Kerley, Brian Joseph,     3 g  over 30 Minutes Intravenous Every 6 Hours 01/06/25 0900 01/11/25 0859    01/06/25 0747  Pharmacy to Dose ampicillin-sulbactam        Ordering Provider: Kerley, Brian Joseph,      Not Applicable Continuous PRN 01/06/25 0747 01/11/25 0746            Microbiology Results (last 10 days)       Procedure Component Value - Date/Time    Respiratory Panel PCR w/COVID-19(SARS-CoV-2) [facility] In-House, NP Swab in UTM/VTM, 2 HR TAT - Swab, Nasopharynx [[id number]]  (Normal) Collected: 01/01/25 1442    Lab Status: Final result Specimen: Swab from Nasopharynx Updated: 01/01/25 1545     ADENOVIRUS, PCR Not Detected     Coronavirus 229E Not Detected     Coronavirus HKU1 Not Detected     Coronavirus NL63 Not Detected     Coronavirus OC43 Not Detected     COVID19 Not Detected     Human Metapneumovirus Not Detected     Human Rhinovirus/Enterovirus Not Detected     Influenza A PCR Not Detected     Influenza B PCR Not Detected     Parainfluenza Virus 1 Not Detected     Parainfluenza Virus 2 Not Detected     Parainfluenza Virus 3 Not Detected     Parainfluenza Virus 4 Not Detected     RSV, PCR Not Detected     Bordetella pertussis pcr Not Detected     Bordetella parapertussis PCR Not Detected     Chlamydophila pneumoniae PCR Not Detected     Mycoplasma pneumo by PCR Not Detected    Narrative:      In the setting of a positive respiratory panel with a viral infection PLUS a negative procalcitonin without other underlying concern for bacterial infection, consider observing off  antibiotics or discontinuation of antibiotics and continue supportive care. If the respiratory panel is positive for atypical bacterial infection (Bordetella pertussis, Chlamydophila pneumoniae, or Mycoplasma pneumoniae), consider antibiotic de-escalation to target atypical bacterial infection.    Blood Culture - Blood, Arm, Left [[id number]]  (Normal) Collected: 01/01/25 0518    Lab Status: Final result Specimen: Blood from Arm, Left Updated: 01/06/25 0530     Blood Culture No growth at 5 days    Blood Culture - Blood, Hand, Right [[id number]]  (Normal) Collected: 01/01/25 0518    Lab Status: Final result Specimen: Blood from Hand, Right Updated: 01/06/25 0530     Blood Culture No growth at 5 days    Narrative:      Less than seven (7) mL's of blood was collected.  Insufficient quantity may yield false negative results.    COVID-19 and FLU A/B PCR, 1 HR TAT - Swab, Nasopharynx [[id number]]  (Normal) Collected: 01/01/25 0400    Lab Status: Final result Specimen: Swab from Nasopharynx Updated: 01/01/25 0430     COVID19 Not Detected     Influenza A PCR Not Detected     Influenza B PCR Not Detected    Narrative:      Fact sheet for providers: https://www.fda.gov/media/224325/download    Fact sheet for patients: https://www.fda.gov/media/486492/download    Test performed by PCR.             Allergies  Atorvastatin, Levothyroxine, Levothyroxine sodium, and Piroxicam    Relevant clinical data and objective history reviewed:  Creatinine   Date Value Ref Range Status   01/06/2025 0.62 (L) 0.76 - 1.27 mg/dL Final     Estimated Creatinine Clearance: 208.2 mL/min (A) (by C-G formula based on SCr of 0.62 mg/dL (L)).  I/O last 3 completed shifts:  In: 732.8 [P.O.:360; I.V.:372.8]  Out: 4635 [Urine:4635]  Patient weight: (!) 167 kg (369 lb 0.8 oz)    Asessment/Plan  Initiate ampicillin/sulbactam (Unasyn) 3 gm IV q 6 hrs × 5 days.  Pharmacy will monitor Mr. Oliver's renal function and clinical status and adjust the Unasyn dose and/or  frequency as needed.      Thank you for the opportunity to consult on this patient.    Alex Cristina RPH, Pharm.D.  01/06/25  07:59 EST

## 2025-01-06 NOTE — PROGRESS NOTES
[facility] Progress Note  [facility]. [place]        Patient Name: Cal Oliver Jr.  : 1965  MRN: [id number]   LOS: 5 days    Patient Care Team:  Woo Betancourt MD as PCP - General (Internal Medicine)    Chief Complaint:    Chief Complaint   Patient presents with    Shortness of Breath     Primary Care Physician:  Woo Betancourt MD  Date of admission: 2025    Subjective     Interval History:   Follow-up and uric/oliguric renal failure.    Significant improvement in urine output noted.  Renal function is continuously improving and has been stable.  Patient has been on BiPAP with gradual improvement of his hypercapnia.  Still no meaningful interaction.  Events noted from last 24 hours.  I reviewed the chart and other providers notes, labs and procedures done since my last note.    Review of Systems:   unobtainable.    Objective     Vitals:   Temp:  [97.5 °F (36.4 °C)-99.6 °F (37.6 °C)] 99.2 °F (37.3 °C)  Heart Rate:  [55-90] 90  Resp:  [22-31] 30  BP: (151-195)/() 166/94  Flow (L/min) (Oxygen Therapy):  [15] 15    Intake/Output Summary (Last 24 hours) at 2025 1121  Last data filed at 2025 0823  Gross per 24 hour   Intake 410 ml   Output 4000 ml   Net -3590 ml       Physical Exam:    General Appearance: Drowsy and sleepy, no acute distress   Skin: warm and dry  HEENT: oral mucosa normal, nonicteric sclera  Neck: supple, no JVD  Lungs: CTA, decreased breath sounds all over the chest.  Heart: RRR, normal S1 and S2  Abdomen: obese, soft, nontender, non distended and positive bowel sounds.  : no palpable bladder  Extremities: Trace edema, no cyanosis or clubbing  Neuro: Randomly moving extremities no meaningful interaction.    Scheduled Meds:     Current Facility-Administered Medications   Medication Dose Route Frequency Provider Last Rate Last Admin    acetaminophen (TYLENOL) tablet 650 mg  650 mg Oral Q4H PRN Puja Paz, DO   650  mg at 01/05/25 0838    Or    acetaminophen (TYLENOL) 160 MG/5ML oral solution 650 mg  650 mg Oral Q4H PRN Puja Paz DO        Or    acetaminophen (TYLENOL) suppository 650 mg  650 mg Rectal Q4H PRN Puja Paz DO        aluminum-magnesium hydroxide-simethicone (MAALOX MAX) 400-400-40 MG/5ML suspension 15 mL  15 mL Oral Q6H PRN Puja Paz DO        amLODIPine (NORVASC) tablet 10 mg  10 mg Oral Daily Puja Paz DO   10 mg at 01/06/25 0907    ampicillin-sulbactam (UNASYN) 3 g in sodium chloride 0.9 % 100 mL IVPB-VTB  3 g Intravenous Q6H Kerley, Brian Joseph, DO   3 g at 01/06/25 0817    aspirin chewable tablet 81 mg  81 mg Oral Daily Puja Paz DO   81 mg at 01/06/25 0907    benzonatate (TESSALON) capsule 100 mg  100 mg Oral TID PRN Puja Paz DO   100 mg at 01/04/25 0440    sennosides-docusate (PERICOLACE) 8.6-50 MG per tablet 2 tablet  2 tablet Oral BID PRN Puja Paz DO        And    polyethylene glycol (MIRALAX) packet 17 g  17 g Oral Daily PRN Puja Paz DO        And    bisacodyl (DULCOLAX) EC tablet 5 mg  5 mg Oral Daily PRN Puja Paz DO        And    bisacodyl (DULCOLAX) suppository 10 mg  10 mg Rectal Daily PRN Puja Paz DO        budesonide (PULMICORT) nebulizer solution 0.5 mg  0.5 mg Nebulization BID - RT Naeem, Enid J, APRN   0.5 mg at 01/06/25 0659    carvedilol (COREG) tablet 12.5 mg  12.5 mg Oral BID With Meals Puja Paz DO   12.5 mg at 01/06/25 0907    dexmedetomidine (PRECEDEX) 400 mcg in 100 mL NS infusion  0.2 mcg/kg/hr Intravenous Titrated Kerley, Brian Joseph, DO 8.5 mL/hr at 01/06/25 1051 0.2 mcg/kg/hr at 01/06/25 1051    dextrose (D50W) (25 g/50 mL) IV injection 25 g  25 g Intravenous Q15 Min PRN Puja Paz DO        dextrose (GLUTOSE) oral gel 15 g  15 g Oral Q15 Min PRN Puja Paz,         Enoxaparin Sodium (LOVENOX)  syringe 60 mg  60 mg Subcutaneous Q12H Puja Paz DO   60 mg at 01/06/25 0643    folic acid 1 mg in sodium chloride 0.9 % 50 mL IVPB  1 mg Intravenous Daily Kerley, Brian Joseph, DO        glucagon (GLUCAGEN) injection 1 mg  1 mg Intramuscular Q15 Min PRN Puja Paz DO        guaiFENesin (MUCINEX) 12 hr tablet 600 mg  600 mg Oral Q12H Puja Paz DO   600 mg at 01/06/25 0907    hydrALAZINE (APRESOLINE) injection 10 mg  10 mg Intravenous Q4H PRN Romel Schumacher MD   10 mg at 01/06/25 0600    Insulin Lispro (humaLOG) injection 2-9 Units  2-9 Units Subcutaneous 4x Daily AC & at Bedtime Puja Paz DO   2 Units at 01/06/25 0817    ipratropium-albuterol (DUO-NEB) nebulizer solution 3 mL  3 mL Nebulization Q6H - RT Puja Paz DO   3 mL at 01/06/25 0659    lactulose (CHRONULAC) 10 GM/15ML solution 30 g  30 g Oral TID Kerley, Brian Joseph, DO   30 g at 01/06/25 0907    LORazepam (ATIVAN) tablet 1 mg  1 mg Oral Q1H PRN Kerley, Brian Joseph, DO        Or    LORazepam (ATIVAN) injection 1 mg  1 mg Intravenous Q1H PRN Kerley, Brian Joseph, DO   1 mg at 01/06/25 0818    Or    LORazepam (ATIVAN) tablet 2 mg  2 mg Oral Q1H PRN Kerley, Brian Joseph, DO        Or    LORazepam (ATIVAN) injection 2 mg  2 mg Intravenous Q1H PRN Kerley, Brian Joseph, DO   2 mg at 01/06/25 1119    Or    LORazepam (ATIVAN) injection 2 mg  2 mg Intravenous Q15 Min PRN Kerley, Brian Joseph, DO   2 mg at 01/06/25 1027    Or    LORazepam (ATIVAN) injection 2 mg  2 mg Intramuscular Q15 Min PRN Kerley, Brian Joseph, DO        LORazepam (ATIVAN) injection 2 mg  2 mg Intravenous Q4H PRN Puja Paz, DO   2 mg at 01/06/25 0600    Magnesium Standard Dose Replacement - Follow Nurse / BPA Driven Protocol   Not Applicable PRN Kerley, Brian Joseph,         methocarbamol (ROBAXIN) tablet 750 mg  750 mg Oral TID PRN uPja Paz DO   750 mg at 01/05/25 2039    midodrine (PROAMATINE)  tablet 5 mg  5 mg Oral Q8H PRN Robert Martinez MD        mupirocin (BACTROBAN) 2 % nasal ointment 1 Application  1 Application Each Nare BID Kerley, Brian Joseph,    1 Application at 01/06/25 0818    nicotine (NICODERM CQ) 21 MG/24HR patch 1 patch  1 patch Transdermal Q24H Puja Paz,    1 patch at 01/06/25 0643    nicotine polacrilex (NICORETTE) gum 4 mg  4 mg Mouth/Throat Q1H PRN Puja Paz,         nitroglycerin (NITROSTAT) SL tablet 0.4 mg  0.4 mg Sublingual Q5 Min PRN Puja Paz DO        ondansetron ODT (ZOFRAN-ODT) disintegrating tablet 4 mg  4 mg Oral Q6H PRN Puja Paz DO        Or    ondansetron (ZOFRAN) injection 4 mg  4 mg Intravenous Q6H PRN Puja Paz DO        pantoprazole (PROTONIX) injection 40 mg  40 mg Intravenous Q AM Kerley, Brian Joseph, DO   40 mg at 01/06/25 0643    Pharmacy to Dose ampicillin-sulbactam   Not Applicable Continuous PRN Kerley, Brian Joseph, DO        Pharmacy to Dose enoxaparin (LOVENOX)   Not Applicable Continuous PRN Puja Paz,         rOPINIRole (REQUIP) tablet 3 mg  3 mg Oral Nightly Puja Paz, DO   3 mg at 01/05/25 2039    sodium chloride 0.9 % flush 10 mL  10 mL Intravenous PRN Puja Paz,         sodium chloride 0.9 % flush 10 mL  10 mL Intravenous Q12H Puja Paz, DO   10 mL at 01/06/25 0818    sodium chloride 0.9 % flush 10 mL  10 mL Intravenous PRN Puja Paz,         sodium chloride 0.9 % infusion 40 mL  40 mL Intravenous PRN Puja Paz,         terazosin (HYTRIN) capsule 1 mg  1 mg Oral Nightly Puja Paz, DO   1 mg at 01/05/25 2039    thiamine (B-1) injection 200 mg  200 mg Intravenous Q8H Kerley, Brian Joseph, DO   200 mg at 01/06/25 0818    Followed by    [START ON 1/11/2025] thiamine (VITAMIN B-1) tablet 100 mg  100 mg Oral Daily Kerley, Brian Joseph, DO        valsartan (DIOVAN) tablet 320 mg   320 mg Oral Daily Puja Paz DO   320 mg at 01/06/25 0907    ziprasidone (GEODON) injection 20 mg  20 mg Intramuscular Q4H PRN Romel Schumacher MD   20 mg at 01/05/25 2348       amLODIPine, 10 mg, Oral, Daily  ampicillin-sulbactam, 3 g, Intravenous, Q6H  aspirin, 81 mg, Oral, Daily  budesonide, 0.5 mg, Nebulization, BID - RT  carvedilol, 12.5 mg, Oral, BID With Meals  enoxaparin, 60 mg, Subcutaneous, Q12H  folic acid 1 mg in sodium chloride 0.9 % 50 mL IVPB, 1 mg, Intravenous, Daily  guaiFENesin, 600 mg, Oral, Q12H  insulin lispro, 2-9 Units, Subcutaneous, 4x Daily AC & at Bedtime  ipratropium-albuterol, 3 mL, Nebulization, Q6H - RT  lactulose, 30 g, Oral, TID  mupirocin, 1 Application, Each Nare, BID  nicotine, 1 patch, Transdermal, Q24H  pantoprazole, 40 mg, Intravenous, Q AM  rOPINIRole, 3 mg, Oral, Nightly  sodium chloride, 10 mL, Intravenous, Q12H  terazosin, 1 mg, Oral, Nightly  thiamine (B-1) IV, 200 mg, Intravenous, Q8H   Followed by  [START ON 1/11/2025] thiamine, 100 mg, Oral, Daily  valsartan, 320 mg, Oral, Daily        IV Meds:   dexmedetomidine, 0.2 mcg/kg/hr, Last Rate: 0.2 mcg/kg/hr (01/06/25 1051)  Pharmacy to Dose ampicillin-sulbactam,   Pharmacy to Dose enoxaparin (LOVENOX),         Results Reviewed:   I have personally reviewed the results from the time of this admission to 1/6/2025 11:21 EST     Results from last 7 days   Lab Units 01/06/25  0431 01/05/25  0407 01/04/25  0704 01/02/25  0510 01/01/25  0357   SODIUM mmol/L 141 143 139   < > 139   POTASSIUM mmol/L 3.7 3.9 3.8   < > 3.5   CHLORIDE mmol/L 94* 95* 95*   < > 92*   CO2 mmol/L 39.4* 40.7* 36.8*   < > 37.3*   BUN mg/dL 15 21* 22*   < > 9   CREATININE mg/dL 0.62* 0.65* 0.74*   < > 0.76   CALCIUM mg/dL 8.5* 8.5* 8.4*   < > 9.0   BILIRUBIN mg/dL  --   --   --   --  0.4   ALK PHOS U/L  --   --   --   --  128*   ALT (SGPT) U/L  --   --   --   --  24   AST (SGOT) U/L  --   --   --   --  18   GLUCOSE mg/dL 198* 218* 199*   < > 244*    <  "> = values in this interval not displayed.       Estimated Creatinine Clearance: 208.2 mL/min (A) (by C-G formula based on SCr of 0.62 mg/dL (L)).                Results from last 7 days   Lab Units 01/06/25  0431 01/05/25  0407 01/04/25  0705 01/03/25  0511 01/02/25  1416   WBC 10*3/mm3 16.03* 13.31* 15.45* 16.97* 20.77*   HEMOGLOBIN g/dL 14.3 14.4 14.0 13.7 13.3   PLATELETS 10*3/mm3 254 237 245 244 259             Brief Urine Lab Results  (Last result in the past 365 days)        Color   Clarity   Blood   Leuk Est   Nitrite   Protein   CREAT   Urine HCG        01/04/25 1235 Orange   Clear   Large (3+)   Negative   Negative   Negative                   No results found for: \"UTPCR\"    Imaging Results (Last 24 Hours)       Procedure Component Value Units Date/Time    XR Chest 1 View [[id number]] Collected: 01/06/25 1029     Updated: 01/06/25 1032    Narrative:      PROCEDURE: XR CHEST 1 VW-     HISTORY: hypoxia, f/u; J96.01-Acute respiratory failure with hypoxia,  shortness of breath     COMPARISON: 1/5/2024     FINDINGS:  Portable view of the chest demonstrates pulmonary vascular  congestion. Mild right perihilar atelectasis is noted. There is no  evidence of effusion, pneumothorax or other significant pleural disease.  The mediastinum is unremarkable.     The heart size is normal.       Impression:      No significant change           This report was signed and finalized on 1/6/2025 10:30 AM by Olvin Mcbride MD.       US Liver [[id number]] Collected: 01/05/25 1903     Updated: 01/05/25 1905    Narrative:      FINAL REPORT    TECHNIQUE:  null    CLINICAL HISTORY:  abn LFTs    COMPARISON:  null    FINDINGS:  Exam: Limited right upper quadrant abdominal ultrasound    Comparison: None    Clinical History: Abnormal LFTs    Findings:    Selected images from a right upper quadrant ultrasound are provided for interpretation    The liver is echogenic which may be reflective of fatty infiltration. No liver lesions are " seen.    Portval vein is patent with hepatopetal flow.    Prior cholecystectomy    The common bile duct is not enlarged at 5.8 mm.    No choledocholithiasis.    Pancreas obscured by bowel gas.    Right kidney does not demonstrate any hydronephrosis or stones.    No ascites seen.      Impression:      IMPRESSION:    1. Echogenic liver texture may be reflective of fatty infiltration. No focal lesions.    2. Prior cholecystectomy. No choledocholithiasis or biliary obstruction    Authenticated and Electronically Signed by Vibha Hatch MD  on 01/05/2025 07:03:54 PM                Assessment / Plan     ASSESSMENT:    Acute respiratory failure with hypoxia    COPD exacerbation    Oliguria: Patient is only 100 cc of urine output overnight, he did receive 40 mg of Lasix IV.   Received IVF challenge w > 3 liters of UO  Urinary retention ; Epstein in place , monitor I/O   Hypercapnic respiratory failure: Patient's pCO2 greater than 90, has been on BiPAP since admission with minimal improvement.  Hypertension: on home regimen, still noted to be high blood pressures.  Obstructive sleep apnea: It is not clear if he was using his BiPAP at home, currently on BiPAP.  Type 2 diabetes: Continue with the sliding scale  Morbid obesity: Complicates all forms of care.      PLAN:  I will go ahead and increase his Hytrin from 1 mg to 5 mg at bedtime and see if that will help any.  Continue to hold diuretics and stop the midodrine which is ordered as as needed  Details were also discussed with the hospitalist service and or other providers as needed.   Continue with rest of the current treatment plan, and monitor with surveillance labs.  Further recommendations will depend on clinical course of the patient during the current hospitalization.   I have reviewed the copied text to this note, it was edited and the changes made as needed.  It is accurate to the point, when the note was signed today.     Thank you for involving us in the care of  Cal Oliver Jr..  Please feel free to call with any questions.    Gio Candelario MD, FASN  01/06/25  11:21 EST    [facility]  [phone]      Part of this note may be an electronic transcription/translation of spoken language to printed text using the Dragon Dictation System.

## 2025-01-06 NOTE — PLAN OF CARE
Pt restless for majority of shift. Multiple attempts made to get out of bed unassisted, remove equipment & oxygen despite redirection & PRN medication administration (see MAR). Bilateral soft wrist restraints applied for pt safety & removal of equipment.

## 2025-01-06 NOTE — PROGRESS NOTES
[facility] HOSPITALIST    PROGRESS NOTE    Name:  Cla Oliver Jr.   Age:  59 y.o.  Sex:  male  :  1965  MRN:  [id number]   Visit Number:  [id number]  Admission Date:  2025  Date Of Service:  25  Primary Care Physician:  Woo Betancourt MD     LOS: 5 days :    Chief Complaint:      Shortness of air    Subjective:    Says his breathing is feeling better this morning, still short of air though.  Denies any specific pain.    Hospital Course:    Cal Oliver Jr is a  59-year-old man with past medical history of morbid obesity BMI 52, chronic tobacco abuse, uncontrolled high blood pressure, chronic back pain, diabetes, sleep apnea with home BiPAP use, who presented from home with complaints of shortness of breath.  Notes symptoms over the last 3 to 4 days, worsening.  Not been able to catch his breath with exertion, lying flat.  Noticed some swelling.  Has not been checking blood pressure.  No chest pain or palpitations.  Continues to smoke.  Unknown sick contacts.     In the ER he was hypertensive, heart rate in the 90s, afebrile, and hypoxic on room air.  CMP unremarkable.  proBNP of 110.  Troponins mildly elevated.  White count of 15 hemoglobin 14 platelets of 256.  Normal procalcitonin.  Negative flu COVID testing.  Chest x-ray with some interstitial edema.  Patient given DuoNeb, Solu-Medrol, Lasix, started on BiPAP.  Hospitalist consulted for further medical management.  Echo obtained noting preserved EF with grade 2 diastolic dysfunction.  Patient appears euvolemic at this time.  CT rule out PE was obtained with no PE noted with bilateral segmental/subsegmental consolidation less likely favored to be pneumonia and more atelectasis at this time.    Review of Systems:     All systems were reviewed and negative except as mentioned in subjective, assessment and plan.    Vital Signs:    Temp:  [97.5 °F (36.4 °C)-99.6 °F (37.6 °C)] 99.2 °F (37.3 °C)  Heart Rate:  [55-90]  85  Resp:  [22-42] 42  BP: (151-195)/() 166/94    Intake and output:    I/O last 3 completed shifts:  In: 732.8 [P.O.:360; I.V.:372.8]  Out: 4635 [Urine:4635]  I/O this shift:  In: 50 [P.O.:50]  Out: 1900 [Urine:1900]    Physical Examination:    General Appearance:  Alert and cooperative.  Chronically ill middle-age male.  Obese.   Head:  Atraumatic and normocephalic.   Eyes: Conjunctivae and sclerae normal, no icterus. No pallor.   Throat: No oral lesions, no thrush, oral mucosa moist.   Neck: Supple, trachea midline, no thyromegaly.   Lungs:   Breath sounds heard bilaterally equally.  Unlabored on BiPAP.  Diminished throughout.   Heart:  Normal S1 and S2, murmur, no gallop, no rub. No JVD.   Abdomen:   Normal bowel sounds, no masses, no organomegaly. Soft, nontender, nondistended, no rebound tenderness.   Extremities: Supple, no edema, no cyanosis, no clubbing.   Skin: Warm.   Neurologic: Alert and oriented x 3. No facial asymmetry. Moves all four limbs. No tremors.      Laboratory results:    Results from last 7 days   Lab Units 01/06/25  0431 01/05/25  0407 01/04/25  0704 01/02/25  0510 01/01/25  0357   SODIUM mmol/L 141 143 139   < > 139   POTASSIUM mmol/L 3.7 3.9 3.8   < > 3.5   CHLORIDE mmol/L 94* 95* 95*   < > 92*   CO2 mmol/L 39.4* 40.7* 36.8*   < > 37.3*   BUN mg/dL 15 21* 22*   < > 9   CREATININE mg/dL 0.62* 0.65* 0.74*   < > 0.76   CALCIUM mg/dL 8.5* 8.5* 8.4*   < > 9.0   BILIRUBIN mg/dL  --   --   --   --  0.4   ALK PHOS U/L  --   --   --   --  128*   ALT (SGPT) U/L  --   --   --   --  24   AST (SGOT) U/L  --   --   --   --  18   GLUCOSE mg/dL 198* 218* 199*   < > 244*    < > = values in this interval not displayed.     Results from last 7 days   Lab Units 01/06/25  0431 01/05/25  0407 01/04/25  0705   WBC 10*3/mm3 16.03* 13.31* 15.45*   HEMOGLOBIN g/dL 14.3 14.4 14.0   HEMATOCRIT % 45.0 46.3 45.2   PLATELETS 10*3/mm3 254 237 245         Results from last 7 days   Lab Units 01/01/25  0521  01/01/25  0357   HSTROP T ng/L 24* 24*     Results from last 7 days   Lab Units 01/01/25  0518   BLOODCX  No growth at 5 days  No growth at 5 days     Recent Labs     01/04/25  0426 01/05/25  0755 01/06/25  0747   PHART 7.365 7.386 7.442   YOZ0WUV 78.5* 79.1* 67.4*   PO2ART 70.9* 63.6* 61.8*   [id number] 44.8* 47.4* 46.0*   BASEEXCESS 15.2* 17.8* 17.9*      I have reviewed the patient's laboratory results.    Radiology results:    XR Chest 1 View    Result Date: 1/6/2025  PROCEDURE: XR CHEST 1 VW-  HISTORY: hypoxia, f/u; J96.01-Acute respiratory failure with hypoxia, shortness of breath  COMPARISON: 1/5/2024  FINDINGS:  Portable view of the chest demonstrates pulmonary vascular congestion. Mild right perihilar atelectasis is noted. There is no evidence of effusion, pneumothorax or other significant pleural disease. The mediastinum is unremarkable.  The heart size is normal.      Impression: No significant change    This report was signed and finalized on 1/6/2025 10:30 AM by Olvin Mcbride MD.      US Liver    Result Date: 1/5/2025  FINAL REPORT TECHNIQUE: null CLINICAL HISTORY: abn LFTs COMPARISON: null FINDINGS: Exam: Limited right upper quadrant abdominal ultrasound Comparison: None Clinical History: Abnormal LFTs Findings: Selected images from a right upper quadrant ultrasound are provided for interpretation The liver is echogenic which may be reflective of fatty infiltration. No liver lesions are seen. Portval vein is patent with hepatopetal flow. Prior cholecystectomy The common bile duct is not enlarged at 5.8 mm. No choledocholithiasis. Pancreas obscured by bowel gas. Right kidney does not demonstrate any hydronephrosis or stones. No ascites seen.     Impression: IMPRESSION: 1. Echogenic liver texture may be reflective of fatty infiltration. No focal lesions. 2. Prior cholecystectomy. No choledocholithiasis or biliary obstruction Authenticated and Electronically Signed by Vibha Hatch MD on 01/05/2025  07:03:54 PM    XR Chest 1 View    Result Date: 1/5/2025  PROCEDURE: XR CHEST 1 VW-  HISTORY: hypoxia f/u; J96.01-Acute respiratory failure with hypoxia, acute respiratory failure.  COMPARISON: 2 days prior.  FINDINGS: The heart is normal in size. There is decreased inspiratory effort. Again noted are bilateral perihilar linear densities, suspect atelectasis. There is pulmonary vascular congestion and bilateral interstitial disease, similar to prior. No effusion seen. There is improved aeration of the lung bases bilaterally compared to previous exam.. The mediastinum is unremarkable. There is no pneumothorax.  There are no acute osseous abnormalities. Apical lordotic positioning noted.      Impression: Improved bibasilar atelectasis or infiltrate compared to prior..     This report was signed and finalized on 1/5/2025 8:20 AM by Krysten Rogers MD.     I have reviewed the patient's radiology reports.    Medication Review:     I have reviewed the patient's active and prn medications.     Problem List:      Acute respiratory failure with hypoxia    COPD exacerbation      Assessment/Plan:    Inpatient general floor admission 1/1/2025 with acute respiratory failure with hypoxia and hypercapnia on BiPAP secondary to suspected COPD and CHF.  Transferred to ICU 1/2 due to worsening respiratory status, continued on BiPAP.  While respiratory status started to improve while in ICU, mental status each day did appear to decline, suspect possible alcohol withdrawal.    Acute respiratory failure with hypoxia and hypercapnia  COPD exacerbation  Acute exacerbation of heart failure  Supplemental oxygen as needed saturation but 90%.  Continue on BiPAP nightly and while resting.  CO2 improving.  Solu-Medrol, Pulmicort, duonebs.    Started Unasyn out of an abundance of caution 1/6, patient may have aspirated.  Pulmonology consult when available 1/8, so far in general CO2 improving.  Echocardiogram showed grade 2 diastolic dysfunction with  preserved EF.  On admission respiratory panel negative, CT likely atelectasis, procalcitonin negative.  Oliguria  Urinary retention  Epstein catheter placed due to urinary retention.  Nephrology consultation, recommendations appreciated.  Encephalopathy  Alcohol use disorder, suspected  Alcohol withdrawal, suspected  CIWA protocol started, Ativan, Precedex, phenobarbital.  Patient did admit to cocaine, says that he does not drink much anymore.  Unclear how much he drinks.    Chronic: Type 2 diabetes, hypertension, obesity BMI 51, SHUKRI.  Subcutaneous insulin protocol.  Hydralazine as needed.  Likely has chronically uncontrolled hypertension.     DVT Prophylaxis: Enoxaparin   Code Status: Full  Diet: Diabetic  Discharge Plan: Expect at least several more days depending on clinical status.    Updated wife Janelle during course. She works night shift and typically comes into the ICU early mornings.     Brian Joseph Kerley,   01/06/25  12:50 EST    Dictated utilizing Dragon dictation.    I have reviewed the copied text and it is accurate as of 1/6/2025

## 2025-01-06 NOTE — PLAN OF CARE
Problem: Noninvasive Ventilation Acute  Goal: Effective Unassisted Ventilation and Oxygenation  Outcome: Progressing   Goal Outcome Evaluation:                 RT EQUIPMENT DEVICE RELATED - SKIN ASSESSMENT    Julien Score:  Julien Score: 14     RT Medical Equipment/Device:     NIV Mask:  Full-face    size: l    Skin Assessment:      Nose:  Intact    Device Skin Pressure Protection:  Pressure points protected    Nurse Notification:  No    Janelle Angel, CRT

## 2025-01-07 ENCOUNTER — APPOINTMENT (OUTPATIENT)
Dept: GENERAL RADIOLOGY | Facility: HOSPITAL | Age: 60
End: 2025-01-07
Payer: MEDICARE

## 2025-01-07 LAB
A-A DO2: 221.5 MMHG
ANION GAP SERPL CALCULATED.3IONS-SCNC: 9 MMOL/L (ref 5–15)
ARTERIAL PATENCY WRIST A: POSITIVE
ATMOSPHERIC PRESS: 740 MMHG
BASE EXCESS BLDA CALC-SCNC: 13.8 MMOL/L (ref 0–2)
BASOPHILS # BLD AUTO: 0.02 10*3/MM3 (ref 0–0.2)
BASOPHILS NFR BLD AUTO: 0.2 % (ref 0–1.5)
BDY SITE: ABNORMAL
BUN SERPL-MCNC: 17 MG/DL (ref 6–20)
BUN/CREAT SERPL: 27 (ref 7–25)
CALCIUM SPEC-SCNC: 8.5 MG/DL (ref 8.6–10.5)
CHLORIDE SERPL-SCNC: 97 MMOL/L (ref 98–107)
CO2 SERPL-SCNC: 35 MMOL/L (ref 22–29)
COHGB MFR BLD: 1.1 % (ref 0–2)
CREAT SERPL-MCNC: 0.63 MG/DL (ref 0.76–1.27)
DEPRECATED RDW RBC AUTO: 43.7 FL (ref 37–54)
EGFRCR SERPLBLD CKD-EPI 2021: 110.9 ML/MIN/1.73
EOSINOPHIL # BLD AUTO: 0.02 10*3/MM3 (ref 0–0.4)
EOSINOPHIL NFR BLD AUTO: 0.2 % (ref 0.3–6.2)
EPAP: 6
ERYTHROCYTE [DISTWIDTH] IN BLOOD BY AUTOMATED COUNT: 13.4 % (ref 12.3–15.4)
GLUCOSE BLDC GLUCOMTR-MCNC: 176 MG/DL (ref 70–130)
GLUCOSE BLDC GLUCOMTR-MCNC: 200 MG/DL (ref 70–130)
GLUCOSE BLDC GLUCOMTR-MCNC: 211 MG/DL (ref 70–130)
GLUCOSE SERPL-MCNC: 175 MG/DL (ref 65–99)
HCO3 BLDA-SCNC: 40.9 MMOL/L (ref 22–28)
HCT VFR BLD AUTO: 47.3 % (ref 37.5–51)
HCT VFR BLD CALC: 48.2 %
HGB BLD-MCNC: 15.1 G/DL (ref 13–17.7)
IMM GRANULOCYTES # BLD AUTO: 0.06 10*3/MM3 (ref 0–0.05)
IMM GRANULOCYTES NFR BLD AUTO: 0.5 % (ref 0–0.5)
INHALED O2 CONCENTRATION: 50 %
IPAP: 22
LYMPHOCYTES # BLD AUTO: 1.31 10*3/MM3 (ref 0.7–3.1)
LYMPHOCYTES NFR BLD AUTO: 11.2 % (ref 19.6–45.3)
Lab: ABNORMAL
MCH RBC QN AUTO: 28.5 PG (ref 26.6–33)
MCHC RBC AUTO-ENTMCNC: 31.9 G/DL (ref 31.5–35.7)
MCV RBC AUTO: 89.2 FL (ref 79–97)
METHGB BLD QL: 0.5 % (ref 0–1.5)
MODALITY: ABNORMAL
MONOCYTES # BLD AUTO: 0.68 10*3/MM3 (ref 0.1–0.9)
MONOCYTES NFR BLD AUTO: 5.8 % (ref 5–12)
MRSA DNA SPEC QL NAA+PROBE: NORMAL
NEUTROPHILS NFR BLD AUTO: 82.1 % (ref 42.7–76)
NEUTROPHILS NFR BLD AUTO: 9.57 10*3/MM3 (ref 1.7–7)
NRBC BLD AUTO-RTO: 0 /100 WBC (ref 0–0.2)
OXYHGB MFR BLDV: 91.4 % (ref 94–99)
PCO2 BLDA: 58.7 MM HG (ref 35–45)
PCO2 TEMP ADJ BLD: ABNORMAL MM[HG]
PH BLDA: 7.45 PH UNITS (ref 7.3–7.5)
PH, TEMP CORRECTED: ABNORMAL
PLATELET # BLD AUTO: 285 10*3/MM3 (ref 140–450)
PMV BLD AUTO: 9.6 FL (ref 6–12)
PO2 BLDA: 61.4 MM HG (ref 75–100)
PO2 TEMP ADJ BLD: ABNORMAL MM[HG]
POTASSIUM SERPL-SCNC: 3.5 MMOL/L (ref 3.5–5.2)
RBC # BLD AUTO: 5.3 10*6/MM3 (ref 4.14–5.8)
SAO2 % BLDCOA: 92.9 % (ref 94–100)
SET MECH RESP RATE: 22
SODIUM SERPL-SCNC: 141 MMOL/L (ref 136–145)
VENTILATOR MODE: ABNORMAL
WBC NRBC COR # BLD AUTO: 11.66 10*3/MM3 (ref 3.4–10.8)

## 2025-01-07 PROCEDURE — 94799 UNLISTED PULMONARY SVC/PX: CPT

## 2025-01-07 PROCEDURE — 99223 1ST HOSP IP/OBS HIGH 75: CPT | Performed by: INTERNAL MEDICINE

## 2025-01-07 PROCEDURE — 71045 X-RAY EXAM CHEST 1 VIEW: CPT

## 2025-01-07 PROCEDURE — 94660 CPAP INITIATION&MGMT: CPT

## 2025-01-07 PROCEDURE — 85025 COMPLETE CBC W/AUTO DIFF WBC: CPT | Performed by: STUDENT IN AN ORGANIZED HEALTH CARE EDUCATION/TRAINING PROGRAM

## 2025-01-07 PROCEDURE — 83050 HGB METHEMOGLOBIN QUAN: CPT

## 2025-01-07 PROCEDURE — 25010000002 LORAZEPAM PER 2 MG: Performed by: FAMILY MEDICINE

## 2025-01-07 PROCEDURE — 25010000002 AMPICILLIN-SULBACTAM PER 1.5 G: Performed by: STUDENT IN AN ORGANIZED HEALTH CARE EDUCATION/TRAINING PROGRAM

## 2025-01-07 PROCEDURE — 82375 ASSAY CARBOXYHB QUANT: CPT

## 2025-01-07 PROCEDURE — 80048 BASIC METABOLIC PNL TOTAL CA: CPT | Performed by: STUDENT IN AN ORGANIZED HEALTH CARE EDUCATION/TRAINING PROGRAM

## 2025-01-07 PROCEDURE — 25010000002 ENOXAPARIN PER 10 MG: Performed by: FAMILY MEDICINE

## 2025-01-07 PROCEDURE — 99233 SBSQ HOSP IP/OBS HIGH 50: CPT | Performed by: FAMILY MEDICINE

## 2025-01-07 PROCEDURE — 36600 WITHDRAWAL OF ARTERIAL BLOOD: CPT

## 2025-01-07 PROCEDURE — 82805 BLOOD GASES W/O2 SATURATION: CPT

## 2025-01-07 PROCEDURE — 82948 REAGENT STRIP/BLOOD GLUCOSE: CPT

## 2025-01-07 PROCEDURE — 25010000002 HYDRALAZINE PER 20 MG: Performed by: INTERNAL MEDICINE

## 2025-01-07 PROCEDURE — 63710000001 INSULIN LISPRO (HUMAN) PER 5 UNITS: Performed by: FAMILY MEDICINE

## 2025-01-07 PROCEDURE — 25010000002 METHYLPREDNISOLONE PER 40 MG: Performed by: STUDENT IN AN ORGANIZED HEALTH CARE EDUCATION/TRAINING PROGRAM

## 2025-01-07 PROCEDURE — 94761 N-INVAS EAR/PLS OXIMETRY MLT: CPT

## 2025-01-07 PROCEDURE — 25010000002 THIAMINE HCL 200 MG/2ML SOLUTION: Performed by: STUDENT IN AN ORGANIZED HEALTH CARE EDUCATION/TRAINING PROGRAM

## 2025-01-07 PROCEDURE — 25010000002 PHENOBARBITAL PER 120 MG: Performed by: STUDENT IN AN ORGANIZED HEALTH CARE EDUCATION/TRAINING PROGRAM

## 2025-01-07 PROCEDURE — 82948 REAGENT STRIP/BLOOD GLUCOSE: CPT | Performed by: FAMILY MEDICINE

## 2025-01-07 RX ORDER — TERAZOSIN 5 MG/1
10 CAPSULE ORAL NIGHTLY
Status: DISCONTINUED | OUTPATIENT
Start: 2025-01-07 | End: 2025-01-10 | Stop reason: HOSPADM

## 2025-01-07 RX ORDER — SPIRONOLACTONE 25 MG/1
25 TABLET ORAL DAILY
Status: DISCONTINUED | OUTPATIENT
Start: 2025-01-07 | End: 2025-01-09

## 2025-01-07 RX ORDER — HYDROCHLOROTHIAZIDE 25 MG/1
25 TABLET ORAL DAILY
Status: DISCONTINUED | OUTPATIENT
Start: 2025-01-07 | End: 2025-01-10 | Stop reason: HOSPADM

## 2025-01-07 RX ORDER — FUROSEMIDE 10 MG/ML
20 INJECTION INTRAMUSCULAR; INTRAVENOUS
Status: DISCONTINUED | OUTPATIENT
Start: 2025-01-07 | End: 2025-01-09

## 2025-01-07 RX ORDER — FUROSEMIDE 10 MG/ML
20 INJECTION INTRAMUSCULAR; INTRAVENOUS DAILY
Status: DISCONTINUED | OUTPATIENT
Start: 2025-01-07 | End: 2025-01-07

## 2025-01-07 RX ADMIN — Medication 10 ML: at 20:01

## 2025-01-07 RX ADMIN — BUDESONIDE 0.5 MG: 0.5 INHALANT RESPIRATORY (INHALATION) at 06:50

## 2025-01-07 RX ADMIN — GUAIFENESIN 600 MG: 600 TABLET, EXTENDED RELEASE ORAL at 20:00

## 2025-01-07 RX ADMIN — IPRATROPIUM BROMIDE AND ALBUTEROL SULFATE 3 ML: 2.5; .5 SOLUTION RESPIRATORY (INHALATION) at 06:50

## 2025-01-07 RX ADMIN — THIAMINE HYDROCHLORIDE 200 MG: 100 INJECTION, SOLUTION INTRAMUSCULAR; INTRAVENOUS at 21:27

## 2025-01-07 RX ADMIN — HYDRALAZINE HYDROCHLORIDE 10 MG: 20 INJECTION INTRAMUSCULAR; INTRAVENOUS at 19:44

## 2025-01-07 RX ADMIN — DEXMEDETOMIDINE HYDROCHLORIDE 1.4 MCG/KG/HR: 400 INJECTION INTRAVENOUS at 06:02

## 2025-01-07 RX ADMIN — DEXMEDETOMIDINE HYDROCHLORIDE 0.6 MCG/KG/HR: 400 INJECTION INTRAVENOUS at 09:40

## 2025-01-07 RX ADMIN — IPRATROPIUM BROMIDE AND ALBUTEROL SULFATE 3 ML: 2.5; .5 SOLUTION RESPIRATORY (INHALATION) at 12:32

## 2025-01-07 RX ADMIN — INSULIN LISPRO 4 UNITS: 100 INJECTION, SOLUTION INTRAVENOUS; SUBCUTANEOUS at 11:52

## 2025-01-07 RX ADMIN — NICOTINE 1 PATCH: 21 PATCH TRANSDERMAL at 05:44

## 2025-01-07 RX ADMIN — BUDESONIDE 0.5 MG: 0.5 INHALANT RESPIRATORY (INHALATION) at 19:19

## 2025-01-07 RX ADMIN — ASPIRIN 81 MG CHEWABLE TABLET 81 MG: 81 TABLET CHEWABLE at 08:29

## 2025-01-07 RX ADMIN — METHYLPREDNISOLONE SODIUM SUCCINATE 40 MG: 40 INJECTION, POWDER, LYOPHILIZED, FOR SOLUTION INTRAMUSCULAR; INTRAVENOUS at 03:32

## 2025-01-07 RX ADMIN — MUPIROCIN 1 APPLICATION: 20 OINTMENT TOPICAL at 08:30

## 2025-01-07 RX ADMIN — PANTOPRAZOLE SODIUM 40 MG: 40 INJECTION, POWDER, FOR SOLUTION INTRAVENOUS at 05:39

## 2025-01-07 RX ADMIN — LORAZEPAM 2 MG: 2 TABLET ORAL at 08:29

## 2025-01-07 RX ADMIN — AMLODIPINE BESYLATE 10 MG: 5 TABLET ORAL at 08:29

## 2025-01-07 RX ADMIN — GUAIFENESIN 600 MG: 600 TABLET, EXTENDED RELEASE ORAL at 08:29

## 2025-01-07 RX ADMIN — AMPICILLIN SODIUM AND SULBACTAM SODIUM 3 G: 2; 1 INJECTION, POWDER, FOR SOLUTION INTRAMUSCULAR; INTRAVENOUS at 20:00

## 2025-01-07 RX ADMIN — AMPICILLIN SODIUM AND SULBACTAM SODIUM 3 G: 2; 1 INJECTION, POWDER, FOR SOLUTION INTRAMUSCULAR; INTRAVENOUS at 03:32

## 2025-01-07 RX ADMIN — CARVEDILOL 12.5 MG: 6.25 TABLET, FILM COATED ORAL at 17:48

## 2025-01-07 RX ADMIN — DEXMEDETOMIDINE HYDROCHLORIDE 0.9 MCG/KG/HR: 400 INJECTION INTRAVENOUS at 21:50

## 2025-01-07 RX ADMIN — FOLIC ACID 1 MG: 5 INJECTION, SOLUTION INTRAMUSCULAR; INTRAVENOUS; SUBCUTANEOUS at 11:52

## 2025-01-07 RX ADMIN — TERAZOSIN HYDROCHLORIDE 10 MG: 5 CAPSULE ORAL at 20:00

## 2025-01-07 RX ADMIN — INSULIN LISPRO 7 UNITS: 100 INJECTION, SOLUTION INTRAVENOUS; SUBCUTANEOUS at 20:16

## 2025-01-07 RX ADMIN — PHENOBARBITAL SODIUM 65 MG: 65 INJECTION INTRAMUSCULAR; INTRAVENOUS at 06:35

## 2025-01-07 RX ADMIN — IPRATROPIUM BROMIDE AND ALBUTEROL SULFATE 3 ML: 2.5; .5 SOLUTION RESPIRATORY (INHALATION) at 19:19

## 2025-01-07 RX ADMIN — DEXMEDETOMIDINE HYDROCHLORIDE 0.4 MCG/KG/HR: 400 INJECTION INTRAVENOUS at 13:13

## 2025-01-07 RX ADMIN — AMPICILLIN SODIUM AND SULBACTAM SODIUM 3 G: 2; 1 INJECTION, POWDER, FOR SOLUTION INTRAMUSCULAR; INTRAVENOUS at 08:09

## 2025-01-07 RX ADMIN — INSULIN LISPRO 2 UNITS: 100 INJECTION, SOLUTION INTRAVENOUS; SUBCUTANEOUS at 16:52

## 2025-01-07 RX ADMIN — VALSARTAN 320 MG: 80 TABLET, FILM COATED ORAL at 08:29

## 2025-01-07 RX ADMIN — DEXMEDETOMIDINE HYDROCHLORIDE 0.4 MCG/KG/HR: 400 INJECTION INTRAVENOUS at 17:49

## 2025-01-07 RX ADMIN — ENOXAPARIN SODIUM 60 MG: 60 INJECTION SUBCUTANEOUS at 17:48

## 2025-01-07 RX ADMIN — DEXMEDETOMIDINE HYDROCHLORIDE 0.6 MCG/KG/HR: 400 INJECTION INTRAVENOUS at 03:41

## 2025-01-07 RX ADMIN — HYDROCHLOROTHIAZIDE 25 MG: 25 TABLET ORAL at 10:46

## 2025-01-07 RX ADMIN — THIAMINE HYDROCHLORIDE 200 MG: 100 INJECTION, SOLUTION INTRAMUSCULAR; INTRAVENOUS at 05:39

## 2025-01-07 RX ADMIN — ROPINIROLE HYDROCHLORIDE 3 MG: 1 TABLET, FILM COATED ORAL at 20:00

## 2025-01-07 RX ADMIN — ENOXAPARIN SODIUM 60 MG: 60 INJECTION SUBCUTANEOUS at 05:38

## 2025-01-07 RX ADMIN — CARVEDILOL 12.5 MG: 6.25 TABLET, FILM COATED ORAL at 08:29

## 2025-01-07 RX ADMIN — SPIRONOLACTONE 25 MG: 25 TABLET, FILM COATED ORAL at 10:46

## 2025-01-07 RX ADMIN — LORAZEPAM 2 MG: 2 INJECTION INTRAMUSCULAR; INTRAVENOUS at 03:48

## 2025-01-07 RX ADMIN — THIAMINE HYDROCHLORIDE 200 MG: 100 INJECTION, SOLUTION INTRAMUSCULAR; INTRAVENOUS at 13:13

## 2025-01-07 RX ADMIN — LACTULOSE 30 G: 20 SOLUTION ORAL at 08:29

## 2025-01-07 RX ADMIN — IPRATROPIUM BROMIDE AND ALBUTEROL SULFATE 3 ML: 2.5; .5 SOLUTION RESPIRATORY (INHALATION) at 00:16

## 2025-01-07 RX ADMIN — PHENOBARBITAL SODIUM 65 MG: 65 INJECTION INTRAMUSCULAR; INTRAVENOUS at 18:23

## 2025-01-07 RX ADMIN — LACTULOSE 30 G: 20 SOLUTION ORAL at 20:23

## 2025-01-07 RX ADMIN — HYDRALAZINE HYDROCHLORIDE 10 MG: 20 INJECTION INTRAMUSCULAR; INTRAVENOUS at 01:12

## 2025-01-07 RX ADMIN — AMPICILLIN SODIUM AND SULBACTAM SODIUM 3 G: 2; 1 INJECTION, POWDER, FOR SOLUTION INTRAMUSCULAR; INTRAVENOUS at 15:57

## 2025-01-07 RX ADMIN — INSULIN LISPRO 4 UNITS: 100 INJECTION, SOLUTION INTRAVENOUS; SUBCUTANEOUS at 08:09

## 2025-01-07 RX ADMIN — Medication 10 ML: at 08:29

## 2025-01-07 RX ADMIN — MUPIROCIN 1 APPLICATION: 20 OINTMENT TOPICAL at 20:00

## 2025-01-07 RX ADMIN — METHYLPREDNISOLONE SODIUM SUCCINATE 40 MG: 40 INJECTION, POWDER, LYOPHILIZED, FOR SOLUTION INTRAMUSCULAR; INTRAVENOUS at 15:57

## 2025-01-07 NOTE — CASE MANAGEMENT/SOCIAL WORK
Continued Stay Note   [place]     Patient Name: Cal Oliver Jr.  MRN: [id number]  Today's Date: 1/7/2025    Admit Date: 1/1/2025    Plan: pt sleeping soundly this am; pt not read medically for discharge per notes   Discharge Plan       Row Name 01/07/25 1143       Plan    Plan pt sleeping soundly this am; pt not read medically for discharge per notes                   Discharge Codes    No documentation.                       Jessenia Winter RN

## 2025-01-07 NOTE — PROGRESS NOTES
"Dietitian Assessment    Patient Name: Cal Oliver Jr.  YOB: 1965  MRN: [id number]  Admission date: 1/1/2025    Comment:        Clinical Nutrition Assessment      Reason for Assessment LOS   H&P  Past Medical History:   Diagnosis Date    Asthma     Chronic back pain     Diabetes mellitus     High blood pressure     Hyperlipidemia     Migraine     Nausea and vomiting     Sleep apnea        Past Surgical History:   Procedure Laterality Date    BACK SURGERY      GALLBLADDER SURGERY      KNEE SURGERY              Current Problems   Patient Active Problem List   Diagnosis    Asthma    Mild chronic obstructive pulmonary disease    Dental abscess    Depression    Diarrhea    Gastroesophageal reflux disease    Hypercholesterolemia    Hypertension    Hypokalemia    Hypothyroidism    Insomnia    Muscle pain    Nausea and vomiting    SHUKRI on CPAP    Restless legs syndrome    Type 2 diabetes mellitus    Vitamin D deficiency    Abnormal liver function tests    Chronic back pain    Morbid obesity    Encounter for screening colonoscopy    Routine general medical examination at a health care facility    Chronic pain of right knee    Sleep apnea    Osteoarthritis of both knees    Acute respiratory failure with hypoxia    COPD exacerbation        Encounter Information        Trending Narrative     1/7: Pt screened for LOS. Pt has had wt loss of 8# (2.9%) within 1 week. However, wt loss is intentional r/t diuretic use. Average PO intake 41% x 6 meals. RD will mortified diet to have heart healthy diet restriction and order Boost glucose control daily.      Anthropometrics        Current Height, Weight Height: 180.3 cm (71\")  Weight: (!) 164 kg (362 lb 3.5 oz) (01/07/25 0400)   Trending Weight Hx     This admission:              PTA:     Wt Readings from Last 30 Encounters:   01/07/25 0400 (!) 164 kg (362 lb 3.5 oz)   01/05/25 0330 (!) 167 kg (369 lb 0.8 oz)   01/04/25 0400 (!) 169 kg (371 lb 11.1 oz)   01/02/25 1345 " (!) 170 kg (375 lb 10.6 oz)   01/01/25 0811 (!) 168 kg (370 lb 6 oz)   01/01/25 0634 (!) 168 kg (369 lb 14.9 oz)   01/01/25 0340 (!) 165 kg (363 lb)   11/12/24 0934 (!) 162 kg (358 lb)   04/17/24 1534 (!) 166 kg (365 lb)   03/01/24 0218 (!) 164 kg (361 lb 9.6 oz)   01/17/24 1322 (!) 162 kg (358 lb)   02/03/23 1355 (!) 162 kg (357 lb)   04/12/22 1610 (!) 173 kg (381 lb)   11/18/21 0838 (!) 177 kg (390 lb)   10/07/21 0933 (!) 174 kg (383 lb)   08/18/21 1052 (!) 174 kg (384 lb)   12/30/20 1210 (!) 172 kg (380 lb)   10/27/20 1524 (!) 175 kg (386 lb 3.2 oz)   10/08/20 1050 (!) 176 kg (387 lb 12.8 oz)   10/08/20 1048 (!) 176 kg (387 lb 12.8 oz)   09/11/20 1016 (!) 176 kg (388 lb 6.4 oz)   09/11/20 1013 (!) 176 kg (388 lb 6.4 oz)   03/11/20 1133 (!) 163 kg (360 lb)   01/17/20 1111 (!) 166 kg (365 lb 15.4 oz)   12/30/19 0808 (!) 166 kg (367 lb)   10/24/19 1256 (!) 163 kg (359 lb 12.8 oz)   10/21/19 0946 (!) 158 kg (349 lb)   10/17/19 1457 (!) 149 kg (328 lb 0.7 oz)   09/25/19 1442 (!) 159 kg (351 lb)   03/08/19 1102 (!) 165 kg (363 lb 6.4 oz)   02/08/19 0811 (!) 171 kg (377 lb)   12/07/18 1324 (!) 168 kg (370 lb)   11/29/18 1342 (!) 167 kg (369 lb)   10/17/18 1352 (!) 168 kg (370 lb)   09/05/18 1457 (!) 174 kg (384 lb)   09/05/18 1454 (!) 174 kg (384 lb)   08/29/18 1407 (!) 176 kg (389 lb)   08/29/18 1402 (!) 176 kg (389 lb)   07/09/18 1006 (!) 173 kg (382 lb)   04/18/18 1559 (!) 173 kg (382 lb)      BMI kg/m2 Body mass index is 50.52 kg/m².     Labs        Pertinent Labs     Results from last 7 days   Lab Units 01/07/25  0447 01/06/25  0431 01/05/25  0407 01/02/25  0510 01/01/25  0357   SODIUM mmol/L 141 141 143   < > 139   POTASSIUM mmol/L 3.5 3.7 3.9   < > 3.5   CHLORIDE mmol/L 97* 94* 95*   < > 92*   CO2 mmol/L 35.0* 39.4* 40.7*   < > 37.3*   BUN mg/dL 17 15 21*   < > 9   CREATININE mg/dL 0.63* 0.62* 0.65*   < > 0.76   CALCIUM mg/dL 8.5* 8.5* 8.5*   < > 9.0   BILIRUBIN mg/dL  --   --   --   --  0.4   ALK PHOS U/L  --    --   --   --  128*   ALT (SGPT) U/L  --   --   --   --  24   AST (SGOT) U/L  --   --   --   --  18   GLUCOSE mg/dL 175* 198* 218*   < > 244*    < > = values in this interval not displayed.       Results from last 7 days   Lab Units 01/07/25  0447   HEMOGLOBIN g/dL 15.1   HEMATOCRIT % 47.3       Lab Results   Component Value Date    HGBA1C 6.30 (H) 09/25/2019            Medications       Scheduled Medications amLODIPine, 10 mg, Oral, Daily  ampicillin-sulbactam, 3 g, Intravenous, Q6H  aspirin, 81 mg, Oral, Daily  budesonide, 0.5 mg, Nebulization, BID - RT  carvedilol, 12.5 mg, Oral, BID With Meals  enoxaparin, 60 mg, Subcutaneous, Q12H  folic acid 1 mg in sodium chloride 0.9 % 50 mL IVPB, 1 mg, Intravenous, Daily  furosemide, 20 mg, Intravenous, BID Diuretics  guaiFENesin, 600 mg, Oral, Q12H  insulin lispro, 2-9 Units, Subcutaneous, 4x Daily AC & at Bedtime  ipratropium-albuterol, 3 mL, Nebulization, Q6H - RT  lactulose, 30 g, Oral, TID  methylPREDNISolone sodium succinate, 40 mg, Intravenous, Q12H  mupirocin, 1 Application, Each Nare, BID  nicotine, 1 patch, Transdermal, Q24H  pantoprazole, 40 mg, Intravenous, Q AM  PHENobarbital, 65 mg, Intravenous, Once   Followed by  [START ON 1/8/2025] PHENobarbital, 32.4 mg, Oral, Once   Followed by  [START ON 1/8/2025] PHENobarbital, 32.4 mg, Oral, Once   Followed by  [START ON 1/9/2025] PHENobarbital, 32.4 mg, Oral, Once  rOPINIRole, 3 mg, Oral, Nightly  sodium chloride, 10 mL, Intravenous, Q12H  terazosin, 5 mg, Oral, Nightly  thiamine (B-1) IV, 200 mg, Intravenous, Q8H   Followed by  [START ON 1/11/2025] thiamine, 100 mg, Oral, Daily  valsartan, 320 mg, Oral, Daily        Infusions dexmedetomidine, 0.2 mcg/kg/hr, Last Rate: 1.4 mcg/kg/hr (01/07/25 0602)  Pharmacy to Dose ampicillin-sulbactam,   Pharmacy to Dose enoxaparin (LOVENOX),          PRN Medications   acetaminophen **OR** acetaminophen **OR** acetaminophen    aluminum-magnesium hydroxide-simethicone    benzonatate     senna-docusate sodium **AND** polyethylene glycol **AND** bisacodyl **AND** bisacodyl    dextrose    dextrose    glucagon (human recombinant)    hydrALAZINE    LORazepam **OR** LORazepam **OR** LORazepam **OR** LORazepam **OR** LORazepam **OR** LORazepam    LORazepam    Magnesium Standard Dose Replacement - Follow Nurse / BPA Driven Protocol    methocarbamol    nicotine polacrilex    nitroglycerin    ondansetron ODT **OR** ondansetron    Pharmacy to Dose ampicillin-sulbactam    Pharmacy to Dose enoxaparin (LOVENOX)    sodium chloride    sodium chloride    sodium chloride    ziprasidone     Physical Findings        Trending Physical   Appearance, NFPE    --  Edema  not assessed   Bowel Function LBM: 1/5   Tubes Peripheral IV   Chewing/Swallowing WNL   Skin MASD     Estimated/Assessed Needs       Energy Requirements    EST Needs, Method, Wt used 8316-6550 kcal (MSJ 1-1.2)       Protein Requirements    EST Needs, Method, Wt used 131-164 g pro (.8-1 g pro/kg CBW)       Fluid Requirements     Estimated Needs (mL/day) 7612-4780 mL/day       Current Nutrition Orders & Evaluation of Intake       Oral Nutrition     Food Allergies NKFA   Current PO Diet Diet: Diabetic; Consistent Carbohydrate; Fluid Consistency: Thin (IDDSI 0)   Supplement    PO Evaluation     Trending % PO Intake 1/7: 41% x 6 meals     Enteral Nutrition    Enteral Route    Order, Modulars, Flushes    Residual/Tolerance    TF Observation         Parenteral Nutrition     TPN Route    Total # Days on TPN    TPN Order, Lipid Details    MVI & Trace Element Freq    TPN Observation       Nutrition Diagnosis         Nutrition Dx Problem 1 Predicted suboptimal intake r/t respiratory failure AEB average PO intake 41% x 6 meals.       Nutrition Dx Problem 2 Obesity r/t lifestyle AEB BMI=50.52.       Intervention Goal         Intervention Goal(s) PO intake meet >50% of estimated needs  Adhere to ONS  1-2# wt loss/week is appropriate      Nutrition Intervention         RD Action Supplement provided, adjusted diet to      Nutrition Prescription          Diet Prescription Diet: Diabetic; Consistent Carbohydrate; Fluid Consistency: Thin (IDDSI 0)   Supplement Prescription Boost glucose control daily      Enteral Prescription        TPN Prescription      Monitor/Evaluation        Monitor Per protocol, I&O, PO intake, Supplement intake, Pertinent labs, Weight, Skin status, GI status, Symptoms, POC/GOC, Swallow function, Hemodynamic stability     RD to f/up    Electronically signed by:  Coreen Soriano RD  01/07/25 08:33 EST

## 2025-01-07 NOTE — PLAN OF CARE
Goal Outcome Evaluation:  Plan of Care Reviewed With: patient           Outcome Evaluation: Pt tolerated bipap through the night..

## 2025-01-07 NOTE — CONSULTS
Date of admission:  1/1/2025    Date of consultation:   January 7, 2025    Requested by:   Puja Paz, *    PCP: Woo Betancourt MD    Reason:  Acute Respiratory Failure.     History of Present Illness:  59 y.o. male with past medical history as mentioned below who was initially admitted with complaints of shortness of breath and associated symptoms of swelling.  He was found to have hypertension and hypoxemia.  He continued to require significant BiPAP supplementation with FiO2 requirements of 80% or so.  He was moved to the ICU and later on had to be started on Precedex.    Pulmonary consultation was requested for further recommendations.     No further history is available from the patient, as he is on the PAP device.    Review of System: Could not be obtained, as the patient is on PAP device.     Past Medical History: Pertinent history reviewed, as appropriate. Negative, except noted below or in HPI.  If this history could not be obtained due to a reason, the reason is listed in the HPI.  Past Medical History:   Diagnosis Date    Asthma     Chronic back pain     Diabetes mellitus     High blood pressure     Hyperlipidemia     Migraine     Nausea and vomiting     Sleep apnea          Past Surgical History: Pertinent history reviewed, as appropriate. Negative, except noted below or in HPI. If this history could not be obtained due to a reason, the reason is listed in the HPI.  Past Surgical History:   Procedure Laterality Date    BACK SURGERY      GALLBLADDER SURGERY      KNEE SURGERY           Family History: Pertinent history reviewed, as appropriate. Negative, except noted below or in HPI. If this history could not be obtained due to a reason, the reason is listed in the HPI.  Family History   Problem Relation Age of Onset    Other Other         HIGH BLOOD PRESSURE    Hypertension Other     Heart disease Mother     Heart attack Mother     Heart disease Father          Social History: Pertinent  "history reviewed, as appropriate. Negative, except noted below or in HPI. If this history could not be obtained due to a reason, the reason is listed in the HPI.  Social History     Socioeconomic History    Marital status:    Tobacco Use    Smoking status: Some Days     Current packs/day: 0.25     Average packs/day: 0.3 packs/day for 20.0 years (5.0 ttl pk-yrs)     Types: Cigarettes    Smokeless tobacco: Never    Tobacco comments:     not smoked since12/28/2020   Vaping Use    Vaping status: Never Used   Substance and Sexual Activity    Alcohol use: No    Drug use: No    Sexual activity: Defer             Physical Exam:  /86   Pulse 57   Temp 98.2 °F (36.8 °C) (Axillary)   Resp 22   Ht 180.3 cm (71\")   Wt (!) 164 kg (362 lb 3.5 oz)   SpO2 96%   BMI 50.52 kg/m²     Constitutional:            Vital signs reviewed            Patient is currently on PAP device    Eyes:            Conjunctiva: Pink    ENT:             Patient was on the PAP device      Neck:             Supple.  Increased adipose tissue noted.              Thyroid gland did not seem to be enlarged    Cardiovascular:              S1 + S2. Regular at this time    Lungs/Respiratory:            Transmitted breath sounds bilaterally with somewhat decreased air entry             Percussion could not be performed at this time.            Decreased Air Entry Bilaterally with wheezing and basal crackles heard.    Abdomen/GI:            Obese but soft.  Bowel sounds sluggishly positive.  No obvious organomegaly noted.    Musculoskeletal/Extremities:             Gait could not be assessed at this time.              No clubbing in the upper extremities             1+ edema noted in the lower extremities bilaterally.    Neurologic:             Exam was limited since the patient was on PAP device.    Psychiatric:             Exam was limited since the patient was on PAP device.      Labs: Reviewed. Pertinent labs were noted.   Results from last 7 " "days   Lab Units 01/07/25 0447 01/06/25  0431 01/05/25  0407 01/04/25  0705 01/03/25  0511   WBC 10*3/mm3 11.66* 16.03* 13.31* 15.45* 16.97*   HEMOGLOBIN g/dL 15.1 14.3 14.4 14.0 13.7   HEMATOCRIT % 47.3 45.0 46.3 45.2 45.1   PLATELETS 10*3/mm3 285 254 237 245 244   NEUTROPHIL % % 82.1* 82.0* 88.7* 85.0* 85.0*   NEUTROS ABS 10*3/mm3 9.57* 13.15* 11.82* 13.13* 14.41*   EOSINOPHIL % % 0.2* 0.1* 0.2* 0.1* 0.1*   EOS ABS 10*3/mm3 0.02 0.01 0.02 0.02 0.02   LYMPHOCYTE % % 11.2* 9.0* 5.8* 6.6* 7.3*   LYMPHS ABS 10*3/mm3 1.31 1.44 0.77 1.02 1.24       Lab Results   Component Value Date    PROCALCITO 0.05 01/06/2025    PROCALCITO 0.04 01/05/2025    PROCALCITO 0.04 01/04/2025       No results found for: \"CRP\"    No results found for: \"SEDRATE\"    Lab Results   Component Value Date    PROBNP 111.6 01/01/2025       Results from last 7 days   Lab Units 01/07/25 0447 01/06/25  0431 01/05/25  0407 01/02/25  0510 01/01/25  0357   SODIUM mmol/L 141 141 143   < > 139   POTASSIUM mmol/L 3.5 3.7 3.9   < > 3.5   CHLORIDE mmol/L 97* 94* 95*   < > 92*   CO2 mmol/L 35.0* 39.4* 40.7*   < > 37.3*   BUN mg/dL 17 15 21*   < > 9   CREATININE mg/dL 0.63* 0.62* 0.65*   < > 0.76   CALCIUM mg/dL 8.5* 8.5* 8.5*   < > 9.0   ANION GAP mmol/L 9.0 7.6 7.3   < > 9.7   BILIRUBIN mg/dL  --   --   --   --  0.4   ALK PHOS U/L  --   --   --   --  128*   ALT (SGPT) U/L  --   --   --   --  24   AST (SGOT) U/L  --   --   --   --  18   GLUCOSE mg/dL 175* 198* 218*   < > 244*   TOTAL PROTEIN g/dL  --   --   --   --  7.1   ALBUMIN g/dL  --   --   --   --  3.7    < > = values in this interval not displayed.             Lab Results   Component Value Date    TSH 2.790 01/11/2024    TSH 2.460 09/25/2019    TSH 1.410 09/04/2018       Lab Results   Component Value Date    FREET4 1.03 01/11/2024    FREET4 0.90 (L) 09/25/2019    FREET4 1.00 09/04/2018       Lab Results   Component Value Date    INR 0.94 01/11/2024       Lab Results   Component Value Date    CKTOTAL 163 " "12/24/2014    CKTOTAL 169 12/24/2014    CKTOTAL 214 (H) 12/23/2014       No components found for: \"HSTROPT\"    Lab Results   Component Value Date    TROPONINT 24 (H) 01/01/2025    TROPONINT 24 (H) 01/01/2025       Lab Results   Component Value Date    DDIMER 368 12/23/2014       Lab Results   Component Value Date    LIPASE 22 01/11/2024    LIPASE 65 03/20/2015    LIPASE 26 12/23/2014       Brief Urine Lab Results  (Last result in the past 365 days)        Color   Clarity   Blood   Leuk Est   Nitrite   Protein   CREAT   Urine HCG        01/04/25 1235 Orange   Clear   Large (3+)   Negative   Negative   Negative                     Micro: As of January 7, 2025   No results found for: \"RESPCX\"  No results found for: \"BCIDPCR\"  Lab Results   Component Value Date    BLOODCX No growth at 24 hours 01/06/2025    BLOODCX No growth at 24 hours 01/06/2025    BLOODCX No growth at 5 days 01/01/2025    BLOODCX No growth at 5 days 01/01/2025     No results found for: \"URINECX\"  No results found for: \"MRSACX\"  No results found for: \"MRSAPCR\"  No results found for: \"URCX\"  No components found for: \"LOWRESPCF\"  No results found for: \"THROATCX\"  No results found for: \"CULTURES\"  No components found for: \"STREPBCX\"  No results found for: \"STREPPNEUAG\"  No results found for: \"LEGIONELLA\"  No results found for: \"LEGANTIGENUR\"  No results found for: \"MYCOPLASCX\"  No results found for: \"GCCX\"  No results found for: \"WOUNDCX\"  No results found for: \"BODYFLDCX\"    No results found for: \"FLU\"    Lab Results   Component Value Date    ADENOVIRUS Not Detected 01/01/2025     Lab Results   Component Value Date    WG306B Not Detected 01/01/2025     Lab Results   Component Value Date    CVHKU1 Not Detected 01/01/2025     Lab Results   Component Value Date    CVNL63 Not Detected 01/01/2025     Lab Results   Component Value Date    CVOC43 Not Detected 01/01/2025     Lab Results   Component Value Date    HUMETPNEVS Not Detected 01/01/2025     Lab " "Results   Component Value Date    HURVEV Not Detected 01/01/2025     Lab Results   Component Value Date    FLUBPCR Not Detected 01/01/2025     Lab Results   Component Value Date    PARAINFLUE Not Detected 01/01/2025     Lab Results   Component Value Date    PARAFLUV2 Not Detected 01/01/2025     Lab Results   Component Value Date    PARAFLUV3 Not Detected 01/01/2025     Lab Results   Component Value Date    PARAFLUV4 Not Detected 01/01/2025     Lab Results   Component Value Date    BPERTPCR Not Detected 01/01/2025     No results found for: \"VRQJC33232\"  Lab Results   Component Value Date    CPNEUPCR Not Detected 01/01/2025     Lab Results   Component Value Date    MPNEUMO Not Detected 01/01/2025     Lab Results   Component Value Date    FLUAPCR Not Detected 01/01/2025     No results found for: \"FLUAH3\"  No results found for: \"FLUAH1\"  Lab Results   Component Value Date    RSV Not Detected 01/01/2025     Lab Results   Component Value Date    BPARAPCR Not Detected 01/01/2025       COVID 19:  Lab Results   Component Value Date    COVID19 Not Detected 01/01/2025         Lab Results   Component Value Date    THCURSCR Negative 01/04/2025     Lab Results   Component Value Date    PCPUR Negative 01/04/2025     Lab Results   Component Value Date    COCAINEUR Negative 01/04/2025     Lab Results   Component Value Date    METAMPSCNUR Negative 01/04/2025     Lab Results   Component Value Date    LABOPIASCN Negative 01/04/2025     Lab Results   Component Value Date    AMPHETSCREEN Negative 01/04/2025     Lab Results   Component Value Date    LABBENZSCN Positive (A) 01/04/2025     Lab Results   Component Value Date    TRICYCLICSCN Negative 01/04/2025     Lab Results   Component Value Date    LABMETHSCN Negative 01/04/2025     Lab Results   Component Value Date    BARBITSCNUR Negative 01/04/2025     Lab Results   Component Value Date    OXYCODONESCN Negative 01/04/2025     No results found for: \"PROPOXSCN\"  Lab Results   Component " "Value Date    BUPRENORSCNU Negative 01/04/2025     No results found for: \"ETHANOLMGDL\"  No results found for: \"ETOHPCT\"      ABG:  Recent Labs     01/05/25  0755 01/06/25  0747 01/07/25  0534   PHART 7.386 7.442 7.452   TBK9XTE 79.1* 67.4* 58.7*   PO2ART 63.6* 61.8* 61.4*   JDA0VKG 47.4* 46.0* 40.9*   BASEEXCESS 17.8* 17.9* 13.8*       Lab Results   Component Value Date    LACTATE 0.8 01/06/2025    LACTATE 1.2 01/01/2025       Imaging Study: Latest imaging studies was reviewed personally.   Imaging Results (Last 72 Hours)       Procedure Component Value Units Date/Time    XR Chest 1 View [[id number]] Collected: 01/07/25 0740     Updated: 01/07/25 0743    Narrative:      PROCEDURE: XR CHEST 1 VW-     HISTORY: hypoxia f/u; J96.01-Acute respiratory failure with hypoxia     COMPARISON: 1 day prior.     FINDINGS: The heart is normal in size. There is persistent pulmonary  vascular congestion but mildly improved interstitial edema compared to  prior. Right perihilar atelectasis has improved. There is still mild  bibasilar atelectasis or infiltrate. Elevation right hemidiaphragm again  noted.. The mediastinum is unremarkable. There is no pneumothorax.   There are no acute osseous abnormalities. Apical lordotic positioning  noted.        Impression:      Mildly improved interstitial edema, otherwise stable chest..              This report was signed and finalized on 1/7/2025 7:41 AM by Krysten Rogers MD.       XR Chest 1 View [[id number]] Collected: 01/06/25 1029     Updated: 01/06/25 1032    Narrative:      PROCEDURE: XR CHEST 1 VW-     HISTORY: hypoxia, f/u; J96.01-Acute respiratory failure with hypoxia,  shortness of breath     COMPARISON: 1/5/2024     FINDINGS:  Portable view of the chest demonstrates pulmonary vascular  congestion. Mild right perihilar atelectasis is noted. There is no  evidence of effusion, pneumothorax or other significant pleural disease.  The mediastinum is unremarkable.     The heart size is " normal.       Impression:      No significant change           This report was signed and finalized on 1/6/2025 10:30 AM by Olvin Mcbride MD.       US Liver [[id number]] Collected: 01/05/25 1903     Updated: 01/05/25 1905    Narrative:      FINAL REPORT    TECHNIQUE:  null    CLINICAL HISTORY:  abn LFTs    COMPARISON:  null    FINDINGS:  Exam: Limited right upper quadrant abdominal ultrasound    Comparison: None    Clinical History: Abnormal LFTs    Findings:    Selected images from a right upper quadrant ultrasound are provided for interpretation    The liver is echogenic which may be reflective of fatty infiltration. No liver lesions are seen.    Portval vein is patent with hepatopetal flow.    Prior cholecystectomy    The common bile duct is not enlarged at 5.8 mm.    No choledocholithiasis.    Pancreas obscured by bowel gas.    Right kidney does not demonstrate any hydronephrosis or stones.    No ascites seen.      Impression:      IMPRESSION:    1. Echogenic liver texture may be reflective of fatty infiltration. No focal lesions.    2. Prior cholecystectomy. No choledocholithiasis or biliary obstruction    Authenticated and Electronically Signed by Vibha Hatch MD  on 01/05/2025 07:03:54 PM    XR Chest 1 View [[id number]] Collected: 01/05/25 0819     Updated: 01/05/25 0822    Narrative:      PROCEDURE: XR CHEST 1 VW-     HISTORY: hypoxia f/u; J96.01-Acute respiratory failure with hypoxia,  acute respiratory failure.     COMPARISON: 2 days prior.     FINDINGS: The heart is normal in size. There is decreased inspiratory  effort. Again noted are bilateral perihilar linear densities, suspect  atelectasis. There is pulmonary vascular congestion and bilateral  interstitial disease, similar to prior. No effusion seen. There is  improved aeration of the lung bases bilaterally compared to previous  exam.. The mediastinum is unremarkable. There is no pneumothorax.  There  are no acute osseous abnormalities. Apical  lordotic positioning noted.        Impression:      Improved bibasilar atelectasis or infiltrate compared to  prior..              This report was signed and finalized on 1/5/2025 8:20 AM by Krysten Rogers MD.               ECHO:  Results for orders placed during the hospital encounter of 01/01/25    Adult Transthoracic Echo Complete w/ Color, Spectral and Contrast if necessary per protocol    Interpretation Summary    Left ventricular systolic function is normal. Calculated left ventricular EF = 61.6% Left ventricular ejection fraction appears to be 61 - 65%.    Left ventricular wall thickness is consistent with mild concentric hypertrophy.    Left ventricular diastolic function is consistent with (grade II w/high LAP) pseudonormalization.    The left atrial cavity is moderate to severely dilated.        dexmedetomidine, 0.2 mcg/kg/hr, Last Rate: 0.6 mcg/kg/hr (01/07/25 0940)  Pharmacy to Dose ampicillin-sulbactam,   Pharmacy to Dose enoxaparin (LOVENOX),             Assessment:  1.  Acute Respiratory Failure.  2.  Chronic respiratory acidosis  3.  Morbid obesity  4.  COPD?  5.  Obstructive sleep apnea/obesity hypoventilation syndrome    Discussion/Recommendations:   Since the patient's latest ABG suggests compensated respiratory acidosis.    He continues to require significant oxygen supplementation and according to the nursing staff, has been needing 15 L high flow nasal cannula and FiO2 of up to 80% on the BiPAP on occasion.    I feel that his respiratory failure is multifactorial including from likelihood of aspiration pneumonia/pneumonitis with possibility of decompensated diastolic congestive heart failure.    If he has any further worsening, with regards to respiratory acidosis, we may consider transitioning him to AVAPS.    We will need further information with regards to clinical status of his underlying COPD    he will definitely need outpatient workup with PFTs.    ABG will be repeated in the morning.       Chest X Ray will be ordered as appropriate.     His latest chest x-ray showed improved findings.    Nebulized treatments have been adjusted.    We will adjust the dose of Solu-Medrol, as clinically indicated.    I was able to review the patient's last titration study from March 2018 and it showed BiPAP titration with a good response to a pressure of 19/13.    I was able to review a compliance report from October 2020, which was performed on BiPAP setting of 18/13.  Overall it appeared to show good control although residual AHI was minimally elevated at 6.6.    I have also discussed the case with the nursing staff.    All relevant recommendations were, and will be, discussed with Puja Paz, *, as indicated    I would like to thank you for the opportunity to participate in the care of this patient.  We will communicate changes and recommendations, if and when necessary.      This document was electronically signed by Lisa Chapin MD on 01/07/25 at 12:36 EST      Dictated utilizing Dragon dictation.

## 2025-01-07 NOTE — PROGRESS NOTES
[facility]    [facility] NOTE    Name:  Cal Oliver Jr.   Age:  59 y.o.  Sex:  male  :  1965  MRN:  [id number]   Visit Number:  [id number]  Admission Date:  2025  Date Of Service:  25  Primary Care Physician:  Woo Betancourt MD     LOS: 6 days :    Chief Complaint:      Shortness of air    Subjective:    Patient somewhat drowsy at time of visit, per nursing staff has been on Precedex and phenobarbital.  There was reported concern for possible alcohol withdrawal.  Nephrology and pulmonology to see patient today.  He has been tolerating BiPAP.    Hospital Course:    Morbidly obese 59-year-old history of chronic tobacco abuse, uncontrolled high blood pressure, chronic back pain, diabetes, sleep apnea with home BiPAP use, who presented from home with complaints of shortness of breath.  Notes symptoms over the last 3 to 4 days, worsening.  Not been able to catch his breath with exertion, lying flat.  Noticed some swelling.  Has not been checking blood pressure.  No chest pain or palpitations.  Continues to smoke.  Unknown sick contacts.     In the ER he was hypertensive, heart rate in the 90s, afebrile, and hypoxic on room air.  CMP unremarkable.  proBNP of 110.  Troponins mildly elevated.  White count of 15 hemoglobin 14 platelets of 256.  Normal procalcitonin.  Negative flu COVID testing.  Chest x-ray with some interstitial edema.  Patient given DuoNeb, Solu-Medrol, Lasix, started on BiPAP.  Will admit for workup    Patient unfortunately did develop evidence of worsening hypercapnia, hypoxia, decreased mental status.  He was moved to the ICU.  He continue treatment for COPD exacerbation with Pulmicort, DuoNebs, Solu-Medrol.  He was started on Unasyn due to concern for possible aspiration event.  He has been on intermittent diuretic therapy, echocardiogram with grade 2 diastolic dysfunction noted.  He did have urinary retention and is also been seen by  nephrology.    Patient appears to be slowly improving.  There was concern for possible alcohol withdrawal as he was requiring high amounts of Precedex and Ativan, with subsequent started on phenobarbital.  He did admit to cocaine use but not alcohol use.  This will need to be assessed more when he is alert.    Review of Systems:     All systems were reviewed and negative except as mentioned in subjective, assessment and plan.    Vital Signs:    Temp:  [97.7 °F (36.5 °C)-99.2 °F (37.3 °C)] 98.2 °F (36.8 °C)  Heart Rate:  [50-85] 57  Resp:  [22-23] 22  BP: (135-179)/() 137/86    Intake and output:    I/O last 3 completed shifts:  In: 1977.3 [P.O.:50; I.V.:1477.3; IV Piggyback:450]  Out: 7300 [Urine:7300]  I/O this shift:  In: 340 [P.O.:340]  Out: 595 [Urine:595]    Physical Examination:    General Appearance:  Somnolent but arousable.  Chronically ill gentleman   Head:  Atraumatic and normocephalic.   Eyes: Conjunctivae and sclerae normal, no icterus. No pallor.   Throat: No oral lesions, no thrush, oral mucosa moist.   Neck: Supple, trachea midline, no thyromegaly.   Lungs:   Breath sounds diminished bilaterally, nontachypneic.   Heart:  Normal S1 and S2, murmur, no gallop, no rub. No JVD.   Abdomen:   Normal bowel sounds, no masses, no organomegaly. Soft, nontender, nondistended, no rebound tenderness.  Epstein catheter in place   Extremities: Supple, 1-2+ edema, no cyanosis, no clubbing.   Skin: Warm.   Neurologic: Unable to assess alertness, on sedation with Precedex.  Was moving extremities     Laboratory results:    Results from last 7 days   Lab Units 01/07/25  0447 01/06/25  0431 01/05/25  0407 01/02/25  0510 01/01/25  0357   SODIUM mmol/L 141 141 143   < > 139   POTASSIUM mmol/L 3.5 3.7 3.9   < > 3.5   CHLORIDE mmol/L 97* 94* 95*   < > 92*   CO2 mmol/L 35.0* 39.4* 40.7*   < > 37.3*   BUN mg/dL 17 15 21*   < > 9   CREATININE mg/dL 0.63* 0.62* 0.65*   < > 0.76   CALCIUM mg/dL 8.5* 8.5* 8.5*   < > 9.0    BILIRUBIN mg/dL  --   --   --   --  0.4   ALK PHOS U/L  --   --   --   --  128*   ALT (SGPT) U/L  --   --   --   --  24   AST (SGOT) U/L  --   --   --   --  18   GLUCOSE mg/dL 175* 198* 218*   < > 244*    < > = values in this interval not displayed.     Results from last 7 days   Lab Units 01/07/25  0447 01/06/25  0431 01/05/25  0407   WBC 10*3/mm3 11.66* 16.03* 13.31*   HEMOGLOBIN g/dL 15.1 14.3 14.4   HEMATOCRIT % 47.3 45.0 46.3   PLATELETS 10*3/mm3 285 254 237         Results from last 7 days   Lab Units 01/01/25  0521 01/01/25  0357   HSTROP T ng/L 24* 24*     Results from last 7 days   Lab Units 01/06/25  0813 01/06/25  0805 01/01/25  0518   BLOODCX  No growth at 24 hours No growth at 24 hours No growth at 5 days  No growth at 5 days     Recent Labs     01/05/25  0755 01/06/25  0747 01/07/25  0534   PHART 7.386 7.442 7.452   FQL7MNG 79.1* 67.4* 58.7*   PO2ART 63.6* 61.8* 61.4*   [id number] 47.4* 46.0* 40.9*   BASEEXCESS 17.8* 17.9* 13.8*      I have reviewed the patient's laboratory results.    Radiology results:    XR Chest 1 View    Result Date: 1/7/2025  PROCEDURE: XR CHEST 1 VW-  HISTORY: hypoxia f/u; J96.01-Acute respiratory failure with hypoxia  COMPARISON: 1 day prior.  FINDINGS: The heart is normal in size. There is persistent pulmonary vascular congestion but mildly improved interstitial edema compared to prior. Right perihilar atelectasis has improved. There is still mild bibasilar atelectasis or infiltrate. Elevation right hemidiaphragm again noted.. The mediastinum is unremarkable. There is no pneumothorax. There are no acute osseous abnormalities. Apical lordotic positioning noted.      Impression: Mildly improved interstitial edema, otherwise stable chest..     This report was signed and finalized on 1/7/2025 7:41 AM by Krysten Rogers MD.      XR Chest 1 View    Result Date: 1/6/2025  PROCEDURE: XR CHEST 1 VW-  HISTORY: hypoxia, f/u; J96.01-Acute respiratory failure with hypoxia, shortness of breath   COMPARISON: 1/5/2024  FINDINGS:  Portable view of the chest demonstrates pulmonary vascular congestion. Mild right perihilar atelectasis is noted. There is no evidence of effusion, pneumothorax or other significant pleural disease. The mediastinum is unremarkable.  The heart size is normal.      Impression: No significant change    This report was signed and finalized on 1/6/2025 10:30 AM by Olvin Mcbride MD.      US Liver    Result Date: 1/5/2025  FINAL REPORT TECHNIQUE: null CLINICAL HISTORY: abn LFTs COMPARISON: null FINDINGS: Exam: Limited right upper quadrant abdominal ultrasound Comparison: None Clinical History: Abnormal LFTs Findings: Selected images from a right upper quadrant ultrasound are provided for interpretation The liver is echogenic which may be reflective of fatty infiltration. No liver lesions are seen. Portval vein is patent with hepatopetal flow. Prior cholecystectomy The common bile duct is not enlarged at 5.8 mm. No choledocholithiasis. Pancreas obscured by bowel gas. Right kidney does not demonstrate any hydronephrosis or stones. No ascites seen.     Impression: IMPRESSION: 1. Echogenic liver texture may be reflective of fatty infiltration. No focal lesions. 2. Prior cholecystectomy. No choledocholithiasis or biliary obstruction Authenticated and Electronically Signed by Vibha Hatch MD on 01/05/2025 07:03:54 PM   I have reviewed the patient's radiology reports.    Medication Review:     I have reviewed the patient's active and prn medications.     Problem List:      Acute respiratory failure with hypoxia    COPD exacerbation      Assessment:    COPD with acute exacerbation, POA  Acute hypoxic/hypercapnic respiratory failure  Acute urinary retention  Possible diastolic heart failure, workup pending, POA  Hypertensive urgency, POA  Chronic tobacco abuse  Type 2 diabetes  Morbid obesity  Obstructive sleep apnea    Plan:  Acute respiratory failure with hypoxia and hypercapnia  COPD  exacerbation  Acute exacerbation of heart failure  Has been on bronchodilators, steroids, BiPAP.  Given Unasyn due to possible aspiration.  Had been started on diuretic therapy, urine output significant over the last 48 hours.  Pulmonology to follow-up today.  Echocardiogram with grade 2 diastolic dysfunction.  Oliguria  Urinary retention  Epstein catheter placed due to urinary retention.  Nephrology consultation, recommendations appreciated.  Encephalopathy  Alcohol use disorder, suspected  Alcohol withdrawal, suspected  Per prior provider, concern for withdrawal symptoms.  Was requiring max doses of Precedex and Ativan, was started on phenobarbital.  Appears somewhat late in the course to have developed significant withdrawal symptoms from alcohol, will need to assess this further.    Chronic: Type 2 diabetes, hypertension, obesity BMI 51, SHUKRI.    Continue ICU treatment    DVT Prophylaxis: Enoxaparin   Code Status: Full  Diet: Diabetic  Discharge Plan: Undecided    Puja Paz DO  01/07/25  16:06 EST

## 2025-01-07 NOTE — PROGRESS NOTES
[facility]"" Progress Note  [facility]. [place]        Patient Name: Cal Oliver Jr.  : 1965  MRN: [id number]   LOS: 6 days    Patient Care Team:  Woo Betancourt MD as PCP - General (Internal Medicine)    Chief Complaint:    Chief Complaint   Patient presents with    Shortness of Breath     Primary Care Physician:  Woo Betancourt MD  Date of admission: 2025    Subjective     Interval History:   Follow-up and uric/oliguric renal failure.    Significant improvement in urine output noted.  Renal function is continuously improving and has been stable.  Patient has been on BiPAP with gradual improvement of his hypercapnia.  Slightly more alert this morning.  Still appears to be confused off and on.  Events noted from last 24 hours.  I reviewed the chart and other providers notes, labs and procedures done since my last note.    Review of Systems:   unobtainable.    Objective     Vitals:   Temp:  [97.7 °F (36.5 °C)-99.4 °F (37.4 °C)] 97.7 °F (36.5 °C)  Heart Rate:  [50-92] 62  Resp:  [22-42] 22  BP: (135-193)/() 172/102  Flow (L/min) (Oxygen Therapy):  [15] 15    Intake/Output Summary (Last 24 hours) at 2025 0642  Last data filed at 2025 0500  Gross per 24 hour   Intake 1977.33 ml   Output 4775 ml   Net -2797.67 ml       Physical Exam:    General Appearance: Drowsy and sleepy, no acute distress   Skin: warm and dry  HEENT: oral mucosa normal, nonicteric sclera  Neck: supple, no JVD  Lungs: CTA, decreased breath sounds all over the chest.  Heart: RRR, normal S1 and S2  Abdomen: obese, soft, nontender, non distended and positive bowel sounds.  : no palpable bladder  Extremities: Trace edema, no cyanosis or clubbing  Neuro: Randomly moving extremities no meaningful interaction.    Scheduled Meds:     Current Facility-Administered Medications   Medication Dose Route Frequency Provider Last Rate Last Admin    acetaminophen (TYLENOL) tablet 650 mg  650  mg Oral Q4H PRN Puja Paz DO   650 mg at 01/05/25 0838    Or    acetaminophen (TYLENOL) 160 MG/5ML oral solution 650 mg  650 mg Oral Q4H PRN Puja Paz DO        Or    acetaminophen (TYLENOL) suppository 650 mg  650 mg Rectal Q4H PRN Puja Paz DO        aluminum-magnesium hydroxide-simethicone (MAALOX MAX) 400-400-40 MG/5ML suspension 15 mL  15 mL Oral Q6H PRN Puja Paz DO        amLODIPine (NORVASC) tablet 10 mg  10 mg Oral Daily Puja Paz DO   10 mg at 01/06/25 0907    ampicillin-sulbactam (UNASYN) 3 g in sodium chloride 0.9 % 100 mL IVPB-VTB  3 g Intravenous Q6H Kerley, Brian Joseph, DO   3 g at 01/07/25 0332    aspirin chewable tablet 81 mg  81 mg Oral Daily Puja Paz DO   81 mg at 01/06/25 0907    benzonatate (TESSALON) capsule 100 mg  100 mg Oral TID PRN Puja Paz DO   100 mg at 01/04/25 0440    sennosides-docusate (PERICOLACE) 8.6-50 MG per tablet 2 tablet  2 tablet Oral BID PRN Puja Paz DO        And    polyethylene glycol (MIRALAX) packet 17 g  17 g Oral Daily PRN Puja Paz DO        And    bisacodyl (DULCOLAX) EC tablet 5 mg  5 mg Oral Daily PRN Puja Paz DO        And    bisacodyl (DULCOLAX) suppository 10 mg  10 mg Rectal Daily PRN Puja Paz DO        budesonide (PULMICORT) nebulizer solution 0.5 mg  0.5 mg Nebulization BID - RT Naeem, Enid WHITE, APRN   0.5 mg at 01/06/25 1921    carvedilol (COREG) tablet 12.5 mg  12.5 mg Oral BID With Meals Puja Paz DO   12.5 mg at 01/06/25 1738    dexmedetomidine (PRECEDEX) 400 mcg in 100 mL NS infusion  0.2 mcg/kg/hr Intravenous Titrated Kerley, Brian Joseph,  59.5 mL/hr at 01/07/25 0602 1.4 mcg/kg/hr at 01/07/25 0602    dextrose (D50W) (25 g/50 mL) IV injection 25 g  25 g Intravenous Q15 Min PRN Puja Paz DO        dextrose (GLUTOSE) oral gel 15 g  15 g Oral Q15 Min PRN Puja Paz  DO Dirk        Enoxaparin Sodium (LOVENOX) syringe 60 mg  60 mg Subcutaneous Q12H Puja Paz DO   60 mg at 01/07/25 0538    folic acid 1 mg in sodium chloride 0.9 % 50 mL IVPB  1 mg Intravenous Daily Kerley, Brian Joseph,  mL/hr at 01/06/25 1213 1 mg at 01/06/25 1213    glucagon (GLUCAGEN) injection 1 mg  1 mg Intramuscular Q15 Min PRN Puja Paz DO        guaiFENesin (MUCINEX) 12 hr tablet 600 mg  600 mg Oral Q12H Puja Paz DO   600 mg at 01/06/25 0907    hydrALAZINE (APRESOLINE) injection 10 mg  10 mg Intravenous Q4H PRN Romel Schumacher MD   10 mg at 01/07/25 0112    Insulin Lispro (humaLOG) injection 2-9 Units  2-9 Units Subcutaneous 4x Daily AC & at Bedtime Puja Paz DO   2 Units at 01/06/25 2109    ipratropium-albuterol (DUO-NEB) nebulizer solution 3 mL  3 mL Nebulization Q6H - RT Puja Paz DO   3 mL at 01/07/25 0016    lactulose (CHRONULAC) 10 GM/15ML solution 30 g  30 g Oral TID Kerley, Brian Joseph, DO   30 g at 01/06/25 0907    LORazepam (ATIVAN) tablet 1 mg  1 mg Oral Q1H PRN Kerley, Brian Joseph, DO        Or    LORazepam (ATIVAN) injection 1 mg  1 mg Intravenous Q1H PRN Kerley, Brian Joseph, DO   1 mg at 01/06/25 0818    Or    LORazepam (ATIVAN) tablet 2 mg  2 mg Oral Q1H PRN Kerley, Brian Joseph, DO        Or    LORazepam (ATIVAN) injection 2 mg  2 mg Intravenous Q1H PRN Kerley, Brian Joseph, DO   2 mg at 01/06/25 1119    Or    LORazepam (ATIVAN) injection 2 mg  2 mg Intravenous Q15 Min PRN Kerley, Brian Joseph, DO   2 mg at 01/06/25 1235    Or    LORazepam (ATIVAN) injection 2 mg  2 mg Intramuscular Q15 Min PRN Kerley, Norbert Bret, DO        LORazepam (ATIVAN) injection 2 mg  2 mg Intravenous Q4H PRN Puja Paz, DO   2 mg at 01/07/25 0348    Magnesium Standard Dose Replacement - Follow Nurse / BPA Driven Protocol   Not Applicable PRN Kerley, Brian Joseph, DO        methocarbamol (ROBAXIN) tablet 750 mg  750 mg Oral  TID PRN Puja Paz DO   750 mg at 01/05/25 2039    methylPREDNISolone sodium succinate (SOLU-Medrol) injection 40 mg  40 mg Intravenous Q12H Kerley, Brian Joseph, DO   40 mg at 01/07/25 0332    mupirocin (BACTROBAN) 2 % nasal ointment 1 Application  1 Application Each Nare BID Kerley, Brian Joseph, DO   1 Application at 01/06/25 2106    nicotine (NICODERM CQ) 21 MG/24HR patch 1 patch  1 patch Transdermal Q24H Puja Paz DO   1 patch at 01/07/25 0544    nicotine polacrilex (NICORETTE) gum 4 mg  4 mg Mouth/Throat Q1H PRN Puja Paz DO        nitroglycerin (NITROSTAT) SL tablet 0.4 mg  0.4 mg Sublingual Q5 Min PRN Puja Paz DO        ondansetron ODT (ZOFRAN-ODT) disintegrating tablet 4 mg  4 mg Oral Q6H PRN Puja Paz DO        Or    ondansetron (ZOFRAN) injection 4 mg  4 mg Intravenous Q6H PRN Puja Paz DO        pantoprazole (PROTONIX) injection 40 mg  40 mg Intravenous Q AM Kerley, Brian Joseph, DO   40 mg at 01/07/25 0539    Pharmacy to Dose ampicillin-sulbactam   Not Applicable Continuous PRN Kerley, Brian Joseph, DO        Pharmacy to Dose enoxaparin (LOVENOX)   Not Applicable Continuous PRN Puja Paz DO        PHENobarbital injection 65 mg  65 mg Intravenous Once Kerley, Brian Joseph, DO        Followed by    [START ON 1/8/2025] PHENobarbital tablet 32.4 mg  32.4 mg Oral Once Kerley, Brian Joseph, DO        Followed by    [START ON 1/8/2025] PHENobarbital tablet 32.4 mg  32.4 mg Oral Once Kerley, Brian Joseph, DO        Followed by    [START ON 1/9/2025] PHENobarbital tablet 32.4 mg  32.4 mg Oral Once Kerley, Brian Joseph, DO        rOPINIRole (REQUIP) tablet 3 mg  3 mg Oral Nightly Puja Paz DO   3 mg at 01/05/25 2039    sodium chloride 0.9 % flush 10 mL  10 mL Intravenous PRN Puja Paz, DO        sodium chloride 0.9 % flush 10 mL  10 mL Intravenous Q12H Puja Paz, DO   10 mL at  01/06/25 2106    sodium chloride 0.9 % flush 10 mL  10 mL Intravenous PRN Puja Paz,         sodium chloride 0.9 % infusion 40 mL  40 mL Intravenous PRN Puja Paz DO        terazosin (HYTRIN) capsule 5 mg  5 mg Oral Nightly Gio Candelario MD, FASN        thiamine (B-1) injection 200 mg  200 mg Intravenous Q8H Kerley, Brian Joseph,    200 mg at 01/07/25 0539    Followed by    [START ON 1/11/2025] thiamine (VITAMIN B-1) tablet 100 mg  100 mg Oral Daily Kerley, Brian Joseph, DO        valsartan (DIOVAN) tablet 320 mg  320 mg Oral Daily Puja Paz,    320 mg at 01/06/25 0907    ziprasidone (GEODON) injection 20 mg  20 mg Intramuscular Q4H PRN Romel Schumacher MD   20 mg at 01/06/25 1220       amLODIPine, 10 mg, Oral, Daily  ampicillin-sulbactam, 3 g, Intravenous, Q6H  aspirin, 81 mg, Oral, Daily  budesonide, 0.5 mg, Nebulization, BID - RT  carvedilol, 12.5 mg, Oral, BID With Meals  enoxaparin, 60 mg, Subcutaneous, Q12H  folic acid 1 mg in sodium chloride 0.9 % 50 mL IVPB, 1 mg, Intravenous, Daily  guaiFENesin, 600 mg, Oral, Q12H  insulin lispro, 2-9 Units, Subcutaneous, 4x Daily AC & at Bedtime  ipratropium-albuterol, 3 mL, Nebulization, Q6H - RT  lactulose, 30 g, Oral, TID  methylPREDNISolone sodium succinate, 40 mg, Intravenous, Q12H  mupirocin, 1 Application, Each Nare, BID  nicotine, 1 patch, Transdermal, Q24H  pantoprazole, 40 mg, Intravenous, Q AM  PHENobarbital, 65 mg, Intravenous, Once   Followed by  [START ON 1/8/2025] PHENobarbital, 32.4 mg, Oral, Once   Followed by  [START ON 1/8/2025] PHENobarbital, 32.4 mg, Oral, Once   Followed by  [START ON 1/9/2025] PHENobarbital, 32.4 mg, Oral, Once  rOPINIRole, 3 mg, Oral, Nightly  sodium chloride, 10 mL, Intravenous, Q12H  terazosin, 5 mg, Oral, Nightly  thiamine (B-1) IV, 200 mg, Intravenous, Q8H   Followed by  [START ON 1/11/2025] thiamine, 100 mg, Oral, Daily  valsartan, 320 mg, Oral, Daily        IV Meds:  "  dexmedetomidine, 0.2 mcg/kg/hr, Last Rate: 1.4 mcg/kg/hr (01/07/25 0602)  Pharmacy to Dose ampicillin-sulbactam,   Pharmacy to Dose enoxaparin (LOVENOX),         Results Reviewed:   I have personally reviewed the results from the time of this admission to 1/7/2025 06:42 EST     Results from last 7 days   Lab Units 01/07/25 0447 01/06/25  0431 01/05/25  0407 01/02/25  0510 01/01/25  0357   SODIUM mmol/L 141 141 143   < > 139   POTASSIUM mmol/L 3.5 3.7 3.9   < > 3.5   CHLORIDE mmol/L 97* 94* 95*   < > 92*   CO2 mmol/L 35.0* 39.4* 40.7*   < > 37.3*   BUN mg/dL 17 15 21*   < > 9   CREATININE mg/dL 0.63* 0.62* 0.65*   < > 0.76   CALCIUM mg/dL 8.5* 8.5* 8.5*   < > 9.0   BILIRUBIN mg/dL  --   --   --   --  0.4   ALK PHOS U/L  --   --   --   --  128*   ALT (SGPT) U/L  --   --   --   --  24   AST (SGOT) U/L  --   --   --   --  18   GLUCOSE mg/dL 175* 198* 218*   < > 244*    < > = values in this interval not displayed.       Estimated Creatinine Clearance: 203.1 mL/min (A) (by C-G formula based on SCr of 0.63 mg/dL (L)).                Results from last 7 days   Lab Units 01/07/25 0447 01/06/25 0431 01/05/25  0407 01/04/25  0705 01/03/25  0511   WBC 10*3/mm3 11.66* 16.03* 13.31* 15.45* 16.97*   HEMOGLOBIN g/dL 15.1 14.3 14.4 14.0 13.7   PLATELETS 10*3/mm3 285 254 237 245 244             Brief Urine Lab Results  (Last result in the past 365 days)        Color   Clarity   Blood   Leuk Est   Nitrite   Protein   CREAT   Urine HCG        01/04/25 1235 Orange   Clear   Large (3+)   Negative   Negative   Negative                   No results found for: \"UTPCR\"    Imaging Results (Last 24 Hours)       Procedure Component Value Units Date/Time    XR Chest 1 View [[id number]] Resulted: 01/07/25 0454     Updated: 01/07/25 0455    XR Chest 1 View [[id number]] Collected: 01/06/25 1029     Updated: 01/06/25 1032    Narrative:      PROCEDURE: XR CHEST 1 VW-     HISTORY: hypoxia, f/u; J96.01-Acute respiratory failure with " hypoxia,  shortness of breath     COMPARISON: 1/5/2024     FINDINGS:  Portable view of the chest demonstrates pulmonary vascular  congestion. Mild right perihilar atelectasis is noted. There is no  evidence of effusion, pneumothorax or other significant pleural disease.  The mediastinum is unremarkable.     The heart size is normal.       Impression:      No significant change           This report was signed and finalized on 1/6/2025 10:30 AM by Olvin Mcbride MD.                   Assessment / Plan     ASSESSMENT:    Acute respiratory failure with hypoxia    COPD exacerbation    Oliguria: Patient is only 100 cc of urine output overnight, he did receive 40 mg of Lasix IV.   Received IVF challenge w > 3 liters of UO  Urinary retention ; Epstein in place , monitor I/O   Hypercapnic respiratory failure: Patient's pCO2 greater than 90, has been on BiPAP since admission with gradual improvement.  Hypertension: on home regimen, still noted to be high blood pressures.  Obstructive sleep apnea: It is not clear if he was using his BiPAP at home, currently on BiPAP.  Type 2 diabetes: Continue with the sliding scale  Morbid obesity: Complicates all forms of care.      PLAN:  I will go ahead and increase his Hytrin from 5 mg to 10 mg at bedtime and see if that will help any.  I will go ahead and start him on Aldactazide, 25/25 1 pill a day and see how he will respond to it.  Serum bicarb is significantly better.  Details were also discussed with the hospitalist service and or other providers as needed.   Continue with rest of the current treatment plan, and monitor with surveillance labs.  Further recommendations will depend on clinical course of the patient during the current hospitalization.   I have reviewed the copied text to this note, it was edited and the changes made as needed.  It is accurate to the point, when the note was signed today.     Thank you for involving us in the care of Cal Hidalgo Benito Carroll.  Please feel  free to call with any questions.    Gio Candelario MD, FASN  01/07/25  06:42 [facility]    [facility]  [phone]      Part of this note may be an electronic transcription/translation of spoken language to printed text using the Dragon Dictation System.

## 2025-01-08 LAB
ANION GAP SERPL CALCULATED.3IONS-SCNC: 8.4 MMOL/L (ref 5–15)
BASOPHILS # BLD AUTO: 0.02 10*3/MM3 (ref 0–0.2)
BASOPHILS NFR BLD AUTO: 0.2 % (ref 0–1.5)
BUN SERPL-MCNC: 16 MG/DL (ref 6–20)
BUN/CREAT SERPL: 27.1 (ref 7–25)
CALCIUM SPEC-SCNC: 8.6 MG/DL (ref 8.6–10.5)
CHLORIDE SERPL-SCNC: 97 MMOL/L (ref 98–107)
CO2 SERPL-SCNC: 33.6 MMOL/L (ref 22–29)
CREAT SERPL-MCNC: 0.59 MG/DL (ref 0.76–1.27)
DEPRECATED RDW RBC AUTO: 43.9 FL (ref 37–54)
EGFRCR SERPLBLD CKD-EPI 2021: 113.2 ML/MIN/1.73
EOSINOPHIL # BLD AUTO: 0.07 10*3/MM3 (ref 0–0.4)
EOSINOPHIL NFR BLD AUTO: 0.6 % (ref 0.3–6.2)
ERYTHROCYTE [DISTWIDTH] IN BLOOD BY AUTOMATED COUNT: 13.6 % (ref 12.3–15.4)
GLUCOSE BLDC GLUCOMTR-MCNC: 118 MG/DL (ref 70–130)
GLUCOSE BLDC GLUCOMTR-MCNC: 205 MG/DL (ref 70–130)
GLUCOSE BLDC GLUCOMTR-MCNC: 270 MG/DL (ref 70–130)
GLUCOSE BLDC GLUCOMTR-MCNC: 274 MG/DL (ref 70–130)
GLUCOSE BLDC GLUCOMTR-MCNC: 288 MG/DL (ref 70–130)
GLUCOSE BLDC GLUCOMTR-MCNC: 330 MG/DL (ref 70–130)
GLUCOSE SERPL-MCNC: 197 MG/DL (ref 65–99)
HBA1C MFR BLD: 8.9 % (ref 4.8–5.6)
HCT VFR BLD AUTO: 47.4 % (ref 37.5–51)
HGB BLD-MCNC: 15.2 G/DL (ref 13–17.7)
IMM GRANULOCYTES # BLD AUTO: 0.04 10*3/MM3 (ref 0–0.05)
IMM GRANULOCYTES NFR BLD AUTO: 0.3 % (ref 0–0.5)
LYMPHOCYTES # BLD AUTO: 1.37 10*3/MM3 (ref 0.7–3.1)
LYMPHOCYTES NFR BLD AUTO: 11.8 % (ref 19.6–45.3)
MCH RBC QN AUTO: 28.5 PG (ref 26.6–33)
MCHC RBC AUTO-ENTMCNC: 32.1 G/DL (ref 31.5–35.7)
MCV RBC AUTO: 88.8 FL (ref 79–97)
MONOCYTES # BLD AUTO: 0.61 10*3/MM3 (ref 0.1–0.9)
MONOCYTES NFR BLD AUTO: 5.3 % (ref 5–12)
NEUTROPHILS NFR BLD AUTO: 81.8 % (ref 42.7–76)
NEUTROPHILS NFR BLD AUTO: 9.46 10*3/MM3 (ref 1.7–7)
NRBC BLD AUTO-RTO: 0 /100 WBC (ref 0–0.2)
PLATELET # BLD AUTO: 257 10*3/MM3 (ref 140–450)
PMV BLD AUTO: 9.7 FL (ref 6–12)
POTASSIUM SERPL-SCNC: 3.5 MMOL/L (ref 3.5–5.2)
RBC # BLD AUTO: 5.34 10*6/MM3 (ref 4.14–5.8)
SODIUM SERPL-SCNC: 139 MMOL/L (ref 136–145)
WBC NRBC COR # BLD AUTO: 11.57 10*3/MM3 (ref 3.4–10.8)

## 2025-01-08 PROCEDURE — 99291 CRITICAL CARE FIRST HOUR: CPT | Performed by: INTERNAL MEDICINE

## 2025-01-08 PROCEDURE — 25010000002 METHYLPREDNISOLONE PER 40 MG: Performed by: INTERNAL MEDICINE

## 2025-01-08 PROCEDURE — 80048 BASIC METABOLIC PNL TOTAL CA: CPT | Performed by: STUDENT IN AN ORGANIZED HEALTH CARE EDUCATION/TRAINING PROGRAM

## 2025-01-08 PROCEDURE — 63710000001 INSULIN LISPRO (HUMAN) PER 5 UNITS: Performed by: FAMILY MEDICINE

## 2025-01-08 PROCEDURE — 82948 REAGENT STRIP/BLOOD GLUCOSE: CPT

## 2025-01-08 PROCEDURE — 25010000002 METHYLPREDNISOLONE PER 40 MG: Performed by: STUDENT IN AN ORGANIZED HEALTH CARE EDUCATION/TRAINING PROGRAM

## 2025-01-08 PROCEDURE — 94799 UNLISTED PULMONARY SVC/PX: CPT

## 2025-01-08 PROCEDURE — 25010000002 AMPICILLIN-SULBACTAM PER 1.5 G: Performed by: STUDENT IN AN ORGANIZED HEALTH CARE EDUCATION/TRAINING PROGRAM

## 2025-01-08 PROCEDURE — 99232 SBSQ HOSP IP/OBS MODERATE 35: CPT | Performed by: FAMILY MEDICINE

## 2025-01-08 PROCEDURE — 25010000002 ENOXAPARIN PER 10 MG: Performed by: FAMILY MEDICINE

## 2025-01-08 PROCEDURE — 94664 DEMO&/EVAL PT USE INHALER: CPT

## 2025-01-08 PROCEDURE — 94660 CPAP INITIATION&MGMT: CPT

## 2025-01-08 PROCEDURE — 25010000002 THIAMINE HCL 200 MG/2ML SOLUTION: Performed by: STUDENT IN AN ORGANIZED HEALTH CARE EDUCATION/TRAINING PROGRAM

## 2025-01-08 PROCEDURE — 94761 N-INVAS EAR/PLS OXIMETRY MLT: CPT

## 2025-01-08 PROCEDURE — 25010000002 LORAZEPAM PER 2 MG: Performed by: STUDENT IN AN ORGANIZED HEALTH CARE EDUCATION/TRAINING PROGRAM

## 2025-01-08 PROCEDURE — 85025 COMPLETE CBC W/AUTO DIFF WBC: CPT | Performed by: STUDENT IN AN ORGANIZED HEALTH CARE EDUCATION/TRAINING PROGRAM

## 2025-01-08 PROCEDURE — 82948 REAGENT STRIP/BLOOD GLUCOSE: CPT | Performed by: FAMILY MEDICINE

## 2025-01-08 PROCEDURE — 83036 HEMOGLOBIN GLYCOSYLATED A1C: CPT | Performed by: FAMILY MEDICINE

## 2025-01-08 RX ORDER — METHYLPREDNISOLONE SODIUM SUCCINATE 40 MG/ML
20 INJECTION, POWDER, LYOPHILIZED, FOR SOLUTION INTRAMUSCULAR; INTRAVENOUS EVERY 12 HOURS
Status: DISCONTINUED | OUTPATIENT
Start: 2025-01-08 | End: 2025-01-09

## 2025-01-08 RX ORDER — ARFORMOTEROL TARTRATE 15 UG/2ML
15 SOLUTION RESPIRATORY (INHALATION)
Status: DISCONTINUED | OUTPATIENT
Start: 2025-01-08 | End: 2025-01-10 | Stop reason: HOSPADM

## 2025-01-08 RX ADMIN — PHENOBARBITAL 32.4 MG: 32.4 TABLET ORAL at 20:05

## 2025-01-08 RX ADMIN — BUDESONIDE 0.5 MG: 0.5 INHALANT RESPIRATORY (INHALATION) at 07:04

## 2025-01-08 RX ADMIN — IPRATROPIUM BROMIDE AND ALBUTEROL SULFATE 3 ML: 2.5; .5 SOLUTION RESPIRATORY (INHALATION) at 12:57

## 2025-01-08 RX ADMIN — PANTOPRAZOLE SODIUM 40 MG: 40 INJECTION, POWDER, FOR SOLUTION INTRAVENOUS at 05:43

## 2025-01-08 RX ADMIN — CARVEDILOL 12.5 MG: 6.25 TABLET, FILM COATED ORAL at 09:41

## 2025-01-08 RX ADMIN — PHENOBARBITAL 32.4 MG: 32.4 TABLET ORAL at 06:14

## 2025-01-08 RX ADMIN — AMPICILLIN SODIUM AND SULBACTAM SODIUM 3 G: 2; 1 INJECTION, POWDER, FOR SOLUTION INTRAMUSCULAR; INTRAVENOUS at 09:40

## 2025-01-08 RX ADMIN — ROPINIROLE HYDROCHLORIDE 3 MG: 1 TABLET, FILM COATED ORAL at 21:32

## 2025-01-08 RX ADMIN — CARVEDILOL 12.5 MG: 6.25 TABLET, FILM COATED ORAL at 17:02

## 2025-01-08 RX ADMIN — IPRATROPIUM BROMIDE AND ALBUTEROL SULFATE 3 ML: 2.5; .5 SOLUTION RESPIRATORY (INHALATION) at 00:31

## 2025-01-08 RX ADMIN — INSULIN LISPRO 6 UNITS: 100 INJECTION, SOLUTION INTRAVENOUS; SUBCUTANEOUS at 21:32

## 2025-01-08 RX ADMIN — NICOTINE 1 PATCH: 21 PATCH TRANSDERMAL at 05:44

## 2025-01-08 RX ADMIN — DEXMEDETOMIDINE HYDROCHLORIDE 0.5 MCG/KG/HR: 400 INJECTION INTRAVENOUS at 07:45

## 2025-01-08 RX ADMIN — DEXMEDETOMIDINE HYDROCHLORIDE 0.3 MCG/KG/HR: 400 INJECTION INTRAVENOUS at 15:20

## 2025-01-08 RX ADMIN — IPRATROPIUM BROMIDE AND ALBUTEROL SULFATE 3 ML: 2.5; .5 SOLUTION RESPIRATORY (INHALATION) at 07:04

## 2025-01-08 RX ADMIN — DEXMEDETOMIDINE HYDROCHLORIDE 0.8 MCG/KG/HR: 400 INJECTION INTRAVENOUS at 00:55

## 2025-01-08 RX ADMIN — Medication 10 ML: at 09:43

## 2025-01-08 RX ADMIN — DEXMEDETOMIDINE HYDROCHLORIDE 0.9 MCG/KG/HR: 400 INJECTION INTRAVENOUS at 03:52

## 2025-01-08 RX ADMIN — THIAMINE HYDROCHLORIDE 200 MG: 100 INJECTION, SOLUTION INTRAMUSCULAR; INTRAVENOUS at 21:32

## 2025-01-08 RX ADMIN — METHYLPREDNISOLONE SODIUM SUCCINATE 20 MG: 40 INJECTION, POWDER, FOR SOLUTION INTRAMUSCULAR; INTRAVENOUS at 17:02

## 2025-01-08 RX ADMIN — METHYLPREDNISOLONE SODIUM SUCCINATE 40 MG: 40 INJECTION, POWDER, LYOPHILIZED, FOR SOLUTION INTRAMUSCULAR; INTRAVENOUS at 03:03

## 2025-01-08 RX ADMIN — GUAIFENESIN 600 MG: 600 TABLET, EXTENDED RELEASE ORAL at 09:41

## 2025-01-08 RX ADMIN — ASPIRIN 81 MG CHEWABLE TABLET 81 MG: 81 TABLET CHEWABLE at 09:41

## 2025-01-08 RX ADMIN — VALSARTAN 320 MG: 80 TABLET, FILM COATED ORAL at 09:40

## 2025-01-08 RX ADMIN — AMPICILLIN SODIUM AND SULBACTAM SODIUM 3 G: 2; 1 INJECTION, POWDER, FOR SOLUTION INTRAMUSCULAR; INTRAVENOUS at 21:32

## 2025-01-08 RX ADMIN — TERAZOSIN HYDROCHLORIDE 10 MG: 5 CAPSULE ORAL at 21:32

## 2025-01-08 RX ADMIN — AMPICILLIN SODIUM AND SULBACTAM SODIUM 3 G: 2; 1 INJECTION, POWDER, FOR SOLUTION INTRAMUSCULAR; INTRAVENOUS at 02:44

## 2025-01-08 RX ADMIN — AMPICILLIN SODIUM AND SULBACTAM SODIUM 3 G: 2; 1 INJECTION, POWDER, FOR SOLUTION INTRAMUSCULAR; INTRAVENOUS at 14:54

## 2025-01-08 RX ADMIN — HYDROCHLOROTHIAZIDE 25 MG: 25 TABLET ORAL at 09:41

## 2025-01-08 RX ADMIN — GUAIFENESIN 600 MG: 600 TABLET, EXTENDED RELEASE ORAL at 21:32

## 2025-01-08 RX ADMIN — ARFORMOTEROL TARTRATE 15 MCG: 15 SOLUTION RESPIRATORY (INHALATION) at 19:12

## 2025-01-08 RX ADMIN — INSULIN LISPRO 6 UNITS: 100 INJECTION, SOLUTION INTRAVENOUS; SUBCUTANEOUS at 11:57

## 2025-01-08 RX ADMIN — LACTULOSE 30 G: 20 SOLUTION ORAL at 21:31

## 2025-01-08 RX ADMIN — ENOXAPARIN SODIUM 60 MG: 60 INJECTION SUBCUTANEOUS at 17:02

## 2025-01-08 RX ADMIN — ACETAMINOPHEN 650 MG: 325 TABLET, FILM COATED ORAL at 21:34

## 2025-01-08 RX ADMIN — BUDESONIDE 0.5 MG: 0.5 INHALANT RESPIRATORY (INHALATION) at 19:12

## 2025-01-08 RX ADMIN — LACTULOSE 30 G: 20 SOLUTION ORAL at 17:02

## 2025-01-08 RX ADMIN — LORAZEPAM 2 MG: 2 INJECTION INTRAMUSCULAR; INTRAVENOUS at 15:20

## 2025-01-08 RX ADMIN — LORAZEPAM 1 MG: 0.5 TABLET ORAL at 21:32

## 2025-01-08 RX ADMIN — LACTULOSE 30 G: 20 SOLUTION ORAL at 09:42

## 2025-01-08 RX ADMIN — AMLODIPINE BESYLATE 10 MG: 5 TABLET ORAL at 09:41

## 2025-01-08 RX ADMIN — ENOXAPARIN SODIUM 60 MG: 60 INJECTION SUBCUTANEOUS at 05:42

## 2025-01-08 RX ADMIN — THIAMINE HYDROCHLORIDE 200 MG: 100 INJECTION, SOLUTION INTRAMUSCULAR; INTRAVENOUS at 05:43

## 2025-01-08 RX ADMIN — THIAMINE HYDROCHLORIDE 200 MG: 100 INJECTION, SOLUTION INTRAMUSCULAR; INTRAVENOUS at 14:54

## 2025-01-08 RX ADMIN — SPIRONOLACTONE 25 MG: 25 TABLET, FILM COATED ORAL at 09:41

## 2025-01-08 RX ADMIN — IPRATROPIUM BROMIDE AND ALBUTEROL SULFATE 3 ML: 2.5; .5 SOLUTION RESPIRATORY (INHALATION) at 19:12

## 2025-01-08 RX ADMIN — FOLIC ACID 1 MG: 5 INJECTION, SOLUTION INTRAMUSCULAR; INTRAVENOUS; SUBCUTANEOUS at 10:49

## 2025-01-08 RX ADMIN — DEXMEDETOMIDINE HYDROCHLORIDE 0.2 MCG/KG/HR: 400 INJECTION INTRAVENOUS at 22:50

## 2025-01-08 RX ADMIN — Medication 10 ML: at 21:33

## 2025-01-08 RX ADMIN — INSULIN LISPRO 4 UNITS: 100 INJECTION, SOLUTION INTRAVENOUS; SUBCUTANEOUS at 09:41

## 2025-01-08 NOTE — DISCHARGE PLACEMENT REQUEST
"Torrie Oliver Jr. (59 y.o. Male)       Date of Birth   1965    Social Security Number       Address   [address] [place] [address]    Home Phone   [phone]    MRN   [id number]       [place]   None    Marital Status                               Admission Date   25    Admission Type   Emergency    Admitting Provider   Puja Paz DO    Attending Provider   Puja Paz DO    Department, Room/Bed   [facility] INTENSIVE CARE, I03/       Discharge Date       Discharge Disposition       Discharge Destination                                 Attending Provider: Puja Paz DO    Allergies: Atorvastatin, Levothyroxine, Levothyroxine Sodium, Piroxicam    Isolation: None   Infection: None   Code Status: CPR    Ht: 180.3 cm (71\")   Wt: 168 kg (369 lb 7.9 oz)    Admission Cmt: None   Principal Problem: Acute respiratory failure with hypoxia [J96.01]                   Active Insurance as of 2025       Primary Coverage       Payor Plan Insurance Group Employer/Plan Group    MEDICARE MEDICARE A & B        Payor Plan Address Payor Plan Phone Number Payor Plan Fax Number Effective Dates    [address] [phone]  2006 - None Entered    [place] [address]         Subscriber Name Subscriber Birth Date Member ID       TORRIE OLIVER JR. 1965 [id number]                     Emergency Contacts        (Rel.) Home Phone Work Phone Mobile Phone    GENEVA OLIVER (Spouse) -- -- [phone]    Callum Oliver (Son) -- -- [phone]                 History & Physical        Puja Paz DO at 25 0553            [facility] HOSPITALIST   HISTORY AND PHYSICAL      Name:  Torrie Oliver JrFina   Age:  59 y.o.  Sex:  male  :  1965  MRN:  [id number]   Visit Number:  [id number]  Admission Date:  2025  Date Of Service:  25  Primary Care Physician:  Woo Betancourt MD    Chief " Complaint:     Short of breath    History Of Presenting Illness:      Morbidly obese 59-year-old history of chronic tobacco abuse, uncontrolled high blood pressure, chronic back pain, diabetes, sleep apnea with home BiPAP use, who presented from home with complaints of shortness of breath.  Notes symptoms over the last 3 to 4 days, worsening.  Not been able to catch his breath with exertion, lying flat.  Noticed some swelling.  Has not been checking blood pressure.  No chest pain or palpitations.  Continues to smoke.  Unknown sick contacts.    In the ER he was hypertensive, heart rate in the 90s, afebrile, and hypoxic on room air.  CMP unremarkable.  proBNP of 110.  Troponins mildly elevated.  White count of 15 hemoglobin 14 platelets of 256.  Normal procalcitonin.  Negative flu COVID testing.  Chest x-ray with some interstitial edema.  Patient given DuoNeb, Solu-Medrol, Lasix, started on BiPAP.  Will admit for workup    Review Of Systems:    All systems were reviewed and negative except as mentioned in history of presenting illness, assessment and plan.    Past Medical History: Patient  has a past medical history of Asthma, Chronic back pain, Diabetes mellitus, High blood pressure, Hyperlipidemia, Migraine, Nausea and vomiting, and Sleep apnea.    Past Surgical History: Patient  has a past surgical history that includes Gallbladder surgery; Back surgery; and Knee surgery.    Social History: Patient  reports that he has been smoking cigarettes. He has a 5 pack-year smoking history. He has never used smokeless tobacco. He reports that he does not drink alcohol and does not use drugs.    Family History:  Patient's family history has been reviewed and found to be noncontributory.     Allergies:      Atorvastatin, Levothyroxine, Levothyroxine sodium, and Piroxicam    Home Medications:    Prior to Admission Medications       Prescriptions Last Dose Informant Patient Reported? Taking?    albuterol sulfate  (90  Base) MCG/ACT inhaler   No No    INHALE 2 PUFFS BY MOUTH EVERY 4 HOURS AS NEEDED FOR WHEEZING    amLODIPine (NORVASC) 10 MG tablet   No No    Take 1 tablet by mouth Daily.    aspirin 81 MG chewable tablet   No No    CHEW AND SWALLOW 1 TABLET BY MOUTH DAILY    carvedilol (Coreg) 12.5 MG tablet   No No    Take 1 tablet by mouth 2 (Two) Times a Day With Meals.    cloNIDine (CATAPRES) 0.1 MG tablet   No No    TAKE 1 TABLET BY MOUTH EVERY NIGHT AT BEDTIME    cyclobenzaprine (FLEXERIL) 10 MG tablet   No No    Take 1 tablet by mouth 2 (Two) Times a Day.    doxazosin (CARDURA) 2 MG tablet   No No    Take 1 tablet by mouth every night at bedtime.    Gel Base gel   No No    Ketoprofen 10%, Gabapentin 6%, Ketamine 5%, Lidocaine 5%, Amitriptyline 2%, Baclofen 2%, Bupivacaine 2%, Cyclobenaprine 2%, Meloxicam 01.% TDG    Apply 1 to 2 grams of pain gel to affected areas 3 to 4 times a day.    Gel Base gel   No No    Ketoprofen 15%, Lidocaine 5%, Bupivacaine 2%, Meloxicam 0.1% TDG Apply 1 to 2 grams of pain gel to affected areas 3 to 4 times a day.    metFORMIN (GLUCOPHAGE) 500 MG tablet   No No    TAKE 1 TABLET BY MOUTH TWICE DAILY    methocarbamol (ROBAXIN) 750 MG tablet   No No    Take 1 tablet by mouth 3 (Three) Times a Day As Needed for Muscle Spasms.    omeprazole (priLOSEC) 20 MG capsule   No No    Take 1 capsule by mouth Daily.    potassium chloride ER (K-TAB) 20 MEQ tablet controlled-release ER tablet   No No    Take 1 tablet by mouth 2 (Two) Times a Day With Meals.    rOPINIRole (REQUIP) 3 MG tablet   No No    Take 1 tablet by mouth every night at bedtime.    triamcinolone (KENALOG) 0.1 % cream   No No    Apply to affected area tid prn itch    valsartan (DIOVAN) 320 MG tablet   No No    Take 1 tablet by mouth Daily for 360 days.          ED Medications:    Medications   sodium chloride 0.9 % flush 10 mL (has no administration in time range)   nitroglycerin (TRIDIL) 200 mcg/ml infusion (0 mcg/min Intravenous Hold 1/1/25  "0506)   furosemide (LASIX) injection 80 mg (80 mg Intravenous Given 1/1/25 0512)   aspirin chewable tablet 324 mg (324 mg Oral Given 1/1/25 0513)   ipratropium-albuterol (DUO-NEB) nebulizer solution 9 mL (9 mL Nebulization Given 1/1/25 0537)   methylPREDNISolone sodium succinate (SOLU-Medrol) injection 125 mg (125 mg Intravenous Given 1/1/25 0539)   Enoxaparin Sodium (LOVENOX) syringe 160 mg (160 mg Subcutaneous Given 1/1/25 0539)     Vital Signs:  Temp:  [98.4 °F (36.9 °C)] 98.4 °F (36.9 °C)  Heart Rate:  [80-89] 80  Resp:  [26] 26  BP: (139-182)/() 149/82        01/01/25  0340   Weight: (!) 165 kg (363 lb)     Body mass index is 50.63 kg/m².    Physical Exam:     Most recent vital Signs: /82 (BP Location: Left arm, Patient Position: Sitting)   Pulse 80   Temp 98.4 °F (36.9 °C) (Oral)   Resp 26   Ht 180.3 cm (71\")   Wt (!) 165 kg (363 lb)   SpO2 91%   BMI 50.63 kg/m²     Physical Exam  Constitutional:       Appearance: He is ill-appearing.   HENT:      Mouth/Throat:      Mouth: Mucous membranes are moist.      Pharynx: Oropharynx is clear.   Eyes:      Extraocular Movements: Extraocular movements intact.      Pupils: Pupils are equal, round, and reactive to light.   Cardiovascular:      Rate and Rhythm: Regular rhythm. Tachycardia present.      Pulses: Normal pulses.      Heart sounds: Murmur heard.   Pulmonary:      Effort: Respiratory distress present.      Breath sounds: Wheezing and rales present.   Abdominal:      General: Bowel sounds are normal. There is no distension.      Tenderness: There is no abdominal tenderness.   Musculoskeletal:         General: Normal range of motion.      Right lower leg: Edema present.      Left lower leg: Edema present.   Skin:     General: Skin is warm.      Capillary Refill: Capillary refill takes less than 2 seconds.   Neurological:      General: No focal deficit present.      Mental Status: He is alert.      Motor: Weakness present.         Laboratory " "data:    I have reviewed the labs done in the emergency room.    Results from last 7 days   Lab Units 01/01/25  0357   SODIUM mmol/L 139   POTASSIUM mmol/L 3.5   CHLORIDE mmol/L 92*   CO2 mmol/L 37.3*   BUN mg/dL 9   CREATININE mg/dL 0.76   CALCIUM mg/dL 9.0   BILIRUBIN mg/dL 0.4   ALK PHOS U/L 128*   ALT (SGPT) U/L 24   AST (SGOT) U/L 18   GLUCOSE mg/dL 244*     Results from last 7 days   Lab Units 01/01/25  0357   WBC 10*3/mm3 15.08*   HEMOGLOBIN g/dL 14.8   HEMATOCRIT % 45.8   PLATELETS 10*3/mm3 256         Results from last 7 days   Lab Units 01/01/25  0521 01/01/25  0357   HSTROP T ng/L 24* 24*     Results from last 7 days   Lab Units 01/01/25  0357   PROBNP pg/mL 111.6                       Invalid input(s): \"USDES\", \"NITRITITE\", \"BACT\", \"EP\"    Pain Management Panel           No data to display                EKG:      This is a sinus rhythm with a regular rate, no acute ST changes    Radiology:    No radiology results for the last 3 days    Assessment:    COPD with acute exacerbation, POA  Possible diastolic heart failure, workup pending, POA  Hypertensive urgency, POA  Chronic tobacco abuse  Type 2 diabetes  Morbid obesity  Obstructive sleep apnea    Plan:    Admit to telemetry    COPD:  Will place on bronchodilators, Solu-Medrol, Symbicort, oxygen and continue BiPAP.  Wean as tolerated.  NicoDerm patch ordered    CHF?:  Does have elevated blood pressures, some peripheral edema, interstitial edema noted on chest x-ray.  Course of Lasix ordered.  Echo will be ordered.    Hypertension/tobacco use/diabetes/obesity/SHUKRI:  Will restart blood pressure medications.  Has had uncontrolled high blood pressure chronically it appears.  Sliding scale insulin ordered.  Monitor on Solu-Medrol.  Continue with BiPAP.    Further recommendations depend upon clinical course    Risk Assessment: High  DVT Prophylaxis: Enoxaparin   Code Status: Full  Diet: Diabetic    Advance Care Planning  ACP discussion was held with the patient " "during this visit. Patient does not have an advance directive, declines further assistance.           Puja Paz DO  25  05:53 EST    Dictated utilizing Dragon dictation.    Electronically signed by Puja Paz DO at 25 0615          Physician Progress Notes (last 48 hours)        Lisa Chapin MD at 25 0949            CC: Acute Respiratory Failure.     S: Currently on PAP therapy.  Slightly more awake.  Was able to ask about \"when can I go home\".  Was not entirely aware of the circumstances that led him to be transferred to the ICU.  Remains on low-dose Precedex.    ROS: Could not be reliably obtained as the patient is on PAP therapy.     O:Vital signs reviewed. FiO2: 50%.    /87   Pulse 80   Temp 97.6 °F (36.4 °C) (Oral)   Resp 26   Ht 180.3 cm (71\")   Wt (!) 168 kg (369 lb 7.9 oz)   SpO2 91%   BMI 51.53 kg/m²     Temp (24hrs), Av.8 °F (36.6 °C), Min:97.2 °F (36.2 °C), Max:98.2 °F (36.8 °C)      I & Os reviewed.   Intake/Output         25 0700 - 25 0659 25 0700 - 25 0659    Intake (ml) 1419 --    Output (ml) 2650 250    Net (ml) -1231 -250    Last Weight 168 kg (369 lb 7.9 oz) --            Net IO Since Admission: -10,395.65 mL [25 0949]    General/Constitutional: On PAP therapy. Appears to be in some distress.  Eyes: PERRL.   Neck: Supple without obvious JVD. No obvious masses noted.   Cardiovascular: S1 + S2.  Appears regular  Lungs/Respiratory: Transmitted Breath sounds noted.  Minimal scattered wheezing heard.  Bibasilar crackles noted  GI/Abdomen: Obese but soft. Bowel sounds positive  Musculoskeletal/Extremities: Trace edema noted. Gait could not be assessed at this time, as the patient was laying in bed.   Neurologic: Was able to follow some commands. Detailed exam couldn't be performed due to him being on PAP therapy.   Psych: Seemed somewhat somnolent      Labs: Reviewed.   Results from last 7 days   Lab Units " "01/08/25  0426 01/07/25  0447 01/06/25  0431 01/05/25  0407 01/04/25  0705   WBC 10*3/mm3 11.57* 11.66* 16.03* 13.31* 15.45*   HEMOGLOBIN g/dL 15.2 15.1 14.3 14.4 14.0   HEMATOCRIT % 47.4 47.3 45.0 46.3 45.2   PLATELETS 10*3/mm3 257 285 254 237 245   NEUTROPHIL % % 81.8* 82.1* 82.0* 88.7* 85.0*   NEUTROS ABS 10*3/mm3 9.46* 9.57* 13.15* 11.82* 13.13*   EOSINOPHIL % % 0.6 0.2* 0.1* 0.2* 0.1*   EOS ABS 10*3/mm3 0.07 0.02 0.01 0.02 0.02   LYMPHOCYTE % % 11.8* 11.2* 9.0* 5.8* 6.6*   LYMPHS ABS 10*3/mm3 1.37 1.31 1.44 0.77 1.02       Lab Results   Component Value Date    PROCALCITO 0.05 01/06/2025    PROCALCITO 0.04 01/05/2025    PROCALCITO 0.04 01/04/2025       No results found for: \"CRP\"    No results found for: \"SEDRATE\"    Lab Results   Component Value Date    PROBNP 111.6 01/01/2025       Results from last 7 days   Lab Units 01/08/25  0426 01/07/25  0447 01/06/25  0431   SODIUM mmol/L 139 141 141   POTASSIUM mmol/L 3.5 3.5 3.7   CHLORIDE mmol/L 97* 97* 94*   CO2 mmol/L 33.6* 35.0* 39.4*   BUN mg/dL 16 17 15   CREATININE mg/dL 0.59* 0.63* 0.62*   CALCIUM mg/dL 8.6 8.5* 8.5*   ANION GAP mmol/L 8.4 9.0 7.6   GLUCOSE mg/dL 197* 175* 198*             Lab Results   Component Value Date    TSH 2.790 01/11/2024    TSH 2.460 09/25/2019    TSH 1.410 09/04/2018       Lab Results   Component Value Date    FREET4 1.03 01/11/2024    FREET4 0.90 (L) 09/25/2019    FREET4 1.00 09/04/2018             Lab Results   Component Value Date    CKTOTAL 163 12/24/2014    CKTOTAL 169 12/24/2014    CKTOTAL 214 (H) 12/23/2014       No components found for: \"HSTROPT\"    Lab Results   Component Value Date    TROPONINT 24 (H) 01/01/2025    TROPONINT 24 (H) 01/01/2025       Lab Results   Component Value Date    DDIMER 368 12/23/2014       Lab Results   Component Value Date    LIPASE 22 01/11/2024    LIPASE 65 03/20/2015    LIPASE 26 12/23/2014       Brief Urine Lab Results  (Last result in the past 365 days)        Color   Clarity   Blood   Leuk Est " "  Nitrite   Protein   CREAT   Urine HCG        01/04/25 1235 Orange   Clear   Large (3+)   Negative   Negative   Negative                     Micro: As of January 8, 2025   No results found for: \"RESPCX\"  No results found for: \"BCIDPCR\"  Lab Results   Component Value Date    BLOODCX No growth at 2 days 01/06/2025    BLOODCX No growth at 2 days 01/06/2025    BLOODCX No growth at 5 days 01/01/2025    BLOODCX No growth at 5 days 01/01/2025     No results found for: \"URINECX\"  No results found for: \"MRSACX\"  Lab Results   Component Value Date    MRSAPCR No MRSA Detected 01/06/2025     No results found for: \"URCX\"  No components found for: \"LOWRESPCF\"  No results found for: \"THROATCX\"  No results found for: \"CULTURES\"  No components found for: \"STREPBCX\"  No results found for: \"STREPPNEUAG\"  No results found for: \"LEGIONELLA\"  No results found for: \"LEGANTIGENUR\"  No results found for: \"MYCOPLASCX\"  No results found for: \"GCCX\"  No results found for: \"WOUNDCX\"  No results found for: \"BODYFLDCX\"    No results found for: \"FLU\"    Lab Results   Component Value Date    ADENOVIRUS Not Detected 01/01/2025     Lab Results   Component Value Date    VB221I Not Detected 01/01/2025     Lab Results   Component Value Date    CVHKU1 Not Detected 01/01/2025     Lab Results   Component Value Date    CVNL63 Not Detected 01/01/2025     Lab Results   Component Value Date    CVOC43 Not Detected 01/01/2025     Lab Results   Component Value Date    HUMETPNEVS Not Detected 01/01/2025     Lab Results   Component Value Date    HURVEV Not Detected 01/01/2025     Lab Results   Component Value Date    FLUBPCR Not Detected 01/01/2025     Lab Results   Component Value Date    PARAINFLUE Not Detected 01/01/2025     Lab Results   Component Value Date    PARAFLUV2 Not Detected 01/01/2025     Lab Results   Component Value Date    PARAFLUV3 Not Detected 01/01/2025     Lab Results   Component Value Date    PARAFLUV4 Not Detected 01/01/2025     Lab Results " "  Component Value Date    BPERTPCR Not Detected 01/01/2025     No results found for: \"[id number]\"  Lab Results   Component Value Date    CPNEUPCR Not Detected 01/01/2025     Lab Results   Component Value Date    MPNEUMO Not Detected 01/01/2025     Lab Results   Component Value Date    FLUAPCR Not Detected 01/01/2025     No results found for: \"FLUAH3\"  No results found for: \"FLUAH1\"  Lab Results   Component Value Date    RSV Not Detected 01/01/2025     Lab Results   Component Value Date    BPARAPCR Not Detected 01/01/2025       COVID 19:  Lab Results   Component Value Date    COVID19 Not Detected 01/01/2025           ABG:   Recent Labs     01/05/25  0755 01/06/25  0747 01/07/25  0534   PHART 7.386 7.442 7.452   MTY3EDG 79.1* 67.4* 58.7*   PO2ART 63.6* 61.8* 61.4*   ISY0QJK 47.4* 46.0* 40.9*   BASEEXCESS 17.8* 17.9* 13.8*     Lab Results   Component Value Date    LACTATE 0.8 01/06/2025    LACTATE 1.2 01/01/2025         Echo: Results for orders placed during the hospital encounter of 01/01/25    Adult Transthoracic Echo Complete w/ Color, Spectral and Contrast if necessary per protocol    Interpretation Summary    Left ventricular systolic function is normal. Calculated left ventricular EF = 61.6% Left ventricular ejection fraction appears to be 61 - 65%.    Left ventricular wall thickness is consistent with mild concentric hypertrophy.    Left ventricular diastolic function is consistent with (grade II w/high LAP) pseudonormalization.    The left atrial cavity is moderate to severely dilated.        dexmedetomidine, 0.2 mcg/kg/hr, Last Rate: 0.5 mcg/kg/hr (01/08/25 0745)  Pharmacy to Dose ampicillin-sulbactam,   Pharmacy to Dose enoxaparin (LOVENOX),           CXRay: Latest imaging study was reviewed personally.   Imaging Results (Last 24 Hours)       ** No results found for the last 24 hours. **              Assessment & Recommendations/Plan:   1.  Acute Respiratory Failure.  Continues to require BiPAP on a regular " "basis with FiO2 up to 50-60% or so.  I have asked the nursing staff to decrease FiO2 as tolerated  I will also try to use lower oxygen supplementation on high flow nasal cannula, when he is off the BiPAP.  Will make adjustments to the BiPAP  Will repeat chest x-ray and ABG in the morning.    2.  Chronic respiratory acidosis  Does appear to have compensated respiratory acidosis  Based on the last titration study, he did appear to have significant sleep apnea requiring BiPAP at high pressure of 18/13.  Original sleep study could not be found within the system.    3.  Morbid obesity  Does complicate all aspects of care    4.  COPD?  Will start the patient on Brovana nebulized treatments and continue Pulmicort for now.  Will decrease Solu-Medrol to 20 mg twice daily  Will recommend switching to oral prednisone on 2025, if clinically the patient improves.    5.  Obstructive sleep apnea/obesity hypoventilation syndrome  Does appear to have obstructive sleep apnea.  Will ask case management to obtain settings on his current device and also ask the [employer] company to assess the status of the current device  Although unclear, he seemed to indicate that his device was recalled and he has not used it in \"a while\".  May need outpatient workup including repeat sleep study, but this will be determined after information is obtained from the [employer] company.    6.?  Pneumonia/atelectasis  Unclear if the patient had significant aspiration but given significant respiratory failure, would recommend antibiotics for total of 3 days.  If his repeat procalcitonin, to be performed on 2025, is within normal range, then Unasyn can be discontinued after 3 days  Temp (24hrs), Av.8 °F (36.6 °C), Min:97.2 °F (36.2 °C), Max:98.2 °F (36.8 °C)    Lab Results   Component Value Date    PROCALCITO 0.05 2025    PROCALCITO 0.04 2025    PROCALCITO 0.04 2025     Lab Results   Component Value Date    WBC 11.57 (H) 2025    WBC " 11.66 (H) 01/07/2025    WBC 16.03 (H) 01/06/2025       7.  Renal/Electrolytes/Fluid status  Nephrology following  Lab Results   Component Value Date    BUN 16 01/08/2025    BUN 17 01/07/2025    BUN 15 01/06/2025    CREATININE 0.59 (L) 01/08/2025    CREATININE 0.63 (L) 01/07/2025    CREATININE 0.62 (L) 01/06/2025     Lab Results   Component Value Date     01/08/2025     01/07/2025     01/06/2025    K 3.5 01/08/2025    K 3.5 01/07/2025    K 3.7 01/06/2025     Net IO Since Admission: -10,395.65 mL [01/08/25 0949]  Lab Results   Component Value Date    PROBNP 111.6 01/01/2025     8.  Hematologic  Lab Results   Component Value Date    HGB 15.2 01/08/2025    HGB 15.1 01/07/2025    HGB 14.3 01/06/2025     Lab Results   Component Value Date     01/08/2025     01/07/2025     01/06/2025     Lab Results   Component Value Date    INR 0.94 01/11/2024     9.  GI   Nutrition per Dietician.   GI prophylaxis per admitting attending.    10.  DVT prophylaxis  Per admitting attending.    Critical Care time spent in direct patient care: 40 minutes including high complexity decision making to assess, manipulate, and support vital organ system failure in this individual who has impairment of one or more vital organ systems such that there is a high probability of imminent or life threatening deterioration in the patient’s condition.  This time includes multiple reassessments throughout the day, if needed and as appropriate.  This time excludes other billable procedures. Time does include preparation of documents, review of old records, coordinating care with other providers and direct bedside care.    I have discussed patient's overall clinical status with Puja Paz, * especially with regards to respiratory status, antibiotics duration, and radiology findings/orders.      Patient's prognosis and disposition were also discussed.      Plan was also discussed with nursing staff, as  necessary.     This document was electronically signed by Lisa Chapin MD on 25 at 09:49 EST      Dictated utilizing Dragon dictation.      Electronically signed by Lisa Chapin MD at 25 1137       Puja Paz DO at 25 0859              [facility]    Name:  Cal Oliver Jr.   Age:  59 y.o.  Sex:  male  :  1965  MRN:  [id number]   Visit Number:  [id number]  Admission Date:  2025  Date Of Service:  25  Primary Care Physician:  Woo Betancourt MD     LOS: 7 days :    Chief Complaint:      Shortness of air    Subjective:    Patient is alert and oriented this morning.  Family at bedside.  Tolerated BiPAP well.  He is feeling better, he does not remember the last few days very well.    Hospital Course:    Morbidly obese 59-year-old history of chronic tobacco abuse, uncontrolled high blood pressure, chronic back pain, diabetes, sleep apnea with home BiPAP use, who presented from home with complaints of shortness of breath.  Notes symptoms over the last 3 to 4 days, worsening.  Not been able to catch his breath with exertion, lying flat.  Noticed some swelling.  Has not been checking blood pressure.  No chest pain or palpitations.  Continues to smoke.  Unknown sick contacts.     In the ER he was hypertensive, heart rate in the 90s, afebrile, and hypoxic on room air.  CMP unremarkable.  proBNP of 110.  Troponins mildly elevated.  White count of 15 hemoglobin 14 platelets of 256.  Normal procalcitonin.  Negative flu COVID testing.  Chest x-ray with some interstitial edema.  Patient given DuoNeb, Solu-Medrol, Lasix, started on BiPAP.  Will admit for workup    Patient unfortunately did develop evidence of worsening hypercapnia, hypoxia, decreased mental status.  He was moved to the ICU.  He continue treatment for COPD exacerbation with Pulmicort, DuoNebs, Solu-Medrol.  He was started on Unasyn due to concern for  possible aspiration event.  He has been on intermittent diuretic therapy, echocardiogram with grade 2 diastolic dysfunction noted.  He did have urinary retention and is also been seen by nephrology.    Patient appears to be slowly improving.  There was concern for possible alcohol withdrawal as he was requiring high amounts of Precedex and Ativan, with subsequent started on phenobarbital.  He did admit to cocaine use but not alcohol use.  This will need to be assessed more when he is alert.    Review of Systems:     All systems were reviewed and negative except as mentioned in subjective, assessment and plan.    Vital Signs:    Temp:  [97.2 °F (36.2 °C)-98.2 °F (36.8 °C)] 97.6 °F (36.4 °C)  Heart Rate:  [52-85] 76  Resp:  [20-34] 26  BP: (124-175)/() 152/88    Intake and output:    I/O last 3 completed shifts:  In: 3346.3 [P.O.:700; I.V.:1876.3; IV Piggyback:770]  Out: 4200 [Urine:4200]  No intake/output data recorded.    Physical Examination:    General Appearance:   awake and alert and able to follow simple command   Head:  Atraumatic and normocephalic.   Eyes: Conjunctivae and sclerae normal, no icterus. No pallor.   Throat: No oral lesions, no thrush, oral mucosa moist.   Neck: Supple, trachea midline, no thyromegaly.   Lungs:   Breath sounds diminished, high flow oxygen in place   Heart:  Normal S1 and S2, murmur, no gallop, no rub. No JVD.   Abdomen:   Normal bowel sounds, no masses, no organomegaly. Soft, nontender, nondistended, no rebound tenderness.  Epstein catheter in place   Extremities: Supple, 1-2+ edema, no cyanosis, no clubbing.   Skin: Warm.   Neurologic: Awake and alert.  No focal deficits.  Moves extremities albeit weak     Laboratory results:    Results from last 7 days   Lab Units 01/08/25  0426 01/07/25  0447 01/06/25  0431   SODIUM mmol/L 139 141 141   POTASSIUM mmol/L 3.5 3.5 3.7   CHLORIDE mmol/L 97* 97* 94*   CO2 mmol/L 33.6* 35.0* 39.4*   BUN mg/dL 16 17 15   CREATININE mg/dL 0.59*  0.63* 0.62*   CALCIUM mg/dL 8.6 8.5* 8.5*   GLUCOSE mg/dL 197* 175* 198*     Results from last 7 days   Lab Units 01/08/25  0426 01/07/25  0447 01/06/25  0431   WBC 10*3/mm3 11.57* 11.66* 16.03*   HEMOGLOBIN g/dL 15.2 15.1 14.3   HEMATOCRIT % 47.4 47.3 45.0   PLATELETS 10*3/mm3 257 285 254               Results from last 7 days   Lab Units 01/06/25  0813 01/06/25  0805   BLOODCX  No growth at 2 days No growth at 2 days     Recent Labs     01/05/25  0755 01/06/25  0747 01/07/25  0534   PHART 7.386 7.442 7.452   RVI3PXX 79.1* 67.4* 58.7*   PO2ART 63.6* 61.8* 61.4*   UTV7KAM 47.4* 46.0* 40.9*   BASEEXCESS 17.8* 17.9* 13.8*      I have reviewed the patient's laboratory results.    Radiology results:    XR Chest 1 View    Result Date: 1/7/2025  PROCEDURE: XR CHEST 1 VW-  HISTORY: hypoxia f/u; J96.01-Acute respiratory failure with hypoxia  COMPARISON: 1 day prior.  FINDINGS: The heart is normal in size. There is persistent pulmonary vascular congestion but mildly improved interstitial edema compared to prior. Right perihilar atelectasis has improved. There is still mild bibasilar atelectasis or infiltrate. Elevation right hemidiaphragm again noted.. The mediastinum is unremarkable. There is no pneumothorax. There are no acute osseous abnormalities. Apical lordotic positioning noted.      Impression: Mildly improved interstitial edema, otherwise stable chest..     This report was signed and finalized on 1/7/2025 7:41 AM by Krysten Rogers MD.      XR Chest 1 View    Result Date: 1/6/2025  PROCEDURE: XR CHEST 1 VW-  HISTORY: hypoxia, f/u; J96.01-Acute respiratory failure with hypoxia, shortness of breath  COMPARISON: 1/5/2024  FINDINGS:  Portable view of the chest demonstrates pulmonary vascular congestion. Mild right perihilar atelectasis is noted. There is no evidence of effusion, pneumothorax or other significant pleural disease. The mediastinum is unremarkable.  The heart size is normal.      Impression: No significant  change    This report was signed and finalized on 1/6/2025 10:30 AM by Olvin Mcbride MD.     I have reviewed the patient's radiology reports.    Medication Review:     I have reviewed the patient's active and prn medications.     Problem List:      Acute respiratory failure with hypoxia    COPD exacerbation      Assessment:    COPD with acute exacerbation, POA  Acute hypoxic/hypercapnic respiratory failure  Acute urinary retention  Possible diastolic heart failure, workup pending, POA  Hypertensive urgency, POA  Chronic tobacco abuse  Type 2 diabetes  Morbid obesity  Obstructive sleep apnea    Plan:  Acute respiratory failure with hypoxia and hypercapnia  COPD exacerbation  Acute exacerbation of heart failure  Has been on bronchodilators, steroids, BiPAP.  Given Unasyn due to possible aspiration.  Had been on diuretic therapy with significant urine output.  Pulmonology is following may need BiPAP at home  Echo with grade 2 diastolic dysfunction  Oliguria  Urinary retention  Epstein catheter placed due to urinary retention.  Nephrology consultation, recommendations appreciated.  May try a voiding trial today  Encephalopathy  Alcohol use disorder, suspected  Alcohol withdrawal, suspected  Per prior provider, concern for withdrawal symptoms.  Was requiring max doses of Precedex and Ativan, was started on phenobarbital.  Appears somewhat late in the course to have developed significant withdrawal symptoms from alcohol, will need to assess this further.    Chronic: Type 2 diabetes, hypertension, obesity BMI 51, SHUKRI.    Continue ICU treatment    DVT Prophylaxis: Enoxaparin   Code Status: Full  Diet: Diabetic  Discharge Plan: TBD    Puja Paz DO  01/08/25  08:59 EST      Electronically signed by Puja Paz DO at 01/08/25 0900       Gio Candelario MD, FASN at 01/08/25 0720                [facility] Progress Note  [facility] [place]        Patient Name: Cal Oliver    : 1965  MRN: [id number]   LOS: 7 days    Patient Care Team:  Woo Betancourt MD as PCP - General (Internal Medicine)    Chief Complaint:    Chief Complaint   Patient presents with    Shortness of Breath     Primary Care Physician:  Woo Betancourt MD  Date of admission: 2025    Subjective     Interval History:   Follow-up and uric/oliguric renal failure.    Significant improvement in urine output noted.  Renal function is continuously improving and has been stable.  Patient has been on BiPAP with gradual improvement of his hypercapnia.  Slightly more alert this morning.  Still appears to be confused off and on.  Son and another family member around the said that he is totally confused.  Events noted from last 24 hours.  I reviewed the chart and other providers notes, labs and procedures done since my last note.    Review of Systems:   unobtainable.    Objective     Vitals:   Temp:  [97.2 °F (36.2 °C)-98.2 °F (36.8 °C)] 97.2 °F (36.2 °C)  Heart Rate:  [52-85] 67  Resp:  [20-34] 26  BP: (124-175)/() 164/97  Flow (L/min) (Oxygen Therapy):  [10-15] 10    Intake/Output Summary (Last 24 hours) at 2025 0720  Last data filed at 2025 0500  Gross per 24 hour   Intake 700 ml   Output 2650 ml   Net -1950 ml       Physical Exam:    General Appearance: Drowsy and sleepy, no acute distress   Skin: warm and dry  HEENT: oral mucosa normal, nonicteric sclera  Neck: supple, no JVD  Lungs: CTA, decreased breath sounds all over the chest.  Heart: RRR, normal S1 and S2  Abdomen: obese, soft, nontender, non distended and positive bowel sounds.  : no palpable bladder  Extremities: Trace edema, no cyanosis or clubbing  Neuro: Randomly moving extremities no meaningful interaction.    Scheduled Meds:     Current Facility-Administered Medications   Medication Dose Route Frequency Provider Last Rate Last Admin    acetaminophen (TYLENOL) tablet 650 mg  650 mg Oral Q4H PRN Puja Paz DO   650 mg  at 01/05/25 0838    Or    acetaminophen (TYLENOL) 160 MG/5ML oral solution 650 mg  650 mg Oral Q4H PRN Puja Paz DO        Or    acetaminophen (TYLENOL) suppository 650 mg  650 mg Rectal Q4H PRN Puja Paz DO        aluminum-magnesium hydroxide-simethicone (MAALOX MAX) 400-400-40 MG/5ML suspension 15 mL  15 mL Oral Q6H PRN Puja Paz DO        amLODIPine (NORVASC) tablet 10 mg  10 mg Oral Daily Puja Paz DO   10 mg at 01/07/25 0829    ampicillin-sulbactam (UNASYN) 3 g in sodium chloride 0.9 % 100 mL IVPB-VTB  3 g Intravenous Q6H Kerley, Brian Joseph, DO   3 g at 01/08/25 0244    aspirin chewable tablet 81 mg  81 mg Oral Daily Puja Paz DO   81 mg at 01/07/25 0829    benzonatate (TESSALON) capsule 100 mg  100 mg Oral TID PRN Puja Paz DO   100 mg at 01/04/25 0440    sennosides-docusate (PERICOLACE) 8.6-50 MG per tablet 2 tablet  2 tablet Oral BID PRN Puja Paz DO        And    polyethylene glycol (MIRALAX) packet 17 g  17 g Oral Daily PRN Puja Paz DO        And    bisacodyl (DULCOLAX) EC tablet 5 mg  5 mg Oral Daily PRN Puja Paz DO        And    bisacodyl (DULCOLAX) suppository 10 mg  10 mg Rectal Daily PRN Puja Paz DO        budesonide (PULMICORT) nebulizer solution 0.5 mg  0.5 mg Nebulization BID - RT Naeem, Enid J, APRN   0.5 mg at 01/08/25 0704    carvedilol (COREG) tablet 12.5 mg  12.5 mg Oral BID With Meals Puja Paz DO   12.5 mg at 01/07/25 1748    dexmedetomidine (PRECEDEX) 400 mcg in 100 mL NS infusion  0.2 mcg/kg/hr Intravenous Titrated Kerley, Brian Joseph, DO 25.5 mL/hr at 01/08/25 0622 0.6 mcg/kg/hr at 01/08/25 0622    dextrose (D50W) (25 g/50 mL) IV injection 25 g  25 g Intravenous Q15 Min PRN Puja Paz DO        dextrose (GLUTOSE) oral gel 15 g  15 g Oral Q15 Min PRN Puja Paz,         Enoxaparin Sodium (LOVENOX)  syringe 60 mg  60 mg Subcutaneous Q12H Puja Paz DO   60 mg at 01/08/25 0542    folic acid 1 mg in sodium chloride 0.9 % 50 mL IVPB  1 mg Intravenous Daily Kerley, Brian Joseph,  mL/hr at 01/07/25 1152 1 mg at 01/07/25 1152    [Held by provider] furosemide (LASIX) injection 20 mg  20 mg Intravenous BID Diuretics Puja Paz DO        glucagon (GLUCAGEN) injection 1 mg  1 mg Intramuscular Q15 Min PRN Puja Paz DO        guaiFENesin (MUCINEX) 12 hr tablet 600 mg  600 mg Oral Q12H Puja Paz DO   600 mg at 01/07/25 2000    hydrALAZINE (APRESOLINE) injection 10 mg  10 mg Intravenous Q4H PRN Romel Schumacher MD   10 mg at 01/07/25 1944    hydroCHLOROthiazide tablet 25 mg  25 mg Oral Daily Gio Candelario MD, FASN   25 mg at 01/07/25 1046    Insulin Lispro (humaLOG) injection 2-9 Units  2-9 Units Subcutaneous 4x Daily AC & at Bedtime Puja Paz DO   7 Units at 01/07/25 2016    ipratropium-albuterol (DUO-NEB) nebulizer solution 3 mL  3 mL Nebulization Q6H - RT Puja Paz DO   3 mL at 01/08/25 0704    lactulose (CHRONULAC) 10 GM/15ML solution 30 g  30 g Oral TID Kerley, Brian Joseph, DO   30 g at 01/07/25 2023    LORazepam (ATIVAN) tablet 1 mg  1 mg Oral Q1H PRN Kerley, Brian Joseph, DO        Or    LORazepam (ATIVAN) injection 1 mg  1 mg Intravenous Q1H PRN Kerley, Brian Joseph, DO   1 mg at 01/06/25 0818    Or    LORazepam (ATIVAN) tablet 2 mg  2 mg Oral Q1H PRN Kerley, Brian Joseph, DO   2 mg at 01/07/25 0829    Or    LORazepam (ATIVAN) injection 2 mg  2 mg Intravenous Q1H PRN Kerley, Brian Joseph, DO   2 mg at 01/06/25 1119    Or    LORazepam (ATIVAN) injection 2 mg  2 mg Intravenous Q15 Min PRN Kerley, Brian Joseph, DO   2 mg at 01/06/25 1235    Or    LORazepam (ATIVAN) injection 2 mg  2 mg Intramuscular Q15 Min PRN Kerley, Brian Joseph, DO        Magnesium Standard Dose Replacement - Follow Nurse / BPA Driven Protocol   Not Applicable  PRN Kerley, Brian Joseph, DO        methocarbamol (ROBAXIN) tablet 750 mg  750 mg Oral TID PRN Puja Paz DO   750 mg at 01/05/25 2039    methylPREDNISolone sodium succinate (SOLU-Medrol) injection 40 mg  40 mg Intravenous Q12H Kerley, Brian Joseph, DO   40 mg at 01/08/25 0303    nicotine (NICODERM CQ) 21 MG/24HR patch 1 patch  1 patch Transdermal Q24H Puja Paz DO   1 patch at 01/08/25 0544    nicotine polacrilex (NICORETTE) gum 4 mg  4 mg Mouth/Throat Q1H PRN Puja Paz DO        nitroglycerin (NITROSTAT) SL tablet 0.4 mg  0.4 mg Sublingual Q5 Min PRN Puja Paz DO        ondansetron ODT (ZOFRAN-ODT) disintegrating tablet 4 mg  4 mg Oral Q6H PRN Puja Paz DO        Or    ondansetron (ZOFRAN) injection 4 mg  4 mg Intravenous Q6H PRN Puja Paz DO        pantoprazole (PROTONIX) injection 40 mg  40 mg Intravenous Q AM Kerley, Brian Joseph, DO   40 mg at 01/08/25 0543    Pharmacy to Dose ampicillin-sulbactam   Not Applicable Continuous PRN Kerley, Brian Joseph, DO        Pharmacy to Dose enoxaparin (LOVENOX)   Not Applicable Continuous PRN Puja Paz DO        PHENobarbital tablet 32.4 mg  32.4 mg Oral Once Kerley, Brian Joseph, DO        Followed by    [START ON 1/9/2025] PHENobarbital tablet 32.4 mg  32.4 mg Oral Once Kerley, Brian Joseph, DO        rOPINIRole (REQUIP) tablet 3 mg  3 mg Oral Nightly Puja Paz DO   3 mg at 01/07/25 2000    sodium chloride 0.9 % flush 10 mL  10 mL Intravenous PRN Puja Paz,         sodium chloride 0.9 % flush 10 mL  10 mL Intravenous Q12H Puja Paz,    10 mL at 01/07/25 2001    sodium chloride 0.9 % flush 10 mL  10 mL Intravenous PRN Puja Paz,         sodium chloride 0.9 % infusion 40 mL  40 mL Intravenous PRN Puja Paz DO        spironolactone (ALDACTONE) tablet 25 mg  25 mg Oral Daily Gio Candelario MD, FASN   25 mg  at 01/07/25 1046    terazosin (HYTRIN) capsule 10 mg  10 mg Oral Nightly Gio Candelario MD, FASN   10 mg at 01/07/25 2000    thiamine (B-1) injection 200 mg  200 mg Intravenous Q8H Kerley, Brian Joseph, DO   200 mg at 01/08/25 0543    Followed by    [START ON 1/11/2025] thiamine (VITAMIN B-1) tablet 100 mg  100 mg Oral Daily Kerley, Brian Joseph,         valsartan (DIOVAN) tablet 320 mg  320 mg Oral Daily Jackson Pujabernard Cavazos, DO   320 mg at 01/07/25 0829    ziprasidone (GEODON) injection 20 mg  20 mg Intramuscular Q4H PRN Romel Schumacher MD   20 mg at 01/06/25 1220       amLODIPine, 10 mg, Oral, Daily  ampicillin-sulbactam, 3 g, Intravenous, Q6H  aspirin, 81 mg, Oral, Daily  budesonide, 0.5 mg, Nebulization, BID - RT  carvedilol, 12.5 mg, Oral, BID With Meals  enoxaparin, 60 mg, Subcutaneous, Q12H  folic acid 1 mg in sodium chloride 0.9 % 50 mL IVPB, 1 mg, Intravenous, Daily  [Held by provider] furosemide, 20 mg, Intravenous, BID Diuretics  guaiFENesin, 600 mg, Oral, Q12H  hydroCHLOROthiazide Oral, 25 mg, Oral, Daily  insulin lispro, 2-9 Units, Subcutaneous, 4x Daily AC & at Bedtime  ipratropium-albuterol, 3 mL, Nebulization, Q6H - RT  lactulose, 30 g, Oral, TID  methylPREDNISolone sodium succinate, 40 mg, Intravenous, Q12H  nicotine, 1 patch, Transdermal, Q24H  pantoprazole, 40 mg, Intravenous, Q AM  PHENobarbital, 32.4 mg, Oral, Once   Followed by  [START ON 1/9/2025] PHENobarbital, 32.4 mg, Oral, Once  rOPINIRole, 3 mg, Oral, Nightly  sodium chloride, 10 mL, Intravenous, Q12H  spironolactone, 25 mg, Oral, Daily  terazosin, 10 mg, Oral, Nightly  thiamine (B-1) IV, 200 mg, Intravenous, Q8H   Followed by  [START ON 1/11/2025] thiamine, 100 mg, Oral, Daily  valsartan, 320 mg, Oral, Daily        IV Meds:   dexmedetomidine, 0.2 mcg/kg/hr, Last Rate: 0.6 mcg/kg/hr (01/08/25 0622)  Pharmacy to Dose ampicillin-sulbactam,   Pharmacy to Dose enoxaparin (LOVENOX),         Results Reviewed:   I have personally reviewed  "the results from the time of this admission to 1/8/2025 07:20 EST     Results from last 7 days   Lab Units 01/08/25  0426 01/07/25 0447 01/06/25  0431   SODIUM mmol/L 139 141 141   POTASSIUM mmol/L 3.5 3.5 3.7   CHLORIDE mmol/L 97* 97* 94*   CO2 mmol/L 33.6* 35.0* 39.4*   BUN mg/dL 16 17 15   CREATININE mg/dL 0.59* 0.63* 0.62*   CALCIUM mg/dL 8.6 8.5* 8.5*   GLUCOSE mg/dL 197* 175* 198*       Estimated Creatinine Clearance: 218.8 mL/min (A) (by C-G formula based on SCr of 0.59 mg/dL (L)).                Results from last 7 days   Lab Units 01/08/25  0426 01/07/25 0447 01/06/25  0431 01/05/25  0407 01/04/25  0705   WBC 10*3/mm3 11.57* 11.66* 16.03* 13.31* 15.45*   HEMOGLOBIN g/dL 15.2 15.1 14.3 14.4 14.0   PLATELETS 10*3/mm3 257 285 254 237 245             Brief Urine Lab Results  (Last result in the past 365 days)        Color   Clarity   Blood   Leuk Est   Nitrite   Protein   CREAT   Urine HCG        01/04/25 1235 Orange   Clear   Large (3+)   Negative   Negative   Negative                   No results found for: \"UTPCR\"    Imaging Results (Last 24 Hours)       Procedure Component Value Units Date/Time    XR Chest 1 View [[id number]] Collected: 01/07/25 0740     Updated: 01/07/25 0743    Narrative:      PROCEDURE: XR CHEST 1 VW-     HISTORY: hypoxia f/u; J96.01-Acute respiratory failure with hypoxia     COMPARISON: 1 day prior.     FINDINGS: The heart is normal in size. There is persistent pulmonary  vascular congestion but mildly improved interstitial edema compared to  prior. Right perihilar atelectasis has improved. There is still mild  bibasilar atelectasis or infiltrate. Elevation right hemidiaphragm again  noted.. The mediastinum is unremarkable. There is no pneumothorax.   There are no acute osseous abnormalities. Apical lordotic positioning  noted.        Impression:      Mildly improved interstitial edema, otherwise stable chest..              This report was signed and finalized on 1/7/2025 7:41 AM by " Krysten Rogers MD.                   Assessment / Plan     ASSESSMENT:    Acute respiratory failure with hypoxia    COPD exacerbation    Oliguria: Patient is only 100 cc of urine output overnight, he did receive 40 mg of Lasix IV.   Received IVF challenge w > 3 liters of UO  Urinary retention ; Epstein in place , monitor I/O   Hypercapnic respiratory failure: Patient's pCO2 greater than 90, has been on BiPAP since admission with gradual improvement.  Hypertension: on home regimen, still noted to be high blood pressures.  Obstructive sleep apnea: It is not clear if he was using his BiPAP at home, currently on BiPAP.  Type 2 diabetes: Continue with the sliding scale  Morbid obesity: Complicates all forms of care.      PLAN:  Clinically appears to be stable.  Off-and-on confusion noted.  Details were also discussed with the hospitalist service and or other providers as needed.   Continue with rest of the current treatment plan, and monitor with surveillance labs.  Further recommendations will depend on clinical course of the patient during the current hospitalization.   I have reviewed the copied text to this note, it was edited and the changes made as needed.  It is accurate to the point, when the note was signed today.     Thank you for involving us in the care of Cal Oliver Jr..  Please feel free to call with any questions.    Gio Candelario MD, JULIO  25  07:20 [facility]    [facility]  [phone]      Part of this note may be an electronic transcription/translation of spoken language to printed text using the Dragon Dictation System.                   Electronically signed by Gio Candelario MD, JULIO at 25 1203       Puja Paz DO at 25 1606              [facility]    Name:  Cal Oliver Jr.   Age:  59 y.o.  Sex:  male  :  1965  MRN:  [id number]   Visit Number:  [id number]  Admission Date:   1/1/2025  Date Of Service:  01/07/25  Primary Care Physician:  Woo Betancourt MD     LOS: 6 days :    Chief Complaint:      Shortness of air    Subjective:    Patient somewhat drowsy at time of visit, per nursing staff has been on Precedex and phenobarbital.  There was reported concern for possible alcohol withdrawal.  Nephrology and pulmonology to see patient today.  He has been tolerating BiPAP.    Hospital Course:    Morbidly obese 59-year-old history of chronic tobacco abuse, uncontrolled high blood pressure, chronic back pain, diabetes, sleep apnea with home BiPAP use, who presented from home with complaints of shortness of breath.  Notes symptoms over the last 3 to 4 days, worsening.  Not been able to catch his breath with exertion, lying flat.  Noticed some swelling.  Has not been checking blood pressure.  No chest pain or palpitations.  Continues to smoke.  Unknown sick contacts.     In the ER he was hypertensive, heart rate in the 90s, afebrile, and hypoxic on room air.  CMP unremarkable.  proBNP of 110.  Troponins mildly elevated.  White count of 15 hemoglobin 14 platelets of 256.  Normal procalcitonin.  Negative flu COVID testing.  Chest x-ray with some interstitial edema.  Patient given DuoNeb, Solu-Medrol, Lasix, started on BiPAP.  Will admit for workup    Patient unfortunately did develop evidence of worsening hypercapnia, hypoxia, decreased mental status.  He was moved to the ICU.  He continue treatment for COPD exacerbation with Pulmicort, DuoNebs, Solu-Medrol.  He was started on Unasyn due to concern for possible aspiration event.  He has been on intermittent diuretic therapy, echocardiogram with grade 2 diastolic dysfunction noted.  He did have urinary retention and is also been seen by nephrology.    Patient appears to be slowly improving.  There was concern for possible alcohol withdrawal as he was requiring high amounts of Precedex and Ativan, with subsequent started on phenobarbital.  He did  admit to cocaine use but not alcohol use.  This will need to be assessed more when he is alert.    Review of Systems:     All systems were reviewed and negative except as mentioned in subjective, assessment and plan.    Vital Signs:    Temp:  [97.7 °F (36.5 °C)-99.2 °F (37.3 °C)] 98.2 °F (36.8 °C)  Heart Rate:  [50-85] 57  Resp:  [22-23] 22  BP: (135-179)/() 137/86    Intake and output:    I/O last 3 completed shifts:  In: 1977.3 [P.O.:50; I.V.:1477.3; IV Piggyback:450]  Out: 7300 [Urine:7300]  I/O this shift:  In: 340 [P.O.:340]  Out: 595 [Urine:595]    Physical Examination:    General Appearance:  Somnolent but arousable.  Chronically ill gentleman   Head:  Atraumatic and normocephalic.   Eyes: Conjunctivae and sclerae normal, no icterus. No pallor.   Throat: No oral lesions, no thrush, oral mucosa moist.   Neck: Supple, trachea midline, no thyromegaly.   Lungs:   Breath sounds diminished bilaterally, nontachypneic.   Heart:  Normal S1 and S2, murmur, no gallop, no rub. No JVD.   Abdomen:   Normal bowel sounds, no masses, no organomegaly. Soft, nontender, nondistended, no rebound tenderness.  Epstein catheter in place   Extremities: Supple, 1-2+ edema, no cyanosis, no clubbing.   Skin: Warm.   Neurologic: Unable to assess alertness, on sedation with Precedex.  Was moving extremities     Laboratory results:    Results from last 7 days   Lab Units 01/07/25  0447 01/06/25  0431 01/05/25  0407 01/02/25  0510 01/01/25  0357   SODIUM mmol/L 141 141 143   < > 139   POTASSIUM mmol/L 3.5 3.7 3.9   < > 3.5   CHLORIDE mmol/L 97* 94* 95*   < > 92*   CO2 mmol/L 35.0* 39.4* 40.7*   < > 37.3*   BUN mg/dL 17 15 21*   < > 9   CREATININE mg/dL 0.63* 0.62* 0.65*   < > 0.76   CALCIUM mg/dL 8.5* 8.5* 8.5*   < > 9.0   BILIRUBIN mg/dL  --   --   --   --  0.4   ALK PHOS U/L  --   --   --   --  128*   ALT (SGPT) U/L  --   --   --   --  24   AST (SGOT) U/L  --   --   --   --  18   GLUCOSE mg/dL 175* 198* 218*   < > 244*    < > = values  in this interval not displayed.     Results from last 7 days   Lab Units 01/07/25  0447 01/06/25  0431 01/05/25  0407   WBC 10*3/mm3 11.66* 16.03* 13.31*   HEMOGLOBIN g/dL 15.1 14.3 14.4   HEMATOCRIT % 47.3 45.0 46.3   PLATELETS 10*3/mm3 285 254 237         Results from last 7 days   Lab Units 01/01/25  0521 01/01/25  0357   HSTROP T ng/L 24* 24*     Results from last 7 days   Lab Units 01/06/25  0813 01/06/25  0805 01/01/25  0518   BLOODCX  No growth at 24 hours No growth at 24 hours No growth at 5 days  No growth at 5 days     Recent Labs     01/05/25  0755 01/06/25  0747 01/07/25  0534   PHART 7.386 7.442 7.452   QSK3WIA 79.1* 67.4* 58.7*   PO2ART 63.6* 61.8* 61.4*   PQG3UWE 47.4* 46.0* 40.9*   BASEEXCESS 17.8* 17.9* 13.8*      I have reviewed the patient's laboratory results.    Radiology results:    XR Chest 1 View    Result Date: 1/7/2025  PROCEDURE: XR CHEST 1 VW-  HISTORY: hypoxia f/u; J96.01-Acute respiratory failure with hypoxia  COMPARISON: 1 day prior.  FINDINGS: The heart is normal in size. There is persistent pulmonary vascular congestion but mildly improved interstitial edema compared to prior. Right perihilar atelectasis has improved. There is still mild bibasilar atelectasis or infiltrate. Elevation right hemidiaphragm again noted.. The mediastinum is unremarkable. There is no pneumothorax. There are no acute osseous abnormalities. Apical lordotic positioning noted.      Impression: Mildly improved interstitial edema, otherwise stable chest..     This report was signed and finalized on 1/7/2025 7:41 AM by Krysten Rogers MD.      XR Chest 1 View    Result Date: 1/6/2025  PROCEDURE: XR CHEST 1 VW-  HISTORY: hypoxia, f/u; J96.01-Acute respiratory failure with hypoxia, shortness of breath  COMPARISON: 1/5/2024  FINDINGS:  Portable view of the chest demonstrates pulmonary vascular congestion. Mild right perihilar atelectasis is noted. There is no evidence of effusion, pneumothorax or other significant  pleural disease. The mediastinum is unremarkable.  The heart size is normal.      Impression: No significant change    This report was signed and finalized on 1/6/2025 10:30 AM by Olvin Mcbride MD.      US Liver    Result Date: 1/5/2025  FINAL REPORT TECHNIQUE: null CLINICAL HISTORY: abn LFTs COMPARISON: null FINDINGS: Exam: Limited right upper quadrant abdominal ultrasound Comparison: None Clinical History: Abnormal LFTs Findings: Selected images from a right upper quadrant ultrasound are provided for interpretation The liver is echogenic which may be reflective of fatty infiltration. No liver lesions are seen. Portval vein is patent with hepatopetal flow. Prior cholecystectomy The common bile duct is not enlarged at 5.8 mm. No choledocholithiasis. Pancreas obscured by bowel gas. Right kidney does not demonstrate any hydronephrosis or stones. No ascites seen.     Impression: IMPRESSION: 1. Echogenic liver texture may be reflective of fatty infiltration. No focal lesions. 2. Prior cholecystectomy. No choledocholithiasis or biliary obstruction Authenticated and Electronically Signed by Vibha Hatch MD on 01/05/2025 07:03:54 PM   I have reviewed the patient's radiology reports.    Medication Review:     I have reviewed the patient's active and prn medications.     Problem List:      Acute respiratory failure with hypoxia    COPD exacerbation      Assessment:    COPD with acute exacerbation, POA  Acute hypoxic/hypercapnic respiratory failure  Acute urinary retention  Possible diastolic heart failure, workup pending, POA  Hypertensive urgency, POA  Chronic tobacco abuse  Type 2 diabetes  Morbid obesity  Obstructive sleep apnea    Plan:  Acute respiratory failure with hypoxia and hypercapnia  COPD exacerbation  Acute exacerbation of heart failure  Has been on bronchodilators, steroids, BiPAP.  Given Unasyn due to possible aspiration.  Had been started on diuretic therapy, urine output significant over the last 48  hours.  Pulmonology to follow-up today.  Echocardiogram with grade 2 diastolic dysfunction.  Oliguria  Urinary retention  Epstein catheter placed due to urinary retention.  Nephrology consultation, recommendations appreciated.  Encephalopathy  Alcohol use disorder, suspected  Alcohol withdrawal, suspected  Per prior provider, concern for withdrawal symptoms.  Was requiring max doses of Precedex and Ativan, was started on phenobarbital.  Appears somewhat late in the course to have developed significant withdrawal symptoms from alcohol, will need to assess this further.    Chronic: Type 2 diabetes, hypertension, obesity BMI 51, SHUKRI.    Continue ICU treatment    DVT Prophylaxis: Enoxaparin   Code Status: Full  Diet: Diabetic  Discharge Plan: Undecided    Puja Paz DO  25  16:06 EST      Electronically signed by Puja Paz DO at 25 1611       Gio Candelario MD, FASN at 25 0642                [facility] Progress Note  [facility]. [place]        Patient Name: aCl Oliver Jr.  : 1965  MRN: [id number]   LOS: 6 days    Patient Care Team:  Woo Betancourt MD as PCP - General (Internal Medicine)    Chief Complaint:    Chief Complaint   Patient presents with    Shortness of Breath     Primary Care Physician:  Woo Betancourt MD  Date of admission: 2025    Subjective     Interval History:   Follow-up and uric/oliguric renal failure.    Significant improvement in urine output noted.  Renal function is continuously improving and has been stable.  Patient has been on BiPAP with gradual improvement of his hypercapnia.  Slightly more alert this morning.  Still appears to be confused off and on.  Events noted from last 24 hours.  I reviewed the chart and other providers notes, labs and procedures done since my last note.    Review of Systems:   unobtainable.    Objective     Vitals:   Temp:  [97.7 °F (36.5 °C)-99.4 °F (37.4 °C)] 97.7 °F (36.5  °C)  Heart Rate:  [50-92] 62  Resp:  [22-42] 22  BP: (135-193)/() 172/102  Flow (L/min) (Oxygen Therapy):  [15] 15    Intake/Output Summary (Last 24 hours) at 1/7/2025 0642  Last data filed at 1/7/2025 0500  Gross per 24 hour   Intake 1977.33 ml   Output 4775 ml   Net -2797.67 ml       Physical Exam:    General Appearance: Drowsy and sleepy, no acute distress   Skin: warm and dry  HEENT: oral mucosa normal, nonicteric sclera  Neck: supple, no JVD  Lungs: CTA, decreased breath sounds all over the chest.  Heart: RRR, normal S1 and S2  Abdomen: obese, soft, nontender, non distended and positive bowel sounds.  : no palpable bladder  Extremities: Trace edema, no cyanosis or clubbing  Neuro: Randomly moving extremities no meaningful interaction.    Scheduled Meds:     Current Facility-Administered Medications   Medication Dose Route Frequency Provider Last Rate Last Admin    acetaminophen (TYLENOL) tablet 650 mg  650 mg Oral Q4H PRN Puja Paz DO   650 mg at 01/05/25 0838    Or    acetaminophen (TYLENOL) 160 MG/5ML oral solution 650 mg  650 mg Oral Q4H PRN Puja Paz DO        Or    acetaminophen (TYLENOL) suppository 650 mg  650 mg Rectal Q4H PRN Puja Paz DO        aluminum-magnesium hydroxide-simethicone (MAALOX MAX) 400-400-40 MG/5ML suspension 15 mL  15 mL Oral Q6H PRN Puja Paz DO        amLODIPine (NORVASC) tablet 10 mg  10 mg Oral Daily Puja Paz DO   10 mg at 01/06/25 0907    ampicillin-sulbactam (UNASYN) 3 g in sodium chloride 0.9 % 100 mL IVPB-VTB  3 g Intravenous Q6H Kerley, Brian Joseph, DO   3 g at 01/07/25 0332    aspirin chewable tablet 81 mg  81 mg Oral Daily Puja Paz DO   81 mg at 01/06/25 0907    benzonatate (TESSALON) capsule 100 mg  100 mg Oral TID PRN Puja Paz DO   100 mg at 01/04/25 0440    sennosides-docusate (PERICOLACE) 8.6-50 MG per tablet 2 tablet  2 tablet Oral BID PRN Karrick, Puja  DO Dirk        And    polyethylene glycol (MIRALAX) packet 17 g  17 g Oral Daily PRN Puja Paz DO        And    bisacodyl (DULCOLAX) EC tablet 5 mg  5 mg Oral Daily PRN Puja Paz DO        And    bisacodyl (DULCOLAX) suppository 10 mg  10 mg Rectal Daily PRN Puja Paz DO        budesonide (PULMICORT) nebulizer solution 0.5 mg  0.5 mg Nebulization BID - RT NaeemEnid APRN   0.5 mg at 01/06/25 1921    carvedilol (COREG) tablet 12.5 mg  12.5 mg Oral BID With Meals Puja Paz DO   12.5 mg at 01/06/25 1738    dexmedetomidine (PRECEDEX) 400 mcg in 100 mL NS infusion  0.2 mcg/kg/hr Intravenous Titrated Kerley, Brian Joseph, DO 59.5 mL/hr at 01/07/25 0602 1.4 mcg/kg/hr at 01/07/25 0602    dextrose (D50W) (25 g/50 mL) IV injection 25 g  25 g Intravenous Q15 Min PRN Puja Paz DO        dextrose (GLUTOSE) oral gel 15 g  15 g Oral Q15 Min PRN Puja Paz DO        Enoxaparin Sodium (LOVENOX) syringe 60 mg  60 mg Subcutaneous Q12H Puja Paz DO   60 mg at 01/07/25 0538    folic acid 1 mg in sodium chloride 0.9 % 50 mL IVPB  1 mg Intravenous Daily Kerley, Brian Joseph,  mL/hr at 01/06/25 1213 1 mg at 01/06/25 1213    glucagon (GLUCAGEN) injection 1 mg  1 mg Intramuscular Q15 Min PRN Puja Paz DO        guaiFENesin (MUCINEX) 12 hr tablet 600 mg  600 mg Oral Q12H Puja Paz DO   600 mg at 01/06/25 0907    hydrALAZINE (APRESOLINE) injection 10 mg  10 mg Intravenous Q4H PRN Romel Schumacher MD   10 mg at 01/07/25 0112    Insulin Lispro (humaLOG) injection 2-9 Units  2-9 Units Subcutaneous 4x Daily AC & at Bedtime Puja Paz, DO   2 Units at 01/06/25 2109    ipratropium-albuterol (DUO-NEB) nebulizer solution 3 mL  3 mL Nebulization Q6H - RT Puja Paz, DO   3 mL at 01/07/25 0016    lactulose (CHRONULAC) 10 GM/15ML solution 30 g  30 g Oral TID Kerley, Brian Joseph,    30 g at  01/06/25 0907    LORazepam (ATIVAN) tablet 1 mg  1 mg Oral Q1H PRN Kerley, Brian Joseph,         Or    LORazepam (ATIVAN) injection 1 mg  1 mg Intravenous Q1H PRN Kerley, Brian Joseph, DO   1 mg at 01/06/25 0818    Or    LORazepam (ATIVAN) tablet 2 mg  2 mg Oral Q1H PRN Kerley, Brian Joseph, DO        Or    LORazepam (ATIVAN) injection 2 mg  2 mg Intravenous Q1H PRN Kerley, Brian Joseph, DO   2 mg at 01/06/25 1119    Or    LORazepam (ATIVAN) injection 2 mg  2 mg Intravenous Q15 Min PRN Kerley, Brian Joseph, DO   2 mg at 01/06/25 1235    Or    LORazepam (ATIVAN) injection 2 mg  2 mg Intramuscular Q15 Min PRN Kerley, Brian Joseph, DO        LORazepam (ATIVAN) injection 2 mg  2 mg Intravenous Q4H PRN Puja Paz DO   2 mg at 01/07/25 0348    Magnesium Standard Dose Replacement - Follow Nurse / BPA Driven Protocol   Not Applicable PRN Kerley, Brian Joseph, DO        methocarbamol (ROBAXIN) tablet 750 mg  750 mg Oral TID PRN Puja Paz DO   750 mg at 01/05/25 2039    methylPREDNISolone sodium succinate (SOLU-Medrol) injection 40 mg  40 mg Intravenous Q12H Kerley, Brian Joseph, DO   40 mg at 01/07/25 0332    mupirocin (BACTROBAN) 2 % nasal ointment 1 Application  1 Application Each Nare BID Kerley, Brian Joseph, DO   1 Application at 01/06/25 2106    nicotine (NICODERM CQ) 21 MG/24HR patch 1 patch  1 patch Transdermal Q24H Puja Paz DO   1 patch at 01/07/25 0544    nicotine polacrilex (NICORETTE) gum 4 mg  4 mg Mouth/Throat Q1H PRN Puja Paz DO        nitroglycerin (NITROSTAT) SL tablet 0.4 mg  0.4 mg Sublingual Q5 Min PRN Puja Paz DO        ondansetron ODT (ZOFRAN-ODT) disintegrating tablet 4 mg  4 mg Oral Q6H PRN Karrick, Puja Dirk, DO        Or    ondansetron (ZOFRAN) injection 4 mg  4 mg Intravenous Q6H PRN Puja Paz DO        pantoprazole (PROTONIX) injection 40 mg  40 mg Intravenous Q AM Kerley, Brian Joseph, DO   40 mg at  01/07/25 0539    Pharmacy to Dose ampicillin-sulbactam   Not Applicable Continuous PRN Kerley, Brian Joseph, DO        Pharmacy to Dose enoxaparin (LOVENOX)   Not Applicable Continuous PRN Puja Paz DO        PHENobarbital injection 65 mg  65 mg Intravenous Once Kerley, Brian Joseph, DO        Followed by    [START ON 1/8/2025] PHENobarbital tablet 32.4 mg  32.4 mg Oral Once Kerley, Brian Joseph, DO        Followed by    [START ON 1/8/2025] PHENobarbital tablet 32.4 mg  32.4 mg Oral Once Kerley, Brian Joseph, DO        Followed by    [START ON 1/9/2025] PHENobarbital tablet 32.4 mg  32.4 mg Oral Once Kerley, Brian Joseph, DO        rOPINIRole (REQUIP) tablet 3 mg  3 mg Oral Nightly Puja Paz DO   3 mg at 01/05/25 2039    sodium chloride 0.9 % flush 10 mL  10 mL Intravenous PRN Puja Paz,         sodium chloride 0.9 % flush 10 mL  10 mL Intravenous Q12H Puja Paz DO   10 mL at 01/06/25 2106    sodium chloride 0.9 % flush 10 mL  10 mL Intravenous PRN Puja Paz,         sodium chloride 0.9 % infusion 40 mL  40 mL Intravenous PRN Puja Paz DO        terazosin (HYTRIN) capsule 5 mg  5 mg Oral Nightly Gio Candelario MD, FASN        thiamine (B-1) injection 200 mg  200 mg Intravenous Q8H Kerley, Brian Joseph, DO   200 mg at 01/07/25 0539    Followed by    [START ON 1/11/2025] thiamine (VITAMIN B-1) tablet 100 mg  100 mg Oral Daily Kerley, Brian Joseph, DO        valsartan (DIOVAN) tablet 320 mg  320 mg Oral Daily Puja Paz DO   320 mg at 01/06/25 0907    ziprasidone (GEODON) injection 20 mg  20 mg Intramuscular Q4H PRN Romel Schumacher MD   20 mg at 01/06/25 1220       amLODIPine, 10 mg, Oral, Daily  ampicillin-sulbactam, 3 g, Intravenous, Q6H  aspirin, 81 mg, Oral, Daily  budesonide, 0.5 mg, Nebulization, BID - RT  carvedilol, 12.5 mg, Oral, BID With Meals  enoxaparin, 60 mg, Subcutaneous, Q12H  folic acid 1 mg in sodium  chloride 0.9 % 50 mL IVPB, 1 mg, Intravenous, Daily  guaiFENesin, 600 mg, Oral, Q12H  insulin lispro, 2-9 Units, Subcutaneous, 4x Daily AC & at Bedtime  ipratropium-albuterol, 3 mL, Nebulization, Q6H - RT  lactulose, 30 g, Oral, TID  methylPREDNISolone sodium succinate, 40 mg, Intravenous, Q12H  mupirocin, 1 Application, Each Nare, BID  nicotine, 1 patch, Transdermal, Q24H  pantoprazole, 40 mg, Intravenous, Q AM  PHENobarbital, 65 mg, Intravenous, Once   Followed by  [START ON 1/8/2025] PHENobarbital, 32.4 mg, Oral, Once   Followed by  [START ON 1/8/2025] PHENobarbital, 32.4 mg, Oral, Once   Followed by  [START ON 1/9/2025] PHENobarbital, 32.4 mg, Oral, Once  rOPINIRole, 3 mg, Oral, Nightly  sodium chloride, 10 mL, Intravenous, Q12H  terazosin, 5 mg, Oral, Nightly  thiamine (B-1) IV, 200 mg, Intravenous, Q8H   Followed by  [START ON 1/11/2025] thiamine, 100 mg, Oral, Daily  valsartan, 320 mg, Oral, Daily        IV Meds:   dexmedetomidine, 0.2 mcg/kg/hr, Last Rate: 1.4 mcg/kg/hr (01/07/25 0602)  Pharmacy to Dose ampicillin-sulbactam,   Pharmacy to Dose enoxaparin (LOVENOX),         Results Reviewed:   I have personally reviewed the results from the time of this admission to 1/7/2025 06:42 EST     Results from last 7 days   Lab Units 01/07/25  0447 01/06/25  0431 01/05/25  0407 01/02/25  0510 01/01/25  0357   SODIUM mmol/L 141 141 143   < > 139   POTASSIUM mmol/L 3.5 3.7 3.9   < > 3.5   CHLORIDE mmol/L 97* 94* 95*   < > 92*   CO2 mmol/L 35.0* 39.4* 40.7*   < > 37.3*   BUN mg/dL 17 15 21*   < > 9   CREATININE mg/dL 0.63* 0.62* 0.65*   < > 0.76   CALCIUM mg/dL 8.5* 8.5* 8.5*   < > 9.0   BILIRUBIN mg/dL  --   --   --   --  0.4   ALK PHOS U/L  --   --   --   --  128*   ALT (SGPT) U/L  --   --   --   --  24   AST (SGOT) U/L  --   --   --   --  18   GLUCOSE mg/dL 175* 198* 218*   < > 244*    < > = values in this interval not displayed.       Estimated Creatinine Clearance: 203.1 mL/min (A) (by C-G formula based on SCr of  "0.63 mg/dL (L)).                Results from last 7 days   Lab Units 01/07/25  0447 01/06/25  0431 01/05/25  0407 01/04/25  0705 01/03/25  0511   WBC 10*3/mm3 11.66* 16.03* 13.31* 15.45* 16.97*   HEMOGLOBIN g/dL 15.1 14.3 14.4 14.0 13.7   PLATELETS 10*3/mm3 285 254 237 245 244             Brief Urine Lab Results  (Last result in the past 365 days)        Color   Clarity   Blood   Leuk Est   Nitrite   Protein   CREAT   Urine HCG        01/04/25 1235 Orange   Clear   Large (3+)   Negative   Negative   Negative                   No results found for: \"UTPCR\"    Imaging Results (Last 24 Hours)       Procedure Component Value Units Date/Time    XR Chest 1 View [[id number]] Resulted: 01/07/25 0454     Updated: 01/07/25 0455    XR Chest 1 View [[id number]] Collected: 01/06/25 1029     Updated: 01/06/25 1032    Narrative:      PROCEDURE: XR CHEST 1 VW-     HISTORY: hypoxia, f/u; J96.01-Acute respiratory failure with hypoxia,  shortness of breath     COMPARISON: 1/5/2024     FINDINGS:  Portable view of the chest demonstrates pulmonary vascular  congestion. Mild right perihilar atelectasis is noted. There is no  evidence of effusion, pneumothorax or other significant pleural disease.  The mediastinum is unremarkable.     The heart size is normal.       Impression:      No significant change           This report was signed and finalized on 1/6/2025 10:30 AM by Olvin Mcbride MD.                   Assessment / Plan     ASSESSMENT:    Acute respiratory failure with hypoxia    COPD exacerbation    Oliguria: Patient is only 100 cc of urine output overnight, he did receive 40 mg of Lasix IV.   Received IVF challenge w > 3 liters of UO  Urinary retention ; Epstein in place , monitor I/O   Hypercapnic respiratory failure: Patient's pCO2 greater than 90, has been on BiPAP since admission with gradual improvement.  Hypertension: on home regimen, still noted to be high blood pressures.  Obstructive sleep apnea: It is not clear if he was " using his BiPAP at home, currently on BiPAP.  Type 2 diabetes: Continue with the sliding scale  Morbid obesity: Complicates all forms of care.      PLAN:  I will go ahead and increase his Hytrin from 5 mg to 10 mg at bedtime and see if that will help any.  I will go ahead and start him on Aldactazide, 25/25 1 pill a day and see how he will respond to it.  Serum bicarb is significantly better.  Details were also discussed with the hospitalist service and or other providers as needed.   Continue with rest of the current treatment plan, and monitor with surveillance labs.  Further recommendations will depend on clinical course of the patient during the current hospitalization.   I have reviewed the copied text to this note, it was edited and the changes made as needed.  It is accurate to the point, when the note was signed today.     Thank you for involving us in the care of Cal Oliver .  Please feel free to call with any questions.    Gio Candelario MD, JULIO  01/07/25  06:42 [facility]    [facility]  [phone]      Part of this note may be an electronic transcription/translation of spoken language to printed text using the Dragon Dictation System.                   Electronically signed by Gio Candelario MD, JULIO at 01/07/25 0950          Consult Notes (most recent note)        Lisa Chapin MD at 01/07/25 1236        Consult Orders    1. Inpatient Pulmonology Consult [[id number]] ordered by Puja Paz DO at 01/07/25 0809                 Date of admission:  1/1/2025    Date of consultation:   January 7, 2025    Requested by:   Puja Paz, *    PCP: Woo Betancourt MD    Reason:  Acute Respiratory Failure.     History of Present Illness:  59 y.o. male with past medical history as mentioned below who was initially admitted with complaints of shortness of breath and associated symptoms of swelling.  He was found to have hypertension and  hypoxemia.  He continued to require significant BiPAP supplementation with FiO2 requirements of 80% or so.  He was moved to the ICU and later on had to be started on Precedex.    Pulmonary consultation was requested for further recommendations.     No further history is available from the patient, as he is on the PAP device.    Review of System: Could not be obtained, as the patient is on PAP device.     Past Medical History: Pertinent history reviewed, as appropriate. Negative, except noted below or in HPI.  If this history could not be obtained due to a reason, the reason is listed in the HPI.  Past Medical History:   Diagnosis Date    Asthma     Chronic back pain     Diabetes mellitus     High blood pressure     Hyperlipidemia     Migraine     Nausea and vomiting     Sleep apnea          Past Surgical History: Pertinent history reviewed, as appropriate. Negative, except noted below or in HPI. If this history could not be obtained due to a reason, the reason is listed in the HPI.  Past Surgical History:   Procedure Laterality Date    BACK SURGERY      GALLBLADDER SURGERY      KNEE SURGERY           Family History: Pertinent history reviewed, as appropriate. Negative, except noted below or in HPI. If this history could not be obtained due to a reason, the reason is listed in the HPI.  Family History   Problem Relation Age of Onset    Other Other         HIGH BLOOD PRESSURE    Hypertension Other     Heart disease Mother     Heart attack Mother     Heart disease Father          Social History: Pertinent history reviewed, as appropriate. Negative, except noted below or in HPI. If this history could not be obtained due to a reason, the reason is listed in the HPI.  Social History     Socioeconomic History    Marital status:    Tobacco Use    Smoking status: Some Days     Current packs/day: 0.25     Average packs/day: 0.3 packs/day for 20.0 years (5.0 ttl pk-yrs)     Types: Cigarettes    Smokeless tobacco: Never  "   Tobacco comments:     not smoked since12/28/2020   Vaping Use    Vaping status: Never Used   Substance and Sexual Activity    Alcohol use: No    Drug use: No    Sexual activity: Defer             Physical Exam:  /86   Pulse 57   Temp 98.2 °F (36.8 °C) (Axillary)   Resp 22   Ht 180.3 cm (71\")   Wt (!) 164 kg (362 lb 3.5 oz)   SpO2 96%   BMI 50.52 kg/m²     Constitutional:            Vital signs reviewed            Patient is currently on PAP device    Eyes:            Conjunctiva: Pink    ENT:             Patient was on the PAP device      Neck:             Supple.  Increased adipose tissue noted.              Thyroid gland did not seem to be enlarged    Cardiovascular:              S1 + S2. Regular at this time    Lungs/Respiratory:            Transmitted breath sounds bilaterally with somewhat decreased air entry             Percussion could not be performed at this time.            Decreased Air Entry Bilaterally with wheezing and basal crackles heard.    Abdomen/GI:            Obese but soft.  Bowel sounds sluggishly positive.  No obvious organomegaly noted.    Musculoskeletal/Extremities:             Gait could not be assessed at this time.              No clubbing in the upper extremities             1+ edema noted in the lower extremities bilaterally.    Neurologic:             Exam was limited since the patient was on PAP device.    Psychiatric:             Exam was limited since the patient was on PAP device.      Labs: Reviewed. Pertinent labs were noted.   Results from last 7 days   Lab Units 01/07/25  0447 01/06/25  0431 01/05/25  0407 01/04/25  0705 01/03/25  0511   WBC 10*3/mm3 11.66* 16.03* 13.31* 15.45* 16.97*   HEMOGLOBIN g/dL 15.1 14.3 14.4 14.0 13.7   HEMATOCRIT % 47.3 45.0 46.3 45.2 45.1   PLATELETS 10*3/mm3 285 254 237 245 244   NEUTROPHIL % % 82.1* 82.0* 88.7* 85.0* 85.0*   NEUTROS ABS 10*3/mm3 9.57* 13.15* 11.82* 13.13* 14.41*   EOSINOPHIL % % 0.2* 0.1* 0.2* 0.1* 0.1*   EOS ABS " "10*3/mm3 0.02 0.01 0.02 0.02 0.02   LYMPHOCYTE % % 11.2* 9.0* 5.8* 6.6* 7.3*   LYMPHS ABS 10*3/mm3 1.31 1.44 0.77 1.02 1.24       Lab Results   Component Value Date    PROCALCITO 0.05 01/06/2025    PROCALCITO 0.04 01/05/2025    PROCALCITO 0.04 01/04/2025       No results found for: \"CRP\"    No results found for: \"SEDRATE\"    Lab Results   Component Value Date    PROBNP 111.6 01/01/2025       Results from last 7 days   Lab Units 01/07/25  0447 01/06/25  0431 01/05/25  0407 01/02/25  0510 01/01/25  0357   SODIUM mmol/L 141 141 143   < > 139   POTASSIUM mmol/L 3.5 3.7 3.9   < > 3.5   CHLORIDE mmol/L 97* 94* 95*   < > 92*   CO2 mmol/L 35.0* 39.4* 40.7*   < > 37.3*   BUN mg/dL 17 15 21*   < > 9   CREATININE mg/dL 0.63* 0.62* 0.65*   < > 0.76   CALCIUM mg/dL 8.5* 8.5* 8.5*   < > 9.0   ANION GAP mmol/L 9.0 7.6 7.3   < > 9.7   BILIRUBIN mg/dL  --   --   --   --  0.4   ALK PHOS U/L  --   --   --   --  128*   ALT (SGPT) U/L  --   --   --   --  24   AST (SGOT) U/L  --   --   --   --  18   GLUCOSE mg/dL 175* 198* 218*   < > 244*   TOTAL PROTEIN g/dL  --   --   --   --  7.1   ALBUMIN g/dL  --   --   --   --  3.7    < > = values in this interval not displayed.             Lab Results   Component Value Date    TSH 2.790 01/11/2024    TSH 2.460 09/25/2019    TSH 1.410 09/04/2018       Lab Results   Component Value Date    FREET4 1.03 01/11/2024    FREET4 0.90 (L) 09/25/2019    FREET4 1.00 09/04/2018       Lab Results   Component Value Date    INR 0.94 01/11/2024       Lab Results   Component Value Date    CKTOTAL 163 12/24/2014    CKTOTAL 169 12/24/2014    CKTOTAL 214 (H) 12/23/2014       No components found for: \"HSTROPT\"    Lab Results   Component Value Date    TROPONINT 24 (H) 01/01/2025    TROPONINT 24 (H) 01/01/2025       Lab Results   Component Value Date    DDIMER 368 12/23/2014       Lab Results   Component Value Date    LIPASE 22 01/11/2024    LIPASE 65 03/20/2015    LIPASE 26 12/23/2014       Brief Urine Lab Results  " "(Last result in the past 365 days)        Color   Clarity   Blood   Leuk Est   Nitrite   Protein   CREAT   Urine HCG        01/04/25 1235 Orange   Clear   Large (3+)   Negative   Negative   Negative                     Micro: As of January 7, 2025   No results found for: \"RESPCX\"  No results found for: \"BCIDPCR\"  Lab Results   Component Value Date    BLOODCX No growth at 24 hours 01/06/2025    BLOODCX No growth at 24 hours 01/06/2025    BLOODCX No growth at 5 days 01/01/2025    BLOODCX No growth at 5 days 01/01/2025     No results found for: \"URINECX\"  No results found for: \"MRSACX\"  No results found for: \"MRSAPCR\"  No results found for: \"URCX\"  No components found for: \"LOWRESPCF\"  No results found for: \"THROATCX\"  No results found for: \"CULTURES\"  No components found for: \"STREPBCX\"  No results found for: \"STREPPNEUAG\"  No results found for: \"LEGIONELLA\"  No results found for: \"LEGANTIGENUR\"  No results found for: \"MYCOPLASCX\"  No results found for: \"GCCX\"  No results found for: \"WOUNDCX\"  No results found for: \"BODYFLDCX\"    No results found for: \"FLU\"    Lab Results   Component Value Date    ADENOVIRUS Not Detected 01/01/2025     Lab Results   Component Value Date    NL625H Not Detected 01/01/2025     Lab Results   Component Value Date    CVHKU1 Not Detected 01/01/2025     Lab Results   Component Value Date    CVNL63 Not Detected 01/01/2025     Lab Results   Component Value Date    CVOC43 Not Detected 01/01/2025     Lab Results   Component Value Date    HUMETPNEVS Not Detected 01/01/2025     Lab Results   Component Value Date    HURVEV Not Detected 01/01/2025     Lab Results   Component Value Date    FLUBPCR Not Detected 01/01/2025     Lab Results   Component Value Date    PARAINFLUE Not Detected 01/01/2025     Lab Results   Component Value Date    PARAFLUV2 Not Detected 01/01/2025     Lab Results   Component Value Date    PARAFLUV3 Not Detected 01/01/2025     Lab Results   Component Value Date    PARAFLUV4 Not " "Detected 01/01/2025     Lab Results   Component Value Date    BPERTPCR Not Detected 01/01/2025     No results found for: \"[id number]\"  Lab Results   Component Value Date    CPNEUPCR Not Detected 01/01/2025     Lab Results   Component Value Date    MPNEUMO Not Detected 01/01/2025     Lab Results   Component Value Date    FLUAPCR Not Detected 01/01/2025     No results found for: \"FLUAH3\"  No results found for: \"FLUAH1\"  Lab Results   Component Value Date    RSV Not Detected 01/01/2025     Lab Results   Component Value Date    BPARAPCR Not Detected 01/01/2025       COVID 19:  Lab Results   Component Value Date    COVID19 Not Detected 01/01/2025         Lab Results   Component Value Date    THCURSCR Negative 01/04/2025     Lab Results   Component Value Date    PCPUR Negative 01/04/2025     Lab Results   Component Value Date    COCAINEUR Negative 01/04/2025     Lab Results   Component Value Date    METAMPSCNUR Negative 01/04/2025     Lab Results   Component Value Date    LABOPIASCN Negative 01/04/2025     Lab Results   Component Value Date    AMPHETSCREEN Negative 01/04/2025     Lab Results   Component Value Date    LABBENZSCN Positive (A) 01/04/2025     Lab Results   Component Value Date    TRICYCLICSCN Negative 01/04/2025     Lab Results   Component Value Date    LABMETHSCN Negative 01/04/2025     Lab Results   Component Value Date    BARBITSCNUR Negative 01/04/2025     Lab Results   Component Value Date    OXYCODONESCN Negative 01/04/2025     No results found for: \"PROPOXSCN\"  Lab Results   Component Value Date    BUPRENORSCNU Negative 01/04/2025     No results found for: \"ETHANOLMGDL\"  No results found for: \"ETOHPCT\"      ABG:  Recent Labs     01/05/25  0755 01/06/25  0747 01/07/25  0534   PHART 7.386 7.442 7.452   MEP6JME 79.1* 67.4* 58.7*   PO2ART 63.6* 61.8* 61.4*   TRG5EUO 47.4* 46.0* 40.9*   BASEEXCESS 17.8* 17.9* 13.8*       Lab Results   Component Value Date    LACTATE 0.8 01/06/2025    LACTATE 1.2 01/01/2025 "       Imaging Study: Latest imaging studies was reviewed personally.   Imaging Results (Last 72 Hours)       Procedure Component Value Units Date/Time    XR Chest 1 View [[id number]] Collected: 01/07/25 0740     Updated: 01/07/25 0743    Narrative:      PROCEDURE: XR CHEST 1 VW-     HISTORY: hypoxia f/u; J96.01-Acute respiratory failure with hypoxia     COMPARISON: 1 day prior.     FINDINGS: The heart is normal in size. There is persistent pulmonary  vascular congestion but mildly improved interstitial edema compared to  prior. Right perihilar atelectasis has improved. There is still mild  bibasilar atelectasis or infiltrate. Elevation right hemidiaphragm again  noted.. The mediastinum is unremarkable. There is no pneumothorax.   There are no acute osseous abnormalities. Apical lordotic positioning  noted.        Impression:      Mildly improved interstitial edema, otherwise stable chest..              This report was signed and finalized on 1/7/2025 7:41 AM by Krysten Rogers MD.       XR Chest 1 View [[id number]] Collected: 01/06/25 1029     Updated: 01/06/25 1032    Narrative:      PROCEDURE: XR CHEST 1 VW-     HISTORY: hypoxia, f/u; J96.01-Acute respiratory failure with hypoxia,  shortness of breath     COMPARISON: 1/5/2024     FINDINGS:  Portable view of the chest demonstrates pulmonary vascular  congestion. Mild right perihilar atelectasis is noted. There is no  evidence of effusion, pneumothorax or other significant pleural disease.  The mediastinum is unremarkable.     The heart size is normal.       Impression:      No significant change           This report was signed and finalized on 1/6/2025 10:30 AM by Olvin Mcbride MD.       US Liver [[id number]] Collected: 01/05/25 1903     Updated: 01/05/25 1905    Narrative:      FINAL REPORT    TECHNIQUE:  null    CLINICAL HISTORY:  abn LFTs    COMPARISON:  null    FINDINGS:  Exam: Limited right upper quadrant abdominal ultrasound    Comparison: None    Clinical  History: Abnormal LFTs    Findings:    Selected images from a right upper quadrant ultrasound are provided for interpretation    The liver is echogenic which may be reflective of fatty infiltration. No liver lesions are seen.    Portval vein is patent with hepatopetal flow.    Prior cholecystectomy    The common bile duct is not enlarged at 5.8 mm.    No choledocholithiasis.    Pancreas obscured by bowel gas.    Right kidney does not demonstrate any hydronephrosis or stones.    No ascites seen.      Impression:      IMPRESSION:    1. Echogenic liver texture may be reflective of fatty infiltration. No focal lesions.    2. Prior cholecystectomy. No choledocholithiasis or biliary obstruction    Authenticated and Electronically Signed by Vibha Hatch MD  on 01/05/2025 07:03:54 PM    XR Chest 1 View [[id number]] Collected: 01/05/25 0819     Updated: 01/05/25 0822    Narrative:      PROCEDURE: XR CHEST 1 VW-     HISTORY: hypoxia f/u; J96.01-Acute respiratory failure with hypoxia,  acute respiratory failure.     COMPARISON: 2 days prior.     FINDINGS: The heart is normal in size. There is decreased inspiratory  effort. Again noted are bilateral perihilar linear densities, suspect  atelectasis. There is pulmonary vascular congestion and bilateral  interstitial disease, similar to prior. No effusion seen. There is  improved aeration of the lung bases bilaterally compared to previous  exam.. The mediastinum is unremarkable. There is no pneumothorax.  There  are no acute osseous abnormalities. Apical lordotic positioning noted.        Impression:      Improved bibasilar atelectasis or infiltrate compared to  prior..              This report was signed and finalized on 1/5/2025 8:20 AM by Krysten Rogers MD.               ECHO:  Results for orders placed during the hospital encounter of 01/01/25    Adult Transthoracic Echo Complete w/ Color, Spectral and Contrast if necessary per protocol    Interpretation Summary    Left  ventricular systolic function is normal. Calculated left ventricular EF = 61.6% Left ventricular ejection fraction appears to be 61 - 65%.    Left ventricular wall thickness is consistent with mild concentric hypertrophy.    Left ventricular diastolic function is consistent with (grade II w/high LAP) pseudonormalization.    The left atrial cavity is moderate to severely dilated.        dexmedetomidine, 0.2 mcg/kg/hr, Last Rate: 0.6 mcg/kg/hr (01/07/25 0940)  Pharmacy to Dose ampicillin-sulbactam,   Pharmacy to Dose enoxaparin (LOVENOX),             Assessment:  1.  Acute Respiratory Failure.  2.  Chronic respiratory acidosis  3.  Morbid obesity  4.  COPD?  5.  Obstructive sleep apnea/obesity hypoventilation syndrome    Discussion/Recommendations:   Since the patient's latest ABG suggests compensated respiratory acidosis.    He continues to require significant oxygen supplementation and according to the nursing staff, has been needing 15 L high flow nasal cannula and FiO2 of up to 80% on the BiPAP on occasion.    I feel that his respiratory failure is multifactorial including from likelihood of aspiration pneumonia/pneumonitis with possibility of decompensated diastolic congestive heart failure.    If he has any further worsening, with regards to respiratory acidosis, we may consider transitioning him to AVAPS.    We will need further information with regards to clinical status of his underlying COPD    he will definitely need outpatient workup with PFTs.    ABG will be repeated in the morning.      Chest X Ray will be ordered as appropriate.     His latest chest x-ray showed improved findings.    Nebulized treatments have been adjusted.    We will adjust the dose of Solu-Medrol, as clinically indicated.    I was able to review the patient's last titration study from March 2018 and it showed BiPAP titration with a good response to a pressure of 19/13.    I was able to review a compliance report from October 2020,  which was performed on BiPAP setting of 18/13.  Overall it appeared to show good control although residual AHI was minimally elevated at 6.6.    I have also discussed the case with the nursing staff.    All relevant recommendations were, and will be, discussed with Puja Paz, *, as indicated    I would like to thank you for the opportunity to participate in the care of this patient.  We will communicate changes and recommendations, if and when necessary.      This document was electronically signed by Lisa Chapin MD on 01/07/25 at 12:36 EST      Dictated utilizing Dragon dictation.          Electronically signed by Lisa Chapin MD at 01/08/25 9833

## 2025-01-08 NOTE — PROGRESS NOTES
"  CC: Acute Respiratory Failure.     S: Currently on PAP therapy.  Slightly more awake.  Was able to ask about \"when can I go home\".  Was not entirely aware of the circumstances that led him to be transferred to the ICU.  Remains on low-dose Precedex.    ROS: Could not be reliably obtained as the patient is on PAP therapy.     O:Vital signs reviewed. FiO2: 50%.    /87   Pulse 80   Temp 97.6 °F (36.4 °C) (Oral)   Resp 26   Ht 180.3 cm (71\")   Wt (!) 168 kg (369 lb 7.9 oz)   SpO2 91%   BMI 51.53 kg/m²     Temp (24hrs), Av.8 °F (36.6 °C), Min:97.2 °F (36.2 °C), Max:98.2 °F (36.8 °C)      I & Os reviewed.   Intake/Output         25 0700 - 25 0659 25 0700 - 25 0659    Intake (ml) 1419 --    Output (ml) 2650 250    Net (ml) -1231 -250    Last Weight 168 kg (369 lb 7.9 oz) --            Net IO Since Admission: -10,395.65 mL [25 0949]    General/Constitutional: On PAP therapy. Appears to be in some distress.  Eyes: PERRL.   Neck: Supple without obvious JVD. No obvious masses noted.   Cardiovascular: S1 + S2.  Appears regular  Lungs/Respiratory: Transmitted Breath sounds noted.  Minimal scattered wheezing heard.  Bibasilar crackles noted  GI/Abdomen: Obese but soft. Bowel sounds positive  Musculoskeletal/Extremities: Trace edema noted. Gait could not be assessed at this time, as the patient was laying in bed.   Neurologic: Was able to follow some commands. Detailed exam couldn't be performed due to him being on PAP therapy.   Psych: Seemed somewhat somnolent      Labs: Reviewed.   Results from last 7 days   Lab Units 25  0426 25  0447 25  0431 25  0407 25  0705   WBC 10*3/mm3 11.57* 11.66* 16.03* 13.31* 15.45*   HEMOGLOBIN g/dL 15.2 15.1 14.3 14.4 14.0   HEMATOCRIT % 47.4 47.3 45.0 46.3 45.2   PLATELETS 10*3/mm3 257 285 254 237 245   NEUTROPHIL % % 81.8* 82.1* 82.0* 88.7* 85.0*   NEUTROS ABS 10*3/mm3 9.46* 9.57* 13.15* 11.82* 13.13*   EOSINOPHIL % % " "0.6 0.2* 0.1* 0.2* 0.1*   EOS ABS 10*3/mm3 0.07 0.02 0.01 0.02 0.02   LYMPHOCYTE % % 11.8* 11.2* 9.0* 5.8* 6.6*   LYMPHS ABS 10*3/mm3 1.37 1.31 1.44 0.77 1.02       Lab Results   Component Value Date    PROCALCITO 0.05 01/06/2025    PROCALCITO 0.04 01/05/2025    PROCALCITO 0.04 01/04/2025       No results found for: \"CRP\"    No results found for: \"SEDRATE\"    Lab Results   Component Value Date    PROBNP 111.6 01/01/2025       Results from last 7 days   Lab Units 01/08/25  0426 01/07/25  0447 01/06/25  0431   SODIUM mmol/L 139 141 141   POTASSIUM mmol/L 3.5 3.5 3.7   CHLORIDE mmol/L 97* 97* 94*   CO2 mmol/L 33.6* 35.0* 39.4*   BUN mg/dL 16 17 15   CREATININE mg/dL 0.59* 0.63* 0.62*   CALCIUM mg/dL 8.6 8.5* 8.5*   ANION GAP mmol/L 8.4 9.0 7.6   GLUCOSE mg/dL 197* 175* 198*             Lab Results   Component Value Date    TSH 2.790 01/11/2024    TSH 2.460 09/25/2019    TSH 1.410 09/04/2018       Lab Results   Component Value Date    FREET4 1.03 01/11/2024    FREET4 0.90 (L) 09/25/2019    FREET4 1.00 09/04/2018             Lab Results   Component Value Date    CKTOTAL 163 12/24/2014    CKTOTAL 169 12/24/2014    CKTOTAL 214 (H) 12/23/2014       No components found for: \"HSTROPT\"    Lab Results   Component Value Date    TROPONINT 24 (H) 01/01/2025    TROPONINT 24 (H) 01/01/2025       Lab Results   Component Value Date    DDIMER 368 12/23/2014       Lab Results   Component Value Date    LIPASE 22 01/11/2024    LIPASE 65 03/20/2015    LIPASE 26 12/23/2014       Brief Urine Lab Results  (Last result in the past 365 days)        Color   Clarity   Blood   Leuk Est   Nitrite   Protein   CREAT   Urine HCG        01/04/25 1235 Orange   Clear   Large (3+)   Negative   Negative   Negative                     Micro: As of January 8, 2025   No results found for: \"RESPCX\"  No results found for: \"BCIDPCR\"  Lab Results   Component Value Date    BLOODCX No growth at 2 days 01/06/2025    BLOODCX No growth at 2 days 01/06/2025    BLOODCX " "No growth at 5 days 01/01/2025    BLOODCX No growth at 5 days 01/01/2025     No results found for: \"URINECX\"  No results found for: \"MRSACX\"  Lab Results   Component Value Date    MRSAPCR No MRSA Detected 01/06/2025     No results found for: \"URCX\"  No components found for: \"LOWRESPCF\"  No results found for: \"THROATCX\"  No results found for: \"CULTURES\"  No components found for: \"STREPBCX\"  No results found for: \"STREPPNEUAG\"  No results found for: \"LEGIONELLA\"  No results found for: \"LEGANTIGENUR\"  No results found for: \"MYCOPLASCX\"  No results found for: \"GCCX\"  No results found for: \"WOUNDCX\"  No results found for: \"BODYFLDCX\"    No results found for: \"FLU\"    Lab Results   Component Value Date    ADENOVIRUS Not Detected 01/01/2025     Lab Results   Component Value Date    EJ773E Not Detected 01/01/2025     Lab Results   Component Value Date    CVHKU1 Not Detected 01/01/2025     Lab Results   Component Value Date    CVNL63 Not Detected 01/01/2025     Lab Results   Component Value Date    CVOC43 Not Detected 01/01/2025     Lab Results   Component Value Date    HUMETPNEVS Not Detected 01/01/2025     Lab Results   Component Value Date    HURVEV Not Detected 01/01/2025     Lab Results   Component Value Date    FLUBPCR Not Detected 01/01/2025     Lab Results   Component Value Date    PARAINFLUE Not Detected 01/01/2025     Lab Results   Component Value Date    PARAFLUV2 Not Detected 01/01/2025     Lab Results   Component Value Date    PARAFLUV3 Not Detected 01/01/2025     Lab Results   Component Value Date    PARAFLUV4 Not Detected 01/01/2025     Lab Results   Component Value Date    BPERTPCR Not Detected 01/01/2025     No results found for: \"[id number]\"  Lab Results   Component Value Date    CPNEUPCR Not Detected 01/01/2025     Lab Results   Component Value Date    MPNEUMO Not Detected 01/01/2025     Lab Results   Component Value Date    FLUAPCR Not Detected 01/01/2025     No results found for: \"FLUAH3\"  No results " "found for: \"FLUAH1\"  Lab Results   Component Value Date    RSV Not Detected 01/01/2025     Lab Results   Component Value Date    BPARAPCR Not Detected 01/01/2025       COVID 19:  Lab Results   Component Value Date    COVID19 Not Detected 01/01/2025           ABG:   Recent Labs     01/05/25  0755 01/06/25  0747 01/07/25  0534   PHART 7.386 7.442 7.452   BWP2KHJ 79.1* 67.4* 58.7*   PO2ART 63.6* 61.8* 61.4*   IQA1RXZ 47.4* 46.0* 40.9*   BASEEXCESS 17.8* 17.9* 13.8*     Lab Results   Component Value Date    LACTATE 0.8 01/06/2025    LACTATE 1.2 01/01/2025         Echo: Results for orders placed during the hospital encounter of 01/01/25    Adult Transthoracic Echo Complete w/ Color, Spectral and Contrast if necessary per protocol    Interpretation Summary    Left ventricular systolic function is normal. Calculated left ventricular EF = 61.6% Left ventricular ejection fraction appears to be 61 - 65%.    Left ventricular wall thickness is consistent with mild concentric hypertrophy.    Left ventricular diastolic function is consistent with (grade II w/high LAP) pseudonormalization.    The left atrial cavity is moderate to severely dilated.        dexmedetomidine, 0.2 mcg/kg/hr, Last Rate: 0.5 mcg/kg/hr (01/08/25 0745)  Pharmacy to Dose ampicillin-sulbactam,   Pharmacy to Dose enoxaparin (LOVENOX),           CXRay: Latest imaging study was reviewed personally.   Imaging Results (Last 24 Hours)       ** No results found for the last 24 hours. **              Assessment & Recommendations/Plan:   1.  Acute Respiratory Failure.  Continues to require BiPAP on a regular basis with FiO2 up to 50-60% or so.  I have asked the nursing staff to decrease FiO2 as tolerated  I will also try to use lower oxygen supplementation on high flow nasal cannula, when he is off the BiPAP.  Will make adjustments to the BiPAP  Will repeat chest x-ray and ABG in the morning.    2.  Chronic respiratory acidosis  Does appear to have compensated " "respiratory acidosis  Based on the last titration study, he did appear to have significant sleep apnea requiring BiPAP at high pressure of 18/13.  Original sleep study could not be found within the system.    3.  Morbid obesity  Does complicate all aspects of care    4.  COPD?  Will start the patient on Brovana nebulized treatments and continue Pulmicort for now.  Will decrease Solu-Medrol to 20 mg twice daily  Will recommend switching to oral prednisone on 2025, if clinically the patient improves.    5.  Obstructive sleep apnea/obesity hypoventilation syndrome  Does appear to have obstructive sleep apnea.  Will ask case management to obtain settings on his current device and also ask the [employer] company to assess the status of the current device  Although unclear, he seemed to indicate that his device was recalled and he has not used it in \"a while\".  May need outpatient workup including repeat sleep study, but this will be determined after information is obtained from the DME company.    6.?  Pneumonia/atelectasis  Unclear if the patient had significant aspiration but given significant respiratory failure, would recommend antibiotics for total of 3 days.  If his repeat procalcitonin, to be performed on 2025, is within normal range, then Unasyn can be discontinued after 3 days  Temp (24hrs), Av.8 °F (36.6 °C), Min:97.2 °F (36.2 °C), Max:98.2 °F (36.8 °C)    Lab Results   Component Value Date    PROCALCITO 0.05 2025    PROCALCITO 0.04 2025    PROCALCITO 0.04 2025     Lab Results   Component Value Date    WBC 11.57 (H) 2025    WBC 11.66 (H) 2025    WBC 16.03 (H) 2025       7.  Renal/Electrolytes/Fluid status  Nephrology following  Lab Results   Component Value Date    BUN 16 2025    BUN 17 2025    BUN 15 2025    CREATININE 0.59 (L) 2025    CREATININE 0.63 (L) 2025    CREATININE 0.62 (L) 2025     Lab Results   Component Value Date    NA " 139 01/08/2025     01/07/2025     01/06/2025    K 3.5 01/08/2025    K 3.5 01/07/2025    K 3.7 01/06/2025     Net IO Since Admission: -10,395.65 mL [01/08/25 0949]  Lab Results   Component Value Date    PROBNP 111.6 01/01/2025     8.  Hematologic  Lab Results   Component Value Date    HGB 15.2 01/08/2025    HGB 15.1 01/07/2025    HGB 14.3 01/06/2025     Lab Results   Component Value Date     01/08/2025     01/07/2025     01/06/2025     Lab Results   Component Value Date    INR 0.94 01/11/2024     9.  GI   Nutrition per Dietician.   GI prophylaxis per admitting attending.    10.  DVT prophylaxis  Per admitting attending.    Critical Care time spent in direct patient care: 40 minutes including high complexity decision making to assess, manipulate, and support vital organ system failure in this individual who has impairment of one or more vital organ systems such that there is a high probability of imminent or life threatening deterioration in the patient’s condition.  This time includes multiple reassessments throughout the day, if needed and as appropriate.  This time excludes other billable procedures. Time does include preparation of documents, review of old records, coordinating care with other providers and direct bedside care.    I have discussed patient's overall clinical status with Puja Paz, * especially with regards to respiratory status, antibiotics duration, and radiology findings/orders.      Patient's prognosis and disposition were also discussed.      Plan was also discussed with nursing staff, as necessary.     This document was electronically signed by Lisa Chapin MD on 01/08/25 at 09:49 EST      Dictated utilizing Dragon dictation.

## 2025-01-08 NOTE — PLAN OF CARE
Goal Outcome Evaluation:  Plan of Care Reviewed With: patient        Progress: improving  Outcome Evaluation: Pt tolerated bipap through the night..

## 2025-01-08 NOTE — PLAN OF CARE
Problem: Noninvasive Ventilation Acute  Goal: Effective Unassisted Ventilation and Oxygenation  Outcome: Progressing   Goal Outcome Evaluation:            RT EQUIPMENT DEVICE RELATED - SKIN ASSESSMENT    Julien Score:  Julien Score: 15     RT Medical Equipment/Device:     NIV Mask:  Full-face    size: L    Skin Assessment:      Nose:  Intact    Device Skin Pressure Protection:  Pressure points protected    Nurse Notification:  No    Janelle Angel, CRT

## 2025-01-08 NOTE — CASE MANAGEMENT/SOCIAL WORK
DCP; STR possible LTACH. Pt is not medically ready for discharge at this time. CM continues to follow for any needs.

## 2025-01-08 NOTE — PLAN OF CARE
Problem: Noninvasive Ventilation Acute  Goal: Effective Unassisted Ventilation and Oxygenation  Outcome: Progressing   Goal Outcome Evaluation: Patient wearing Bipap on and off throughout the day and all night.    RT EQUIPMENT DEVICE RELATED - SKIN ASSESSMENT    RT Medical Equipment/Device:  NIV Mask: Full-face  size: l    Skin Assessment: Cheek: Intact and Nose: Intact    Device Skin Pressure Protection: Pressure points protected    Nurse Notification: Yisel Carolina, RRT

## 2025-01-08 NOTE — PROGRESS NOTES
[facility]    Name:  Cal Oliver Jr.   Age:  59 y.o.  Sex:  male  :  1965  MRN:  [id number]   Visit Number:  [id number]  Admission Date:  2025  Date Of Service:  25  Primary Care Physician:  Woo Betancourt MD     LOS: 7 days :    Chief Complaint:      Shortness of air    Subjective:    Patient is alert and oriented this morning.  Family at bedside.  Tolerated BiPAP well.  He is feeling better, he does not remember the last few days very well.    Hospital Course:    Morbidly obese 59-year-old history of chronic tobacco abuse, uncontrolled high blood pressure, chronic back pain, diabetes, sleep apnea with home BiPAP use, who presented from home with complaints of shortness of breath.  Notes symptoms over the last 3 to 4 days, worsening.  Not been able to catch his breath with exertion, lying flat.  Noticed some swelling.  Has not been checking blood pressure.  No chest pain or palpitations.  Continues to smoke.  Unknown sick contacts.     In the ER he was hypertensive, heart rate in the 90s, afebrile, and hypoxic on room air.  CMP unremarkable.  proBNP of 110.  Troponins mildly elevated.  White count of 15 hemoglobin 14 platelets of 256.  Normal procalcitonin.  Negative flu COVID testing.  Chest x-ray with some interstitial edema.  Patient given DuoNeb, Solu-Medrol, Lasix, started on BiPAP.  Will admit for workup    Patient unfortunately did develop evidence of worsening hypercapnia, hypoxia, decreased mental status.  He was moved to the ICU.  He continue treatment for COPD exacerbation with Pulmicort, DuoNebs, Solu-Medrol.  He was started on Unasyn due to concern for possible aspiration event.  He has been on intermittent diuretic therapy, echocardiogram with grade 2 diastolic dysfunction noted.  He did have urinary retention and is also been seen by nephrology.    Patient appears to be slowly improving.  There was concern for possible alcohol  withdrawal as he was requiring high amounts of Precedex and Ativan, with subsequent started on phenobarbital.  He did admit to cocaine use but not alcohol use.  This will need to be assessed more when he is alert.    Review of Systems:     All systems were reviewed and negative except as mentioned in subjective, assessment and plan.    Vital Signs:    Temp:  [97.2 °F (36.2 °C)-98.2 °F (36.8 °C)] 97.6 °F (36.4 °C)  Heart Rate:  [52-85] 76  Resp:  [20-34] 26  BP: (124-175)/() 152/88    Intake and output:    I/O last 3 completed shifts:  In: 3346.3 [P.O.:700; I.V.:1876.3; IV Piggyback:770]  Out: 4200 [Urine:4200]  No intake/output data recorded.    Physical Examination:    General Appearance:   awake and alert and able to follow simple command   Head:  Atraumatic and normocephalic.   Eyes: Conjunctivae and sclerae normal, no icterus. No pallor.   Throat: No oral lesions, no thrush, oral mucosa moist.   Neck: Supple, trachea midline, no thyromegaly.   Lungs:   Breath sounds diminished, high flow oxygen in place   Heart:  Normal S1 and S2, murmur, no gallop, no rub. No JVD.   Abdomen:   Normal bowel sounds, no masses, no organomegaly. Soft, nontender, nondistended, no rebound tenderness.  Epstein catheter in place   Extremities: Supple, 1-2+ edema, no cyanosis, no clubbing.   Skin: Warm.   Neurologic: Awake and alert.  No focal deficits.  Moves extremities albeit weak     Laboratory results:    Results from last 7 days   Lab Units 01/08/25  0426 01/07/25  0447 01/06/25  0431   SODIUM mmol/L 139 141 141   POTASSIUM mmol/L 3.5 3.5 3.7   CHLORIDE mmol/L 97* 97* 94*   CO2 mmol/L 33.6* 35.0* 39.4*   BUN mg/dL 16 17 15   CREATININE mg/dL 0.59* 0.63* 0.62*   CALCIUM mg/dL 8.6 8.5* 8.5*   GLUCOSE mg/dL 197* 175* 198*     Results from last 7 days   Lab Units 01/08/25  0426 01/07/25  0447 01/06/25  0431   WBC 10*3/mm3 11.57* 11.66* 16.03*   HEMOGLOBIN g/dL 15.2 15.1 14.3   HEMATOCRIT % 47.4 47.3 45.0   PLATELETS 10*3/mm3 257  285 254               Results from last 7 days   Lab Units 01/06/25  0813 01/06/25  0805   BLOODCX  No growth at 2 days No growth at 2 days     Recent Labs     01/05/25  0755 01/06/25  0747 01/07/25  0534   PHART 7.386 7.442 7.452   SCI8LGR 79.1* 67.4* 58.7*   PO2ART 63.6* 61.8* 61.4*   LVN2QYO 47.4* 46.0* 40.9*   BASEEXCESS 17.8* 17.9* 13.8*      I have reviewed the patient's laboratory results.    Radiology results:    XR Chest 1 View    Result Date: 1/7/2025  PROCEDURE: XR CHEST 1 VW-  HISTORY: hypoxia f/u; J96.01-Acute respiratory failure with hypoxia  COMPARISON: 1 day prior.  FINDINGS: The heart is normal in size. There is persistent pulmonary vascular congestion but mildly improved interstitial edema compared to prior. Right perihilar atelectasis has improved. There is still mild bibasilar atelectasis or infiltrate. Elevation right hemidiaphragm again noted.. The mediastinum is unremarkable. There is no pneumothorax. There are no acute osseous abnormalities. Apical lordotic positioning noted.      Impression: Mildly improved interstitial edema, otherwise stable chest..     This report was signed and finalized on 1/7/2025 7:41 AM by Krysten Rogers MD.      XR Chest 1 View    Result Date: 1/6/2025  PROCEDURE: XR CHEST 1 VW-  HISTORY: hypoxia, f/u; J96.01-Acute respiratory failure with hypoxia, shortness of breath  COMPARISON: 1/5/2024  FINDINGS:  Portable view of the chest demonstrates pulmonary vascular congestion. Mild right perihilar atelectasis is noted. There is no evidence of effusion, pneumothorax or other significant pleural disease. The mediastinum is unremarkable.  The heart size is normal.      Impression: No significant change    This report was signed and finalized on 1/6/2025 10:30 AM by Olvin Mcbride MD.     I have reviewed the patient's radiology reports.    Medication Review:     I have reviewed the patient's active and prn medications.     Problem List:      Acute respiratory failure with  hypoxia    COPD exacerbation      Assessment:    COPD with acute exacerbation, POA  Acute hypoxic/hypercapnic respiratory failure  Acute urinary retention  Possible diastolic heart failure, workup pending, POA  Hypertensive urgency, POA  Chronic tobacco abuse  Type 2 diabetes  Morbid obesity  Obstructive sleep apnea    Plan:  Acute respiratory failure with hypoxia and hypercapnia  COPD exacerbation  Acute exacerbation of heart failure  Has been on bronchodilators, steroids, BiPAP.  Given Unasyn due to possible aspiration.  Had been on diuretic therapy with significant urine output.  Pulmonology is following may need BiPAP at home  Echo with grade 2 diastolic dysfunction  Oliguria  Urinary retention  Epstein catheter placed due to urinary retention.  Nephrology consultation, recommendations appreciated.  May try a voiding trial today  Encephalopathy  Alcohol use disorder, suspected  Alcohol withdrawal, suspected  Per prior provider, concern for withdrawal symptoms.  Was requiring max doses of Precedex and Ativan, was started on phenobarbital.  Appears somewhat late in the course to have developed significant withdrawal symptoms from alcohol, will need to assess this further.    Chronic: Type 2 diabetes, hypertension, obesity BMI 51, SHUKRI.    Continue ICU treatment    DVT Prophylaxis: Enoxaparin   Code Status: Full  Diet: Diabetic  Discharge Plan: LYNN Paz DO  01/08/25  08:59 EST

## 2025-01-08 NOTE — PLAN OF CARE
Goal Outcome Evaluation:  Plan of Care Reviewed With: patient        Progress: improving  Outcome Evaluation: patient titrated to 10L HFNC today without issue. RN titrated precedex off but patient became panicked, hallucinations became violent and extreme, RN restarted precedex drip and gave ativan PRN per [facility]WA protocol.

## 2025-01-08 NOTE — PROGRESS NOTES
[facility] Progress Note  [facility]. [place]        Patient Name: Cal Oliver Jr.  : 1965  MRN: [id number]   LOS: 7 days    Patient Care Team:  Woo Betancourt MD as PCP - General (Internal Medicine)    Chief Complaint:    Chief Complaint   Patient presents with    Shortness of Breath     Primary Care Physician:  Woo Betancourt MD  Date of admission: 2025    Subjective     Interval History:   Follow-up and uric/oliguric renal failure.    Significant improvement in urine output noted.  Renal function is continuously improving and has been stable.  Patient has been on BiPAP with gradual improvement of his hypercapnia.  Slightly more alert this morning.  Still appears to be confused off and on.  Son and another family member around the said that he is totally confused.  Events noted from last 24 hours.  I reviewed the chart and other providers notes, labs and procedures done since my last note.    Review of Systems:   unobtainable.    Objective     Vitals:   Temp:  [97.2 °F (36.2 °C)-98.2 °F (36.8 °C)] 97.2 °F (36.2 °C)  Heart Rate:  [52-85] 67  Resp:  [20-34] 26  BP: (124-175)/() 164/97  Flow (L/min) (Oxygen Therapy):  [10-15] 10    Intake/Output Summary (Last 24 hours) at 2025 0720  Last data filed at 2025 0500  Gross per 24 hour   Intake 700 ml   Output 2650 ml   Net -1950 ml       Physical Exam:    General Appearance: Drowsy and sleepy, no acute distress   Skin: warm and dry  HEENT: oral mucosa normal, nonicteric sclera  Neck: supple, no JVD  Lungs: CTA, decreased breath sounds all over the chest.  Heart: RRR, normal S1 and S2  Abdomen: obese, soft, nontender, non distended and positive bowel sounds.  : no palpable bladder  Extremities: Trace edema, no cyanosis or clubbing  Neuro: Randomly moving extremities no meaningful interaction.    Scheduled Meds:     Current Facility-Administered Medications   Medication Dose Route Frequency  Provider Last Rate Last Admin    acetaminophen (TYLENOL) tablet 650 mg  650 mg Oral Q4H PRN Puja Paz DO   650 mg at 01/05/25 0838    Or    acetaminophen (TYLENOL) 160 MG/5ML oral solution 650 mg  650 mg Oral Q4H PRN Puja Paz DO        Or    acetaminophen (TYLENOL) suppository 650 mg  650 mg Rectal Q4H PRN Puja Paz DO        aluminum-magnesium hydroxide-simethicone (MAALOX MAX) 400-400-40 MG/5ML suspension 15 mL  15 mL Oral Q6H PRN Puja Paz DO        amLODIPine (NORVASC) tablet 10 mg  10 mg Oral Daily Puja Paz DO   10 mg at 01/07/25 0829    ampicillin-sulbactam (UNASYN) 3 g in sodium chloride 0.9 % 100 mL IVPB-VTB  3 g Intravenous Q6H Kerley, Brian Joseph, DO   3 g at 01/08/25 0244    aspirin chewable tablet 81 mg  81 mg Oral Daily Puja Paz DO   81 mg at 01/07/25 0829    benzonatate (TESSALON) capsule 100 mg  100 mg Oral TID PRN Puja Paz DO   100 mg at 01/04/25 0440    sennosides-docusate (PERICOLACE) 8.6-50 MG per tablet 2 tablet  2 tablet Oral BID PRN Puja Paz DO        And    polyethylene glycol (MIRALAX) packet 17 g  17 g Oral Daily PRN Puja Paz DO        And    bisacodyl (DULCOLAX) EC tablet 5 mg  5 mg Oral Daily PRN Pjua Paz DO        And    bisacodyl (DULCOLAX) suppository 10 mg  10 mg Rectal Daily PRN Puja Paz DO        budesonide (PULMICORT) nebulizer solution 0.5 mg  0.5 mg Nebulization BID - RT Naeem, Enid J, APRN   0.5 mg at 01/08/25 0704    carvedilol (COREG) tablet 12.5 mg  12.5 mg Oral BID With Meals Puja Paz DO   12.5 mg at 01/07/25 1748    dexmedetomidine (PRECEDEX) 400 mcg in 100 mL NS infusion  0.2 mcg/kg/hr Intravenous Titrated Kerley, Brian Joseph, DO 25.5 mL/hr at 01/08/25 0622 0.6 mcg/kg/hr at 01/08/25 0622    dextrose (D50W) (25 g/50 mL) IV injection 25 g  25 g Intravenous Q15 Min PRN Puja Paz DO         dextrose (GLUTOSE) oral gel 15 g  15 g Oral Q15 Min PRN Puja Paz DO        Enoxaparin Sodium (LOVENOX) syringe 60 mg  60 mg Subcutaneous Q12H Puja Paz DO   60 mg at 01/08/25 0542    folic acid 1 mg in sodium chloride 0.9 % 50 mL IVPB  1 mg Intravenous Daily Kerley, Brian Joseph,  mL/hr at 01/07/25 1152 1 mg at 01/07/25 1152    [Held by provider] furosemide (LASIX) injection 20 mg  20 mg Intravenous BID Diuretics Puja Paz DO        glucagon (GLUCAGEN) injection 1 mg  1 mg Intramuscular Q15 Min PRN Puja Paz DO        guaiFENesin (MUCINEX) 12 hr tablet 600 mg  600 mg Oral Q12H Puja Paz DO   600 mg at 01/07/25 2000    hydrALAZINE (APRESOLINE) injection 10 mg  10 mg Intravenous Q4H PRN Romel Schumacher MD   10 mg at 01/07/25 1944    hydroCHLOROthiazide tablet 25 mg  25 mg Oral Daily Gio Candelario MD, FASN   25 mg at 01/07/25 1046    Insulin Lispro (humaLOG) injection 2-9 Units  2-9 Units Subcutaneous 4x Daily AC & at Bedtime Puja Paz DO   7 Units at 01/07/25 2016    ipratropium-albuterol (DUO-NEB) nebulizer solution 3 mL  3 mL Nebulization Q6H - RT Puja Paz DO   3 mL at 01/08/25 0704    lactulose (CHRONULAC) 10 GM/15ML solution 30 g  30 g Oral TID Kerley, Brian Joseph, DO   30 g at 01/07/25 2023    LORazepam (ATIVAN) tablet 1 mg  1 mg Oral Q1H PRN Kerley, Brian Joseph, DO        Or    LORazepam (ATIVAN) injection 1 mg  1 mg Intravenous Q1H PRN Kerley, Brian Joseph, DO   1 mg at 01/06/25 0818    Or    LORazepam (ATIVAN) tablet 2 mg  2 mg Oral Q1H PRN Kerley, Brian Joseph, DO   2 mg at 01/07/25 0829    Or    LORazepam (ATIVAN) injection 2 mg  2 mg Intravenous Q1H PRN Kerley, Brian Joseph, DO   2 mg at 01/06/25 1119    Or    LORazepam (ATIVAN) injection 2 mg  2 mg Intravenous Q15 Min PRN Kerley, Brian Joseph, DO   2 mg at 01/06/25 1235    Or    LORazepam (ATIVAN) injection 2 mg  2 mg Intramuscular Q15 Min PRN  Kerley, Brian Joseph, DO        Magnesium Standard Dose Replacement - Follow Nurse / BPA Driven Protocol   Not Applicable PRN Kerley, Brian Joseph, DO        methocarbamol (ROBAXIN) tablet 750 mg  750 mg Oral TID PRN Puja Paz DO   750 mg at 01/05/25 2039    methylPREDNISolone sodium succinate (SOLU-Medrol) injection 40 mg  40 mg Intravenous Q12H Kerley, Brian Joseph, DO   40 mg at 01/08/25 0303    nicotine (NICODERM CQ) 21 MG/24HR patch 1 patch  1 patch Transdermal Q24H Puja Paz DO   1 patch at 01/08/25 0544    nicotine polacrilex (NICORETTE) gum 4 mg  4 mg Mouth/Throat Q1H PRN Puja Paz DO        nitroglycerin (NITROSTAT) SL tablet 0.4 mg  0.4 mg Sublingual Q5 Min PRN Puja Paz DO        ondansetron ODT (ZOFRAN-ODT) disintegrating tablet 4 mg  4 mg Oral Q6H PRN Puja Paz DO        Or    ondansetron (ZOFRAN) injection 4 mg  4 mg Intravenous Q6H PRN Puja Paz DO        pantoprazole (PROTONIX) injection 40 mg  40 mg Intravenous Q AM Kerley, Brian Joseph, DO   40 mg at 01/08/25 0543    Pharmacy to Dose ampicillin-sulbactam   Not Applicable Continuous PRN Kerley, Brian Joseph, DO        Pharmacy to Dose enoxaparin (LOVENOX)   Not Applicable Continuous PRN Puja Paz DO        PHENobarbital tablet 32.4 mg  32.4 mg Oral Once Kerley, Brian Joseph, DO        Followed by    [START ON 1/9/2025] PHENobarbital tablet 32.4 mg  32.4 mg Oral Once Kerley, Brian Joseph, DO        rOPINIRole (REQUIP) tablet 3 mg  3 mg Oral Nightly Puja Paz DO   3 mg at 01/07/25 2000    sodium chloride 0.9 % flush 10 mL  10 mL Intravenous PRN Puja Paz DO        sodium chloride 0.9 % flush 10 mL  10 mL Intravenous Q12H Puja Paz DO   10 mL at 01/07/25 2001    sodium chloride 0.9 % flush 10 mL  10 mL Intravenous PRN Puja Paz DO        sodium chloride 0.9 % infusion 40 mL  40 mL Intravenous PRN  Puja aPz DO        spironolactone (ALDACTONE) tablet 25 mg  25 mg Oral Daily Gio Candelario MD, FASN   25 mg at 01/07/25 1046    terazosin (HYTRIN) capsule 10 mg  10 mg Oral Nightly Gio Candelario MD, FASN   10 mg at 01/07/25 2000    thiamine (B-1) injection 200 mg  200 mg Intravenous Q8H Kerley, Brian Joseph, DO   200 mg at 01/08/25 0543    Followed by    [START ON 1/11/2025] thiamine (VITAMIN B-1) tablet 100 mg  100 mg Oral Daily Kerley, Brian Joseph, DO        valsartan (DIOVAN) tablet 320 mg  320 mg Oral Daily Puja Paz DO   320 mg at 01/07/25 0829    ziprasidone (GEODON) injection 20 mg  20 mg Intramuscular Q4H PRN Romel Schumacher MD   20 mg at 01/06/25 1220       amLODIPine, 10 mg, Oral, Daily  ampicillin-sulbactam, 3 g, Intravenous, Q6H  aspirin, 81 mg, Oral, Daily  budesonide, 0.5 mg, Nebulization, BID - RT  carvedilol, 12.5 mg, Oral, BID With Meals  enoxaparin, 60 mg, Subcutaneous, Q12H  folic acid 1 mg in sodium chloride 0.9 % 50 mL IVPB, 1 mg, Intravenous, Daily  [Held by provider] furosemide, 20 mg, Intravenous, BID Diuretics  guaiFENesin, 600 mg, Oral, Q12H  hydroCHLOROthiazide Oral, 25 mg, Oral, Daily  insulin lispro, 2-9 Units, Subcutaneous, 4x Daily AC & at Bedtime  ipratropium-albuterol, 3 mL, Nebulization, Q6H - RT  lactulose, 30 g, Oral, TID  methylPREDNISolone sodium succinate, 40 mg, Intravenous, Q12H  nicotine, 1 patch, Transdermal, Q24H  pantoprazole, 40 mg, Intravenous, Q AM  PHENobarbital, 32.4 mg, Oral, Once   Followed by  [START ON 1/9/2025] PHENobarbital, 32.4 mg, Oral, Once  rOPINIRole, 3 mg, Oral, Nightly  sodium chloride, 10 mL, Intravenous, Q12H  spironolactone, 25 mg, Oral, Daily  terazosin, 10 mg, Oral, Nightly  thiamine (B-1) IV, 200 mg, Intravenous, Q8H   Followed by  [START ON 1/11/2025] thiamine, 100 mg, Oral, Daily  valsartan, 320 mg, Oral, Daily        IV Meds:   dexmedetomidine, 0.2 mcg/kg/hr, Last Rate: 0.6 mcg/kg/hr (01/08/25 0622)  Pharmacy to  "Dose ampicillin-sulbactam,   Pharmacy to Dose enoxaparin (LOVENOX),         Results Reviewed:   I have personally reviewed the results from the time of this admission to 1/8/2025 07:20 EST     Results from last 7 days   Lab Units 01/08/25  0426 01/07/25 0447 01/06/25  0431   SODIUM mmol/L 139 141 141   POTASSIUM mmol/L 3.5 3.5 3.7   CHLORIDE mmol/L 97* 97* 94*   CO2 mmol/L 33.6* 35.0* 39.4*   BUN mg/dL 16 17 15   CREATININE mg/dL 0.59* 0.63* 0.62*   CALCIUM mg/dL 8.6 8.5* 8.5*   GLUCOSE mg/dL 197* 175* 198*       Estimated Creatinine Clearance: 218.8 mL/min (A) (by C-G formula based on SCr of 0.59 mg/dL (L)).                Results from last 7 days   Lab Units 01/08/25  0426 01/07/25 0447 01/06/25  0431 01/05/25  0407 01/04/25  0705   WBC 10*3/mm3 11.57* 11.66* 16.03* 13.31* 15.45*   HEMOGLOBIN g/dL 15.2 15.1 14.3 14.4 14.0   PLATELETS 10*3/mm3 257 285 254 237 245             Brief Urine Lab Results  (Last result in the past 365 days)        Color   Clarity   Blood   Leuk Est   Nitrite   Protein   CREAT   Urine HCG        01/04/25 1235 Orange   Clear   Large (3+)   Negative   Negative   Negative                   No results found for: \"UTPCR\"    Imaging Results (Last 24 Hours)       Procedure Component Value Units Date/Time    XR Chest 1 View [[id number]] Collected: 01/07/25 0740     Updated: 01/07/25 0743    Narrative:      PROCEDURE: XR CHEST 1 VW-     HISTORY: hypoxia f/u; J96.01-Acute respiratory failure with hypoxia     COMPARISON: 1 day prior.     FINDINGS: The heart is normal in size. There is persistent pulmonary  vascular congestion but mildly improved interstitial edema compared to  prior. Right perihilar atelectasis has improved. There is still mild  bibasilar atelectasis or infiltrate. Elevation right hemidiaphragm again  noted.. The mediastinum is unremarkable. There is no pneumothorax.   There are no acute osseous abnormalities. Apical lordotic positioning  noted.        Impression:      Mildly " improved interstitial edema, otherwise stable chest..              This report was signed and finalized on 1/7/2025 7:41 AM by Krysten Rogers MD.                   Assessment / Plan     ASSESSMENT:    Acute respiratory failure with hypoxia    COPD exacerbation    Oliguria: Patient is only 100 cc of urine output overnight, he did receive 40 mg of Lasix IV.   Received IVF challenge w > 3 liters of UO  Urinary retention ; Epstein in place , monitor I/O   Hypercapnic respiratory failure: Patient's pCO2 greater than 90, has been on BiPAP since admission with gradual improvement.  Hypertension: on home regimen, still noted to be high blood pressures.  Obstructive sleep apnea: It is not clear if he was using his BiPAP at home, currently on BiPAP.  Type 2 diabetes: Continue with the sliding scale  Morbid obesity: Complicates all forms of care.      PLAN:  Clinically appears to be stable.  Off-and-on confusion noted.  Details were also discussed with the hospitalist service and or other providers as needed.   Continue with rest of the current treatment plan, and monitor with surveillance labs.  Further recommendations will depend on clinical course of the patient during the current hospitalization.   I have reviewed the copied text to this note, it was edited and the changes made as needed.  It is accurate to the point, when the note was signed today.     Thank you for involving us in the care of Cal Hidalgo Benito Carroll.  Please feel free to call with any questions.    Gio Candelario MD, JULIO  01/08/25  07:20 [facility]    [facility]  [phone]      Part of this note may be an electronic transcription/translation of spoken language to printed text using the Dragon Dictation System.

## 2025-01-09 ENCOUNTER — APPOINTMENT (OUTPATIENT)
Dept: GENERAL RADIOLOGY | Facility: HOSPITAL | Age: 60
End: 2025-01-09
Payer: MEDICARE

## 2025-01-09 LAB
A-A DO2: 208.7 MMHG
ANION GAP SERPL CALCULATED.3IONS-SCNC: 6.5 MMOL/L (ref 5–15)
ARTERIAL PATENCY WRIST A: POSITIVE
ATMOSPHERIC PRESS: 740 MMHG
BASE EXCESS BLDA CALC-SCNC: 14.4 MMOL/L (ref 0–2)
BASOPHILS # BLD AUTO: 0.02 10*3/MM3 (ref 0–0.2)
BASOPHILS NFR BLD AUTO: 0.2 % (ref 0–1.5)
BDY SITE: ABNORMAL
BUN SERPL-MCNC: 14 MG/DL (ref 6–20)
BUN/CREAT SERPL: 20 (ref 7–25)
CALCIUM SPEC-SCNC: 8.2 MG/DL (ref 8.6–10.5)
CHLORIDE SERPL-SCNC: 98 MMOL/L (ref 98–107)
CO2 SERPL-SCNC: 37.5 MMOL/L (ref 22–29)
COHGB MFR BLD: 1.1 % (ref 0–2)
CREAT SERPL-MCNC: 0.7 MG/DL (ref 0.76–1.27)
DEPRECATED RDW RBC AUTO: 45.2 FL (ref 37–54)
EGFRCR SERPLBLD CKD-EPI 2021: 107.5 ML/MIN/1.73
EOSINOPHIL # BLD AUTO: 0.14 10*3/MM3 (ref 0–0.4)
EOSINOPHIL NFR BLD AUTO: 1.2 % (ref 0.3–6.2)
EPAP: 13
ERYTHROCYTE [DISTWIDTH] IN BLOOD BY AUTOMATED COUNT: 13.7 % (ref 12.3–15.4)
GLUCOSE BLDC GLUCOMTR-MCNC: 144 MG/DL (ref 70–130)
GLUCOSE BLDC GLUCOMTR-MCNC: 164 MG/DL (ref 70–130)
GLUCOSE BLDC GLUCOMTR-MCNC: 211 MG/DL (ref 70–130)
GLUCOSE BLDC GLUCOMTR-MCNC: 243 MG/DL (ref 70–130)
GLUCOSE BLDC GLUCOMTR-MCNC: 85 MG/DL (ref 70–130)
GLUCOSE SERPL-MCNC: 170 MG/DL (ref 65–99)
HCO3 BLDA-SCNC: 43.1 MMOL/L (ref 22–28)
HCT VFR BLD AUTO: 44.7 % (ref 37.5–51)
HCT VFR BLD CALC: 45.5 %
HGB BLD-MCNC: 14.4 G/DL (ref 13–17.7)
IMM GRANULOCYTES # BLD AUTO: 0.05 10*3/MM3 (ref 0–0.05)
IMM GRANULOCYTES NFR BLD AUTO: 0.4 % (ref 0–0.5)
INHALED O2 CONCENTRATION: 50 %
IPAP: 18
LYMPHOCYTES # BLD AUTO: 1.17 10*3/MM3 (ref 0.7–3.1)
LYMPHOCYTES NFR BLD AUTO: 10.2 % (ref 19.6–45.3)
Lab: ABNORMAL
Lab: ABNORMAL
MCH RBC QN AUTO: 29 PG (ref 26.6–33)
MCHC RBC AUTO-ENTMCNC: 32.2 G/DL (ref 31.5–35.7)
MCV RBC AUTO: 90.1 FL (ref 79–97)
METHGB BLD QL: 0.5 % (ref 0–1.5)
MODALITY: ABNORMAL
MONOCYTES # BLD AUTO: 0.62 10*3/MM3 (ref 0.1–0.9)
MONOCYTES NFR BLD AUTO: 5.4 % (ref 5–12)
NEUTROPHILS NFR BLD AUTO: 82.6 % (ref 42.7–76)
NEUTROPHILS NFR BLD AUTO: 9.46 10*3/MM3 (ref 1.7–7)
NOTIFIED BY: ABNORMAL
NOTIFIED WHO: ABNORMAL
NRBC BLD AUTO-RTO: 0 /100 WBC (ref 0–0.2)
OXYHGB MFR BLDV: 90.3 % (ref 94–99)
PCO2 BLDA: 70.3 MM HG (ref 35–45)
PCO2 TEMP ADJ BLD: ABNORMAL MM[HG]
PH BLDA: 7.4 PH UNITS (ref 7.3–7.5)
PH, TEMP CORRECTED: ABNORMAL
PLATELET # BLD AUTO: 225 10*3/MM3 (ref 140–450)
PMV BLD AUTO: 9.7 FL (ref 6–12)
PO2 BLDA: 61.7 MM HG (ref 75–100)
PO2 TEMP ADJ BLD: ABNORMAL MM[HG]
POTASSIUM SERPL-SCNC: 3.5 MMOL/L (ref 3.5–5.2)
PROCALCITONIN SERPL-MCNC: 0.08 NG/ML (ref 0–0.25)
RBC # BLD AUTO: 4.96 10*6/MM3 (ref 4.14–5.8)
SAO2 % BLDCOA: 91.8 % (ref 94–100)
SET MECH RESP RATE: 18
SODIUM SERPL-SCNC: 142 MMOL/L (ref 136–145)
VENTILATOR MODE: ABNORMAL
WBC NRBC COR # BLD AUTO: 11.46 10*3/MM3 (ref 3.4–10.8)

## 2025-01-09 PROCEDURE — 94761 N-INVAS EAR/PLS OXIMETRY MLT: CPT

## 2025-01-09 PROCEDURE — 82805 BLOOD GASES W/O2 SATURATION: CPT

## 2025-01-09 PROCEDURE — 94799 UNLISTED PULMONARY SVC/PX: CPT

## 2025-01-09 PROCEDURE — 94660 CPAP INITIATION&MGMT: CPT

## 2025-01-09 PROCEDURE — 97162 PT EVAL MOD COMPLEX 30 MIN: CPT

## 2025-01-09 PROCEDURE — 80048 BASIC METABOLIC PNL TOTAL CA: CPT | Performed by: STUDENT IN AN ORGANIZED HEALTH CARE EDUCATION/TRAINING PROGRAM

## 2025-01-09 PROCEDURE — 82375 ASSAY CARBOXYHB QUANT: CPT

## 2025-01-09 PROCEDURE — 82948 REAGENT STRIP/BLOOD GLUCOSE: CPT | Performed by: FAMILY MEDICINE

## 2025-01-09 PROCEDURE — 63710000001 INSULIN LISPRO (HUMAN) PER 5 UNITS: Performed by: FAMILY MEDICINE

## 2025-01-09 PROCEDURE — 71045 X-RAY EXAM CHEST 1 VIEW: CPT

## 2025-01-09 PROCEDURE — 25010000002 ENOXAPARIN PER 10 MG: Performed by: FAMILY MEDICINE

## 2025-01-09 PROCEDURE — 25010000002 METHYLPREDNISOLONE PER 40 MG: Performed by: INTERNAL MEDICINE

## 2025-01-09 PROCEDURE — 84145 PROCALCITONIN (PCT): CPT | Performed by: INTERNAL MEDICINE

## 2025-01-09 PROCEDURE — 83050 HGB METHEMOGLOBIN QUAN: CPT

## 2025-01-09 PROCEDURE — 99291 CRITICAL CARE FIRST HOUR: CPT | Performed by: INTERNAL MEDICINE

## 2025-01-09 PROCEDURE — 85025 COMPLETE CBC W/AUTO DIFF WBC: CPT | Performed by: STUDENT IN AN ORGANIZED HEALTH CARE EDUCATION/TRAINING PROGRAM

## 2025-01-09 PROCEDURE — 99232 SBSQ HOSP IP/OBS MODERATE 35: CPT | Performed by: FAMILY MEDICINE

## 2025-01-09 PROCEDURE — 97166 OT EVAL MOD COMPLEX 45 MIN: CPT

## 2025-01-09 PROCEDURE — 94664 DEMO&/EVAL PT USE INHALER: CPT

## 2025-01-09 PROCEDURE — 25010000002 LORAZEPAM PER 2 MG: Performed by: STUDENT IN AN ORGANIZED HEALTH CARE EDUCATION/TRAINING PROGRAM

## 2025-01-09 PROCEDURE — 36600 WITHDRAWAL OF ARTERIAL BLOOD: CPT

## 2025-01-09 PROCEDURE — 25010000002 AMPICILLIN-SULBACTAM PER 1.5 G: Performed by: STUDENT IN AN ORGANIZED HEALTH CARE EDUCATION/TRAINING PROGRAM

## 2025-01-09 PROCEDURE — 25010000002 THIAMINE HCL 200 MG/2ML SOLUTION: Performed by: STUDENT IN AN ORGANIZED HEALTH CARE EDUCATION/TRAINING PROGRAM

## 2025-01-09 PROCEDURE — 82948 REAGENT STRIP/BLOOD GLUCOSE: CPT

## 2025-01-09 RX ORDER — PREDNISONE 20 MG/1
40 TABLET ORAL
Status: DISCONTINUED | OUTPATIENT
Start: 2025-01-10 | End: 2025-01-10

## 2025-01-09 RX ORDER — POTASSIUM CHLORIDE 750 MG/1
40 CAPSULE, EXTENDED RELEASE ORAL ONCE
Status: COMPLETED | OUTPATIENT
Start: 2025-01-09 | End: 2025-01-09

## 2025-01-09 RX ORDER — CLONIDINE HYDROCHLORIDE 0.2 MG/1
0.1 TABLET ORAL EVERY 8 HOURS SCHEDULED
Status: DISCONTINUED | OUTPATIENT
Start: 2025-01-09 | End: 2025-01-10 | Stop reason: HOSPADM

## 2025-01-09 RX ORDER — FOLIC ACID 1 MG/1
1 TABLET ORAL DAILY
Status: DISCONTINUED | OUTPATIENT
Start: 2025-01-10 | End: 2025-01-10 | Stop reason: HOSPADM

## 2025-01-09 RX ORDER — LACTULOSE 10 G/15ML
30 SOLUTION ORAL DAILY
Status: DISCONTINUED | OUTPATIENT
Start: 2025-01-10 | End: 2025-01-10 | Stop reason: HOSPADM

## 2025-01-09 RX ORDER — SPIRONOLACTONE 25 MG/1
50 TABLET ORAL DAILY
Status: DISCONTINUED | OUTPATIENT
Start: 2025-01-10 | End: 2025-01-10 | Stop reason: HOSPADM

## 2025-01-09 RX ADMIN — LACTULOSE 30 G: 20 SOLUTION ORAL at 08:20

## 2025-01-09 RX ADMIN — BENZONATATE 100 MG: 100 CAPSULE ORAL at 20:23

## 2025-01-09 RX ADMIN — AMLODIPINE BESYLATE 10 MG: 5 TABLET ORAL at 08:19

## 2025-01-09 RX ADMIN — GUAIFENESIN 600 MG: 600 TABLET, EXTENDED RELEASE ORAL at 20:23

## 2025-01-09 RX ADMIN — IPRATROPIUM BROMIDE AND ALBUTEROL SULFATE 3 ML: 2.5; .5 SOLUTION RESPIRATORY (INHALATION) at 13:23

## 2025-01-09 RX ADMIN — CLONIDINE HYDROCHLORIDE 0.1 MG: 0.2 TABLET ORAL at 20:22

## 2025-01-09 RX ADMIN — POTASSIUM CHLORIDE 40 MEQ: 750 CAPSULE, EXTENDED RELEASE ORAL at 10:30

## 2025-01-09 RX ADMIN — Medication 10 ML: at 20:23

## 2025-01-09 RX ADMIN — HYDROCHLOROTHIAZIDE 25 MG: 25 TABLET ORAL at 08:19

## 2025-01-09 RX ADMIN — IPRATROPIUM BROMIDE AND ALBUTEROL SULFATE 3 ML: 2.5; .5 SOLUTION RESPIRATORY (INHALATION) at 01:16

## 2025-01-09 RX ADMIN — VALSARTAN 320 MG: 80 TABLET, FILM COATED ORAL at 08:19

## 2025-01-09 RX ADMIN — PANTOPRAZOLE SODIUM 40 MG: 40 INJECTION, POWDER, FOR SOLUTION INTRAVENOUS at 05:51

## 2025-01-09 RX ADMIN — INSULIN LISPRO 2 UNITS: 100 INJECTION, SOLUTION INTRAVENOUS; SUBCUTANEOUS at 08:20

## 2025-01-09 RX ADMIN — NICOTINE 1 PATCH: 21 PATCH TRANSDERMAL at 05:54

## 2025-01-09 RX ADMIN — ROPINIROLE HYDROCHLORIDE 3 MG: 1 TABLET, FILM COATED ORAL at 20:23

## 2025-01-09 RX ADMIN — METHOCARBAMOL 750 MG: 500 TABLET ORAL at 20:23

## 2025-01-09 RX ADMIN — INSULIN LISPRO 4 UNITS: 100 INJECTION, SOLUTION INTRAVENOUS; SUBCUTANEOUS at 11:49

## 2025-01-09 RX ADMIN — LORAZEPAM 2 MG: 2 INJECTION INTRAMUSCULAR; INTRAVENOUS at 22:39

## 2025-01-09 RX ADMIN — BUDESONIDE 0.5 MG: 0.5 INHALANT RESPIRATORY (INHALATION) at 07:06

## 2025-01-09 RX ADMIN — METHYLPREDNISOLONE SODIUM SUCCINATE 20 MG: 40 INJECTION, POWDER, FOR SOLUTION INTRAMUSCULAR; INTRAVENOUS at 03:05

## 2025-01-09 RX ADMIN — ARFORMOTEROL TARTRATE 15 MCG: 15 SOLUTION RESPIRATORY (INHALATION) at 07:06

## 2025-01-09 RX ADMIN — ASPIRIN 81 MG CHEWABLE TABLET 81 MG: 81 TABLET CHEWABLE at 08:20

## 2025-01-09 RX ADMIN — IPRATROPIUM BROMIDE AND ALBUTEROL SULFATE 3 ML: 2.5; .5 SOLUTION RESPIRATORY (INHALATION) at 18:36

## 2025-01-09 RX ADMIN — CARVEDILOL 12.5 MG: 6.25 TABLET, FILM COATED ORAL at 17:15

## 2025-01-09 RX ADMIN — SPIRONOLACTONE 25 MG: 25 TABLET, FILM COATED ORAL at 08:20

## 2025-01-09 RX ADMIN — TERAZOSIN HYDROCHLORIDE 10 MG: 5 CAPSULE ORAL at 20:23

## 2025-01-09 RX ADMIN — PHENOBARBITAL 32.4 MG: 32.4 TABLET ORAL at 08:20

## 2025-01-09 RX ADMIN — INSULIN LISPRO 4 UNITS: 100 INJECTION, SOLUTION INTRAVENOUS; SUBCUTANEOUS at 20:24

## 2025-01-09 RX ADMIN — LORAZEPAM 2 MG: 2 INJECTION INTRAMUSCULAR; INTRAVENOUS at 03:07

## 2025-01-09 RX ADMIN — ENOXAPARIN SODIUM 60 MG: 60 INJECTION SUBCUTANEOUS at 17:15

## 2025-01-09 RX ADMIN — DEXMEDETOMIDINE HYDROCHLORIDE 0.3 MCG/KG/HR: 400 INJECTION INTRAVENOUS at 05:52

## 2025-01-09 RX ADMIN — THIAMINE HYDROCHLORIDE 200 MG: 100 INJECTION, SOLUTION INTRAMUSCULAR; INTRAVENOUS at 20:24

## 2025-01-09 RX ADMIN — THIAMINE HYDROCHLORIDE 200 MG: 100 INJECTION, SOLUTION INTRAMUSCULAR; INTRAVENOUS at 05:51

## 2025-01-09 RX ADMIN — THIAMINE HYDROCHLORIDE 200 MG: 100 INJECTION, SOLUTION INTRAMUSCULAR; INTRAVENOUS at 14:21

## 2025-01-09 RX ADMIN — CLONIDINE HYDROCHLORIDE 0.1 MG: 0.2 TABLET ORAL at 10:29

## 2025-01-09 RX ADMIN — AMPICILLIN SODIUM AND SULBACTAM SODIUM 3 G: 2; 1 INJECTION, POWDER, FOR SOLUTION INTRAMUSCULAR; INTRAVENOUS at 03:07

## 2025-01-09 RX ADMIN — BUDESONIDE 0.5 MG: 0.5 INHALANT RESPIRATORY (INHALATION) at 18:36

## 2025-01-09 RX ADMIN — GUAIFENESIN 600 MG: 600 TABLET, EXTENDED RELEASE ORAL at 08:20

## 2025-01-09 RX ADMIN — CARVEDILOL 12.5 MG: 6.25 TABLET, FILM COATED ORAL at 08:19

## 2025-01-09 RX ADMIN — ARFORMOTEROL TARTRATE 15 MCG: 15 SOLUTION RESPIRATORY (INHALATION) at 18:36

## 2025-01-09 RX ADMIN — FOLIC ACID 1 MG: 5 INJECTION, SOLUTION INTRAMUSCULAR; INTRAVENOUS; SUBCUTANEOUS at 10:29

## 2025-01-09 RX ADMIN — ENOXAPARIN SODIUM 60 MG: 60 INJECTION SUBCUTANEOUS at 05:51

## 2025-01-09 RX ADMIN — Medication 10 ML: at 08:20

## 2025-01-09 RX ADMIN — IPRATROPIUM BROMIDE AND ALBUTEROL SULFATE 3 ML: 2.5; .5 SOLUTION RESPIRATORY (INHALATION) at 07:06

## 2025-01-09 NOTE — PLAN OF CARE
Goal Outcome Evaluation:  Plan of Care Reviewed With: patient        Progress: no change  Outcome Evaluation: (S) Pt participated in PT eval this date. Pt reports he lives with his wife and step daughter and was independent at baseline. Pt t/f;d to EOB with Zora and use of trapeze bar. Pt was min A x2 for STS transfer and Rwx. Pt took steps 3ft to chair, but did demonstrate significant weakness in B LE. Pt may benefit from STR depending on improvements in strength. Pt would benefit from PT tx during this inpatient stay.    Anticipated Discharge Disposition (PT): home with assist, home with home health, skilled nursing facility, inpatient rehabilitation facility

## 2025-01-09 NOTE — PLAN OF CARE
Problem: Noninvasive Ventilation Acute  Goal: Effective Unassisted Ventilation and Oxygenation  Outcome: Progressing  Intervention: Monitor and Manage Noninvasive Ventilation  Flowsheets  Taken 1/7/2025 2000 by Joelle Nelson, RN  Airway/Ventilation Management: airway patency maintained  Taken 1/5/2025 1200 by Monse Dorsey RN  NPPV/CPAP Maintenance: full face mask   Goal Outcome Evaluation:

## 2025-01-09 NOTE — CASE MANAGEMENT/SOCIAL WORK
Case Management/Social Work    Patient Name:  Cal Oliver Jr.  YOB: 1965  MRN: [id number]  Admit Date:  1/1/2025        [facility] left message for [facility] in [place] regarding referral. SW Following.     11:45 EST [facility] stated she did not get. PEPE confirmed fax and resent. Pt currently on 10L O2 HF. SW Following.     13:58 EST [facility] spoke with [facility] who states they can offer bed. And will not need a precert. PEPE updated CM to speak with pt/spouse.     Electronically signed by:  SHARDA Lowery  01/09/25 11:42 EST

## 2025-01-09 NOTE — THERAPY EVALUATION
Patient Name: Cal Oliver Jr.  : 1965    MRN: [id number]                              Today's Date: 2025       Admit Date: 2025    Visit Dx:     ICD-10-CM ICD-9-CM   1. Acute hypoxic respiratory failure  J96.01 518.81   2. SHUKRI treated with BiPAP  G47.33 327.23     Patient Active Problem List   Diagnosis    Asthma    Mild chronic obstructive pulmonary disease    Dental abscess    Depression    Diarrhea    Gastroesophageal reflux disease    Hypercholesterolemia    Hypertension    Hypokalemia    Hypothyroidism    Insomnia    Muscle pain    Nausea and vomiting    SHUKRI on CPAP    Restless legs syndrome    Type 2 diabetes mellitus    Vitamin D deficiency    Abnormal liver function tests    Chronic back pain    Morbid obesity    Encounter for screening colonoscopy    Routine general medical examination at a health care facility    Chronic pain of right knee    Sleep apnea    Osteoarthritis of both knees    Acute respiratory failure with hypoxia    COPD exacerbation     Past Medical History:   Diagnosis Date    Asthma     Chronic back pain     Diabetes mellitus     High blood pressure     Hyperlipidemia     Migraine     Nausea and vomiting     Sleep apnea      Past Surgical History:   Procedure Laterality Date    BACK SURGERY      GALLBLADDER SURGERY      KNEE SURGERY        General Information       Row Name 25 1250          Physical Therapy Time and Intention    Document Type evaluation  -MS     Mode of Treatment physical therapy  -MS       Row Name 25 1250          General Information    Patient Profile Reviewed yes  -MS     Prior Level of Function independent:;all household mobility;community mobility  -MS     Existing Precautions/Restrictions fall;oxygen therapy device and L/min  -MS     Barriers to Rehab medically complex;previous functional deficit  -MS       Row Name 25 1250          Living Environment    People in Home spouse  -MS       Row Name 25 1250          Home  Main Entrance    Number of Stairs, Main Entrance none  -MS       Row Name 01/09/25 1250          Cognition    Orientation Status (Cognition) oriented x 4;verbal cues/prompts needed for orientation  -MS       Row Name 01/09/25 1250          Safety Issues/Impairments Affecting Functional Mobility    Safety Issues Affecting Function (Mobility) safety precautions follow-through/compliance;awareness of need for assistance;insight into deficits/self-awareness  -MS     Impairments Affecting Function (Mobility) balance;endurance/activity tolerance;strength;pain;postural/trunk control  -MS               User Key  (r) = Recorded By, (t) = Taken By, (c) = Cosigned By      Initials Name Provider Type    MS Josh Lopez, PT Physical Therapist                   Mobility       Row Name 01/09/25 1250          Bed Mobility    Bed Mobility supine-sit  -MS     Supine-Sit [place] (Bed Mobility) minimum assist (75% patient effort)  -MS     Assistive Device (Bed Mobility) bed rails;head of bed elevated;overhead trapeze  -MS       Row Name 01/09/25 1250          Bed-Chair Transfer    Bed-Chair [place] (Transfers) minimum assist (75% patient effort);2 person assist  -MS     Assistive Device (Bed-Chair Transfers) walker, front-wheeled  -MS       Row Name 01/09/25 1250          Sit-Stand Transfer    Sit-Stand [place] (Transfers) minimum assist (75% patient effort);2 person assist  -MS     Assistive Device (Sit-Stand Transfers) walker, front-wheeled  -MS       Row Name 01/09/25 1250          Gait/Stairs (Locomotion)    [place] Level (Gait) minimum assist (75% patient effort);2 person assist  -MS     Assistive Device (Gait) walker, front-wheeled  -MS     Patient was able to Ambulate yes  -MS     Distance in Feet (Gait) 3  -MS     Deviations/Abnormal Patterns (Gait) festinating/shuffling;gait speed decreased;base of support, wide  -MS     Bilateral Gait Deviations knee buckling bilaterally  -MS               User Key   (r) = Recorded By, (t) = Taken By, (c) = Cosigned By      Initials Name Provider Type    MS Josh Lopez, PT Physical Therapist                   Obj/Interventions       Row Name 01/09/25 1251          Range of Motion Comprehensive    General Range of Motion bilateral lower extremity ROM WFL  -MS       Row Name 01/09/25 1251          Strength Comprehensive (MMT)    General Manual Muscle Testing (MMT) Assessment lower extremity strength deficits identified  -MS     Comment, General Manual Muscle Testing (MMT) Assessment B LE 4-/5  -MS       Row Name 01/09/25 1251          Sensory Assessment (Somatosensory)    Sensory Assessment (Somatosensory) sensation intact  -MS               User Key  (r) = Recorded By, (t) = Taken By, (c) = Cosigned By      Initials Name Provider Type    MS Josh Lopez, PT Physical Therapist                   Goals/Plan       Row Name 01/09/25 1258          Gait Training Goal 1 (PT)    Activity/Assistive Device (Gait Training Goal 1, PT) gait (walking locomotion);assistive device use  -MS     [place] Level (Gait Training Goal 1, PT) standby assist  -MS     Distance (Gait Training Goal 1, PT) 200ft  -MS     Time Frame (Gait Training Goal 1, PT) long term goal (LTG);2 weeks  -MS       Row Name 01/09/25 1258          Patient Education Goal (PT)    Activity (Patient Education Goal, PT) ther exer x15 reps ea  -MS     [place]/Cues/Accuracy (Memory Goal 2, PT) demonstrates adequately  -MS     Time Frame (Patient Education Goal, PT) short term goal (STG);1 week  -MS       Row Name 01/09/25 1258          Therapy Assessment/Plan (PT)    Planned Therapy Interventions (PT) balance training;bed mobility training;gait training;home exercise program;neuromuscular re-education;ROM (range of motion);stair training;strengthening;patient/family education;transfer training  -MS               User Key  (r) = Recorded By, (t) = Taken By, (c) = Cosigned By      Initials Name Provider Type    MS  Josh Lopez, PT Physical Therapist                   Clinical Impression       Row Name 01/09/25 1252          Pain    Pretreatment Pain Rating 8/10  -MS     Posttreatment Pain Rating 8/10  -MS     Pain Location back  -MS     Pain Side/Orientation lower  -MS     Response to Pain Interventions activity participation with tolerable pain  -MS       Row Name 01/09/25 1252          Plan of Care Review    Plan of Care Reviewed With patient  -MS     Progress no change  -MS     Outcome Evaluation Pt participated in PT eval this date. Pt reports he lives with his wife and step daughter and was independent at baseline. Pt t/f;d to EOB with Zora and use of trapeze bar. Pt was min A x2 for STS transfer and Rwx. Pt took steps 3ft to chair, but did demonstrate significant weakness in B LE. Pt may benefit from STR depending on improvements in strength. Pt would benefit from PT tx during this inpatient stay.   -MS       Row Name 01/09/25 1252          Therapy Assessment/Plan (PT)    Patient/Family Therapy Goals Statement (PT) to go home  -MS     Rehab Potential (PT) good  -MS     Criteria for Skilled Interventions Met (PT) yes;meets criteria  -MS     Therapy Frequency (PT) 5 times/wk  -MS       Row Name 01/09/25 1252          Vital Signs    Pre SpO2 (%) 95  -MS     O2 Delivery Pre Treatment hi-flow  -MS     O2 Delivery Intra Treatment hi-flow  -MS     Post SpO2 (%) 92  -MS     O2 Delivery Post Treatment hi-flow  -MS     Pre Patient Position Supine  -MS     Intra Patient Position Standing  -MS     Post Patient Position Sitting  -MS       Row Name 01/09/25 1252          Positioning and Restraints    Pre-Treatment Position in bed  -MS     Post Treatment Position chair  -MS     In Chair sitting;call light within reach;encouraged to call for assist;exit alarm on;notified nsg  -MS               User Key  (r) = Recorded By, (t) = Taken By, (c) = Cosigned By      Initials Name Provider Type    MS Josh Lopez, PT Physical  Therapist                   Outcome Measures       Row Name 01/09/25 1258 01/09/25 0800       How much help from another person do you currently need...    Turning from your back to your side while in flat bed without using bedrails? 3  -MS 4  -AH    Moving from lying on back to sitting on the side of a flat bed without bedrails? 3  -MS 3  -AH    Moving to and from a bed to a chair (including a wheelchair)? 3  -MS 3  -AH    Standing up from a chair using your arms (e.g., wheelchair, bedside chair)? 3  -MS 2  -AH    Climbing 3-5 steps with a railing? 2  -MS 2  -AH    To walk in hospital room? 1  -MS 1  -AH    AM-PAC 6 Clicks Score (PT) 15  -MS 15  -AH      Row Name 01/09/25 1229          Functional Assessment    Outcome Measure Options AM-PAC 6 Clicks Daily Activity (OT)  -SD               User Key  (r) = Recorded By, (t) = Taken By, (c) = Cosigned By      Initials Name Provider Type    SD Marie Watts OT Occupational Therapist    Josh Anderson, PT Physical Therapist     Eric Jha, RN Registered Nurse                                 Physical Therapy Education       Title: PT OT SLP Therapies (In Progress)       Topic: Physical Therapy (In Progress)       Point: Mobility training (Done)       Learning Progress Summary            Patient Acceptance, E, VU by MS at 1/9/2025 1259    Comment: importance of mobility                      Point: Home exercise program (Done)       Learning Progress Summary            Patient Acceptance, E, VU by MS at 1/9/2025 1259    Comment: importance of mobility                      Point: Body mechanics (Not Started)       Learner Progress:  Not documented in this visit.              Point: Precautions (Not Started)       Learner Progress:  Not documented in this visit.                              User Key       Initials Effective Dates Name Provider Type Discipline    MS 08/22/23 -  Josh Lopez, PT Physical Therapist PT                  PT Recommendation and  Plan  Planned Therapy Interventions (PT): balance training, bed mobility training, gait training, home exercise program, neuromuscular re-education, ROM (range of motion), stair training, strengthening, patient/family education, transfer training  Progress: no change  Outcome Evaluation: (S) Pt participated in PT eval this date. Pt reports he lives with his wife and step daughter and was independent at baseline. Pt t/f;d to EOB with Zora and use of trapeze bar. Pt was min A x2 for STS transfer and Rwx. Pt took steps 3ft to chair, but did demonstrate significant weakness in B LE. Pt may benefit from STR depending on improvements in strength. Pt would benefit from PT tx during this inpatient stay.     Time Calculation:   PT Evaluation Complexity  History, PT Evaluation Complexity: 1-2 personal factors and/or comorbidities  Examination of Body Systems (PT Eval Complexity): total of 3 or more elements  Clinical Presentation (PT Evaluation Complexity): evolving  Clinical Decision Making (PT Evaluation Complexity): moderate complexity  Overall Complexity (PT Evaluation Complexity): moderate complexity     PT Charges       Row Name 01/09/25 1259             Time Calculation    Start Time 1000  -MS      PT Received On 01/09/25  -MS      PT Goal Re-Cert Due Date 01/19/25  -MS         Untimed Charges    PT Eval/Re-eval Minutes 57  -MS         Total Minutes    Untimed Charges Total Minutes 57  -MS       Total Minutes 57  -MS                User Key  (r) = Recorded By, (t) = Taken By, (c) = Cosigned By      Initials Name Provider Type    Josh Anderson, PT Physical Therapist                  Therapy Charges for Today       Code Description Service Date Service Provider Modifiers Qty    [id number]  PT EVAL MOD COMPLEXITY 4 1/9/2025 Josh Lopez, PT GP 1            PT G-Codes  Outcome Measure Options: AM-PAC 6 Clicks Daily Activity (OT)  AM-PAC 6 Clicks Score (PT): 15  AM-PAC 6 Clicks Score (OT): 13  PT Discharge  Summary  Anticipated Discharge Disposition (PT): home with assist, home with home health, skilled nursing facility, inpatient rehabilitation facility    Josh Lopez, PT  1/9/2025

## 2025-01-09 NOTE — PROGRESS NOTES
"  CC: Acute Respiratory Failure.     S: Currently on NC  therapy.  Awake and alert. Still intermittent confusion at times.  Was having visual disturbances where he was seeing color blobs instead of people yesterday, but this has resolved currently.  Has been wearing BIPAP at home and [facility] is the DME and he was able to recall that.  He states he has not drank in 3 years, but then admitted he still drinks some.    ROS: Denies fevers/chills/cough.     O:Vital signs reviewed. On 10L NC    BP (!) 183/111   Pulse 84   Temp 98.1 °F (36.7 °C) (Oral)   Resp 21   Ht 180.3 cm (71\")   Wt (!) 173 kg (381 lb 9.9 oz)   SpO2 92%   BMI 53.22 kg/m²     Temp (24hrs), Av.2 °F (36.8 °C), Min:97.9 °F (36.6 °C), Max:98.9 °F (37.2 °C)      I & Os reviewed.   Intake/Output         25 0700 - 25 0659    Intake (ml) 1325.5    Output (ml) 6300    Net (ml) -4974.5    Last Weight 173 kg (381 lb 9.9 oz)            Net IO Since Admission: -15,120.15 mL [25 0829]    General/Constitutional: Awake and aware, answering questions appropriately during my interview   eyes: Pupils equal, nonicteric sclera  Neck: Supple without obvious JVD.   Cardiovascular: S1 + S2.  No murmurs noted   lungs/Respiratory: Good air movement with minimal scattered wheezing and crackles   GI/Abdomen: Obese but soft. Bowel sounds present, no obvious tenderness to palpation  Musculoskeletal/Extremities: Trace edema noted.  No cyanosis or clubbing appreciated.    Neurologic: Follow commands, moving all 4 extremities spontaneously   psych: Awake and aware, appeared anxious at times      Labs: Reviewed.   Results from last 7 days   Lab Units 25  0435 25  0426 25  0447 25  0431 25  0407   WBC 10*3/mm3 11.46* 11.57* 11.66* 16.03* 13.31*   HEMOGLOBIN g/dL 14.4 15.2 15.1 14.3 14.4   HEMATOCRIT % 44.7 47.4 47.3 45.0 46.3   PLATELETS 10*3/mm3 225 257 285 254 237   NEUTROPHIL % % 82.6* 81.8* 82.1* 82.0* 88.7*   NEUTROS ABS " "10*3/mm3 9.46* 9.46* 9.57* 13.15* 11.82*   EOSINOPHIL % % 1.2 0.6 0.2* 0.1* 0.2*   EOS ABS 10*3/mm3 0.14 0.07 0.02 0.01 0.02   LYMPHOCYTE % % 10.2* 11.8* 11.2* 9.0* 5.8*   LYMPHS ABS 10*3/mm3 1.17 1.37 1.31 1.44 0.77       Lab Results   Component Value Date    PROCALCITO 0.08 01/09/2025    PROCALCITO 0.05 01/06/2025    PROCALCITO 0.04 01/05/2025       No results found for: \"CRP\"    No results found for: \"SEDRATE\"    Lab Results   Component Value Date    PROBNP 111.6 01/01/2025       Results from last 7 days   Lab Units 01/09/25  0435 01/08/25  0426 01/07/25  0447   SODIUM mmol/L 142 139 141   POTASSIUM mmol/L 3.5 3.5 3.5   CHLORIDE mmol/L 98 97* 97*   CO2 mmol/L 37.5* 33.6* 35.0*   BUN mg/dL 14 16 17   CREATININE mg/dL 0.70* 0.59* 0.63*   CALCIUM mg/dL 8.2* 8.6 8.5*   ANION GAP mmol/L 6.5 8.4 9.0   GLUCOSE mg/dL 170* 197* 175*             Lab Results   Component Value Date    TSH 2.790 01/11/2024    TSH 2.460 09/25/2019    TSH 1.410 09/04/2018       Lab Results   Component Value Date    FREET4 1.03 01/11/2024    FREET4 0.90 (L) 09/25/2019    FREET4 1.00 09/04/2018             Lab Results   Component Value Date    CKTOTAL 163 12/24/2014    CKTOTAL 169 12/24/2014    CKTOTAL 214 (H) 12/23/2014       No components found for: \"HSTROPT\"    Lab Results   Component Value Date    TROPONINT 24 (H) 01/01/2025    TROPONINT 24 (H) 01/01/2025       Lab Results   Component Value Date    DDIMER 368 12/23/2014       Lab Results   Component Value Date    LIPASE 22 01/11/2024    LIPASE 65 03/20/2015    LIPASE 26 12/23/2014       Brief Urine Lab Results  (Last result in the past 365 days)        Color   Clarity   Blood   Leuk Est   Nitrite   Protein   CREAT   Urine HCG        01/04/25 1235 Orange   Clear   Large (3+)   Negative   Negative   Negative                     Micro: As of January 9, 2025   No results found for: \"RESPCX\"  No results found for: \"BCIDPCR\"  Lab Results   Component Value Date    BLOODCX No growth at 2 days " "01/06/2025    BLOODCX No growth at 2 days 01/06/2025    BLOODCX No growth at 5 days 01/01/2025    BLOODCX No growth at 5 days 01/01/2025     No results found for: \"URINECX\"  No results found for: \"MRSACX\"  Lab Results   Component Value Date    MRSAPCR No MRSA Detected 01/06/2025     No results found for: \"URCX\"  No components found for: \"LOWRESPCF\"  No results found for: \"THROATCX\"  No results found for: \"CULTURES\"  No components found for: \"STREPBCX\"  No results found for: \"STREPPNEUAG\"  No results found for: \"LEGIONELLA\"  No results found for: \"LEGANTIGENUR\"  No results found for: \"MYCOPLASCX\"  No results found for: \"GCCX\"  No results found for: \"WOUNDCX\"  No results found for: \"BODYFLDCX\"    No results found for: \"FLU\"    Lab Results   Component Value Date    ADENOVIRUS Not Detected 01/01/2025     Lab Results   Component Value Date    ES557Z Not Detected 01/01/2025     Lab Results   Component Value Date    CVHKU1 Not Detected 01/01/2025     Lab Results   Component Value Date    CVNL63 Not Detected 01/01/2025     Lab Results   Component Value Date    CVOC43 Not Detected 01/01/2025     Lab Results   Component Value Date    HUMETPNEVS Not Detected 01/01/2025     Lab Results   Component Value Date    HURVEV Not Detected 01/01/2025     Lab Results   Component Value Date    FLUBPCR Not Detected 01/01/2025     Lab Results   Component Value Date    PARAINFLUE Not Detected 01/01/2025     Lab Results   Component Value Date    PARAFLUV2 Not Detected 01/01/2025     Lab Results   Component Value Date    PARAFLUV3 Not Detected 01/01/2025     Lab Results   Component Value Date    PARAFLUV4 Not Detected 01/01/2025     Lab Results   Component Value Date    BPERTPCR Not Detected 01/01/2025     No results found for: \"[id number]\"  Lab Results   Component Value Date    CPNEUPCR Not Detected 01/01/2025     Lab Results   Component Value Date    MPNEUMO Not Detected 01/01/2025     Lab Results   Component Value Date    FLUAPCR Not " "Detected 01/01/2025     No results found for: \"FLUAH3\"  No results found for: \"FLUAH1\"  Lab Results   Component Value Date    RSV Not Detected 01/01/2025     Lab Results   Component Value Date    BPARAPCR Not Detected 01/01/2025       COVID 19:  Lab Results   Component Value Date    COVID19 Not Detected 01/01/2025           ABG:   Recent Labs     01/06/25  0747 01/07/25  0534 01/09/25  0717   PHART 7.442 7.452 7.396   HXU5EPR 67.4* 58.7* 70.3*   PO2ART 61.8* 61.4* 61.7*   WZE8MOU 46.0* 40.9* 43.1*   BASEEXCESS 17.9* 13.8* 14.4*     Lab Results   Component Value Date    LACTATE 0.8 01/06/2025    LACTATE 1.2 01/01/2025         Echo: Results for orders placed during the hospital encounter of 01/01/25    Adult Transthoracic Echo Complete w/ Color, Spectral and Contrast if necessary per protocol    Interpretation Summary    Left ventricular systolic function is normal. Calculated left ventricular EF = 61.6% Left ventricular ejection fraction appears to be 61 - 65%.    Left ventricular wall thickness is consistent with mild concentric hypertrophy.    Left ventricular diastolic function is consistent with (grade II w/high LAP) pseudonormalization.    The left atrial cavity is moderate to severely dilated.        dexmedetomidine, 0.2 mcg/kg/hr, Last Rate: 0.3 mcg/kg/hr (01/09/25 0552)  Pharmacy to Dose enoxaparin (LOVENOX),           CXRay: Latest imaging study was reviewed personally.   Imaging Results (Last 24 Hours)       Procedure Component Value Units Date/Time    XR Chest 1 View [[id number]] Collected: 01/09/25 0637     Updated: 01/09/25 0640    Narrative:      PROCEDURE: XR CHEST 1 VW-     HISTORY: Resp Failure.; J96.01-Acute respiratory failure with hypoxia,  shortness of breath     COMPARISON: One day earlier     FINDINGS: Pulmonary vascular congestion is noted with mild interstitial  edema. There is minimal right basilar atelectasis. The lungs fields are  unchanged. There is no pneumothorax.     The cardiac " silhouette is stable.       Impression:      No significant change.        This report was signed and finalized on 1/9/2025 6:38 AM by Olvin Mcbride MD.                 Assessment & Recommendations/Plan:   1.  Acute Respiratory Failure and chronic respiratory acidosis  Continues to require BiPAP nightly and high flow nasal cannula while awake   We will continue to wean/titrate FiO2 as tolerated  ABG this a.m. shows compensated chronic hypercapnic respiratory failure.  Currently on appropriate BiPAP settings and consistent with those that are done at home.  I have gone ahead and ordered BiPAP for discharge as I believe his machine is about 6 to 7 years old.  I will also try to use lower oxygen supplementation on high flow nasal cannula, when he is off the BiPAP.  Will continue to repeat ABG/chest x-ray when clinically indicated  Continues to have compensated respiratory acidosis  Continues on BiPAP 18/13 at 50% FiO2    2.  Confusion/delirium  Appears to be clinically improving, but suspect some of this may be related to ICU psychosis/delirium.  It is also suspected that patient drinks heavier than he admits to.  Precedex has significantly improved the symptoms as well.  Will continue to monitor.  Encouraged patient to keep lights on during the day and avoid naps if at all possible to help improve delirium.  Will also decrease steroid dose to help minimize the impact it may be having.      3.  Morbid obesity  Does complicate all aspects of care    4.  COPD?  On Pulmicort, Brovana and Steffanybs.  Will need PFTs as an outpatient to verify COPD as well as assess for severity.  Switch to oral prednisone as he is clinically improving and plan for 3 days.  Suspect his prednisone is contributing to his delirium and confusion.    5.  Obstructive sleep apnea/obesity hypoventilation syndrome  DME is [employer].  Orders written for BiPAP as an outpatient with appropriate settings at discharge.    6.?  Pneumonia/atelectasis  Suspect  he had an element of aspiration, but procalcitonin level remains normal.  Would recommend very short course of antibiotics.  Temp (24hrs), Av.2 °F (36.8 °C), Min:97.9 °F (36.6 °C), Max:98.9 °F (37.2 °C)    Lab Results   Component Value Date    PROCALCITO 0.08 2025    PROCALCITO 0.05 2025    PROCALCITO 0.04 2025     Lab Results   Component Value Date    WBC 11.46 (H) 2025    WBC 11.57 (H) 2025    WBC 11.66 (H) 2025       7.  Renal/Electrolytes/Fluid status  Nephrology following  Lab Results   Component Value Date    BUN 14 2025    BUN 16 2025    BUN 17 2025    CREATININE 0.70 (L) 2025    CREATININE 0.59 (L) 2025    CREATININE 0.63 (L) 2025     Lab Results   Component Value Date     2025     2025     2025    K 3.5 2025    K 3.5 2025    K 3.5 2025     Net IO Since Admission: -15,120.15 mL [25 0829]  Lab Results   Component Value Date    PROBNP 111.6 2025     8.  Hematologic  Lab Results   Component Value Date    HGB 14.4 2025    HGB 15.2 2025    HGB 15.1 2025     Lab Results   Component Value Date     2025     2025     2025     Lab Results   Component Value Date    INR 0.94 2024     9.  GI   Nutrition per Dietician.   GI prophylaxis per admitting attending.    10.  DVT prophylaxis  Per admitting attending.    Critical Care time spent in direct patient care: 35 minutes including high complexity decision making to assess, manipulate, and support vital organ system failure in this individual who has impairment of one or more vital organ systems such that there is a high probability of imminent or life threatening deterioration in the patient’s condition.  This time includes multiple reassessments throughout the day, if needed and as appropriate.  This time excludes other billable procedures. Time does include preparation  of documents, review of old records, coordinating care with other providers and direct bedside care.    I have discussed patient's overall clinical status with Puja Paz, * especially with regards to respiratory status, CT scan findings and BiPAP.      Plan was also discussed with nursing staff, as necessary.     This document was electronically signed by Fadumo Liu MD on 01/09/25 at 08:29 EST      Dictated utilizing Dragon dictation.

## 2025-01-09 NOTE — PROGRESS NOTES
[facility]    Name:  Cal Oliver Jr.   Age:  59 y.o.  Sex:  male  :  1965  MRN:  [id number]   Visit Number:  [id number]  Admission Date:  2025  Date Of Service:  25  Primary Care Physician:  Woo Betancourt MD     LOS: 8 days :    Chief Complaint:      Shortness of air    Subjective:    Patient seen this morning.  He is alert and oriented, he does still have some intermittent confusion.  Has been requiring Precedex overnight but currently off this afternoon.  He is going to try to work with therapy today.  Will remove his Epstein catheter.    Hospital Course:    Morbidly obese 59-year-old history of chronic tobacco abuse, uncontrolled high blood pressure, chronic back pain, diabetes, sleep apnea with home BiPAP use, who presented from home with complaints of shortness of breath.  Notes symptoms over the last 3 to 4 days, worsening.  Not been able to catch his breath with exertion, lying flat.  Noticed some swelling.  Has not been checking blood pressure.  No chest pain or palpitations.  Continues to smoke.  Unknown sick contacts.     In the ER he was hypertensive, heart rate in the 90s, afebrile, and hypoxic on room air.  CMP unremarkable.  proBNP of 110.  Troponins mildly elevated.  White count of 15 hemoglobin 14 platelets of 256.  Normal procalcitonin.  Negative flu COVID testing.  Chest x-ray with some interstitial edema.  Patient given DuoNeb, Solu-Medrol, Lasix, started on BiPAP.  Will admit for workup    Patient unfortunately did develop evidence of worsening hypercapnia, hypoxia, decreased mental status.  He was moved to the ICU.  He continue treatment for COPD exacerbation with Pulmicort, DuoNebs, Solu-Medrol.  He was started on Unasyn due to concern for possible aspiration event.  He has been on intermittent diuretic therapy, echocardiogram with grade 2 diastolic dysfunction noted.  He did have urinary retention and is also been seen by  nephrology.    Patient appears to be slowly improving.  There was concern for possible alcohol withdrawal as he was requiring high amounts of Precedex and Ativan, with subsequent started on phenobarbital.  He did admit to cocaine use but not alcohol use.  This will need to be assessed more when he is alert.    Review of Systems:     All systems were reviewed and negative except as mentioned in subjective, assessment and plan.    Vital Signs:    Temp:  [97.4 °F (36.3 °C)-98.9 °F (37.2 °C)] 97.4 °F (36.3 °C)  Heart Rate:  [59-98] 92  Resp:  [20-32] 20  BP: ()/() 152/79    Intake and output:    I/O last 3 completed shifts:  In: 2044.5 [P.O.:620; I.V.:704.5; IV Piggyback:720]  Out: 7825 [Urine:7825]  I/O this shift:  In: 840 [P.O.:840]  Out: 1450 [Urine:1450]    Physical Examination:    General Appearance:  Awake and alert   Head:  Atraumatic and normocephalic.   Eyes: Conjunctivae and sclerae normal, no icterus. No pallor.   Throat: No oral lesions, no thrush, oral mucosa moist.   Neck: Supple, trachea midline, no thyromegaly.   Lungs:   Breath sounds diminished, high flow oxygen in place   Heart:  Normal S1 and S2, murmur, no gallop, no rub. No JVD.   Abdomen:   Normal bowel sounds, no masses, no organomegaly. Soft, nontender, nondistended, no rebound tenderness.  Epstein catheter in place   Extremities: Supple, 1-2+ edema, no cyanosis, no clubbing.   Skin: Warm.   Neurologic: Awake and alert.  No focal deficits.  Moves extremities albeit weak     Laboratory results:    Results from last 7 days   Lab Units 01/09/25  0435 01/08/25  0426 01/07/25  0447   SODIUM mmol/L 142 139 141   POTASSIUM mmol/L 3.5 3.5 3.5   CHLORIDE mmol/L 98 97* 97*   CO2 mmol/L 37.5* 33.6* 35.0*   BUN mg/dL 14 16 17   CREATININE mg/dL 0.70* 0.59* 0.63*   CALCIUM mg/dL 8.2* 8.6 8.5*   GLUCOSE mg/dL 170* 197* 175*     Results from last 7 days   Lab Units 01/09/25  0435 01/08/25  0426 01/07/25  0447   WBC 10*3/mm3 11.46* 11.57* 11.66*    HEMOGLOBIN g/dL 14.4 15.2 15.1   HEMATOCRIT % 44.7 47.4 47.3   PLATELETS 10*3/mm3 225 257 285               Results from last 7 days   Lab Units 01/06/25  0813 01/06/25  0805   BLOODCX  No growth at 3 days No growth at 3 days     Recent Labs     01/06/25  0747 01/07/25  0534 01/09/25  0717   PHART 7.442 7.452 7.396   CRE2KOT 67.4* 58.7* 70.3*   PO2ART 61.8* 61.4* 61.7*   EOD2AKY 46.0* 40.9* 43.1*   BASEEXCESS 17.9* 13.8* 14.4*      I have reviewed the patient's laboratory results.    Radiology results:    XR Chest 1 View    Result Date: 1/9/2025  PROCEDURE: XR CHEST 1 VW-  HISTORY: Resp Failure.; J96.01-Acute respiratory failure with hypoxia, shortness of breath  COMPARISON: One day earlier  FINDINGS: Pulmonary vascular congestion is noted with mild interstitial edema. There is minimal right basilar atelectasis. The lungs fields are unchanged. There is no pneumothorax.  The cardiac silhouette is stable.      Impression: No significant change.   This report was signed and finalized on 1/9/2025 6:38 AM by Olvin Mcbride MD.     I have reviewed the patient's radiology reports.    Medication Review:     I have reviewed the patient's active and prn medications.     Problem List:      Acute respiratory failure with hypoxia    COPD exacerbation      Assessment:    COPD with acute exacerbation, POA  Acute hypoxic/hypercapnic respiratory failure  Acute urinary retention  Possible diastolic heart failure, workup pending, POA  Hypertensive urgency, POA  Chronic tobacco abuse  Type 2 diabetes  Morbid obesity  Obstructive sleep apnea    Plan:  Acute respiratory failure with hypoxia and hypercapnia  COPD exacerbation  Acute exacerbation of heart failure  Has been on bronchodilators, steroids, BiPAP.  Given Unasyn due to possible aspiration.  Had been on diuretic therapy with significant urine output.  Pulmonology is following may need BiPAP at home  Echo with grade 2 diastolic dysfunction  Oliguria  Urinary retention  Epstein  catheter placed due to urinary retention.  Nephrology consultation, recommendations appreciated.  May try a voiding trial today  Encephalopathy  Alcohol use disorder, suspected  Alcohol withdrawal, suspected  Per prior provider, concern for withdrawal symptoms.  Was requiring max doses of Precedex and Ativan, was started on phenobarbital.  He has finished this.  Continues to have Precedex on board.  Steroids may be contributing to the confusion.    Chronic: Type 2 diabetes, hypertension, obesity BMI 51, SHUKRI.    Continue ICU treatment today.  Will plan on looking into LTACH at this point.    DVT Prophylaxis: Enoxaparin   Code Status: Full  Diet: Diabetic  Discharge Plan: TBJIM Paz DO  01/09/25  16:58 EST

## 2025-01-09 NOTE — THERAPY EVALUATION
Patient Name: Cal Oliver Jr.  : 1965    MRN: [id number]                              Today's Date: 2025       Admit Date: 2025    Visit Dx:     ICD-10-CM ICD-9-CM   1. Acute hypoxic respiratory failure  J96.01 518.81   2. SHUKRI treated with BiPAP  G47.33 327.23     Patient Active Problem List   Diagnosis    Asthma    Mild chronic obstructive pulmonary disease    Dental abscess    Depression    Diarrhea    Gastroesophageal reflux disease    Hypercholesterolemia    Hypertension    Hypokalemia    Hypothyroidism    Insomnia    Muscle pain    Nausea and vomiting    SHUKRI on CPAP    Restless legs syndrome    Type 2 diabetes mellitus    Vitamin D deficiency    Abnormal liver function tests    Chronic back pain    Morbid obesity    Encounter for screening colonoscopy    Routine general medical examination at a health care facility    Chronic pain of right knee    Sleep apnea    Osteoarthritis of both knees    Acute respiratory failure with hypoxia    COPD exacerbation     Past Medical History:   Diagnosis Date    Asthma     Chronic back pain     Diabetes mellitus     High blood pressure     Hyperlipidemia     Migraine     Nausea and vomiting     Sleep apnea      Past Surgical History:   Procedure Laterality Date    BACK SURGERY      GALLBLADDER SURGERY      KNEE SURGERY        General Information       Row Name 25 1218          OT Time and Intention    Subjective Information complains of;weakness;pain  -SD     Document Type evaluation  -SD     Mode of Treatment occupational therapy  -SD     Patient Effort good  -SD     Symptoms Noted During/After Treatment none  -SD       Row Name 25 1218          General Information    Patient Profile Reviewed yes  -SD     Prior Level of Function independent:;all household mobility;community mobility;ADL's;driving;cooking  -SD     Existing Precautions/Restrictions fall;oxygen therapy device and L/min  -SD     Barriers to Rehab medically complex;previous  functional deficit;physical barrier  -SD       Row Name 01/09/25 1218          Living Environment    People in Home spouse  -SD       Row Name 01/09/25 1218          Home Main Entrance    Number of Stairs, Main Entrance none  -SD       Row Name 01/09/25 1218          Stairs Within Home, Primary    Stairs, Within Home, Primary ramp entrance, one level  -SD     Number of Stairs, Within Home, Primary none  -SD       Row Name 01/09/25 1218          Cognition    Orientation Status (Cognition) oriented x 4;verbal cues/prompts needed for orientation  -SD       Row Name 01/09/25 1218          Safety Issues/Impairments Affecting Functional Mobility    Safety Issues Affecting Function (Mobility) safety precautions follow-through/compliance;safety precaution awareness;awareness of need for assistance;insight into deficits/self-awareness  -SD     Impairments Affecting Function (Mobility) balance;endurance/activity tolerance;strength;pain;postural/trunk control  -SD               User Key  (r) = Recorded By, (t) = Taken By, (c) = Cosigned By      Initials Name Provider Type    SD Marie Watts OT Occupational Therapist                     Mobility/ADL's       [facility] Name 01/09/25 1220          Bed Mobility    Bed Mobility supine-sit  -SD     Supine-Sit [place] (Bed Mobility) minimum assist (75% patient effort)  -SD     Assistive Device (Bed Mobility) bed rails;head of bed elevated;overhead trapeze  -[facility] Name 01/09/25 1220          Transfers    Transfers sit-stand transfer;bed-chair transfer  -[facility] Name 01/09/25 1220          Bed-Chair Transfer    Bed-Chair [place] (Transfers) minimum assist (75% patient effort);2 person assist  -SD     Assistive Device (Bed-Chair Transfers) walker, front-wheeled  -[facility] Name 01/09/25 1220          Sit-Stand Transfer    Sit-Stand [place] (Transfers) minimum assist (75% patient effort);2 person assist  -SD     Assistive Device (Sit-Stand Transfers) walker,  front-wheeled  -[facility] Name 01/09/25 1220          Functional Mobility    Functional Mobility- Ind. Level not tested  -[facility] Name 01/09/25 1220          Activities of Daily Living    BADL Assessment/Intervention bathing;upper body dressing;lower body dressing;grooming;feeding;toileting  -SD       Row Name 01/09/25 1220          Bathing Assessment/Intervention    [place] Level (Bathing) moderate assist (50% patient effort);maximum assist (25% patient effort)  -SD       Row Name 01/09/25 1220          Upper Body Dressing Assessment/Training    [place] Level (Upper Body Dressing) moderate assist (50% patient effort)  -SD       Row Name 01/09/25 1220          Lower Body Dressing Assessment/Training    [place] Level (Lower Body Dressing) maximum assist (25% patient effort)  -SD       Row Name 01/09/25 1220          Grooming Assessment/Training    [place] Level (Grooming) moderate assist (50% patient effort)  -SD       Row Name 01/09/25 1220          Self-Feeding Assessment/Training    [place] Level (Feeding) minimum assist (75% patient effort)  -SD       Row Name 01/09/25 1220          Toileting Assessment/Training    [place] Level (Toileting) maximum assist (25% patient effort)  -SD     Comment, (Toileting) urinary catheter  -SD               User Key  (r) = Recorded By, (t) = Taken By, (c) = Cosigned By      Initials Name Provider Type    Marie Collins OT Occupational Therapist                   Obj/Interventions       Row Name 01/09/25 1222          Range of Motion Comprehensive    General Range of Motion bilateral upper extremity ROM WFL  -SD       Row Name 01/09/25 1222          Strength Comprehensive (MMT)    General Manual Muscle Testing (MMT) Assessment upper extremity strength deficits identified  -SD     Comment, General Manual Muscle Testing (MMT) Assessment UB 4-/5  -SD               User Key  (r) = Recorded By, (t) = Taken By, (c) = Cosigned By      Initials  Name Provider Type    Marie Collins OT Occupational Therapist                   Goals/Plan       Row Name 01/09/25 1228          Transfer Goal 1 (OT)    Activity/Assistive Device (Transfer Goal 1, OT) sit-to-stand/stand-to-sit;walker, rolling  -SD     [place] Level/Cues Needed (Transfer Goal 1, OT) contact guard required  -SD     Time Frame (Transfer Goal 1, OT) long term goal (LTG)  -SD     Progress/Outcome (Transfer Goal 1, OT) goal ongoing  -SD       Row Name 01/09/25 1228          Dressing Goal 1 (OT)    Activity/Device (Dressing Goal 1, OT) lower body dressing  -SD     [place]/Cues Needed (Dressing Goal 1, OT) minimum assist (75% or more patient effort)  -SD     Time Frame (Dressing Goal 1, OT) 2 weeks  -SD     Progress/Outcome (Dressing Goal 1, OT) goal ongoing  -SD       Row Name 01/09/25 1228          Toileting Goal 1 (OT)    Activity/Device (Toileting Goal 1, OT) toileting skills, all;commode, bedside without drop arms;commode  -SD     [place] Level/Cues Needed (Toileting Goal 1, OT) minimum assist (75% or more patient effort)  -SD     Time Frame (Toileting Goal 1, OT) 2 weeks  -SD     Progress/Outcome (Toileting Goal 1, OT) goal ongoing  -SD       Row Name 01/09/25 1228          Strength Goal 1 (OT)    Strength Goal 1 (OT) Patient to perform UB ther ex as tolerated  -SD     Time Frame (Strength Goal 1, OT) long term goal (LTG)  -SD     Progress/Outcome (Strength Goal 1, OT) goal ongoing  -SD       Row Name 01/09/25 1228          Therapy Assessment/Plan (OT)    Planned Therapy Interventions (OT) activity tolerance training;adaptive equipment training;BADL retraining;patient/caregiver education/training;ROM/therapeutic exercise;strengthening exercise;transfer/mobility retraining  -SD               User Key  (r) = Recorded By, (t) = Taken By, (c) = Cosigned By      Initials Name Provider Type    Marie Collins OT Occupational Therapist                   Clinical Impression        Row Name 01/09/25 1222          Pain Assessment    Pretreatment Pain Rating 8/10  -SD     Posttreatment Pain Rating 8/10  -SD     Pain Location back  -SD     Pain Side/Orientation lower  -SD     Pain Management Interventions exercise or physical activity utilized  -SD     Response to Pain Interventions no change per patient report  -SD       Row Name 01/09/25 1222          Plan of Care Review    Plan of Care Reviewed With patient  -SD     Progress no change  -SD     Outcome Evaluation OT eval completed. Patient is supine in bed, Ox4 with Vcs, reports 8/10 low back pain, on 10L O2 satting 95%. Patient reports he lives with his wife and step daughter in a one level home with a ramp entrance. Patient is normally ind with all ADLs, and mobility. Patient normally drives and cooks. Patient performed supine to sit using trapeze bar with min A. Patient required mod A UBD, max A for LBD, mod A for grooming, max A for toileting with urinary catheter intact. Patient performed sit to stand and tf to chair with min A x 2 using RW. Patient satting 92% following activity. Patient is expected to benefit from continued OT services prior to DC to address deficits in strength, endurance, balance, mobility and ADL performance. Patient may benefit from STR.  -SD       Row Name 01/09/25 1222          Therapy Assessment/Plan (OT)    Patient/Family Therapy Goal Statement (OT) increase strength  -SD     Rehab Potential (OT) good  -SD     Criteria for Skilled Therapeutic Interventions Met (OT) skilled treatment is necessary  -SD     Therapy Frequency (OT) 5 times/wk  Mon-Fri  -SD       Row Name 01/09/25 1222          Therapy Plan Review/Discharge Plan (OT)    Equipment Needs Upon Discharge (OT) walker, rolling  -SD     Anticipated Discharge Disposition (OT) home with home health;inpatient rehabilitation facility  -SD       Row Name 01/09/25 1222          Vital Signs    Pre SpO2 (%) 95  -SD     O2 Delivery Pre Treatment hi-flow  -SD     O2  Delivery Intra Treatment hi-flow  -SD     Post SpO2 (%) 92  -SD     O2 Delivery Post Treatment hi-flow  -SD       Row Name 01/09/25 1222          Positioning and Restraints    Pre-Treatment Position in bed  -SD     Post Treatment Position chair  -SD     In Chair sitting;call light within reach;encouraged to call for assist;exit alarm on;notified nsg  -SD               User Key  (r) = Recorded By, (t) = Taken By, (c) = Cosigned By      Initials Name Provider Type    Marie Collins OT Occupational Therapist                   Outcome Measures       Row Name 01/09/25 1229          How much help from another is currently needed...    Putting on and taking off regular lower body clothing? 2  -SD     Bathing (including washing, rinsing, and drying) 2  -SD     Toileting (which includes using toilet bed pan or urinal) 2  -SD     Putting on and taking off regular upper body clothing 2  -SD     Taking care of personal grooming (such as brushing teeth) 2  -SD     Eating meals 3  -SD     AM-PAC 6 Clicks Score (OT) 13  -SD       Row Name 01/09/25 0800          How much help from another person do you currently need...    Turning from your back to your side while in flat bed without using bedrails? 4  -AH     Moving from lying on back to sitting on the side of a flat bed without bedrails? 3  -AH     Moving to and from a bed to a chair (including a wheelchair)? 3  -AH     Standing up from a chair using your arms (e.g., wheelchair, bedside chair)? 2  -AH     Climbing 3-5 steps with a railing? 2  -AH     To walk in hospital room? 1  -AH     AM-PAC 6 Clicks Score (PT) 15  -AH       Row Name 01/09/25 1229          Functional Assessment    Outcome Measure Options AM-PAC 6 Clicks Daily Activity (OT)  -SD               User Key  (r) = Recorded By, (t) = Taken By, (c) = Cosigned By      Initials Name Provider Type    Marie Collins OT Occupational Therapist    Eric Simon, RN Registered Nurse                    Occupational  Therapy Education       Title: PT OT SLP Therapies (In Progress)       Topic: Occupational Therapy (In Progress)       Point: ADL training (Done)       Description:   Instruct learner(s) on proper safety adaptation and remediation techniques during self care or transfers.   Instruct in proper use of assistive devices.                  Learning Progress Summary            Patient Acceptance, E,TB, VU by SD at 1/9/2025 1230    Comment: OT POC                      Point: Home exercise program (Not Started)       Description:   Instruct learner(s) on appropriate technique for monitoring, assisting and/or progressing therapeutic exercises/activities.                  Learner Progress:  Not documented in this visit.              Point: Precautions (Not Started)       Description:   Instruct learner(s) on prescribed precautions during self-care and functional transfers.                  Learner Progress:  Not documented in this visit.              Point: Body mechanics (Not Started)       Description:   Instruct learner(s) on proper positioning and spine alignment during self-care, functional mobility activities and/or exercises.                  Learner Progress:  Not documented in this visit.                              User Key       Initials Effective Dates Name Provider Type Discipline    SD 06/16/21 -  Marie Watts OT Occupational Therapist OT                  OT Recommendation and Plan  Planned Therapy Interventions (OT): activity tolerance training, adaptive equipment training, BADL retraining, patient/caregiver education/training, ROM/therapeutic exercise, strengthening exercise, transfer/mobility retraining  Therapy Frequency (OT): 5 times/wk (Mon-Fri)  Plan of Care Review  Plan of Care Reviewed With: patient  Progress: no change  Outcome Evaluation: OT eval completed. Patient is supine in bed, Ox4 with Vcs, reports 8/10 low back pain, on 10L O2 satting 95%. Patient reports he lives with his wife and step  daughter in a one level home with a ramp entrance. Patient is normally ind with all ADLs, and mobility. Patient normally drives and cooks. Patient performed supine to sit using trapeze bar with min A. Patient required mod A UBD, max A for LBD, mod A for grooming, max A for toileting with urinary catheter intact. Patient performed sit to stand and tf to chair with min A x 2 using RW. Patient satting 92% following activity. Patient is expected to benefit from continued OT services prior to DC to address deficits in strength, endurance, balance, mobility and ADL performance. Patient may benefit from STR.     Time Calculation:   Evaluation Complexity (OT)  Review Occupational Profile/Medical/Therapy History Complexity: expanded/moderate complexity  Assessment, Occupational Performance/Identification of Deficit Complexity: 3-5 performance deficits  Clinical Decision Making Complexity (OT): detailed assessment/moderate complexity  Overall Complexity of Evaluation (OT): moderate complexity     Time Calculation- OT       Row Name 01/09/25 1231             Time Calculation- OT    OT Start Time 0958  -SD      OT Received On 01/09/25  -SD      OT Goal Re-Cert Due Date 01/21/25  -SD         Untimed Charges    OT Eval/Re-eval Minutes 60  -SD         Total Minutes    Untimed Charges Total Minutes 60  -SD       Total Minutes 60  -SD                User Key  (r) = Recorded By, (t) = Taken By, (c) = Cosigned By      Initials Name Provider Type    Marie Collins OT Occupational Therapist                  Therapy Charges for Today       Code Description Service Date Service Provider Modifiers Qty    [id number]  OT EVAL MOD COMPLEXITY 4 1/9/2025 Marie Watts OT GO 1                 Marie Watts OT  1/9/2025

## 2025-01-09 NOTE — PROGRESS NOTES
RT EQUIPMENT DEVICE RELATED - SKIN ASSESSMENT    Julien Score:  Julien Score: 17     RT Medical Equipment/Device:     NIV Mask:  Full-face    size: l    Skin Assessment:      Cheek:  Intact  Nose:  Intact    Device Skin Pressure Protection:  Pressure points protected    Nurse Notification:  Yisel Cole, CRT     Schedule Surgery Counseling: I have discussed the option of scheduling surgery versus following, as well as the risks, benefits and alternatives of cataract surgery with the patient. It was explained that the surgery is medically indicated at this time, and it can be performed at the patient's option as delaying will cause no further deterioration, therefore there is no rush and there is no harm in waiting to have surgery. It was also explained that there is no guarantee that removing the cataract will improve their vision. The patient understands and desires to schedule preop followed by cataract surgery.

## 2025-01-09 NOTE — PLAN OF CARE
Goal Outcome Evaluation:      Precedex gtt titrated off this shift. Pt is remaining calm and not requiring any prn doses of ativan. PT/OT evaluated pt this shift for d/c placement. Bed offered at [facility] following discharge.        Problem: Noninvasive Ventilation Acute  Goal: Effective Unassisted Ventilation and Oxygenation  Outcome: Progressing     Problem: Adult Inpatient Plan of Care  Goal: Plan of Care Review  Outcome: Progressing  Goal: Patient-Specific Goal (Individualized)  Outcome: Progressing  Goal: Absence of Hospital-Acquired Illness or Injury  Outcome: Progressing  Intervention: Identify and Manage Fall Risk  Recent Flowsheet Documentation  Taken 1/9/2025 1600 by Eric Jha RN  Safety Promotion/Fall Prevention:   room organization consistent   safety round/check completed  Taken 1/9/2025 1400 by Eric Jha RN  Safety Promotion/Fall Prevention:   room organization consistent   safety round/check completed  Taken 1/9/2025 1200 by Eric Jha RN  Safety Promotion/Fall Prevention:   room organization consistent   safety round/check completed  Taken 1/9/2025 1000 by Eric Jha RN  Safety Promotion/Fall Prevention:   room organization consistent   safety round/check completed  Taken 1/9/2025 0800 by Eric Jha RN  Safety Promotion/Fall Prevention:   activity supervised   assistive device/personal items within reach   clutter free environment maintained   elopement precautions   fall prevention program maintained   gait belt   lighting adjusted   mobility aid in reach   muscle strengthening facilitated   nonskid shoes/slippers when out of bed   room organization consistent   safety round/check completed  Intervention: Prevent Skin Injury  Recent Flowsheet Documentation  Taken 1/9/2025 1600 by Eric Jha RN  Body Position:   position changed independently   sitting up in bed   upper extremity elevated   legs elevated   weight shifting  Taken 1/9/2025 1400 by Eric Jha RN  Body Position:    position changed independently   right   side-lying  Taken 1/9/2025 1000 by Eric Jha RN  Body Position:   position changed independently   sitting up in bed   weight shifting  Taken 1/9/2025 0800 by Eric Jha RN  Body Position:   position changed independently   left   side-lying   upper extremity elevated   weight shifting  Intervention: Prevent Infection  Recent Flowsheet Documentation  Taken 1/9/2025 1600 by Eric Jha RN  Infection Prevention: rest/sleep promoted  Taken 1/9/2025 1400 by Eric Jha RN  Infection Prevention: rest/sleep promoted  Taken 1/9/2025 1200 by Eric Jha RN  Infection Prevention: rest/sleep promoted  Taken 1/9/2025 1000 by Eric Jha RN  Infection Prevention: rest/sleep promoted  Taken 1/9/2025 0800 by Eric Jha RN  Infection Prevention: rest/sleep promoted  Goal: Optimal Comfort and Wellbeing  Outcome: Progressing  Goal: Readiness for Transition of Care  Outcome: Progressing     Problem: Gas Exchange Impaired  Goal: Optimal Gas Exchange  Outcome: Progressing  Intervention: Optimize Oxygenation and Ventilation  Recent Flowsheet Documentation  Taken 1/9/2025 1600 by Eric Jha RN  Head of Bed (HOB) Positioning: HOB elevated  Taken 1/9/2025 1400 by Eric Jha RN  Head of Bed (HOB) Positioning: HOB elevated  Taken 1/9/2025 1200 by Eric Jha RN  Head of Bed (HOB) Positioning: HOB elevated  Taken 1/9/2025 1000 by Eric Jha RN  Head of Bed (HOB) Positioning: HOB elevated  Taken 1/9/2025 0800 by Eric Jha RN  Head of Bed (HOB) Positioning: HOB elevated     Problem: Pain Acute  Goal: Optimal Pain Control and Function  Outcome: Progressing  Intervention: Prevent or Manage Pain  Recent Flowsheet Documentation  Taken 1/9/2025 0800 by Eric Jha RN  Medication Review/Management:   medications reviewed   high-risk medications identified     Problem: Sepsis/Septic Shock  Goal: Optimal Coping  Outcome: Progressing  Goal: Absence of Bleeding  Outcome:  Progressing  Goal: Blood Glucose Level Within Target Range  Outcome: Progressing  Goal: Absence of Infection Signs and Symptoms  Outcome: Progressing  Intervention: Initiate Sepsis Management  Recent Flowsheet Documentation  Taken 1/9/2025 1600 by Eric Jha RN  Infection Prevention: rest/sleep promoted  Taken 1/9/2025 1400 by Eric Jha RN  Infection Prevention: rest/sleep promoted  Taken 1/9/2025 1200 by Eric Jha RN  Infection Prevention: rest/sleep promoted  Taken 1/9/2025 1000 by Eric Jha RN  Infection Prevention: rest/sleep promoted  Taken 1/9/2025 0800 by Eric Jha RN  Infection Prevention: rest/sleep promoted  Intervention: Promote Recovery  Recent Flowsheet Documentation  Taken 1/9/2025 1600 by Eric Jha RN  Activity Management: activity encouraged  Taken 1/9/2025 1400 by Eric Jha RN  Activity Management: activity encouraged  Taken 1/9/2025 1200 by Eric Jha RN  Activity Management: up in chair  Taken 1/9/2025 1000 by Eric Jha RN  Activity Management: activity encouraged  Taken 1/9/2025 0800 by Eric Jha RN  Activity Management: activity encouraged  Goal: Optimal Nutrition Delivery  Outcome: Progressing     Problem: Fall Injury Risk  Goal: Absence of Fall and Fall-Related Injury  Outcome: Progressing  Intervention: Identify and Manage Contributors  Recent Flowsheet Documentation  Taken 1/9/2025 0800 by Eric Jha RN  Medication Review/Management:   medications reviewed   high-risk medications identified  Intervention: Promote Injury-Free Environment  Recent Flowsheet Documentation  Taken 1/9/2025 1600 by Eric Jha RN  Safety Promotion/Fall Prevention:   room organization consistent   safety round/check completed  Taken 1/9/2025 1400 by Eric Jha RN  Safety Promotion/Fall Prevention:   room organization consistent   safety round/check completed  Taken 1/9/2025 1200 by Eric Jha RN  Safety Promotion/Fall Prevention:   room organization consistent    safety round/check completed  Taken 1/9/2025 1000 by Eric Jha RN  Safety Promotion/Fall Prevention:   room organization consistent   safety round/check completed  Taken 1/9/2025 0800 by Eric Jha RN  Safety Promotion/Fall Prevention:   activity supervised   assistive device/personal items within reach   clutter free environment maintained   elopement precautions   fall prevention program maintained   gait belt   lighting adjusted   mobility aid in reach   muscle strengthening facilitated   nonskid shoes/slippers when out of bed   room organization consistent   safety round/check completed     Problem: Skin Injury Risk Increased  Goal: Skin Health and Integrity  Outcome: Progressing  Intervention: Optimize Skin Protection  Recent Flowsheet Documentation  Taken 1/9/2025 1600 by Eric Jha RN  Activity Management: activity encouraged  Head of Bed (HOB) Positioning: HOB elevated  Taken 1/9/2025 1400 by Eric Jha RN  Activity Management: activity encouraged  Head of Bed (HOB) Positioning: HOB elevated  Taken 1/9/2025 1200 by Eric Jha RN  Activity Management: up in chair  Head of Bed (HOB) Positioning: HOB elevated  Taken 1/9/2025 1000 by Eric Jha RN  Activity Management: activity encouraged  Head of Bed (HOB) Positioning: HOB elevated  Taken 1/9/2025 0800 by Eric Jha RN  Activity Management: activity encouraged  Pressure Reduction Techniques:   heels elevated off bed   positioned off wounds   pressure points protected   weight shift assistance provided  Head of Bed (HOB) Positioning: HOB elevated  Pressure Reduction Devices:   heel offloading device utilized   positioning supports utilized   pressure-redistributing mattress utilized     Problem: Restraint, Nonviolent  Goal: Absence of Harm or Injury  Outcome: Progressing  Intervention: Protect Skin and Joint Integrity  Recent Flowsheet Documentation  Taken 1/9/2025 1600 by Eric Jha RN  Body Position:   position changed independently    sitting up in bed   upper extremity elevated   legs elevated   weight shifting  Taken 1/9/2025 1400 by Eric Jha RN  Body Position:   position changed independently   right   side-lying  Taken 1/9/2025 1000 by Eric Jha RN  Body Position:   position changed independently   sitting up in bed   weight shifting  Taken 1/9/2025 0800 by Eric Jha RN  Body Position:   position changed independently   left   side-lying   upper extremity elevated   weight shifting

## 2025-01-09 NOTE — PLAN OF CARE
Goal Outcome Evaluation:  Plan of Care Reviewed With: patient        Progress: improving  Outcome Evaluation: Patient more oriented this shift, decreased hallucinations, does best with precedex and ativan combo, tolerating bipap while sleeping and 10L HFNC while awake, patient needs PT/OT eval for strength and rehab placement

## 2025-01-09 NOTE — PLAN OF CARE
Goal Outcome Evaluation:  Plan of Care Reviewed With: patient        Progress: no change  Outcome Evaluation: OT eval completed. Patient is supine in bed, Ox4 with Vcs, reports 8/10 low back pain, on 10L O2 satting 95%. Patient reports he lives with his wife and step daughter in a one level home with a ramp entrance. Patient is normally ind with all ADLs, and mobility. Patient normally drives and cooks. Patient performed supine to sit using trapeze bar with min A. Patient required mod A UBD, max A for LBD, mod A for grooming, max A for toileting with urinary catheter intact. Patient performed sit to stand and tf to chair with min A x 2 using RW. Patient satting 92% following activity. Patient is expected to benefit from continued OT services prior to DC to address deficits in strength, endurance, balance, mobility and ADL performance. Patient may benefit from STR.    Anticipated Discharge Disposition (OT): home with home health, inpatient rehabilitation facility

## 2025-01-09 NOTE — CASE MANAGEMENT/SOCIAL WORK
"1000: CM spoke with [facility] regarding BiPap setting for home. Joseph/Michael confirmed IPAP-18 and EPAP-13. \"Was issued 5/30/2018 and now qualifies for new equipment if needed\". ICU Nurse and MD made aware.  1054: New orders received for BiPAP . Faxed information to [facility]  [phone]. CM to follow.    1400:CM spoke with pt at bedside. DCP updated and informed of bed offer at [facility]/[place].Pt requested CM speak with wife about LTACH placement. CM to follow.    1540: CM attempted to call wife /Janelle regarding bed offer at LTACH in [place]. Unable to leave voice message. CM to follow.  "

## 2025-01-09 NOTE — PROGRESS NOTES
[facility] Progress Note  [facility]. [place]        Patient Name: Cal Oliver Jr.  : 1965  MRN: [id number]   LOS: 8 days    Patient Care Team:  Woo Betancourt MD as PCP - General (Internal Medicine)    Chief Complaint:    Chief Complaint   Patient presents with    Shortness of Breath     Primary Care Physician:  Woo Betancourt MD  Date of admission: 2025    Subjective     Interval History:   Follow-up and uric/oliguric renal failure.    Renal function is continuously improving and has been stable for the last few days.  Patient has been on BiPAP with gradual improvement of his hypercapnia.  Slightly more alert this morning, but as per wife he was communicating very well yesterday most of the day.  Still appears to be confused off and on.  Today he acknowledges that he is hallucinating, he saw butterflies flying while I was talking to him.  Blood pressure still noted to be on the high side.  Events noted from last 24 hours.  I reviewed the chart and other providers notes, labs and procedures done since my last note.    Review of Systems:   unobtainable.    Objective     Vitals:   Temp:  [97.9 °F (36.6 °C)-98.9 °F (37.2 °C)] 97.9 °F (36.6 °C)  Heart Rate:  [59-98] 64  Resp:  [21-32] 21  BP: ()/() 148/72  Flow (L/min) (Oxygen Therapy):  [10] 10    Intake/Output Summary (Last 24 hours) at 2025 0738  Last data filed at 2025 0600  Gross per 24 hour   Intake 1325.5 ml   Output 6050 ml   Net -4724.5 ml       Physical Exam:    General Appearance: Drowsy and sleepy, no acute distress   Skin: warm and dry  HEENT: oral mucosa normal, nonicteric sclera  Neck: supple, no JVD  Lungs: CTA, decreased breath sounds all over the chest.  Heart: RRR, normal S1 and S2  Abdomen: obese, soft, nontender, non distended and positive bowel sounds.  : no palpable bladder  Extremities: Trace edema, no cyanosis or clubbing  Neuro: Randomly moving extremities no  meaningful interaction.    Scheduled Meds:     Current Facility-Administered Medications   Medication Dose Route Frequency Provider Last Rate Last Admin    acetaminophen (TYLENOL) tablet 650 mg  650 mg Oral Q4H PRN Puja Paz DO   650 mg at 01/08/25 2134    Or    acetaminophen (TYLENOL) 160 MG/5ML oral solution 650 mg  650 mg Oral Q4H PRN Puja Paz DO        Or    acetaminophen (TYLENOL) suppository 650 mg  650 mg Rectal Q4H PRN Puja Paz DO        aluminum-magnesium hydroxide-simethicone (MAALOX MAX) 400-400-40 MG/5ML suspension 15 mL  15 mL Oral Q6H PRN Puja Paz DO        amLODIPine (NORVASC) tablet 10 mg  10 mg Oral Daily Puja Paz DO   10 mg at 01/08/25 0941    ampicillin-sulbactam (UNASYN) 3 g in sodium chloride 0.9 % 100 mL IVPB-VTB  3 g Intravenous Q6H Kerley, Brian Joseph, DO   3 g at 01/09/25 0307    arformoterol (BROVANA) nebulizer solution 15 mcg  15 mcg Nebulization BID - RT Lisa Chapin MD   15 mcg at 01/09/25 0706    aspirin chewable tablet 81 mg  81 mg Oral Daily Puja Paz DO   81 mg at 01/08/25 0941    benzonatate (TESSALON) capsule 100 mg  100 mg Oral TID PRN Puja Paz DO   100 mg at 01/04/25 0440    sennosides-docusate (PERICOLACE) 8.6-50 MG per tablet 2 tablet  2 tablet Oral BID PRN Puja Paz DO        And    polyethylene glycol (MIRALAX) packet 17 g  17 g Oral Daily PRN Puja Paz DO        And    bisacodyl (DULCOLAX) EC tablet 5 mg  5 mg Oral Daily PRN Puja Paz DO        And    bisacodyl (DULCOLAX) suppository 10 mg  10 mg Rectal Daily PRN Puja Paz DO        budesonide (PULMICORT) nebulizer solution 0.5 mg  0.5 mg Nebulization BID - RT Enid Hartley APRN   0.5 mg at 01/09/25 0706    carvedilol (COREG) tablet 12.5 mg  12.5 mg Oral BID With Meals Puja Paz DO   12.5 mg at 01/08/25 1702    dexmedetomidine (PRECEDEX) 400  mcg in 100 mL NS infusion  0.2 mcg/kg/hr Intravenous Titrated Kerley, Brian Joseph, DO 12.8 mL/hr at 01/09/25 0552 0.3 mcg/kg/hr at 01/09/25 0552    dextrose (D50W) (25 g/50 mL) IV injection 25 g  25 g Intravenous Q15 Min PRN Puja Paz DO        dextrose (GLUTOSE) oral gel 15 g  15 g Oral Q15 Min PRN Puja Paz DO        Enoxaparin Sodium (LOVENOX) syringe 60 mg  60 mg Subcutaneous Q12H Puja Paz DO   60 mg at 01/09/25 0551    folic acid 1 mg in sodium chloride 0.9 % 50 mL IVPB  1 mg Intravenous Daily Kerley, Brian Joseph,  mL/hr at 01/08/25 1049 1 mg at 01/08/25 1049    [Held by provider] furosemide (LASIX) injection 20 mg  20 mg Intravenous BID Diuretics Puja Paz DO        glucagon (GLUCAGEN) injection 1 mg  1 mg Intramuscular Q15 Min PRN Puja Paz DO        guaiFENesin (MUCINEX) 12 hr tablet 600 mg  600 mg Oral Q12H Puja Paz DO   600 mg at 01/08/25 2132    hydrALAZINE (APRESOLINE) injection 10 mg  10 mg Intravenous Q4H PRN Romel Schumacher MD   10 mg at 01/07/25 1944    hydroCHLOROthiazide tablet 25 mg  25 mg Oral Daily Gio Candelario MD, FASN   25 mg at 01/08/25 0941    Insulin Lispro (humaLOG) injection 2-9 Units  2-9 Units Subcutaneous 4x Daily AC & at Bedtime Puja Paz DO   6 Units at 01/08/25 2132    ipratropium-albuterol (DUO-NEB) nebulizer solution 3 mL  3 mL Nebulization Q6H - RT Puja Paz DO   3 mL at 01/09/25 0706    lactulose (CHRONULAC) 10 GM/15ML solution 30 g  30 g Oral TID Kerley, Brian Joseph, DO   30 g at 01/08/25 2131    LORazepam (ATIVAN) tablet 1 mg  1 mg Oral Q1H PRN Kerley, Brian Joseph, DO   1 mg at 01/08/25 2132    Or    LORazepam (ATIVAN) injection 1 mg  1 mg Intravenous Q1H PRN Kerley, Brian Joseph, DO   1 mg at 01/06/25 0818    Or    LORazepam (ATIVAN) tablet 2 mg  2 mg Oral Q1H PRN Kerley, Brian Joseph, DO   2 mg at 01/07/25 0829    Or    LORazepam (ATIVAN) injection 2  mg  2 mg Intravenous Q1H PRN Kerley, Brian Joseph, DO   2 mg at 01/09/25 0307    Or    LORazepam (ATIVAN) injection 2 mg  2 mg Intravenous Q15 Min PRN Kerley, Brian Joseph, DO   2 mg at 01/08/25 1520    Or    LORazepam (ATIVAN) injection 2 mg  2 mg Intramuscular Q15 Min PRN Kerley, Brian Joseph, DO        Magnesium Standard Dose Replacement - Follow Nurse / BPA Driven Protocol   Not Applicable PRN Kerley, Brian Joseph, DO        methocarbamol (ROBAXIN) tablet 750 mg  750 mg Oral TID PRN Puja Paz DO   750 mg at 01/05/25 2039    methylPREDNISolone sodium succinate (SOLU-Medrol) injection 20 mg  20 mg Intravenous Q12H Lisa Chapin MD   20 mg at 01/09/25 0305    nicotine (NICODERM CQ) 21 MG/24HR patch 1 patch  1 patch Transdermal Q24H Puja Paz DO   1 patch at 01/09/25 0554    nicotine polacrilex (NICORETTE) gum 4 mg  4 mg Mouth/Throat Q1H PRN Puja Paz DO        nitroglycerin (NITROSTAT) SL tablet 0.4 mg  0.4 mg Sublingual Q5 Min PRN Puja Paz DO        ondansetron ODT (ZOFRAN-ODT) disintegrating tablet 4 mg  4 mg Oral Q6H PRN Puja Paz DO        Or    ondansetron (ZOFRAN) injection 4 mg  4 mg Intravenous Q6H PRN Puja Paz DO        pantoprazole (PROTONIX) injection 40 mg  40 mg Intravenous Q AM Kerley, Brian Joseph, DO   40 mg at 01/09/25 0551    Pharmacy to Dose ampicillin-sulbactam   Not Applicable Continuous PRN Kerley, Brian Joseph, DO        Pharmacy to Dose enoxaparin (LOVENOX)   Not Applicable Continuous PRN Puja Paz DO        PHENobarbital tablet 32.4 mg  32.4 mg Oral Once Kerley, Brian Joseph, DO        rOPINIRole (REQUIP) tablet 3 mg  3 mg Oral Nightly Puja Paz DO   3 mg at 01/08/25 2132    sodium chloride 0.9 % flush 10 mL  10 mL Intravenous PRN Puja Paz DO        sodium chloride 0.9 % flush 10 mL  10 mL Intravenous Q12H Puja Paz DO   10 mL at 01/08/25 6479     sodium chloride 0.9 % flush 10 mL  10 mL Intravenous PRN Puja Paz DO        sodium chloride 0.9 % infusion 40 mL  40 mL Intravenous PRN Puja Paz DO        spironolactone (ALDACTONE) tablet 25 mg  25 mg Oral Daily Gio Candelario MD, FASN   25 mg at 01/08/25 0941    terazosin (HYTRIN) capsule 10 mg  10 mg Oral Nightly Gio Candelario MD, FASN   10 mg at 01/08/25 2132    thiamine (B-1) injection 200 mg  200 mg Intravenous Q8H Kerley, Brian Joseph,    200 mg at 01/09/25 0551    Followed by    [START ON 1/11/2025] thiamine (VITAMIN B-1) tablet 100 mg  100 mg Oral Daily Kerley, Brian Joseph, DO        valsartan (DIOVAN) tablet 320 mg  320 mg Oral Daily Puja Paz,    320 mg at 01/08/25 0940    ziprasidone (GEODON) injection 20 mg  20 mg Intramuscular Q4H PRN Romel Schumacher MD   20 mg at 01/06/25 1220       amLODIPine, 10 mg, Oral, Daily  ampicillin-sulbactam, 3 g, Intravenous, Q6H  arformoterol, 15 mcg, Nebulization, BID - RT  aspirin, 81 mg, Oral, Daily  budesonide, 0.5 mg, Nebulization, BID - RT  carvedilol, 12.5 mg, Oral, BID With Meals  enoxaparin, 60 mg, Subcutaneous, Q12H  folic acid 1 mg in sodium chloride 0.9 % 50 mL IVPB, 1 mg, Intravenous, Daily  [Held by provider] furosemide, 20 mg, Intravenous, BID Diuretics  guaiFENesin, 600 mg, Oral, Q12H  hydroCHLOROthiazide Oral, 25 mg, Oral, Daily  insulin lispro, 2-9 Units, Subcutaneous, 4x Daily AC & at Bedtime  ipratropium-albuterol, 3 mL, Nebulization, Q6H - RT  lactulose, 30 g, Oral, TID  methylPREDNISolone sodium succinate, 20 mg, Intravenous, Q12H  nicotine, 1 patch, Transdermal, Q24H  pantoprazole, 40 mg, Intravenous, Q AM  PHENobarbital, 32.4 mg, Oral, Once  rOPINIRole, 3 mg, Oral, Nightly  sodium chloride, 10 mL, Intravenous, Q12H  spironolactone, 25 mg, Oral, Daily  terazosin, 10 mg, Oral, Nightly  thiamine (B-1) IV, 200 mg, Intravenous, Q8H   Followed by  [START ON 1/11/2025] thiamine, 100 mg, Oral,  "Daily  valsartan, 320 mg, Oral, Daily        IV Meds:   dexmedetomidine, 0.2 mcg/kg/hr, Last Rate: 0.3 mcg/kg/hr (01/09/25 0552)  Pharmacy to Dose ampicillin-sulbactam,   Pharmacy to Dose enoxaparin (LOVENOX),         Results Reviewed:   I have personally reviewed the results from the time of this admission to 1/9/2025 07:38 EST     Results from last 7 days   Lab Units 01/09/25 0435 01/08/25  0426 01/07/25  0447   SODIUM mmol/L 142 139 141   POTASSIUM mmol/L 3.5 3.5 3.5   CHLORIDE mmol/L 98 97* 97*   CO2 mmol/L 37.5* 33.6* 35.0*   BUN mg/dL 14 16 17   CREATININE mg/dL 0.70* 0.59* 0.63*   CALCIUM mg/dL 8.2* 8.6 8.5*   GLUCOSE mg/dL 170* 197* 175*       Estimated Creatinine Clearance: 187.7 mL/min (A) (by C-G formula based on SCr of 0.7 mg/dL (L)).                Results from last 7 days   Lab Units 01/09/25  0435 01/08/25  0426 01/07/25  0447 01/06/25  0431 01/05/25  0407   WBC 10*3/mm3 11.46* 11.57* 11.66* 16.03* 13.31*   HEMOGLOBIN g/dL 14.4 15.2 15.1 14.3 14.4   PLATELETS 10*3/mm3 225 257 285 254 237             Brief Urine Lab Results  (Last result in the past 365 days)        Color   Clarity   Blood   Leuk Est   Nitrite   Protein   CREAT   Urine HCG        01/04/25 1235 Orange   Clear   Large (3+)   Negative   Negative   Negative                   No results found for: \"UTPCR\"    Imaging Results (Last 24 Hours)       Procedure Component Value Units Date/Time    XR Chest 1 View [[id number]] Collected: 01/09/25 0637     Updated: 01/09/25 0640    Narrative:      PROCEDURE: XR CHEST 1 VW-     HISTORY: Resp Failure.; J96.01-Acute respiratory failure with hypoxia,  shortness of breath     COMPARISON: One day earlier     FINDINGS: Pulmonary vascular congestion is noted with mild interstitial  edema. There is minimal right basilar atelectasis. The lungs fields are  unchanged. There is no pneumothorax.     The cardiac silhouette is stable.       Impression:      No significant change.        This report was signed and " finalized on 1/9/2025 6:38 AM by Olvin Mcbride MD.                   Assessment / Plan     ASSESSMENT:    Acute respiratory failure with hypoxia    COPD exacerbation    Oliguria: Resolved.  Urinary retention ; Epstein catheter was removed yesterday.  Hypercapnic respiratory failure: Patient's pCO2 greater than 90, has been on BiPAP since admission with gradual improvement.  Hypertension: on home regimen, still noted to be high blood pressures.  Continue to adjust medications as needed.  Obstructive sleep apnea: It is not clear if he was using his BiPAP at home, currently on BiPAP.  Type 2 diabetes: Continue with the sliding scale  Morbid obesity: Complicates all forms of care.      PLAN:  Clinically appears to be stable.  Off-and-on confusion noted.  He may end up requiring higher dose of diuretics.  I will go ahead and start him on clonidine 0.1 mg twice a day and increase as tolerated to have better blood pressure control.  Details were also discussed with the hospitalist service and or other providers as needed.   Continue with rest of the current treatment plan, and monitor with surveillance labs.  Further recommendations will depend on clinical course of the patient during the current hospitalization.   I have reviewed the copied text to this note, it was edited and the changes made as needed.  It is accurate to the point, when the note was signed today.     Thank you for involving us in the care of Cal Hidalgo Benito Carroll.  Please feel free to call with any questions.    Gio Candelario MD, FASN  01/09/25  07:38 [facility]    [facility]  [phone]      Part of this note may be an electronic transcription/translation of spoken language to printed text using the Dragon Dictation System.

## 2025-01-09 NOTE — PROGRESS NOTES
"Dietitian Follow-up    Patient Name: Cal Oliver Jr.  YOB: 1965  MRN: [id number]  Admission date: 1/1/2025    Comment:      Clinical Nutrition Follow-up   Encounter Information        Trending Narrative     1/9: Average PO intake 66% x 3 meals. PO intake is improving. Boost glucose control ordered daily.     1/7: Pt screened for LOS. Pt has had wt loss of 8# (2.9%) within 1 week. However, wt loss is intentional r/t diuretic use. Average PO intake 41% x 6 meals. RD will mortified diet to have heart healthy diet restriction and order Boost glucose control daily.      Anthropometrics        Current Height, Weight Height: 180.3 cm (71\")  Weight: (!) 173 kg (381 lb 9.9 oz) (01/09/25 0400)       Trending Weight Hx     This admission:              PTA:     Wt Readings from Last 30 Encounters:   01/09/25 0400 (!) 173 kg (381 lb 9.9 oz)   01/08/25 0400 (!) 168 kg (369 lb 7.9 oz)   01/07/25 0400 (!) 164 kg (362 lb 3.5 oz)   01/05/25 0330 (!) 167 kg (369 lb 0.8 oz)   01/04/25 0400 (!) 169 kg (371 lb 11.1 oz)   01/02/25 1345 (!) 170 kg (375 lb 10.6 oz)   01/01/25 0811 (!) 168 kg (370 lb 6 oz)   01/01/25 0634 (!) 168 kg (369 lb 14.9 oz)   01/01/25 0340 (!) 165 kg (363 lb)   11/12/24 0934 (!) 162 kg (358 lb)   04/17/24 1534 (!) 166 kg (365 lb)   03/01/24 0218 (!) 164 kg (361 lb 9.6 oz)   01/17/24 1322 (!) 162 kg (358 lb)   02/03/23 1355 (!) 162 kg (357 lb)   04/12/22 1610 (!) 173 kg (381 lb)   11/18/21 0838 (!) 177 kg (390 lb)   10/07/21 0933 (!) 174 kg (383 lb)   08/18/21 1052 (!) 174 kg (384 lb)   12/30/20 1210 (!) 172 kg (380 lb)   10/27/20 1524 (!) 175 kg (386 lb 3.2 oz)   10/08/20 1050 (!) 176 kg (387 lb 12.8 oz)   10/08/20 1048 (!) 176 kg (387 lb 12.8 oz)   09/11/20 1016 (!) 176 kg (388 lb 6.4 oz)   09/11/20 1013 (!) 176 kg (388 lb 6.4 oz)   03/11/20 1133 (!) 163 kg (360 lb)   01/17/20 1111 (!) 166 kg (365 lb 15.4 oz)   12/30/19 0808 (!) 166 kg (367 lb)   10/24/19 1256 (!) 163 kg (359 lb 12.8 oz) "   10/21/19 0946 (!) 158 kg (349 lb)   10/17/19 1457 (!) 149 kg (328 lb 0.7 oz)   09/25/19 1442 (!) 159 kg (351 lb)   03/08/19 1102 (!) 165 kg (363 lb 6.4 oz)   02/08/19 0811 (!) 171 kg (377 lb)   12/07/18 1324 (!) 168 kg (370 lb)   11/29/18 1342 (!) 167 kg (369 lb)   10/17/18 1352 (!) 168 kg (370 lb)   09/05/18 1457 (!) 174 kg (384 lb)   09/05/18 1454 (!) 174 kg (384 lb)   08/29/18 1407 (!) 176 kg (389 lb)   08/29/18 1402 (!) 176 kg (389 lb)   07/09/18 1006 (!) 173 kg (382 lb)   04/18/18 1559 (!) 173 kg (382 lb)      BMI kg/m2 Body mass index is 53.22 kg/m².     Labs        Pertinent Labs Results from last 7 days   Lab Units 01/09/25  0435 01/08/25  0426 01/07/25  0447   SODIUM mmol/L 142 139 141   POTASSIUM mmol/L 3.5 3.5 3.5   CHLORIDE mmol/L 98 97* 97*   CO2 mmol/L 37.5* 33.6* 35.0*   BUN mg/dL 14 16 17   CREATININE mg/dL 0.70* 0.59* 0.63*   CALCIUM mg/dL 8.2* 8.6 8.5*   GLUCOSE mg/dL 170* 197* 175*     Results from last 7 days   Lab Units 01/09/25  0435   HEMOGLOBIN g/dL 14.4   HEMATOCRIT % 44.7         Medications    Scheduled Medications amLODIPine, 10 mg, Oral, Daily  arformoterol, 15 mcg, Nebulization, BID - RT  aspirin, 81 mg, Oral, Daily  budesonide, 0.5 mg, Nebulization, BID - RT  carvedilol, 12.5 mg, Oral, BID With Meals  cloNIDine, 0.1 mg, Oral, Q8H  enoxaparin, 60 mg, Subcutaneous, Q12H  folic acid 1 mg in sodium chloride 0.9 % 50 mL IVPB, 1 mg, Intravenous, Daily  guaiFENesin, 600 mg, Oral, Q12H  hydroCHLOROthiazide Oral, 25 mg, Oral, Daily  insulin lispro, 2-9 Units, Subcutaneous, 4x Daily AC & at Bedtime  ipratropium-albuterol, 3 mL, Nebulization, Q6H - RT  lactulose, 30 g, Oral, TID  methylPREDNISolone sodium succinate, 20 mg, Intravenous, Q12H  nicotine, 1 patch, Transdermal, Q24H  pantoprazole, 40 mg, Intravenous, Q AM  rOPINIRole, 3 mg, Oral, Nightly  sodium chloride, 10 mL, Intravenous, Q12H  spironolactone, 25 mg, Oral, Daily  terazosin, 10 mg, Oral, Nightly  thiamine (B-1) IV, 200 mg,  Intravenous, Q8H   Followed by  [START ON 1/11/2025] thiamine, 100 mg, Oral, Daily  valsartan, 320 mg, Oral, Daily        Infusions dexmedetomidine, 0.2 mcg/kg/hr, Last Rate: Stopped (01/09/25 0800)  Pharmacy to Dose enoxaparin (LOVENOX),         PRN Medications   acetaminophen **OR** acetaminophen **OR** acetaminophen    aluminum-magnesium hydroxide-simethicone    benzonatate    senna-docusate sodium **AND** polyethylene glycol **AND** bisacodyl **AND** bisacodyl    dextrose    dextrose    glucagon (human recombinant)    hydrALAZINE    LORazepam **OR** LORazepam **OR** LORazepam **OR** LORazepam **OR** LORazepam **OR** LORazepam    Magnesium Standard Dose Replacement - Follow Nurse / BPA Driven Protocol    methocarbamol    nicotine polacrilex    nitroglycerin    ondansetron ODT **OR** ondansetron    Pharmacy to Dose enoxaparin (LOVENOX)    sodium chloride    sodium chloride    sodium chloride    ziprasidone     Physical Findings        Trending Physical   Appearance, NFPE    --  Edema  1+ (trace), 2+ (mild)   Bowel Function LBM: 1/9   Tubes Peripheral IV   Chewing/Swallowing WNL   Skin MASD   --  Current Nutrition Orders & Evaluation of Intake       Oral Nutrition     Food Allergies NKFA   Current PO Diet Diet: Diabetic, Cardiac; Healthy Heart (2-3 Na+); Consistent Carbohydrate; Fluid Consistency: Thin (IDDSI 0)   Supplement Orders Placed This Encounter      Dietary Nutrition Supplements Boost Glucose Control (Glucerna Shake)     PO Evaluation     Trending % PO Intake 1/9: 66% x 3 meals  1/7: 41% x 6 meals      Nutrition Diagnosis         Nutrition Dx Problem 1 Predicted suboptimal intake r/t respiratory failure AEB average PO intake 41% x 6 meals.       Nutrition Dx Problem 2 Obesity r/t lifestyle AEB BMI=50.52.        Intervention Goal         Intervention Goal(s) PO intake meet >50% of estimated needs  Adhere to ONS  1-2# wt loss/week is appropriate   Blood glucose WNL     Nutrition Intervention        RD Action  Continue to monitor     Nutrition Prescription          Diet Prescription Diet: Diabetic, Cardiac; Healthy Heart (2-3 Na+); Consistent Carbohydrate; Fluid Consistency: Thin (IDDSI 0)   Supplement Prescription Orders Placed This Encounter      Dietary Nutrition Supplements Boost Glucose Control (Glucerna Shake)     Enteral Nutrition Prescription     TPN Prescription       Monitor/Evaluation        Monitor Per protocol, I&O, PO intake, Supplement intake, Pertinent labs, Weight, Skin status, GI status, Symptoms, POC/GOC, Swallow function, Hemodynamic stability     RD to f/up PRN    Electronically signed by:  Coreen Soriano RD  01/09/25 12:41 EST

## 2025-01-09 NOTE — PLAN OF CARE
Problem: Noninvasive Ventilation Acute  Goal: Effective Unassisted Ventilation and Oxygenation  Outcome: Progressing  Intervention: Monitor and Manage Noninvasive Ventilation  Flowsheets (Taken 1/9/2025 9518)  Airway/Ventilation Management:   airway patency maintained   positive pressure ventilation provided   oxygen therapy provided  NPPV/CPAP Maintenance:   proper fit/secure   full face mask   Goal Outcome Evaluation:

## 2025-01-09 NOTE — PLAN OF CARE
Problem: Noninvasive Ventilation Acute  Goal: Effective Unassisted Ventilation and Oxygenation  Outcome: Progressing   Goal Outcome Evaluation: Patient wearing QHS and on and off during the day.    RT EQUIPMENT DEVICE RELATED - SKIN ASSESSMENT    RT Medical Equipment/Device:  NIV Mask: Full-face  size: L    Skin Assessment: Cheek: Intact and Nose: Intact    Device Skin Pressure Protection: Pressure points protected    Nurse Notification: Yisel Carolina, RRT

## 2025-01-10 VITALS
BODY MASS INDEX: 44.1 KG/M2 | WEIGHT: 315 LBS | RESPIRATION RATE: 18 BRPM | SYSTOLIC BLOOD PRESSURE: 145 MMHG | TEMPERATURE: 97.3 F | HEART RATE: 79 BPM | DIASTOLIC BLOOD PRESSURE: 95 MMHG | OXYGEN SATURATION: 97 % | HEIGHT: 71 IN

## 2025-01-10 LAB
A-A DO2: ABNORMAL
ANION GAP SERPL CALCULATED.3IONS-SCNC: 3.4 MMOL/L (ref 5–15)
ARTERIAL PATENCY WRIST A: POSITIVE
ATMOSPHERIC PRESS: 734 MMHG
BASE EXCESS BLDA CALC-SCNC: 15.5 MMOL/L (ref 0–2)
BASOPHILS # BLD AUTO: 0.01 10*3/MM3 (ref 0–0.2)
BASOPHILS NFR BLD AUTO: 0.1 % (ref 0–1.5)
BDY SITE: ABNORMAL
BUN SERPL-MCNC: 14 MG/DL (ref 6–20)
BUN/CREAT SERPL: 21.2 (ref 7–25)
CALCIUM SPEC-SCNC: 8.4 MG/DL (ref 8.6–10.5)
CHLORIDE SERPL-SCNC: 96 MMOL/L (ref 98–107)
CO2 SERPL-SCNC: 40.6 MMOL/L (ref 22–29)
COHGB MFR BLD: 0.9 % (ref 0–2)
CREAT SERPL-MCNC: 0.66 MG/DL (ref 0.76–1.27)
DEPRECATED RDW RBC AUTO: 45.9 FL (ref 37–54)
EGFRCR SERPLBLD CKD-EPI 2021: 109.4 ML/MIN/1.73
EOSINOPHIL # BLD AUTO: 0.26 10*3/MM3 (ref 0–0.4)
EOSINOPHIL NFR BLD AUTO: 2.5 % (ref 0.3–6.2)
ERYTHROCYTE [DISTWIDTH] IN BLOOD BY AUTOMATED COUNT: 13.8 % (ref 12.3–15.4)
GAS FLOW AIRWAY: 10 LPM
GLUCOSE SERPL-MCNC: 185 MG/DL (ref 65–99)
HCO3 BLDA-SCNC: 44.5 MMOL/L (ref 22–28)
HCT VFR BLD AUTO: 43.6 % (ref 37.5–51)
HCT VFR BLD CALC: 43.4 %
HGB BLD-MCNC: 13.8 G/DL (ref 13–17.7)
IMM GRANULOCYTES # BLD AUTO: 0.03 10*3/MM3 (ref 0–0.05)
IMM GRANULOCYTES NFR BLD AUTO: 0.3 % (ref 0–0.5)
LYMPHOCYTES # BLD AUTO: 1.73 10*3/MM3 (ref 0.7–3.1)
LYMPHOCYTES NFR BLD AUTO: 16.6 % (ref 19.6–45.3)
Lab: ABNORMAL
Lab: ABNORMAL
MCH RBC QN AUTO: 28.8 PG (ref 26.6–33)
MCHC RBC AUTO-ENTMCNC: 31.7 G/DL (ref 31.5–35.7)
MCV RBC AUTO: 91 FL (ref 79–97)
METHGB BLD QL: -0.9 % (ref 0–1.5)
MODALITY: ABNORMAL
MONOCYTES # BLD AUTO: 0.95 10*3/MM3 (ref 0.1–0.9)
MONOCYTES NFR BLD AUTO: 9.1 % (ref 5–12)
NEUTROPHILS NFR BLD AUTO: 7.43 10*3/MM3 (ref 1.7–7)
NEUTROPHILS NFR BLD AUTO: 71.4 % (ref 42.7–76)
NOTIFIED BY: ABNORMAL
NOTIFIED WHO: ABNORMAL
NRBC BLD AUTO-RTO: 0 /100 WBC (ref 0–0.2)
OXYHGB MFR BLDV: 92.1 % (ref 94–99)
PCO2 BLDA: 74 MM HG (ref 35–45)
PCO2 TEMP ADJ BLD: ABNORMAL MM[HG]
PH BLDA: 7.39 PH UNITS (ref 7.3–7.5)
PH, TEMP CORRECTED: ABNORMAL
PLATELET # BLD AUTO: 208 10*3/MM3 (ref 140–450)
PMV BLD AUTO: 9.9 FL (ref 6–12)
PO2 BLDA: 61.2 MM HG (ref 75–100)
PO2 TEMP ADJ BLD: ABNORMAL MM[HG]
POTASSIUM SERPL-SCNC: 3.3 MMOL/L (ref 3.5–5.2)
RBC # BLD AUTO: 4.79 10*6/MM3 (ref 4.14–5.8)
SAO2 % BLDCOA: 92.1 % (ref 94–100)
SODIUM SERPL-SCNC: 140 MMOL/L (ref 136–145)
VENTILATOR MODE: ABNORMAL
WBC NRBC COR # BLD AUTO: 10.41 10*3/MM3 (ref 3.4–10.8)

## 2025-01-10 PROCEDURE — 82948 REAGENT STRIP/BLOOD GLUCOSE: CPT

## 2025-01-10 PROCEDURE — 94799 UNLISTED PULMONARY SVC/PX: CPT

## 2025-01-10 PROCEDURE — 97535 SELF CARE MNGMENT TRAINING: CPT

## 2025-01-10 PROCEDURE — 83050 HGB METHEMOGLOBIN QUAN: CPT

## 2025-01-10 PROCEDURE — 63710000001 PREDNISONE PER 1 MG: Performed by: INTERNAL MEDICINE

## 2025-01-10 PROCEDURE — 36600 WITHDRAWAL OF ARTERIAL BLOOD: CPT

## 2025-01-10 PROCEDURE — 97530 THERAPEUTIC ACTIVITIES: CPT

## 2025-01-10 PROCEDURE — 25010000002 ENOXAPARIN PER 10 MG: Performed by: FAMILY MEDICINE

## 2025-01-10 PROCEDURE — 63710000001 INSULIN LISPRO (HUMAN) PER 5 UNITS: Performed by: FAMILY MEDICINE

## 2025-01-10 PROCEDURE — 25010000002 THIAMINE HCL 200 MG/2ML SOLUTION: Performed by: STUDENT IN AN ORGANIZED HEALTH CARE EDUCATION/TRAINING PROGRAM

## 2025-01-10 PROCEDURE — 25010000002 LORAZEPAM PER 2 MG: Performed by: STUDENT IN AN ORGANIZED HEALTH CARE EDUCATION/TRAINING PROGRAM

## 2025-01-10 PROCEDURE — 82375 ASSAY CARBOXYHB QUANT: CPT

## 2025-01-10 PROCEDURE — 94761 N-INVAS EAR/PLS OXIMETRY MLT: CPT

## 2025-01-10 PROCEDURE — 99291 CRITICAL CARE FIRST HOUR: CPT | Performed by: INTERNAL MEDICINE

## 2025-01-10 PROCEDURE — 97116 GAIT TRAINING THERAPY: CPT

## 2025-01-10 PROCEDURE — 94660 CPAP INITIATION&MGMT: CPT

## 2025-01-10 PROCEDURE — 99239 HOSP IP/OBS DSCHRG MGMT >30: CPT | Performed by: FAMILY MEDICINE

## 2025-01-10 PROCEDURE — 80048 BASIC METABOLIC PNL TOTAL CA: CPT | Performed by: STUDENT IN AN ORGANIZED HEALTH CARE EDUCATION/TRAINING PROGRAM

## 2025-01-10 PROCEDURE — 85025 COMPLETE CBC W/AUTO DIFF WBC: CPT | Performed by: STUDENT IN AN ORGANIZED HEALTH CARE EDUCATION/TRAINING PROGRAM

## 2025-01-10 PROCEDURE — 82805 BLOOD GASES W/O2 SATURATION: CPT

## 2025-01-10 PROCEDURE — 25010000002 ACETAZOLAMIDE PER 500 MG: Performed by: INTERNAL MEDICINE

## 2025-01-10 RX ORDER — LANOLIN ALCOHOL/MO/W.PET/CERES
100 CREAM (GRAM) TOPICAL DAILY
Start: 2025-01-11

## 2025-01-10 RX ORDER — ENOXAPARIN SODIUM 100 MG/ML
60 INJECTION SUBCUTANEOUS EVERY 12 HOURS
Start: 2025-01-10

## 2025-01-10 RX ORDER — IPRATROPIUM BROMIDE AND ALBUTEROL SULFATE 2.5; .5 MG/3ML; MG/3ML
3 SOLUTION RESPIRATORY (INHALATION)
Start: 2025-01-10

## 2025-01-10 RX ORDER — ACETAZOLAMIDE 500 MG/5ML
500 INJECTION, POWDER, LYOPHILIZED, FOR SOLUTION INTRAVENOUS ONCE
Status: COMPLETED | OUTPATIENT
Start: 2025-01-10 | End: 2025-01-10

## 2025-01-10 RX ORDER — VALSARTAN 320 MG/1
320 TABLET ORAL DAILY
Start: 2025-01-11

## 2025-01-10 RX ORDER — POTASSIUM CHLORIDE 750 MG/1
40 CAPSULE, EXTENDED RELEASE ORAL ONCE
Status: COMPLETED | OUTPATIENT
Start: 2025-01-10 | End: 2025-01-10

## 2025-01-10 RX ORDER — INSULIN LISPRO 100 [IU]/ML
2-9 INJECTION, SOLUTION INTRAVENOUS; SUBCUTANEOUS
Start: 2025-01-10

## 2025-01-10 RX ORDER — TERAZOSIN 10 MG/1
10 CAPSULE ORAL NIGHTLY
Start: 2025-01-10

## 2025-01-10 RX ORDER — ROPINIROLE 1 MG/1
1 TABLET, FILM COATED ORAL NIGHTLY
Status: DISCONTINUED | OUTPATIENT
Start: 2025-01-10 | End: 2025-01-10 | Stop reason: HOSPADM

## 2025-01-10 RX ORDER — CLONIDINE HYDROCHLORIDE 0.1 MG/1
0.1 TABLET ORAL EVERY 8 HOURS SCHEDULED
Start: 2025-01-10

## 2025-01-10 RX ORDER — ZIPRASIDONE HYDROCHLORIDE 20 MG/1
20 CAPSULE ORAL NIGHTLY
Status: DISCONTINUED | OUTPATIENT
Start: 2025-01-10 | End: 2025-01-10 | Stop reason: HOSPADM

## 2025-01-10 RX ORDER — FOLIC ACID 1 MG/1
1 TABLET ORAL DAILY
Start: 2025-01-11

## 2025-01-10 RX ORDER — SPIRONOLACTONE 50 MG/1
50 TABLET, FILM COATED ORAL DAILY
Start: 2025-01-11

## 2025-01-10 RX ORDER — BUDESONIDE 0.5 MG/2ML
0.5 INHALANT ORAL
Start: 2025-01-10

## 2025-01-10 RX ORDER — PANTOPRAZOLE SODIUM 40 MG/10ML
40 INJECTION, POWDER, LYOPHILIZED, FOR SOLUTION INTRAVENOUS
Start: 2025-01-11

## 2025-01-10 RX ORDER — HYDROCHLOROTHIAZIDE 25 MG/1
25 TABLET ORAL DAILY
Start: 2025-01-11

## 2025-01-10 RX ORDER — ROPINIROLE 1 MG/1
1 TABLET, FILM COATED ORAL NIGHTLY
Start: 2025-01-10

## 2025-01-10 RX ORDER — ZIPRASIDONE HYDROCHLORIDE 20 MG/1
20 CAPSULE ORAL NIGHTLY
Start: 2025-01-10

## 2025-01-10 RX ORDER — ARFORMOTEROL TARTRATE 15 UG/2ML
15 SOLUTION RESPIRATORY (INHALATION)
Start: 2025-01-10

## 2025-01-10 RX ORDER — NICOTINE 21 MG/24HR
1 PATCH, TRANSDERMAL 24 HOURS TRANSDERMAL EVERY 24 HOURS
Start: 2025-01-11

## 2025-01-10 RX ORDER — CARVEDILOL 6.25 MG/1
6.25 TABLET ORAL 2 TIMES DAILY WITH MEALS
Status: DISCONTINUED | OUTPATIENT
Start: 2025-01-10 | End: 2025-01-10 | Stop reason: HOSPADM

## 2025-01-10 RX ORDER — CARVEDILOL 6.25 MG/1
6.25 TABLET ORAL 2 TIMES DAILY WITH MEALS
Start: 2025-01-10

## 2025-01-10 RX ADMIN — CLONIDINE HYDROCHLORIDE 0.1 MG: 0.2 TABLET ORAL at 05:26

## 2025-01-10 RX ADMIN — LACTULOSE 30 G: 20 SOLUTION ORAL at 08:11

## 2025-01-10 RX ADMIN — POTASSIUM CHLORIDE 40 MEQ: 750 CAPSULE, EXTENDED RELEASE ORAL at 12:16

## 2025-01-10 RX ADMIN — PREDNISONE 40 MG: 20 TABLET ORAL at 08:11

## 2025-01-10 RX ADMIN — LORAZEPAM 2 MG: 2 INJECTION INTRAMUSCULAR; INTRAVENOUS at 02:39

## 2025-01-10 RX ADMIN — IPRATROPIUM BROMIDE AND ALBUTEROL SULFATE 3 ML: 2.5; .5 SOLUTION RESPIRATORY (INHALATION) at 07:10

## 2025-01-10 RX ADMIN — ACETAZOLAMIDE 500 MG: 500 INJECTION, POWDER, LYOPHILIZED, FOR SOLUTION INTRAVENOUS at 12:16

## 2025-01-10 RX ADMIN — SPIRONOLACTONE 50 MG: 25 TABLET, FILM COATED ORAL at 08:11

## 2025-01-10 RX ADMIN — LORAZEPAM 2 MG: 2 INJECTION INTRAMUSCULAR; INTRAVENOUS at 00:45

## 2025-01-10 RX ADMIN — INSULIN LISPRO 4 UNITS: 100 INJECTION, SOLUTION INTRAVENOUS; SUBCUTANEOUS at 08:06

## 2025-01-10 RX ADMIN — AMLODIPINE BESYLATE 10 MG: 5 TABLET ORAL at 08:11

## 2025-01-10 RX ADMIN — VALSARTAN 320 MG: 80 TABLET, FILM COATED ORAL at 08:12

## 2025-01-10 RX ADMIN — HYDROCHLOROTHIAZIDE 25 MG: 25 TABLET ORAL at 08:11

## 2025-01-10 RX ADMIN — DEXMEDETOMIDINE HYDROCHLORIDE 0.6 MCG/KG/HR: 400 INJECTION INTRAVENOUS at 06:41

## 2025-01-10 RX ADMIN — PANTOPRAZOLE SODIUM 40 MG: 40 INJECTION, POWDER, FOR SOLUTION INTRAVENOUS at 05:26

## 2025-01-10 RX ADMIN — IPRATROPIUM BROMIDE AND ALBUTEROL SULFATE 3 ML: 2.5; .5 SOLUTION RESPIRATORY (INHALATION) at 00:14

## 2025-01-10 RX ADMIN — GUAIFENESIN 600 MG: 600 TABLET, EXTENDED RELEASE ORAL at 08:12

## 2025-01-10 RX ADMIN — CARVEDILOL 12.5 MG: 6.25 TABLET, FILM COATED ORAL at 08:12

## 2025-01-10 RX ADMIN — THIAMINE HYDROCHLORIDE 200 MG: 100 INJECTION, SOLUTION INTRAMUSCULAR; INTRAVENOUS at 05:26

## 2025-01-10 RX ADMIN — BUDESONIDE 0.5 MG: 0.5 INHALANT RESPIRATORY (INHALATION) at 07:10

## 2025-01-10 RX ADMIN — Medication 10 ML: at 08:07

## 2025-01-10 RX ADMIN — ASPIRIN 81 MG CHEWABLE TABLET 81 MG: 81 TABLET CHEWABLE at 08:11

## 2025-01-10 RX ADMIN — ENOXAPARIN SODIUM 60 MG: 60 INJECTION SUBCUTANEOUS at 05:26

## 2025-01-10 RX ADMIN — DEXMEDETOMIDINE HYDROCHLORIDE 1 MCG/KG/HR: 400 INJECTION INTRAVENOUS at 03:45

## 2025-01-10 RX ADMIN — ARFORMOTEROL TARTRATE 15 MCG: 15 SOLUTION RESPIRATORY (INHALATION) at 07:10

## 2025-01-10 RX ADMIN — FOLIC ACID 1 MG: 1 TABLET ORAL at 08:12

## 2025-01-10 RX ADMIN — POTASSIUM CHLORIDE 40 MEQ: 750 CAPSULE, EXTENDED RELEASE ORAL at 08:12

## 2025-01-10 NOTE — THERAPY TREATMENT NOTE
Patient Name: Cal Oliver Jr.  : 1965    MRN: [id number]                              Today's Date: 1/10/2025       Admit Date: 2025    Visit Dx:     ICD-10-CM ICD-9-CM   1. Acute hypoxic respiratory failure  J96.01 518.81   2. SHUKRI treated with BiPAP  G47.33 327.23   3. COPD exacerbation  J44.1 491.21   4. Obstructive sleep apnea syndrome  G47.33 327.23     Patient Active Problem List   Diagnosis    Asthma    Mild chronic obstructive pulmonary disease    Dental abscess    Depression    Diarrhea    Gastroesophageal reflux disease    Hypercholesterolemia    Hypertension    Hypokalemia    Hypothyroidism    Insomnia    Muscle pain    Nausea and vomiting    SHUKRI on CPAP    Restless legs syndrome    Type 2 diabetes mellitus    Vitamin D deficiency    Abnormal liver function tests    Chronic back pain    Morbid obesity    Encounter for screening colonoscopy    Routine general medical examination at a health care facility    Chronic pain of right knee    Sleep apnea    Osteoarthritis of both knees    Acute respiratory failure with hypoxia    COPD exacerbation     Past Medical History:   Diagnosis Date    Asthma     Chronic back pain     Diabetes mellitus     High blood pressure     Hyperlipidemia     Migraine     Nausea and vomiting     Sleep apnea      Past Surgical History:   Procedure Laterality Date    BACK SURGERY      GALLBLADDER SURGERY      KNEE SURGERY        General Information       Row Name 01/10/25 1204          Physical Therapy Time and Intention    Document Type therapy note (daily note)  -RM     Mode of Treatment physical therapy  -RM       Row Name 01/10/25 1204          General Information    Patient Profile Reviewed yes  -RM     Existing Precautions/Restrictions fall;oxygen therapy device and L/min  -RM       Row Name 01/10/25 1204          Cognition    Orientation Status (Cognition) oriented x 4;verbal cues/prompts needed for orientation  -RM       Row Name 01/10/25 1204          Safety  Issues/Impairments Affecting Functional Mobility    Safety Issues Affecting Function (Mobility) safety precautions follow-through/compliance;problem-solving;safety precaution awareness;positioning of assistive device  -RM     Impairments Affecting Function (Mobility) balance;endurance/activity tolerance;strength;pain;postural/trunk control  -RM               User Key  (r) = Recorded By, (t) = Taken By, (c) = Cosigned By      Initials Name Provider Type     Anibal Lance, ROWENA Physical Therapist Assistant                   Mobility       Row Name 01/10/25 1205          Bed Mobility    Supine-Sit [place] (Bed Mobility) minimum assist (75% patient effort)  -RM     Assistive Device (Bed Mobility) bed rails;head of bed elevated;overhead trapeze  -RM       Row Name 01/10/25 1205          Sit-Stand Transfer    Sit-Stand [place] (Transfers) minimum assist (75% patient effort);verbal cues  -RM     Assistive Device (Sit-Stand Transfers) walker, front-wheeled  -RM       Row Name 01/10/25 1205          Gait/Stairs (Locomotion)    [place] Level (Gait) minimum assist (75% patient effort);1 person to manage equipment  -RM     Assistive Device (Gait) walker, front-wheeled  -RM     Patient was able to Ambulate yes  -RM     Distance in Feet (Gait) 10  -RM     Deviations/Abnormal Patterns (Gait) festinating/shuffling;gait speed decreased;base of support, wide  -RM               User Key  (r) = Recorded By, (t) = Taken By, (c) = Cosigned By      Initials Name Provider Type    Anibal Chavez, ROWENA Physical Therapist Assistant                   Obj/Interventions    No documentation.                  Goals/Plan    No documentation.                  Clinical Impression       Row Name 01/10/25 1216          Pain    Pretreatment Pain Rating 0/10 - no pain  -RM     Posttreatment Pain Rating 0/10 - no pain  -RM       Row Name 01/10/25 1216          Plan of Care Review    Plan of Care Reviewed With patient  -RM      Progress improving  -RM     Outcome Evaluation Pt supine in bed and willing to participate with treatment. Pt performed bed mobility and transfers and limited gait training this treatment. Pt progressing well advancing activity tolerance with decreased asitance required. See flowsheet for details. Cont PT per POC progressing to goals as pt tolerates.  -RM       Row Name 01/10/25 1216          Vital Signs    Pre SpO2 (%) 89  -RM     O2 Delivery Pre Treatment hi-flow  7  -RM     Intra SpO2 (%) 88  -RM     O2 Delivery Intra Treatment hi-flow  -RM     Post SpO2 (%) 90  -RM     O2 Delivery Post Treatment hi-flow  -RM     Pre Patient Position Supine  -RM     Intra Patient Position Standing  -RM     Post Patient Position Sitting  -RM       Row Name 01/10/25 1216          Positioning and Restraints    Pre-Treatment Position in bed  -RM     Post Treatment Position chair  -RM     In Chair reclined;call light within reach;encouraged to call for assist;notified nsg  -RM               User Key  (r) = Recorded By, (t) = Taken By, (c) = Cosigned By      Initials Name Provider Type     Anibal Lance, PTA Physical Therapist Assistant                   Outcome Measures       Row Name 01/10/25 1220 01/10/25 0800       How much help from another person do you currently need...    Turning from your back to your side while in flat bed without using bedrails? 3  -RM 3  -KR    Moving from lying on back to sitting on the side of a flat bed without bedrails? 3  -RM 3  -KR    Moving to and from a bed to a chair (including a wheelchair)? 3  -RM 2  -KR    Standing up from a chair using your arms (e.g., wheelchair, bedside chair)? 3  -RM 2  -KR    Climbing 3-5 steps with a railing? 1  -RM 2  -KR    To walk in hospital room? 3  -RM 2  -KR    AM-PAC 6 Clicks Score (PT) 16  -RM 14  -KR    Highest Level of Mobility Goal 5 --> Static standing  -RM 4 --> Transfer to chair/commode  -KR      Row Name 01/10/25 1220          Functional Assessment     Outcome Measure Options AM-PAC 6 Clicks Basic Mobility (PT)  -               User Key  (r) = Recorded By, (t) = Taken By, (c) = Cosigned By      Initials Name Provider Type     Anibal Lance, ROWENA Physical Therapist Assistant    Carmela Hauser RN Registered Nurse                                 Physical Therapy Education       Title: PT OT SLP Therapies (In Progress)       Topic: Physical Therapy (In Progress)       Point: Mobility training (Done)       Learning Progress Summary            Patient Acceptance, E,TB,D, VU,NR by  at 1/10/2025 1224    Acceptance, E, VU by MS at 1/9/2025 1259    Comment: importance of mobility                      Point: Home exercise program (Done)       Learning Progress Summary            Patient Acceptance, E, VU by MS at 1/9/2025 1259    Comment: importance of mobility                      Point: Body mechanics (Not Started)       Learner Progress:  Not documented in this visit.              Point: Precautions (Not Started)       Learner Progress:  Not documented in this visit.                              User Key       Initials Effective Dates Name Provider Type Discipline     06/16/21 -  Anibal Lance, ROWENA Physical Therapist Assistant PT    MS 08/22/23 -  Josh Lopez PT Physical Therapist PT                  PT Recommendation and Plan     Progress: improving  Outcome Evaluation: Pt supine in bed and willing to participate with treatment. Pt performed bed mobility and transfers and limited gait training this treatment. Pt progressing well advancing activity tolerance with decreased asitance required. See flowsheet for details. Cont PT per POC progressing to goals as pt tolerates.     Time Calculation:         PT Charges       Row Name 01/10/25 1224             Time Calculation    Start Time 1054  -RM      Stop Time 1139  -RM      Time Calculation (min) 45 min  -RM      PT Received On 01/10/25  -RM      PT Goal Re-Cert Due Date 01/19/25  -RM          Time Calculation- PT    Total Timed Code Minutes- PT 45 minute(s)  -RM         Timed Charges    51237 - Gait Training Minutes  15  -RM      68012 - PT Therapeutic Activity Minutes 30  -RM         Total Minutes    Timed Charges Total Minutes 45  -RM       Total Minutes 45  -RM                User Key  (r) = Recorded By, (t) = Taken By, (c) = Cosigned By      Initials Name Provider Type    Anibal Chavez, PTA Physical Therapist Assistant                  Therapy Charges for Today       Code Description Service Date Service Provider Modifiers Qty    31051171644 HC GAIT TRAINING EA 15 MIN 1/10/2025 Anibal Lance, PTA GP 1    85911805838 HC PT THERAPEUTIC ACT EA 15 MIN 1/10/2025 Anibal Lance, PTA GP 2            PT G-Codes  Outcome Measure Options: AM-PAC 6 Clicks Basic Mobility (PT)  AM-PAC 6 Clicks Score (PT): 16  AM-PAC 6 Clicks Score (OT): 13       Anibal Lance PTA  1/10/2025

## 2025-01-10 NOTE — PROGRESS NOTES
[facility] Progress Note  [facility]. [place]        Patient Name: Cal Oliver Jr.  : 1965  MRN: [id number]   LOS: 9 days    Patient Care Team:  Woo Betancourt MD as PCP - General (Internal Medicine)    Chief Complaint:    Chief Complaint   Patient presents with    Shortness of Breath     Primary Care Physician:  Woo Betancourt MD  Date of admission: 2025    Subjective     Interval History:   Follow-up and uric/oliguric renal failure.    Renal function is continuously improving and has been compensated hypercapnic respiratory failure.  Patient has been on BiPAP with no further improvement of his hypercapnia.  Rather it is slightly worse.  Still well compensated.  Easily arousable denies having any chest pain or shortness of breath.  Events noted from last 24 hours.  I reviewed the chart and other providers notes, labs and procedures done since my last note.    Review of Systems:   unobtainable.    Objective     Vitals:   Temp:  [97.4 °F (36.3 °C)-98.5 °F (36.9 °C)] 98.4 °F (36.9 °C)  Heart Rate:  [53-99] 53  Resp:  [16-26] 20  BP: ()/() 118/79  Flow (L/min) (Oxygen Therapy):  [10-50] 50    Intake/Output Summary (Last 24 hours) at 1/10/2025 0747  Last data filed at 1/10/2025 0617  Gross per 24 hour   Intake 1376.17 ml   Output 1450 ml   Net -73.83 ml       Physical Exam:    General Appearance: Drowsy and sleepy, no acute distress   Skin: warm and dry  HEENT: oral mucosa normal, nonicteric sclera  Neck: supple, no JVD  Lungs: CTA, decreased breath sounds all over the chest.  Much better air movement with the BiPAP on.  Heart: RRR, normal S1 and S2  Abdomen: obese, soft, nontender, non distended and positive bowel sounds.  : no palpable bladder  Extremities: Trace edema, no cyanosis or clubbing  Neuro: Randomly moving extremities no meaningful interaction.    Scheduled Meds:     Current Facility-Administered Medications   Medication Dose  Route Frequency Provider Last Rate Last Admin    acetaminophen (TYLENOL) tablet 650 mg  650 mg Oral Q4H PRN Puja Paz DO   650 mg at 01/08/25 2134    Or    acetaminophen (TYLENOL) 160 MG/5ML oral solution 650 mg  650 mg Oral Q4H PRN Puja Paz DO        Or    acetaminophen (TYLENOL) suppository 650 mg  650 mg Rectal Q4H PRN Puja Paz DO        aluminum-magnesium hydroxide-simethicone (MAALOX MAX) 400-400-40 MG/5ML suspension 15 mL  15 mL Oral Q6H PRN Puja Paz DO        amLODIPine (NORVASC) tablet 10 mg  10 mg Oral Daily Puja Paz DO   10 mg at 01/09/25 0819    arformoterol (BROVANA) nebulizer solution 15 mcg  15 mcg Nebulization BID - RT Lisa Chapin MD   15 mcg at 01/10/25 0710    aspirin chewable tablet 81 mg  81 mg Oral Daily Puja Paz DO   81 mg at 01/09/25 0820    benzonatate (TESSALON) capsule 100 mg  100 mg Oral TID PRN Puja Paz DO   100 mg at 01/09/25 2023    sennosides-docusate (PERICOLACE) 8.6-50 MG per tablet 2 tablet  2 tablet Oral BID PRN Puja Paz DO        And    polyethylene glycol (MIRALAX) packet 17 g  17 g Oral Daily PRN Puja Paz DO        And    bisacodyl (DULCOLAX) EC tablet 5 mg  5 mg Oral Daily PRN Puja Paz DO        And    bisacodyl (DULCOLAX) suppository 10 mg  10 mg Rectal Daily PRN Puja Paz DO        budesonide (PULMICORT) nebulizer solution 0.5 mg  0.5 mg Nebulization BID - RT Enid Hartley APRN   0.5 mg at 01/10/25 0710    carvedilol (COREG) tablet 12.5 mg  12.5 mg Oral BID With Meals Puja Paz DO   12.5 mg at 01/09/25 1715    cloNIDine (CATAPRES) tablet 0.1 mg  0.1 mg Oral Q8H Gio Candelario MD, FASN   0.1 mg at 01/10/25 0526    dexmedetomidine (PRECEDEX) 400 mcg in 100 mL NS infusion  0.2 mcg/kg/hr Intravenous Titrated Kerley, Brian Joseph, DO 25.5 mL/hr at 01/10/25 0641 0.6 mcg/kg/hr at 01/10/25 0641     dextrose (D50W) (25 g/50 mL) IV injection 25 g  25 g Intravenous Q15 Min PRN Puja Paz DO        dextrose (GLUTOSE) oral gel 15 g  15 g Oral Q15 Min PRN Puja Paz DO        Enoxaparin Sodium (LOVENOX) syringe 60 mg  60 mg Subcutaneous Q12H Puja Paz DO   60 mg at 01/10/25 0526    folic acid (FOLVITE) tablet 1 mg  1 mg Oral Daily Puja Paz DO        glucagon (GLUCAGEN) injection 1 mg  1 mg Intramuscular Q15 Min PRN Puja Paz DO        guaiFENesin (MUCINEX) 12 hr tablet 600 mg  600 mg Oral Q12H Puja Paz DO   600 mg at 01/09/25 2023    hydrALAZINE (APRESOLINE) injection 10 mg  10 mg Intravenous Q4H PRN Romel Schumacher MD   10 mg at 01/07/25 1944    hydroCHLOROthiazide tablet 25 mg  25 mg Oral Daily Gio Candelario MD, FASN   25 mg at 01/09/25 0819    Insulin Lispro (humaLOG) injection 2-9 Units  2-9 Units Subcutaneous 4x Daily AC & at Bedtime Puja aPz DO   4 Units at 01/09/25 2024    ipratropium-albuterol (DUO-NEB) nebulizer solution 3 mL  3 mL Nebulization Q6H - RT Puja Paz DO   3 mL at 01/10/25 0710    lactulose (CHRONULAC) 10 GM/15ML solution 30 g  30 g Oral Daily Puja Paz DO        LORazepam (ATIVAN) tablet 1 mg  1 mg Oral Q1H PRN Kerley, Brian Joseph, DO   1 mg at 01/08/25 2132    Or    LORazepam (ATIVAN) injection 1 mg  1 mg Intravenous Q1H PRN Kerley, Brian Joseph, DO   1 mg at 01/06/25 0818    Or    LORazepam (ATIVAN) tablet 2 mg  2 mg Oral Q1H PRN Kerley, Brian Joseph, DO   2 mg at 01/07/25 0829    Or    LORazepam (ATIVAN) injection 2 mg  2 mg Intravenous Q1H PRN Kerley, Brian Joseph, DO   2 mg at 01/10/25 0239    Or    LORazepam (ATIVAN) injection 2 mg  2 mg Intravenous Q15 Min PRN Kerley, Brian Joseph, DO   2 mg at 01/08/25 1520    Or    LORazepam (ATIVAN) injection 2 mg  2 mg Intramuscular Q15 Min PRN Kerley, Brian Joseph, DO        Magnesium Standard Dose Replacement - Follow  Nurse / BPA Driven Protocol   Not Applicable PRN Kerley, Brian Joseph, DO        methocarbamol (ROBAXIN) tablet 750 mg  750 mg Oral TID PRN Puja Paz DO   750 mg at 01/09/25 2023    nicotine (NICODERM CQ) 21 MG/24HR patch 1 patch  1 patch Transdermal Q24H Puja Paz, DO   1 patch at 01/09/25 0554    nicotine polacrilex (NICORETTE) gum 4 mg  4 mg Mouth/Throat Q1H PRN Puja Paz,         nitroglycerin (NITROSTAT) SL tablet 0.4 mg  0.4 mg Sublingual Q5 Min PRN Puja Paz,         ondansetron ODT (ZOFRAN-ODT) disintegrating tablet 4 mg  4 mg Oral Q6H PRN Puja Paz DO        Or    ondansetron (ZOFRAN) injection 4 mg  4 mg Intravenous Q6H PRN Puja Paz,         pantoprazole (PROTONIX) injection 40 mg  40 mg Intravenous Q AM Kerley, Brian Joseph, DO   40 mg at 01/10/25 0526    Pharmacy to Dose enoxaparin (LOVENOX)   Not Applicable Continuous PRN Puja Paz, DO        predniSONE (DELTASONE) tablet 40 mg  40 mg Oral Daily With Breakfast Fadumo Liu MD        rOPINIRole (REQUIP) tablet 3 mg  3 mg Oral Nightly Puja Paz, DO   3 mg at 01/09/25 2023    sodium chloride 0.9 % flush 10 mL  10 mL Intravenous PRN Puja Paz, DO        sodium chloride 0.9 % flush 10 mL  10 mL Intravenous Q12H Puja Paz, DO   10 mL at 01/09/25 2023    sodium chloride 0.9 % flush 10 mL  10 mL Intravenous PRN Puja Paz, DO        sodium chloride 0.9 % infusion 40 mL  40 mL Intravenous PRN Puja Paz, DO        spironolactone (ALDACTONE) tablet 50 mg  50 mg Oral Daily Gio Candelario MD, JULIO        terazosin (HYTRIN) capsule 10 mg  10 mg Oral Nightly Gio Candelario MD, FASN   10 mg at 01/09/25 2023    thiamine (B-1) injection 200 mg  200 mg Intravenous Q8H Kerley, Brian Joseph, DO   200 mg at 01/10/25 0526    Followed by    [START ON 1/11/2025] thiamine (VITAMIN B-1) tablet 100 mg  100  mg Oral Daily Kerley, Brian Joseph, DO        valsartan (DIOVAN) tablet 320 mg  320 mg Oral Daily Puja Paz,    320 mg at 01/09/25 0819    ziprasidone (GEODON) injection 20 mg  20 mg Intramuscular Q4H PRN Romel Schumacher MD   20 mg at 01/06/25 1220       amLODIPine, 10 mg, Oral, Daily  arformoterol, 15 mcg, Nebulization, BID - RT  aspirin, 81 mg, Oral, Daily  budesonide, 0.5 mg, Nebulization, BID - RT  carvedilol, 12.5 mg, Oral, BID With Meals  cloNIDine, 0.1 mg, Oral, Q8H  enoxaparin, 60 mg, Subcutaneous, Q12H  folic acid, 1 mg, Oral, Daily  guaiFENesin, 600 mg, Oral, Q12H  hydroCHLOROthiazide Oral, 25 mg, Oral, Daily  insulin lispro, 2-9 Units, Subcutaneous, 4x Daily AC & at Bedtime  ipratropium-albuterol, 3 mL, Nebulization, Q6H - RT  lactulose, 30 g, Oral, Daily  nicotine, 1 patch, Transdermal, Q24H  pantoprazole, 40 mg, Intravenous, Q AM  predniSONE, 40 mg, Oral, Daily With Breakfast  rOPINIRole, 3 mg, Oral, Nightly  sodium chloride, 10 mL, Intravenous, Q12H  spironolactone, 50 mg, Oral, Daily  terazosin, 10 mg, Oral, Nightly  thiamine (B-1) IV, 200 mg, Intravenous, Q8H   Followed by  [START ON 1/11/2025] thiamine, 100 mg, Oral, Daily  valsartan, 320 mg, Oral, Daily        IV Meds:   dexmedetomidine, 0.2 mcg/kg/hr, Last Rate: 0.6 mcg/kg/hr (01/10/25 0641)  Pharmacy to Dose enoxaparin (LOVENOX),         Results Reviewed:   I have personally reviewed the results from the time of this admission to 1/10/2025 07:47 EST     Results from last 7 days   Lab Units 01/10/25  0433 01/09/25  0435 01/08/25  0426   SODIUM mmol/L 140 142 139   POTASSIUM mmol/L 3.3* 3.5 3.5   CHLORIDE mmol/L 96* 98 97*   CO2 mmol/L 40.6* 37.5* 33.6*   BUN mg/dL 14 14 16   CREATININE mg/dL 0.66* 0.70* 0.59*   CALCIUM mg/dL 8.4* 8.2* 8.6   GLUCOSE mg/dL 185* 170* 197*       Estimated Creatinine Clearance: 206.1 mL/min (A) (by C-G formula based on SCr of 0.66 mg/dL (L)).                Results from last 7 days   Lab Units  "01/10/25  0433 01/09/25  0435 01/08/25  0426 01/07/25  0447 01/06/25  0431   WBC 10*3/mm3 10.41 11.46* 11.57* 11.66* 16.03*   HEMOGLOBIN g/dL 13.8 14.4 15.2 15.1 14.3   PLATELETS 10*3/mm3 208 225 257 285 254             Brief Urine Lab Results  (Last result in the past 365 days)        Color   Clarity   Blood   Leuk Est   Nitrite   Protein   CREAT   Urine HCG        01/04/25 1235 Orange   Clear   Large (3+)   Negative   Negative   Negative                   No results found for: \"UTPCR\"    Imaging Results (Last 24 Hours)       ** No results found for the last 24 hours. **                Assessment / Plan     ASSESSMENT:    Acute respiratory failure with hypoxia    COPD exacerbation    Oliguria: Resolved.  Urinary retention ; Epstein catheter was removed yesterday.  Hypercapnic respiratory failure: Patient's pCO2 greater than 90, has been on BiPAP since admission with gradual improvement.  Hypertension: on home regimen, still noted to be high blood pressures.  Continue to adjust medications as needed.  Obstructive sleep apnea: It is not clear if he was using his BiPAP at home, currently on BiPAP.  Type 2 diabetes: Continue with the sliding scale  Morbid obesity: Complicates all forms of care.      PLAN:  Clinically appears to be stable.  Off-and-on confusion noted.  He may end up requiring higher dose of diuretics.  We will continue with 25 of hydrochlorothiazide and 50 of spironolactone for now.  I will go ahead and give him 1 dose of Diamox 500 mg IV.  I will go ahead and start him on clonidine 0.1 mg 3 times a day and increase as tolerated to have better blood pressure control.  Heart rate is in the 50s noted we will decrease the carvedilol to 6.25 twice a day and continue with the clonidine, blood pressure is much better since he has been on clonidine.  Details were also discussed with the hospitalist service and or other providers as needed.  I did suggest that maybe he needs to be left on antipsychotic on a " regular basis he has been getting Geodon, may be continue with daily dose at bedtime and see if that will help him be more stable.  May benefit from a LTAC.  Continue with rest of the current treatment plan, and monitor with surveillance labs.  Further recommendations will depend on clinical course of the patient during the current hospitalization.   I have reviewed the copied text to this note, it was edited and the changes made as needed.  It is accurate to the point, when the note was signed today.     Thank you for involving us in the care of Cal Hidalgo Benito Carroll.  Please feel free to call with any questions.    Gio Candelario MD, FASN  01/10/25  07:47 EST    [facility]  [phone]      Part of this note may be an electronic transcription/translation of spoken language to printed text using the Dragon Dictation System.

## 2025-01-10 NOTE — PLAN OF CARE
Goal Outcome Evaluation:  Plan of Care Reviewed With: patient        Progress: improving  Outcome Evaluation: OT tx completed. Patient is supine in bed, on 7L satting 89%. Required max A to joanie socks in supine. Patient performed supine to sit with min A, sit to stand min A and walked 8' using RW with min A. Patient sat in chair and performed UBB with mod A, UBD and grooming with min A. Patient left sitting in chair, call bell within reach. Continue OT POC    Anticipated Discharge Disposition (OT): home with home health, inpatient rehabilitation facility

## 2025-01-10 NOTE — CASE MANAGEMENT/SOCIAL WORK
Case Management Discharge Note      Final Note: DC to [facility] in [place] for acute long term care via [facility].    Provided Post Acute Provider List?: N/A    Selected Continued Care - Discharged on 1/10/2025 Admission date: 1/1/2025 - Discharge disposition: Short Term Hospital (DC)      Destination Coordination complete.      Service Provider Services Address Phone Fax Patient Preferred    [facility] [address], 4TH FLOOR, [facility] [address] [phone] --              Durable Medical Equipment Coordination complete.      Service Provider Services Address Phone Fax Patient Preferred    [facility] [address] [place] [address] [address] [phone] --              Dialysis/Infusion    No services have been selected for the patient.                Home Medical Care    No services have been selected for the patient.                Therapy    No services have been selected for the patient.                Community Resources    No services have been selected for the patient.                Community & DME    No services have been selected for the patient.                    Transportation Services  Ambulance: [facility]    Final Discharge Disposition Code: 63 - [facility]

## 2025-01-10 NOTE — PROGRESS NOTES
"  CC: Acute Respiratory Failure.     S: Currently on PAP therapy.  Slightly more awake.  Was able to ask about \"when can I have dinner with family\".  According to nursing staff he had to be restarted on Precedex and also had an episode with hypoxemia with FiO2 that had to be increased to 70%.  According to the nursing staff the patient also occasionally \"sees things\".  This mostly occurs at night and he is mostly oriented throughout the day    ROS: Could not be reliably obtained as the patient is on PAP therapy.     O:Vital signs reviewed. FiO2: 70 %.    /81   Pulse 52   Temp 97.3 °F (36.3 °C) (Axillary)   Resp 18   Ht 180.3 cm (71\")   Wt (!) 182 kg (400 lb 9.2 oz)   SpO2 93%   BMI 55.87 kg/m²     Temp (24hrs), Av °F (36.7 °C), Min:97.3 °F (36.3 °C), Max:98.5 °F (36.9 °C)      I & Os reviewed.   Intake/Output         25 0700 - 01/10/25 0659 01/10/25 0700 - 25 0659    Intake (ml) 1376.2 240    Output (ml) 1450 --    Net (ml) -73.8 240    Last Weight 182 kg (400 lb 9.2 oz) --            Net IO Since Admission: -14,953.98 mL [01/10/25 1046]    General/Constitutional: On PAP therapy. Appears to be in no significant obvious distress.  Eyes: PERRL.   Neck: Supple without obvious JVD. No obvious masses noted.   Cardiovascular: S1 + S2.  Appears regular at this time with mild bradycardia noted.  Lungs/Respiratory: Transmitted Breath sounds noted.  Minimal scattered wheezing heard.  Minimal basal crackles noted  GI/Abdomen: Obese but soft. Bowel sounds positive  Musculoskeletal/Extremities: No obvious edema noted. Gait could not be assessed at this time, as the patient was laying in bed.   Neurologic: Was able to follow some commands. Detailed exam couldn't be performed due to him being on PAP therapy.   Psych: Seemed somewhat somnolent      Labs: Reviewed.   Results from last 7 days   Lab Units 01/10/25  0433 25  0435 25  0426 25  0447 25  0431   WBC 10*3/mm3 10.41 11.46* " "11.57* 11.66* 16.03*   HEMOGLOBIN g/dL 13.8 14.4 15.2 15.1 14.3   HEMATOCRIT % 43.6 44.7 47.4 47.3 45.0   PLATELETS 10*3/mm3 208 225 257 285 254   NEUTROPHIL % % 71.4 82.6* 81.8* 82.1* 82.0*   NEUTROS ABS 10*3/mm3 7.43* 9.46* 9.46* 9.57* 13.15*   EOSINOPHIL % % 2.5 1.2 0.6 0.2* 0.1*   EOS ABS 10*3/mm3 0.26 0.14 0.07 0.02 0.01   LYMPHOCYTE % % 16.6* 10.2* 11.8* 11.2* 9.0*   LYMPHS ABS 10*3/mm3 1.73 1.17 1.37 1.31 1.44       Lab Results   Component Value Date    PROCALCITO 0.08 01/09/2025    PROCALCITO 0.05 01/06/2025    PROCALCITO 0.04 01/05/2025       No results found for: \"CRP\"    No results found for: \"SEDRATE\"    Lab Results   Component Value Date    PROBNP 111.6 01/01/2025       Results from last 7 days   Lab Units 01/10/25  0433 01/09/25  0435 01/08/25  0426   SODIUM mmol/L 140 142 139   POTASSIUM mmol/L 3.3* 3.5 3.5   CHLORIDE mmol/L 96* 98 97*   CO2 mmol/L 40.6* 37.5* 33.6*   BUN mg/dL 14 14 16   CREATININE mg/dL 0.66* 0.70* 0.59*   CALCIUM mg/dL 8.4* 8.2* 8.6   ANION GAP mmol/L 3.4* 6.5 8.4   GLUCOSE mg/dL 185* 170* 197*             Lab Results   Component Value Date    TSH 2.790 01/11/2024    TSH 2.460 09/25/2019    TSH 1.410 09/04/2018       Lab Results   Component Value Date    FREET4 1.03 01/11/2024    FREET4 0.90 (L) 09/25/2019    FREET4 1.00 09/04/2018             Lab Results   Component Value Date    CKTOTAL 163 12/24/2014    CKTOTAL 169 12/24/2014    CKTOTAL 214 (H) 12/23/2014       No components found for: \"HSTROPT\"    Lab Results   Component Value Date    TROPONINT 24 (H) 01/01/2025    TROPONINT 24 (H) 01/01/2025       Lab Results   Component Value Date    DDIMER 368 12/23/2014       Lab Results   Component Value Date    LIPASE 22 01/11/2024    LIPASE 65 03/20/2015    LIPASE 26 12/23/2014       Brief Urine Lab Results  (Last result in the past 365 days)        Color   Clarity   Blood   Leuk Est   Nitrite   Protein   CREAT   Urine HCG        01/04/25 1235 Orange   Clear   Large (3+)   Negative   " "Negative   Negative                     Micro: As of January 10, 2025   No results found for: \"RESPCX\"  No results found for: \"BCIDPCR\"  Lab Results   Component Value Date    BLOODCX No growth at 4 days 01/06/2025    BLOODCX No growth at 4 days 01/06/2025    BLOODCX No growth at 5 days 01/01/2025    BLOODCX No growth at 5 days 01/01/2025     No results found for: \"URINECX\"  No results found for: \"MRSACX\"  Lab Results   Component Value Date    MRSAPCR No MRSA Detected 01/06/2025     No results found for: \"URCX\"  No components found for: \"LOWRESPCF\"  No results found for: \"THROATCX\"  No results found for: \"CULTURES\"  No components found for: \"STREPBCX\"  No results found for: \"STREPPNEUAG\"  No results found for: \"LEGIONELLA\"  No results found for: \"LEGANTIGENUR\"  No results found for: \"MYCOPLASCX\"  No results found for: \"GCCX\"  No results found for: \"WOUNDCX\"  No results found for: \"BODYFLDCX\"    No results found for: \"FLU\"    Lab Results   Component Value Date    ADENOVIRUS Not Detected 01/01/2025     Lab Results   Component Value Date    ZN182W Not Detected 01/01/2025     Lab Results   Component Value Date    CVHKU1 Not Detected 01/01/2025     Lab Results   Component Value Date    CVNL63 Not Detected 01/01/2025     Lab Results   Component Value Date    CVOC43 Not Detected 01/01/2025     Lab Results   Component Value Date    HUMETPNEVS Not Detected 01/01/2025     Lab Results   Component Value Date    HURVEV Not Detected 01/01/2025     Lab Results   Component Value Date    FLUBPCR Not Detected 01/01/2025     Lab Results   Component Value Date    PARAINFLUE Not Detected 01/01/2025     Lab Results   Component Value Date    PARAFLUV2 Not Detected 01/01/2025     Lab Results   Component Value Date    PARAFLUV3 Not Detected 01/01/2025     Lab Results   Component Value Date    PARAFLUV4 Not Detected 01/01/2025     Lab Results   Component Value Date    BPERTPCR Not Detected 01/01/2025     No results found for: " "\"[id number]\"  Lab Results   Component Value Date    CPNEUPCR Not Detected 01/01/2025     Lab Results   Component Value Date    MPNEUMO Not Detected 01/01/2025     Lab Results   Component Value Date    FLUAPCR Not Detected 01/01/2025     No results found for: \"FLUAH3\"  No results found for: \"FLUAH1\"  Lab Results   Component Value Date    RSV Not Detected 01/01/2025     Lab Results   Component Value Date    BPARAPCR Not Detected 01/01/2025       COVID 19:  Lab Results   Component Value Date    COVID19 Not Detected 01/01/2025           ABG:   Recent Labs     01/07/25  0534 01/09/25  0717 01/10/25  0827   PHART 7.452 7.396 7.387   AOP0OMA 58.7* 70.3* 74.0*   PO2ART 61.4* 61.7* 61.2*   OSH7ULO 40.9* 43.1* 44.5*   BASEEXCESS 13.8* 14.4* 15.5*     Lab Results   Component Value Date    LACTATE 0.8 01/06/2025    LACTATE 1.2 01/01/2025         Echo: Results for orders placed during the hospital encounter of 01/01/25    Adult Transthoracic Echo Complete w/ Color, Spectral and Contrast if necessary per protocol    Interpretation Summary    Left ventricular systolic function is normal. Calculated left ventricular EF = 61.6% Left ventricular ejection fraction appears to be 61 - 65%.    Left ventricular wall thickness is consistent with mild concentric hypertrophy.    Left ventricular diastolic function is consistent with (grade II w/high LAP) pseudonormalization.    The left atrial cavity is moderate to severely dilated.        dexmedetomidine, 0.2 mcg/kg/hr, Last Rate: Stopped (01/10/25 1016)  Pharmacy to Dose enoxaparin (LOVENOX),           CXRay: Latest imaging study was reviewed personally.   Imaging Results (Last 24 Hours)       ** No results found for the last 24 hours. **              Assessment & Recommendations/Plan:   1.  Acute Respiratory Failure.  Continues to require BiPAP on a regular basis with FiO2 up to 70% or so.  I have asked the nursing staff to decrease FiO2 as tolerated  I have told the nursing staff to keep " "O2 saturation above 88%.  I also asked the nursing staff to decrease the rate of high flow oxygen supplementation, when he is off the BiPAP  Will make adjustments to the BiPAP, given worsening respiratory failure.  His latest ABG shows mildly worsened acute respiratory acidosis, although for the most part it was compensated.  Will repeat chest x-ray and ABG, as clinically indicated.  May give 1 dose of Diamox today.    2.  Chronic respiratory acidosis  Does appear to have compensated respiratory acidosis  Based on the last titration study, he did appear to have significant sleep apnea requiring BiPAP at high pressure of 18/13.  Original sleep study could not be found within the system.    3.  Morbid obesity  Does complicate all aspects of care    4.  COPD/obstructive sleep apnea/obesity hypoventilation syndrome  Will continue Brovana nebulized treatments and continue Pulmicort for now.  Will discontinue prednisone, given his ongoing issues with agitation/delirium?  Does appear to have obstructive sleep apnea.  Will ask case management to obtain settings on his current device and also ask the [employer] to assess the status of the current device  Although unclear, he seemed to indicate that his device was recalled and he has not used it in \"a while\".  May need outpatient workup including repeat sleep study, but this will be determined after information is obtained from the [employer].    5.  Delirium/agitation will recommend  Decreasing Precedex as tolerated and considering a trial of Geodon as needed.    6.?  Pneumonia/atelectasis  Antibiotics were discontinued after 3 days.  Temp (24hrs), Av °F (36.7 °C), Min:97.3 °F (36.3 °C), Max:98.5 °F (36.9 °C)    Lab Results   Component Value Date    PROCALCITO 0.08 2025    PROCALCITO 0.05 2025    PROCALCITO 0.04 2025     Lab Results   Component Value Date    WBC 10.41 01/10/2025    WBC 11.46 (H) 2025    WBC 11.57 (H) 2025       7.  " Renal/Electrolytes/Fluid status  Nephrology following  Lab Results   Component Value Date    BUN 14 01/10/2025    BUN 14 01/09/2025    BUN 16 01/08/2025    CREATININE 0.66 (L) 01/10/2025    CREATININE 0.70 (L) 01/09/2025    CREATININE 0.59 (L) 01/08/2025     Lab Results   Component Value Date     01/10/2025     01/09/2025     01/08/2025    K 3.3 (L) 01/10/2025    K 3.5 01/09/2025    K 3.5 01/08/2025     Net IO Since Admission: -14,953.98 mL [01/10/25 1046]  Lab Results   Component Value Date    PROBNP 111.6 01/01/2025     8.  Hematologic  Lab Results   Component Value Date    HGB 13.8 01/10/2025    HGB 14.4 01/09/2025    HGB 15.2 01/08/2025     Lab Results   Component Value Date     01/10/2025     01/09/2025     01/08/2025     Lab Results   Component Value Date    INR 0.94 01/11/2024     9.  GI   Nutrition per Dietician.   GI prophylaxis per admitting attending.    10.  DVT prophylaxis  Per admitting attending.    Critical Care time spent in direct patient care: 45 minutes including high complexity decision making to assess, manipulate, and support vital organ system failure in this individual who has impairment of one or more vital organ systems such that there is a high probability of imminent or life threatening deterioration in the patient’s condition.  This time includes multiple reassessments throughout the day, if needed and as appropriate.  This time excludes other billable procedures. Time does include preparation of documents, review of old records, coordinating care with other providers and direct bedside care.    I have discussed patient's overall clinical status with Dr. Candelario and Puja aPz, * especially with regards to latest ABG findings and radiology findings/orders.      Patient's prognosis and disposition were also discussed.      Plan was also discussed with nursing staff, as necessary.     This document was electronically signed by Lisa PATEL  MD Dari on 01/10/25 at 10:46 EST      Dictated utilizing Dragon dictation.

## 2025-01-10 NOTE — PLAN OF CARE
Goal Outcome Evaluation:  Plan of Care Reviewed With: patient        Progress: improving  Outcome Evaluation: Pt supine in bed and willing to participate with treatment. Pt performed bed mobility and transfers and limited gait training this treatment. Pt progressing well advancing activity tolerance with decreased asitance required. See flowsheet for details. Cont PT per POC progressing to goals as pt tolerates.

## 2025-01-10 NOTE — CONSULTS
"Diabetes Education    Patient Name:  Cal Oliver Jr.  YOB: 1965  MRN: [id number]  Admit Date:   DM education referral related to A1c >7%.  Spoke with WON Rudolph pt. Is not appropriate for education at this time.  I did leave written material on \"Diabetes Basics\", BG logs, Healthy Plate Method and smoking cessation.      Electronically signed by:  Mariela Tabares RN  01/10/25 10:40 EST  "

## 2025-01-10 NOTE — DISCHARGE SUMMARY
[facility]   DISCHARGE SUMMARY      Name:  Cal Oliver Jr.   Age:  59 y.o.  Sex:  male  :  1965  MRN:  [id number]   Visit Number:  [id number]    Admission Date:  2025  Date of Discharge:  1/10/2025  Primary Care Physician:  Woo Betancourt MD    Important issues to note:    Patient will be transferred to long-term acute care hospital in [place] for ongoing management    Patient will need follow-up with pulmonology after discharge from that facility  He will need BiPAP arranged for home use    Discharge Diagnoses:     COPD with acute exacerbation, POA  Acute hypoxic/hypercapnic respiratory failure  Acute urinary retention, resolved  Possible diastolic heart failure, workup pending, POA  Hypertensive urgency, POA  Chronic tobacco abuse  Type 2 diabetes  Morbid obesity  Obstructive sleep apnea       Problem List:     Active Hospital Problems    Diagnosis  POA    **Acute respiratory failure with hypoxia [J96.01]  Yes    COPD exacerbation [J44.1]  Yes      Resolved Hospital Problems   No resolved problems to display.     Presenting Problem:    Chief Complaint   Patient presents with    Shortness of Breath      Consults:     Consulting Physician(s)         Provider   Role Specialty     Gio Candelario MD, JULIO      Consulting Physician Nephrology     Lisa Chapin MD      Consulting Physician Pulmonary Disease          Procedures Performed:        History of presenting illness/Hospital Course:    Morbidly obese 59-year-old history of chronic tobacco abuse, uncontrolled high blood pressure, chronic back pain, diabetes, sleep apnea with home BiPAP use, who presented from home with complaints of shortness of breath.  Notes symptoms over the last 3 to 4 days, worsening.  Not been able to catch his breath with exertion, lying flat.  Noticed some swelling.  Has not been checking blood pressure.  No chest pain or palpitations.  Continues to smoke.  Unknown sick contacts.      In the ER he was hypertensive, heart rate in the 90s, afebrile, and hypoxic on room air.  CMP unremarkable.  proBNP of 110.  Troponins mildly elevated.  White count of 15 hemoglobin 14 platelets of 256.  Normal procalcitonin.  Negative flu COVID testing.  Chest x-ray with some interstitial edema.  Patient given DuoNeb, Solu-Medrol, Lasix, started on BiPAP.  Will admit for workup     Patient unfortunately did develop evidence of worsening hypercapnia, hypoxia, decreased mental status.  He was moved to the ICU.  He continue treatment for COPD exacerbation with Pulmicort, DuoNebs, Solu-Medrol.  He was started on Unasyn due to concern for possible aspiration event.  He has been on intermittent diuretic therapy, echocardiogram with grade 2 diastolic dysfunction noted.  He did have urinary retention and is also been seen by nephrology.     Patient overall has been improving.  His blood gases showing compensated hypoxia/hypercapnia at this time.  Pulmonology notes he will likely need BiPAP for home use, and wants him to follow-up after he gets out of long-term acute care facility.    His Epstein catheter has been removed over the last 48 hours and he is urinating well without issues.  He has been continued on diuretic therapy.  He has completed course of Unasyn.  Steroids have now been discontinued.  He is still requiring anywhere from 8 L to 15 L of oxygen at times, worsened with exertion.  Secondary to this, we have consulted with  and family, he will benefit from LTAC placement, this has been arranged in [place].    Patient has had intermittent confusion since being in the hospital.  He has no focal deficits.  There was suspicion of possible alcohol withdrawal, however this is not appear to have been the case.  Mental status appears to be worsened at nighttime.  Pulmonology feels likely ICU delirium, possibly exacerbated by steroids.  He did receive a Precedex drip as well as Ativan.  He has been started  on Geodon at nighttime as well.    Otherwise, will arrange transfer today to the long-term acute care facility for further management.  Would recommend continued monitoring of his urinary output, potassium, kidney function.    Vital Signs:    Temp:  [97.3 °F (36.3 °C)-98.5 °F (36.9 °C)] 97.3 °F (36.3 °C)  Heart Rate:  [49-99] 52  Resp:  [16-26] 18  BP: ()/() 121/81    Physical Exam:    General Appearance:  Alert and cooperative.  No acute distress   Head:  Atraumatic and normocephalic.   Eyes: Conjunctivae and sclerae normal, no icterus. No pallor.   Ears:  Ears with no abnormalities noted.   Throat: No oral lesions, no thrush, oral mucosa moist.   Neck: Supple, trachea midline, no thyromegaly.   Back:   No kyphoscoliosis present. No tenderness to palpation.   Lungs:   Breath sounds heard bilaterally equally.  Scattered wheezes, no significant crackles.   Heart:  Normal S1 and S2, no murmur, no gallop, no rub. No JVD.   Abdomen:   Normal bowel sounds, no masses, no organomegaly. Soft, nontender, nondistended, no rebound tenderness.   Extremities: Supple, mild lower extremity edema, no cyanosis, no clubbing.   Pulses: Pulses palpable bilaterally.   Skin: No bleeding or rash.   Neurologic: Alert and oriented x person and place.  Moves all extremities.     Pertinent Lab Results:     Results from last 7 days   Lab Units 01/10/25  0433 01/09/25  0435 01/08/25  0426   SODIUM mmol/L 140 142 139   POTASSIUM mmol/L 3.3* 3.5 3.5   CHLORIDE mmol/L 96* 98 97*   CO2 mmol/L 40.6* 37.5* 33.6*   BUN mg/dL 14 14 16   CREATININE mg/dL 0.66* 0.70* 0.59*   CALCIUM mg/dL 8.4* 8.2* 8.6   GLUCOSE mg/dL 185* 170* 197*     Results from last 7 days   Lab Units 01/10/25  0433 01/09/25  0435 01/08/25  0426   WBC 10*3/mm3 10.41 11.46* 11.57*   HEMOGLOBIN g/dL 13.8 14.4 15.2   HEMATOCRIT % 43.6 44.7 47.4   PLATELETS 10*3/mm3 208 225 257                         Results from last 7 days   Lab Units 01/10/25  0827   PH, ARTERIAL pH  units 7.387   PO2 ART mm Hg 61.2*   PCO2, ARTERIAL mm Hg 74.0*   HCO3 ART mmol/L 44.5*     Results from last 7 days   Lab Units 01/06/25  0813 01/06/25  0805   BLOODCX  No growth at 4 days No growth at 4 days       Pertinent Radiology Results:    Imaging Results (All)       Procedure Component Value Units Date/Time    XR Chest 1 View [[id number]] Collected: 01/09/25 0637     Updated: 01/09/25 0640    Narrative:      PROCEDURE: XR CHEST 1 VW-     HISTORY: Resp Failure.; J96.01-Acute respiratory failure with hypoxia,  shortness of breath     COMPARISON: One day earlier     FINDINGS: Pulmonary vascular congestion is noted with mild interstitial  edema. There is minimal right basilar atelectasis. The lungs fields are  unchanged. There is no pneumothorax.     The cardiac silhouette is stable.       Impression:      No significant change.        This report was signed and finalized on 1/9/2025 6:38 AM by Olvin Mcbride MD.       XR Chest 1 View [[id number]] Collected: 01/07/25 0740     Updated: 01/07/25 0743    Narrative:      PROCEDURE: XR CHEST 1 VW-     HISTORY: hypoxia f/u; J96.01-Acute respiratory failure with hypoxia     COMPARISON: 1 day prior.     FINDINGS: The heart is normal in size. There is persistent pulmonary  vascular congestion but mildly improved interstitial edema compared to  prior. Right perihilar atelectasis has improved. There is still mild  bibasilar atelectasis or infiltrate. Elevation right hemidiaphragm again  noted.. The mediastinum is unremarkable. There is no pneumothorax.   There are no acute osseous abnormalities. Apical lordotic positioning  noted.        Impression:      Mildly improved interstitial edema, otherwise stable chest..              This report was signed and finalized on 1/7/2025 7:41 AM by Krysten Rogers MD.       XR Chest 1 View [[id number]] Collected: 01/06/25 1029     Updated: 01/06/25 1032    Narrative:      PROCEDURE: XR CHEST 1 VW-     HISTORY: hypoxia, f/u; J96.01-Acute  respiratory failure with hypoxia,  shortness of breath     COMPARISON: 1/5/2024     FINDINGS:  Portable view of the chest demonstrates pulmonary vascular  congestion. Mild right perihilar atelectasis is noted. There is no  evidence of effusion, pneumothorax or other significant pleural disease.  The mediastinum is unremarkable.     The heart size is normal.       Impression:      No significant change           This report was signed and finalized on 1/6/2025 10:30 AM by Olvin Mcbride MD.       US Liver [[id number]] Collected: 01/05/25 1903     Updated: 01/05/25 1905    Narrative:      FINAL REPORT    TECHNIQUE:  null    CLINICAL HISTORY:  abn LFTs    COMPARISON:  null    FINDINGS:  Exam: Limited right upper quadrant abdominal ultrasound    Comparison: None    Clinical History: Abnormal LFTs    Findings:    Selected images from a right upper quadrant ultrasound are provided for interpretation    The liver is echogenic which may be reflective of fatty infiltration. No liver lesions are seen.    Portval vein is patent with hepatopetal flow.    Prior cholecystectomy    The common bile duct is not enlarged at 5.8 mm.    No choledocholithiasis.    Pancreas obscured by bowel gas.    Right kidney does not demonstrate any hydronephrosis or stones.    No ascites seen.      Impression:      IMPRESSION:    1. Echogenic liver texture may be reflective of fatty infiltration. No focal lesions.    2. Prior cholecystectomy. No choledocholithiasis or biliary obstruction    Authenticated and Electronically Signed by Vibha Hatch MD  on 01/05/2025 07:03:54 PM    XR Chest 1 View [[id number]] Collected: 01/05/25 0819     Updated: 01/05/25 0822    Narrative:      PROCEDURE: XR CHEST 1 VW-     HISTORY: hypoxia f/u; J96.01-Acute respiratory failure with hypoxia,  acute respiratory failure.     COMPARISON: 2 days prior.     FINDINGS: The heart is normal in size. There is decreased inspiratory  effort. Again noted are bilateral perihilar  linear densities, suspect  atelectasis. There is pulmonary vascular congestion and bilateral  interstitial disease, similar to prior. No effusion seen. There is  improved aeration of the lung bases bilaterally compared to previous  exam.. The mediastinum is unremarkable. There is no pneumothorax.  There  are no acute osseous abnormalities. Apical lordotic positioning noted.        Impression:      Improved bibasilar atelectasis or infiltrate compared to  prior..              This report was signed and finalized on 1/5/2025 8:20 AM by Krysten Rogers MD.       XR Chest 1 View [[id number]] Collected: 01/03/25 0722     Updated: 01/03/25 0725    Narrative:      PROCEDURE: XR CHEST 1 VW-     HISTORY: f/u hypoxia; J96.01-Acute respiratory failure with hypoxia     COMPARISON: 2 days prior.     FINDINGS: The heart is normal in size. Decreased inspiratory effort  noted. There is new bilateral perihilar linear densities consistent with  atelectasis. There is no new bibasilar atelectasis or infiltrate.. The  mediastinum is unremarkable. There is no pneumothorax.  There are no  acute osseous abnormalities. Apical lordotic positioning noted.        Impression:      New bilateral perihilar atelectasis and new bibasilar  disease, atelectasis versus infiltrate; recommend follow-up to document  clearing..              This report was signed and finalized on 1/3/2025 7:23 AM by Krysten Rogers MD.       CT Angiogram Chest Pulmonary Embolism [[id number]] Collected: 01/01/25 1730     Updated: 01/01/25 1731    Narrative:      FINAL REPORT    TECHNIQUE:  null    CLINICAL HISTORY:  hypoxia    COMPARISON:  null    FINDINGS:  CTA chest with 3-D postprocessing    Comparison: 3/1/24    Findings:    Study quality is adequate for the diagnosis of pulmonary embolism.    No pulmonary embolism.    Cardiomegaly. RV/LV ratio is normal. Mild calcified coronary artery disease.    No aortic dissection or aneurysm. No calcified atherosclerotic disease.    No  lymphadenopathy.    Bilateral segmental/subsegmental consolidation.    No pneumothorax or pleural effusion.    No acute osseous or soft tissue abnormality.    No acute pathology in the imaged portion of the upper abdomen. Status post cholecystectomy.      Impression:      Impression:    No pulmonary embolism.    Bilateral segmental/subsegmental consolidation which is favored to be atelectasis. Pneumonia is considered less likely.    Authenticated and Electronically Signed by Marie June MD  on 01/01/2025 05:30:50 PM    XR Chest 1 View [[id number]] Collected: 01/01/25 0743     Updated: 01/01/25 0746    Narrative:      PROCEDURE: XR CHEST 1 VW-     INDICATION:  SOA Triage Protocol     FINDINGS:  A portable view of the chest was obtained.  There is no prior  exam for comparison. Heart size is normal. Lung volumes are low which  likely accounts for fullness of the inferior right hilum. Increased  interstitial markings are likely related to vascular crowding. No focal  infiltrate, pleural effusion, or pneumothorax.       Impression:      Low lung volumes which likely accounts for fullness of the  inferior right hilum and vascular crowding. Consider upright PA and  lateral chest x-ray.        This report was signed and finalized on 1/1/2025 7:44 AM by Stefanie Bishop MD.               Echo:    Results for orders placed during the hospital encounter of 01/01/25    Adult Transthoracic Echo Complete w/ Color, Spectral and Contrast if necessary per protocol    Interpretation Summary    Left ventricular systolic function is normal. Calculated left ventricular EF = 61.6% Left ventricular ejection fraction appears to be 61 - 65%.    Left ventricular wall thickness is consistent with mild concentric hypertrophy.    Left ventricular diastolic function is consistent with (grade II w/high LAP) pseudonormalization.    The left atrial cavity is moderate to severely dilated.    Condition on Discharge:      Stable.    Code status  during the hospital stay:    Code Status and Medical Interventions: CPR (Attempt to Resuscitate); Full Support   Ordered at: 01/01/25 0603     Code Status (Patient has no pulse and is not breathing):    CPR (Attempt to Resuscitate)     Medical Interventions (Patient has pulse or is breathing):    Full Support     Discharge Disposition:    Short Term Hospital (DC)    Discharge Medications:       Discharge Medications        New Medications        Instructions Start Date   arformoterol 15 MCG/2ML nebulizer solution  Commonly known as: BROVANA   15 mcg, Nebulization, 2 Times Daily - RT      budesonide 0.5 MG/2ML nebulizer solution  Commonly known as: PULMICORT   0.5 mg, Nebulization, 2 Times Daily - RT      Enoxaparin Sodium 60 MG/0.6ML solution prefilled syringe syringe  Commonly known as: LOVENOX   60 mg, Subcutaneous, Every 12 Hours      folic acid 1 MG tablet  Commonly known as: FOLVITE   1 mg, Oral, Daily   Start Date: January 11, 2025     hydroCHLOROthiazide 25 MG tablet   25 mg, Oral, Daily   Start Date: January 11, 2025     Insulin Lispro 100 UNIT/ML injection  Commonly known as: humaLOG   2-9 Units, Subcutaneous, 4 Times Daily Before Meals & Nightly      ipratropium-albuterol 0.5-2.5 mg/3 ml nebulizer  Commonly known as: DUO-NEB   3 mL, Nebulization, Every 6 Hours - RT      nicotine 21 MG/24HR patch  Commonly known as: NICODERM CQ   1 patch, Transdermal, Every 24 Hours   Start Date: January 11, 2025     pantoprazole 40 MG injection  Commonly known as: PROTONIX   40 mg, Intravenous, Every Early Morning   Start Date: January 11, 2025     spironolactone 50 MG tablet  Commonly known as: ALDACTONE   50 mg, Oral, Daily   Start Date: January 11, 2025     terazosin 10 MG capsule  Commonly known as: HYTRIN  Replaces: doxazosin 2 MG tablet   10 mg, Oral, Nightly      thiamine 100 MG tablet  Commonly known as: VITAMIN B1   100 mg, Oral, Daily   Start Date: January 11, 2025     ziprasidone 20 MG capsule  Commonly known  as: GEODON   20 mg, Oral, Nightly             Changes to Medications        Instructions Start Date   carvedilol 6.25 MG tablet  Commonly known as: COREG  What changed:   medication strength  how much to take   6.25 mg, Oral, 2 Times Daily With Meals      cloNIDine 0.1 MG tablet  Commonly known as: CATAPRES  What changed: when to take this   0.1 mg, Oral, Every 8 Hours Scheduled      rOPINIRole 1 MG tablet  Commonly known as: REQUIP  What changed:   medication strength  how much to take  when to take this  additional instructions   1 mg, Oral, Nightly, Take 1 hour before bedtime.             Continue These Medications        Instructions Start Date   amLODIPine 10 MG tablet  Commonly known as: NORVASC   10 mg, Oral, Daily      aspirin 81 MG chewable tablet   81 mg, Oral, Daily      potassium chloride ER 20 MEQ tablet controlled-release ER tablet  Commonly known as: K-TAB   20 mEq, Oral, 2 Times Daily With Meals      valsartan 320 MG tablet  Commonly known as: DIOVAN   320 mg, Oral, Daily   Start Date: January 11, 2025            Stop These Medications      albuterol sulfate  (90 Base) MCG/ACT inhaler  Commonly known as: PROVENTIL HFA;VENTOLIN HFA;PROAIR HFA     cyclobenzaprine 10 MG tablet  Commonly known as: FLEXERIL     doxazosin 2 MG tablet  Commonly known as: CARDURA  Replaced by: terazosin 10 MG capsule     Gel Base gel     metFORMIN 500 MG tablet  Commonly known as: GLUCOPHAGE     methocarbamol 750 MG tablet  Commonly known as: ROBAXIN     omeprazole 20 MG capsule  Commonly known as: priLOSEC     triamcinolone 0.1 % cream  Commonly known as: KENALOG            Discharge Diet:   As tolerated    Activity at Discharge:     As tolerated  Follow-up Appointments:    Additional Instructions for the Follow-ups that You Need to Schedule       Ambulatory Referral to Disease State Management   As directed      To dept:  [facility] [[id number]]   What program(s) are you referring for?: COPD (BIPAP)    Follow-up needed: Yes               Contact information for follow-up providers       Woo Betancourt MD .    Specialty: Internal Medicine  Contact information:  [address]  [facility] [address]  [facility] [address]  [phone]                       Contact information for after-discharge care       Durable Medical Equipment       [facility] .    Service: Oxygen Equipment and Accessories  Contact information:  [address]  [facility] [address]  [phone]                                 No future appointments.  Test Results Pending at Discharge:    Pending Results       None                 Puja Paz DO  01/10/25  12:25 EST    Time: I spent 39 minutes on this discharge activity which included: face-to-face encounter with the patient, reviewing the data in the system, coordination of the care with the nursing staff as well as consultants, documentation, and entering orders.     Dictated utilizing Dragon dictation.

## 2025-01-10 NOTE — THERAPY TREATMENT NOTE
Patient Name: Cal Oliver Jr.  : 1965    MRN: [id number]                              Today's Date: 1/10/2025       Admit Date: 2025    Visit Dx:     ICD-10-CM ICD-9-CM   1. Acute hypoxic respiratory failure  J96.01 518.81   2. SHUKRI treated with BiPAP  G47.33 327.23   3. COPD exacerbation  J44.1 491.21   4. Obstructive sleep apnea syndrome  G47.33 327.23     Patient Active Problem List   Diagnosis    Asthma    Mild chronic obstructive pulmonary disease    Dental abscess    Depression    Diarrhea    Gastroesophageal reflux disease    Hypercholesterolemia    Hypertension    Hypokalemia    Hypothyroidism    Insomnia    Muscle pain    Nausea and vomiting    SHUKRI on CPAP    Restless legs syndrome    Type 2 diabetes mellitus    Vitamin D deficiency    Abnormal liver function tests    Chronic back pain    Morbid obesity    Encounter for screening colonoscopy    Routine general medical examination at a health care facility    Chronic pain of right knee    Sleep apnea    Osteoarthritis of both knees    Acute respiratory failure with hypoxia    COPD exacerbation     Past Medical History:   Diagnosis Date    Asthma     Chronic back pain     Diabetes mellitus     High blood pressure     Hyperlipidemia     Migraine     Nausea and vomiting     Sleep apnea      Past Surgical History:   Procedure Laterality Date    BACK SURGERY      GALLBLADDER SURGERY      KNEE SURGERY        General Information       Row Name 01/10/25 1244          OT Time and Intention    Subjective Information no complaints  -SD     Document Type therapy note (daily note)  -SD     Mode of Treatment occupational therapy  -SD     Patient Effort good  -SD     Symptoms Noted During/After Treatment fatigue  -SD       Row Name 01/10/25 1244          General Information    Patient Profile Reviewed yes  -SD               User Key  (r) = Recorded By, (t) = Taken By, (c) = Cosigned By      Initials Name Provider Type    Marie Collins, OT  Occupational Therapist                     Mobility/ADL's       Row Name 01/10/25 1244          Bed Mobility    Bed Mobility supine-sit  -SD     Supine-Sit Booker (Bed Mobility) minimum assist (75% patient effort)  -SD     Assistive Device (Bed Mobility) bed rails;head of bed elevated  -SD       Row Name 01/10/25 1244          Transfers    Transfers sit-stand transfer  -SD       Row Name 01/10/25 [facility]          Sit-Stand Transfer    Sit-Stand Booker (Transfers) minimum assist (75% patient effort)  -SD     Assistive Device (Sit-Stand Transfers) walker, front-wheeled  -SD       Row Name 01/10/25 [facility]          Functional Mobility    Functional Mobility- Ind. Level minimum assist (75% patient effort)  -SD     Functional Mobility- Device walker, front-wheeled  -SD     Functional Mobility-Distance (Feet) 8  -SD     Functional Mobility- Safety Issues supplemental O2  -SD       Row Name 01/10/25 [facility]          Bathing Assessment/Intervention    Booker Level (Bathing) upper body;upper extremities;moderate assist (50% patient effort)  -SD       Row Name 01/10/25 [facility]          Upper Body Dressing Assessment/Training    Booker Level (Upper Body Dressing) don;minimum assist (75% patient effort)  -SD       Row Name 01/10/25 [facility]          Lower Body Dressing Assessment/Training    Booker Level (Lower Body Dressing) don;socks;maximum assist (25% patient effort)  -SD     Position (Lower Body Dressing) supine  -SD       Row Name 01/10/25 [facility]          Grooming Assessment/Training    Booker Level (Grooming) hair care, combing/brushing;wash face, hands;minimum assist (75% patient effort)  -SD               User Key  (r) = Recorded By, (t) = Taken By, (c) = Cosigned By      Initials Name Provider Type    Marie Collins OT Occupational Therapist                   Obj/Interventions    No documentation.                  Goals/Plan    No documentation.                  Clinical Impression       Row  Name 01/10/25 1245          Pain Assessment    Pretreatment Pain Rating 0/10 - no pain  -SD     Posttreatment Pain Rating 0/10 - no pain  -SD       Row Name 01/10/25 1245          Plan of Care Review    Plan of Care Reviewed With patient  -SD     Progress improving  -SD     Outcome Evaluation OT tx completed. Patient is supine in bed, on 7L satting 89%. Required max A to joanie socks in supine. Patient performed supine to sit with min A, sit to stand min A and walked 8' using RW with min A. Patient sat in chair and performed UBB with mod A, UBD and grooming with min A. Patient left sitting in chair, call bell within reach. Continue OT POC  -SD       Row Name 01/10/25 1245          Vital Signs    Pre SpO2 (%) 89  -SD     O2 Delivery Pre Treatment hi-flow  -SD     Intra SpO2 (%) 88  -SD     O2 Delivery Intra Treatment hi-flow  -SD     Post SpO2 (%) 90  -SD     O2 Delivery Post Treatment hi-flow  -SD       Row Name 01/10/25 1245          Positioning and Restraints    Pre-Treatment Position in bed  -SD     Post Treatment Position chair  -SD     In Chair reclined;call light within reach;encouraged to call for assist;exit alarm on;notified nsg  -SD               User Key  (r) = Recorded By, (t) = Taken By, (c) = Cosigned By      Initials Name Provider Type    Marie Collins OT Occupational Therapist                   Outcome Measures       Row Name 01/10/25 1251          How much help from another is currently needed...    Putting on and taking off regular lower body clothing? 2  -SD     Bathing (including washing, rinsing, and drying) 2  -SD     Toileting (which includes using toilet bed pan or urinal) 2  -SD     Putting on and taking off regular upper body clothing 3  -SD     Taking care of personal grooming (such as brushing teeth) 3  -SD     Eating meals 3  -SD     AM-PAC 6 Clicks Score (OT) 15  -SD       Row Name 01/10/25 1220 01/10/25 0800       How much help from another person do you currently need...     Turning from your back to your side while in flat bed without using bedrails? 3  -RM 3  -KR    Moving from lying on back to sitting on the side of a flat bed without bedrails? 3  -RM 3  -KR    Moving to and from a bed to a chair (including a wheelchair)? 3  -RM 2  -KR    Standing up from a chair using your arms (e.g., wheelchair, bedside chair)? 3  -RM 2  -KR    Climbing 3-5 steps with a railing? 1  -RM 2  -KR    To walk in hospital room? 3  -RM 2  -KR    AM-PAC 6 Clicks Score (PT) 16  -RM 14  -KR    Highest Level of Mobility Goal 5 --> Static standing  -RM 4 --> Transfer to chair/commode  -KR      Row Name 01/10/25 1251 01/10/25 1220       Functional Assessment    Outcome Measure Options AM-PAC 6 Clicks Daily Activity (OT)  -SD AM-PAC 6 Clicks Basic Mobility (PT)  -RM              User Key  (r) = Recorded By, (t) = Taken By, (c) = Cosigned By      Initials Name Provider Type    Anibal Chavez, PTA Physical Therapist Assistant    Marie Collins, OT Occupational Therapist    Carmela Hauser, RN Registered Nurse                    Occupational Therapy Education       Title: PT OT SLP Therapies (In Progress)       Topic: Occupational Therapy (In Progress)       Point: ADL training (Done)       Description:   Instruct learner(s) on proper safety adaptation and remediation techniques during self care or transfers.   Instruct in proper use of assistive devices.                  Learning Progress Summary            Patient Acceptance, E,TB, VU by SD at 1/10/2025 1251    Comment: Safety during tf    Acceptance, E,TB, VU by SD at 1/9/2025 1230    Comment: OT POC                      Point: Home exercise program (Not Started)       Description:   Instruct learner(s) on appropriate technique for monitoring, assisting and/or progressing therapeutic exercises/activities.                  Learner Progress:  Not documented in this visit.              Point: Precautions (Not Started)       Description:    Instruct learner(s) on prescribed precautions during self-care and functional transfers.                  Learner Progress:  Not documented in this visit.              Point: Body mechanics (Not Started)       Description:   Instruct learner(s) on proper positioning and spine alignment during self-care, functional mobility activities and/or exercises.                  Learner Progress:  Not documented in this visit.                              User Key       Initials Effective Dates Name Provider Type Discipline    SD 06/16/21 -  Marie Watts OT Occupational Therapist OT                  OT Recommendation and Plan  Planned Therapy Interventions (OT): activity tolerance training, adaptive equipment training, BADL retraining, patient/caregiver education/training, ROM/therapeutic exercise, strengthening exercise, transfer/mobility retraining  Therapy Frequency (OT): 5 times/wk (Mon-Fri)  Plan of Care Review  Plan of Care Reviewed With: patient  Progress: improving  Outcome Evaluation: OT tx completed. Patient is supine in bed, on 7L satting 89%. Required max A to joanie socks in supine. Patient performed supine to sit with min A, sit to stand min A and walked 8' using RW with min A. Patient sat in chair and performed UBB with mod A, UBD and grooming with min A. Patient left sitting in chair, call bell within reach. Continue OT POC     Time Calculation:   Evaluation Complexity (OT)  Review Occupational Profile/Medical/Therapy History Complexity: expanded/moderate complexity  Assessment, Occupational Performance/Identification of Deficit Complexity: 3-5 performance deficits  Clinical Decision Making Complexity (OT): detailed assessment/moderate complexity  Overall Complexity of Evaluation (OT): moderate complexity     Time Calculation- OT       Row Name 01/10/25 1251 01/10/25 1224          Time Calculation- OT    OT Start Time 1101  -SD --     OT Stop Time 1135  -SD --     OT Time Calculation (min) 34 min  -SD --      OT Received On 01/10/25  -SD --     OT Goal Re-Cert Due Date 01/21/25  -SD --        Timed Charges    36363 - Gait Training Minutes  -- 15  -RM     52848 - OT Therapeutic Activity Minutes 15  -SD --     94149 - OT Self Care/Mgmt Minutes 19  -SD --        Total Minutes    Timed Charges Total Minutes 34  -SD 15  -RM      Total Minutes 34  -SD 15  -RM               User Key  (r) = Recorded By, (t) = Taken By, (c) = Cosigned By      Initials Name Provider Type    Anibal Chavez, PTA Physical Therapist Assistant    SD Marie Watts OT Occupational Therapist                  Therapy Charges for Today       Code Description Service Date Service Provider Modifiers Qty    [id number]  OT EVAL MOD COMPLEXITY 4 1/9/2025 Marie Watts OT GO 1    [id number]  OT THERAPEUTIC ACT EA 15 MIN 1/10/2025 Marie Watts OT GO 1    [id number] HC OT SELF CARE/MGMT/TRAIN EA 15 MIN 1/10/2025 Marie Watts OT GO 1                 Marie Watts OT  1/10/2025

## 2025-01-11 LAB
BACTERIA SPEC AEROBE CULT: NORMAL
BACTERIA SPEC AEROBE CULT: NORMAL
GLUCOSE BLDC GLUCOMTR-MCNC: 172 MG/DL (ref 70–130)
GLUCOSE BLDC GLUCOMTR-MCNC: 203 MG/DL (ref 70–130)

## 2025-01-23 ENCOUNTER — READMISSION MANAGEMENT (OUTPATIENT)
Dept: CALL CENTER | Facility: HOSPITAL | Age: 60
End: 2025-01-23
Payer: MEDICARE

## 2025-01-24 ENCOUNTER — TRANSITIONAL CARE MANAGEMENT TELEPHONE ENCOUNTER (OUTPATIENT)
Dept: CALL CENTER | Facility: HOSPITAL | Age: 60
End: 2025-01-24
Payer: MEDICARE

## 2025-01-24 NOTE — OUTREACH NOTE
Call Center TCM Note      Flowsheet Row Responses   [facility] patient discharged from? Non-   Does the patient have one of the following disease processes/diagnoses(primary or secondary)? Other   TCM attempt successful? Yes  [VR listing Janelle]   Call start time 1243   Call end time 1250   Discharge diagnosis Acute hypoxemic and hypercapnic respiratory failure   Is the patient taking all medications as directed (includes completed medication regime)? Yes   Comments 1/30/2025 11:00 AM Arrive by 10:45 AM HOSPITAL FOLLOW UP   Does the patient have an appointment with their PCP within 7-14 days of discharge? Yes   DME comments Pt has home O2 @3L continuous, Bipap for sleep, nebulizers in place he reports   Comments Per patient's report, he is improving r/t his respiratory status. Patient states that he is tolerating po intake and voiding WNL. Patient resting well at home, he reports and denies any questions at this time. RN educated pt on importance of ongoing cough/deep breathing exercises frequently while awake to keep O2 sats above 90%. Patient reports that when he is active he does note some lower O2 saturations at 88% but with rest and deep breathing the sats bump up to 90's.   What is the patient's perception of their health status since discharge? Improving   Is the patient/caregiver able to teach back signs and symptoms related to disease process for when to call PCP? Yes   Is the patient/caregiver able to teach back signs and symptoms related to disease process for when to call 911? Yes   Is the patient/caregiver able to teach back the hierarchy of who to call/visit for symptoms/problems? PCP, Specialist, Home health nurse, Urgent Care, ED, 911 Yes   If the patient is a current smoker, are they able to teach back resources for cessation? Not a smoker  [Pt reports that he has not smoked and is not utilizing any meds to assist.]   TCM call completed? Yes   Call end time 1250            Alaina MOYA  ABILIO Diego    1/24/2025, 12:50 EST

## 2025-01-24 NOTE — OUTREACH NOTE
Prep Survey      Flowsheet Row Responses   [facility] facility patient discharged from? Non-BH   Is LACE score < 7 ? Non-BH Discharge   Eligibility [facility]   H[facility]   Date of Admission 01/10/25   Date of Discharge 01/23/25   Discharge Disposition Home or Self Care   Discharge diagnosis Acute hypoxemic and hypercapnic respiratory failure   Does the patient have one of the following disease processes/diagnoses(primary or secondary)? Other   Does the patient have Home health ordered? No   Is there a DME ordered? No   Prep survey completed? Yes            SAULO JONES - Registered Nurse

## 2025-01-30 ENCOUNTER — OFFICE VISIT (OUTPATIENT)
Dept: INTERNAL MEDICINE | Facility: CLINIC | Age: 60
End: 2025-01-30
Payer: MEDICARE

## 2025-01-30 VITALS
HEART RATE: 86 BPM | HEIGHT: 71 IN | SYSTOLIC BLOOD PRESSURE: 138 MMHG | RESPIRATION RATE: 16 BRPM | TEMPERATURE: 97.7 F | BODY MASS INDEX: 44.1 KG/M2 | WEIGHT: 315 LBS | OXYGEN SATURATION: 95 % | DIASTOLIC BLOOD PRESSURE: 82 MMHG

## 2025-01-30 DIAGNOSIS — Z99.81 SUPPLEMENTAL OXYGEN DEPENDENT: ICD-10-CM

## 2025-01-30 DIAGNOSIS — I50.31 ACUTE DIASTOLIC CHF (CONGESTIVE HEART FAILURE): ICD-10-CM

## 2025-01-30 DIAGNOSIS — R79.9 ABNORMAL FINDING OF BLOOD CHEMISTRY, UNSPECIFIED: ICD-10-CM

## 2025-01-30 DIAGNOSIS — J43.1 PANLOBULAR EMPHYSEMA: ICD-10-CM

## 2025-01-30 DIAGNOSIS — Z09 HOSPITAL DISCHARGE FOLLOW-UP: Primary | ICD-10-CM

## 2025-01-30 RX ORDER — IPRATROPIUM BROMIDE AND ALBUTEROL SULFATE 2.5; .5 MG/3ML; MG/3ML
SOLUTION RESPIRATORY (INHALATION)
OUTPATIENT
Start: 2025-01-30

## 2025-01-30 RX ORDER — HYDROCHLOROTHIAZIDE 12.5 MG/1
CAPSULE ORAL
Qty: 2 EACH | Refills: 0 | COMMUNITY
Start: 2025-01-30

## 2025-01-30 RX ORDER — IPRATROPIUM BROMIDE AND ALBUTEROL SULFATE 2.5; .5 MG/3ML; MG/3ML
3 SOLUTION RESPIRATORY (INHALATION)
Start: 2025-01-30 | End: 2025-01-30 | Stop reason: SDUPTHER

## 2025-01-30 RX ORDER — IPRATROPIUM BROMIDE AND ALBUTEROL SULFATE 2.5; .5 MG/3ML; MG/3ML
3 SOLUTION RESPIRATORY (INHALATION)
Qty: 360 ML | Refills: 1 | Status: SHIPPED | OUTPATIENT
Start: 2025-01-30

## 2025-01-30 RX ORDER — LOSARTAN POTASSIUM 100 MG/1
100 TABLET ORAL DAILY
COMMUNITY
Start: 2025-01-24

## 2025-01-30 NOTE — PROGRESS NOTES
Subjective     Patient ID: Cal Oliver Jr. is a 59 y.o. male. Patient is here for management of multiple medical problems.     Chief Complaint   Patient presents with    Hospital Follow Up Visit     COPD, acute respiratory failure, acute hypoxemic and hypocapnic respiratory failure     History of Present Illness     Hosptial follow up.       The following portions of the patient's history were reviewed and updated as appropriate: allergies, current medications, past family history, past medical history, past social history, past surgical history and problem list.    Review of Systems    Current Outpatient Medications:     amLODIPine (NORVASC) 10 MG tablet, Take 1 tablet by mouth Daily., Disp: 90 tablet, Rfl: 1    aspirin 81 MG chewable tablet, CHEW AND SWALLOW 1 TABLET BY MOUTH DAILY, Disp: 90 tablet, Rfl: 1    carvedilol (COREG) 6.25 MG tablet, Take 1 tablet by mouth 2 (Two) Times a Day With Meals., Disp: , Rfl:     cloNIDine (CATAPRES) 0.1 MG tablet, Take 1 tablet by mouth Every 8 (Eight) Hours., Disp: , Rfl:     empagliflozin (JARDIANCE) 10 MG tablet tablet, Take 1 tablet by mouth Daily., Disp: , Rfl:     Enoxaparin Sodium (LOVENOX) 60 MG/0.6ML solution prefilled syringe syringe, Inject 0.6 mL under the skin into the appropriate area as directed Every 12 (Twelve) Hours. Indications: Prevention of Unwanted Clot in Veins, Disp: , Rfl:     folic acid (FOLVITE) 1 MG tablet, Take 1 tablet by mouth Daily., Disp: , Rfl:     hydroCHLOROthiazide 25 MG tablet, Take 1 tablet by mouth Daily., Disp: , Rfl:     Insulin Lispro (humaLOG) 100 UNIT/ML injection, Inject 2-9 Units under the skin into the appropriate area as directed 4 (Four) Times a Day Before Meals & at Bedtime., Disp: , Rfl:     ipratropium-albuterol (DUO-NEB) 0.5-2.5 mg/3 ml nebulizer, Take 3 mL by nebulization Every 6 (Six) Hours., Disp: 360 mL, Rfl: 1    losartan (COZAAR) 100 MG tablet, Take 1 tablet by mouth Daily., Disp: , Rfl:     metFORMIN  "(GLUCOPHAGE) 500 MG tablet, Take 1 tablet by mouth 2 (Two) Times a Day With Meals., Disp: , Rfl:     mometasone-formoterol (DULERA 100) 100-5 MCG/ACT inhaler, Inhale 2 puffs 2 (Two) Times a Day., Disp: , Rfl:     nicotine (NICODERM CQ) 21 MG/24HR patch, Place 1 patch on the skin as directed by provider Daily., Disp: , Rfl:     pantoprazole (PROTONIX) 40 MG injection, Infuse 10 mL into a venous catheter Every Morning. Indications: Treatment to Prevent Stress Ulcers, Disp: , Rfl:     potassium chloride ER (K-TAB) 20 MEQ tablet controlled-release ER tablet, Take 1 tablet by mouth 2 (Two) Times a Day With Meals., Disp: 180 tablet, Rfl: 1    rOPINIRole (REQUIP) 1 MG tablet, Take 1 tablet by mouth Every Night. Take 1 hour before bedtime., Disp: , Rfl:     spironolactone (ALDACTONE) 50 MG tablet, Take 1 tablet by mouth Daily., Disp: , Rfl:     terazosin (HYTRIN) 10 MG capsule, Take 1 capsule by mouth Every Night., Disp: , Rfl:     thiamine (VITAMIN B1) 100 MG tablet, Take 1 tablet by mouth Daily., Disp: , Rfl:     ziprasidone (GEODON) 20 MG capsule, Take 1 capsule by mouth Every Night., Disp: , Rfl:     Continuous Glucose Sensor (FreeStyle Kelvin 3 Plus Sensor), Use Every 15 (Fifteen) Days., Disp: 2 each, Rfl: 0    Objective      Blood pressure 138/82, pulse 86, temperature 97.7 °F (36.5 °C), resp. rate 16, height 180.3 cm (71\"), weight (!) 157 kg (346 lb), SpO2 95%.    Class 3 Severe Obesity (BMI >=40). Obesity-related health conditions include the following: hypertension. Obesity is improving with lifestyle modifications. BMI is is above average; BMI management plan is completed. We discussed portion control and increasing exercise.       Physical Exam     General Appearance:    Alert, cooperative, no distress, appears stated age   Head:    Normocephalic, without obvious abnormality, atraumatic   Eyes:    PERRL, conjunctiva/corneas clear, EOM's intact   Ears:    Normal TM's and external ear canals, both ears   Nose:   " Nares normal, septum midline, mucosa normal, no drainage   or sinus tenderness   Throat:   Lips, mucosa, and tongue normal; teeth and gums normal   Neck:   Supple, symmetrical, trachea midline, no adenopathy;        thyroid:  No enlargement/tenderness/nodules; no carotid    bruit or JVD   Back:     Symmetric, no curvature, ROM normal, no CVA tenderness   Lungs:     Clear to auscultation bilaterally, respirations unlabored   Chest wall:    No tenderness or deformity   Heart:    Regular rate and rhythm, S1 and S2 normal, no murmur,        rub or gallop   Abdomen:     Soft, non-tender, bowel sounds active all four quadrants,     no masses, no organomegaly   Extremities:   Extremities normal, atraumatic, no cyanosis or edema   Pulses:   2+ and symmetric all extremities   Skin:   Skin color, texture, turgor normal, no rashes or lesions   Lymph nodes:   Cervical, supraclavicular, and axillary nodes normal   Neurologic:   CNII-XII intact. Normal strength, sensation and reflexes       throughout      Results for orders placed or performed during the hospital encounter of 01/01/25   Comprehensive Metabolic Panel    Collection Time: 01/01/25  3:57 AM    Specimen: Blood   Result Value Ref Range    Glucose 244 (H) 65 - 99 mg/dL    BUN 9 6 - 20 mg/dL    Creatinine 0.76 0.76 - 1.27 mg/dL    Sodium 139 136 - 145 mmol/L    Potassium 3.5 3.5 - 5.2 mmol/L    Chloride 92 (L) 98 - 107 mmol/L    CO2 37.3 (H) 22.0 - 29.0 mmol/L    Calcium 9.0 8.6 - 10.5 mg/dL    Total Protein 7.1 6.0 - 8.5 g/dL    Albumin 3.7 3.5 - 5.2 g/dL    ALT (SGPT) 24 1 - 41 U/L    AST (SGOT) 18 1 - 40 U/L    Alkaline Phosphatase 128 (H) 39 - 117 U/L    Total Bilirubin 0.4 0.0 - 1.2 mg/dL    Globulin 3.4 gm/dL    A/G Ratio 1.1 g/dL    BUN/Creatinine Ratio 11.8 7.0 - 25.0    Anion Gap 9.7 5.0 - 15.0 mmol/L    eGFR 104.8 >60.0 mL/min/1.73   BNP    Collection Time: 01/01/25  3:57 AM    Specimen: Blood   Result Value Ref Range    proBNP 111.6 0.0 - 900.0 pg/mL   High  Sensitivity Troponin T    Collection Time: 01/01/25  3:57 AM    Specimen: Blood   Result Value Ref Range    HS Troponin T 24 (H) <22 ng/L   CBC Auto Differential    Collection Time: 01/01/25  3:57 AM    Specimen: Blood   Result Value Ref Range    WBC 15.08 (H) 3.40 - 10.80 10*3/mm3    RBC 5.11 4.14 - 5.80 10*6/mm3    Hemoglobin 14.8 13.0 - 17.7 g/dL    Hematocrit 45.8 37.5 - 51.0 %    MCV 89.6 79.0 - 97.0 fL    MCH 29.0 26.6 - 33.0 pg    MCHC 32.3 31.5 - 35.7 g/dL    RDW 13.4 12.3 - 15.4 %    RDW-SD 44.6 37.0 - 54.0 fl    MPV 9.2 6.0 - 12.0 fL    Platelets 256 140 - 450 10*3/mm3    Neutrophil % 80.8 (H) 42.7 - 76.0 %    Lymphocyte % 9.9 (L) 19.6 - 45.3 %    Monocyte % 7.2 5.0 - 12.0 %    Eosinophil % 1.5 0.3 - 6.2 %    Basophil % 0.3 0.0 - 1.5 %    Immature Grans % 0.3 0.0 - 0.5 %    Neutrophils, Absolute 12.18 (H) 1.70 - 7.00 10*3/mm3    Lymphocytes, Absolute 1.50 0.70 - 3.10 10*3/mm3    Monocytes, Absolute 1.08 (H) 0.10 - 0.90 10*3/mm3    Eosinophils, Absolute 0.23 0.00 - 0.40 10*3/mm3    Basophils, Absolute 0.04 0.00 - 0.20 10*3/mm3    Immature Grans, Absolute 0.05 0.00 - 0.05 10*3/mm3    nRBC 0.0 0.0 - 0.2 /100 WBC   Procalcitonin    Collection Time: 01/01/25  3:57 AM    Specimen: Blood   Result Value Ref Range    Procalcitonin 0.05 0.00 - 0.25 ng/mL   D-dimer, Quantitative    Collection Time: 01/01/25  3:57 AM    Specimen: Blood   Result Value Ref Range    D-Dimer, Quantitative 1.99 (H) 0.00 - 0.57 MCGFEU/mL   Green Top (Gel)    Collection Time: 01/01/25  3:57 AM   Result Value Ref Range    Extra Tube Hold for add-ons.    Lavender Top    Collection Time: 01/01/25  3:57 AM   Result Value Ref Range    Extra Tube hold for add-on    Gold Top - SST    Collection Time: 01/01/25  3:57 AM   Result Value Ref Range    Extra Tube Hold for add-ons.    Light Blue Top    Collection Time: 01/01/25  3:57 AM   Result Value Ref Range    Extra Tube Hold for add-ons.    COVID-19 and FLU A/B PCR, 1 HR TAT - Swab, Nasopharynx     Collection Time: 01/01/25  4:00 AM    Specimen: Nasopharynx; Swab   Result Value Ref Range    COVID19 Not Detected Not Detected - Ref. Range    Influenza A PCR Not Detected Not Detected    Influenza B PCR Not Detected Not Detected   Blood Culture - Blood, Arm, Left    Collection Time: 01/01/25  5:18 AM    Specimen: Arm, Left; Blood   Result Value Ref Range    Blood Culture No growth at 5 days    Blood Culture - Blood, Hand, Right    Collection Time: 01/01/25  5:18 AM    Specimen: Hand, Right; Blood   Result Value Ref Range    Blood Culture No growth at 5 days    Lactic Acid, Plasma    Collection Time: 01/01/25  5:21 AM    Specimen: Blood   Result Value Ref Range    Lactate 1.2 0.5 - 2.0 mmol/L   High Sensitivity Troponin T 1Hr    Collection Time: 01/01/25  5:21 AM    Specimen: Blood   Result Value Ref Range    HS Troponin T 24 (H) <22 ng/L    Troponin T Numeric Delta 0 ng/L    Troponin T % Delta 0 Abnormal if >/= 20% %   POC Glucose 4x Daily Before Meals & at Bedtime    Collection Time: 01/01/25  7:36 AM    Specimen: Blood   Result Value Ref Range    Glucose 230 (H) 70 - 130 mg/dL   Adult Transthoracic Echo Complete w/ Color, Spectral and Contrast if necessary per protocol    Collection Time: 01/01/25  8:11 AM   Result Value Ref Range    EF(MOD-bp) 61.6 %    LVIDd 5.9 cm    LVIDs 3.8 cm    IVSd 1.38 cm    LVPWd 1.27 cm    FS 35.4 %    IVS/LVPW 1.09 cm    ESV(cubed) 54.4 ml    LV Sys Vol (BSA corrected) 20.7 cm2    EDV(cubed) 202.3 ml    LV Deal Vol (BSA corrected) 51.9 cm2    LV mass(C)d 346.9 grams    LVOT area 4.8 cm2    LVOT diam 2.47 cm    EDV(MOD-sp2) 124.0 ml    EDV(MOD-sp4) 142.0 ml    ESV(MOD-sp2) 45.3 ml    ESV(MOD-sp4) 56.7 ml    SV(MOD-sp2) 78.7 ml    SV(MOD-sp4) 85.3 ml    SVi(MOD-SP2) 28.8 ml/m2    SVi(MOD-SP4) 31.2 ml/m2    SVi (LVOT) 45.7 ml/m2    EF(MOD-sp2) 63.5 %    EF(MOD-sp4) 60.1 %    MV E max guera 101.0 cm/sec    MV A max guera 96.2 cm/sec    MV dec time 0.20 sec    MV E/A 1.05     LA ESV Index  (BP) 33.6 ml/m2    Med Peak E' Quinn 7.7 cm/sec    Lat Peak E' Quinn 7.8 cm/sec    Avg E/e' ratio 13.03     SV(LVOT) 125.1 ml    RV Base 4.7 cm    RV Length 10.1 cm    TAPSE (>1.6) 3.9 cm    RV S' 19.1 cm/sec    LA dimension (2D)  5.4 cm    LV V1 max 115.0 cm/sec    LV V1 max PG 5.3 mmHg    LV V1 mean PG 3.0 mmHg    LV V1 VTI 26.1 cm    Ao pk quinn 214.0 cm/sec    Ao max PG 18.3 mmHg    Ao mean PG 11.0 mmHg    Ao V2 VTI 44.5 cm    PORSCHE(I,D) 2.8 cm2    MV max PG 4.6 mmHg    MV mean PG 2.00 mmHg    MV V2 VTI 28.6 cm    MVA(VTI) 4.4 cm2    MV dec slope 502.0 cm/sec2    RV V1 max PG 3.0 mmHg    RV V1 max 86.7 cm/sec    RV V1 VTI 14.7 cm    PA V2 max 132.0 cm/sec    PA acc time 0.08 sec    Ao root diam 3.6 cm   POC Glucose 4x Daily Before Meals & at Bedtime    Collection Time: 01/01/25 11:35 AM    Specimen: Blood   Result Value Ref Range    Glucose 259 (H) 70 - 130 mg/dL   Respiratory Panel PCR w/COVID-19(SARS-CoV-2) [facility] In-House, NP Swab in UTM/VTM, 2 HR TAT - Swab, Nasopharynx    Collection Time: 01/01/25  2:42 PM    Specimen: Nasopharynx; Swab   Result Value Ref Range    ADENOVIRUS, PCR Not Detected Not Detected    Coronavirus 229E Not Detected Not Detected    Coronavirus HKU1 Not Detected Not Detected    Coronavirus NL63 Not Detected Not Detected    Coronavirus OC43 Not Detected Not Detected    COVID19 Not Detected Not Detected - Ref. Range    Human Metapneumovirus Not Detected Not Detected    Human Rhinovirus/Enterovirus Not Detected Not Detected    Influenza A PCR Not Detected Not Detected    Influenza B PCR Not Detected Not Detected    Parainfluenza Virus 1 Not Detected Not Detected    Parainfluenza Virus 2 Not Detected Not Detected    Parainfluenza Virus 3 Not Detected Not Detected    Parainfluenza Virus 4 Not Detected Not Detected    RSV, PCR Not Detected Not Detected    Bordetella pertussis pcr Not Detected Not Detected    Bordetella parapertussis PCR Not Detected Not Detected    Chlamydophila  pneumoniae PCR Not Detected Not Detected    Mycoplasma pneumo by PCR Not Detected Not Detected   POC Glucose Once    Collection Time: 01/01/25  4:23 PM    Specimen: Blood   Result Value Ref Range    Glucose 327 (H) 70 - 130 mg/dL   POC Glucose Once    Collection Time: 01/01/25  8:37 PM    Specimen: Blood   Result Value Ref Range    Glucose 348 (H) 70 - 130 mg/dL   Basic Metabolic Panel    Collection Time: 01/02/25  5:10 AM    Specimen: Blood   Result Value Ref Range    Glucose 228 (H) 65 - 99 mg/dL    BUN 13 6 - 20 mg/dL    Creatinine 0.82 0.76 - 1.27 mg/dL    Sodium 143 136 - 145 mmol/L    Potassium 3.5 3.5 - 5.2 mmol/L    Chloride 94 (L) 98 - 107 mmol/L    CO2 42.5 (H) 22.0 - 29.0 mmol/L    Calcium 8.7 8.6 - 10.5 mg/dL    BUN/Creatinine Ratio 15.9 7.0 - 25.0    Anion Gap 6.5 5.0 - 15.0 mmol/L    eGFR 102.5 >60.0 mL/min/1.73   POC Glucose 4x Daily Before Meals & at Bedtime    Collection Time: 01/02/25  7:28 AM    Specimen: Blood   Result Value Ref Range    Glucose 216 (H) 70 - 130 mg/dL   Blood Gas, Arterial With Co-Ox    Collection Time: 01/02/25  9:26 AM    Specimen: Arterial Blood   Result Value Ref Range    Site Right Radial     Shamar's Test Positive     pH, Arterial 7.297 (C) 7.300 - 7.500 pH units    pCO2, Arterial 96.2 (C) 35.0 - 45.0 mm Hg    pO2, Arterial 65.2 (L) 75.0 - 100.0 mm Hg    HCO3, Arterial 47.0 (H) 22.0 - 28.0 mmol/L    Base Excess, Arterial 15.5 (H) 0.0 - 2.0 mmol/L    O2 Saturation, Arterial 92.7 (L) 94.0 - 100.0 %    Hematocrit, Blood Gas 43.5 %    Oxyhemoglobin 91.3 (L) 94 - 99 %    Methemoglobin 0.50 0.00 - 1.50 %    Carboxyhemoglobin 1.0 0 - 2 %    A-a DO2 390.7 mmHg    Barometric Pressure for Blood Gas 741 mmHg    Modality BiPap     FIO2 80 %    Ventilator Mode NIV     Notified Who KAYLENE     Notified By [id number]     Notified Time 01/02/2025 09:25     Collected by CN     pH, Temp Corrected      pCO2, Temperature Corrected      pO2, Temperature Corrected     POC Glucose 4x Daily Before Meals  & at Bedtime    Collection Time: 01/02/25 11:26 AM    Specimen: Blood   Result Value Ref Range    Glucose 226 (H) 70 - 130 mg/dL   CBC Auto Differential    Collection Time: 01/02/25  2:16 PM    Specimen: Blood   Result Value Ref Range    WBC 20.77 (H) 3.40 - 10.80 10*3/mm3    RBC 4.64 4.14 - 5.80 10*6/mm3    Hemoglobin 13.3 13.0 - 17.7 g/dL    Hematocrit 43.7 37.5 - 51.0 %    MCV 94.2 79.0 - 97.0 fL    MCH 28.7 26.6 - 33.0 pg    MCHC 30.4 (L) 31.5 - 35.7 g/dL    RDW 13.5 12.3 - 15.4 %    RDW-SD 46.6 37.0 - 54.0 fl    MPV 9.5 6.0 - 12.0 fL    Platelets 259 140 - 450 10*3/mm3    Neutrophil % 91.9 (H) 42.7 - 76.0 %    Lymphocyte % 3.4 (L) 19.6 - 45.3 %    Monocyte % 3.9 (L) 5.0 - 12.0 %    Eosinophil % 0.0 (L) 0.3 - 6.2 %    Basophil % 0.1 0.0 - 1.5 %    Immature Grans % 0.7 (H) 0.0 - 0.5 %    Neutrophils, Absolute 19.08 (H) 1.70 - 7.00 10*3/mm3    Lymphocytes, Absolute 0.70 0.70 - 3.10 10*3/mm3    Monocytes, Absolute 0.81 0.10 - 0.90 10*3/mm3    Eosinophils, Absolute 0.01 0.00 - 0.40 10*3/mm3    Basophils, Absolute 0.03 0.00 - 0.20 10*3/mm3    Immature Grans, Absolute 0.14 (H) 0.00 - 0.05 10*3/mm3    nRBC 0.0 0.0 - 0.2 /100 WBC   Procalcitonin    Collection Time: 01/02/25  2:16 PM    Specimen: Blood   Result Value Ref Range    Procalcitonin 0.05 0.00 - 0.25 ng/mL   Blood Gas, Arterial With Co-Ox    Collection Time: 01/02/25  3:23 PM    Specimen: Arterial Blood   Result Value Ref Range    Site Right Radial     Shamar's Test Positive     pH, Arterial 7.290 (C) 7.300 - 7.500 pH units    pCO2, Arterial 95.6 (C) 35.0 - 45.0 mm Hg    pO2, Arterial 72.1 (L) 75.0 - 100.0 mm Hg    HCO3, Arterial 45.9 (H) 22.0 - 28.0 mmol/L    Base Excess, Arterial 14.6 (H) 0.0 - 2.0 mmol/L    O2 Saturation, Arterial 94.7 94.0 - 100.0 %    Hematocrit, Blood Gas 42.5 %    Oxyhemoglobin 93.3 (L) 94 - 99 %    Methemoglobin 0.60 0.00 - 1.50 %    Carboxyhemoglobin 0.9 0 - 2 %    A-a DO2 276.2 mmHg    Barometric Pressure for Blood Gas 738 mmHg     Modality BiPap     FIO2 65 %    Ventilator Mode NIV     Set [facility] Resp Rate 18.0     IPAP 22     EPAP 6     Notified Who MD     Notified By [id number]     Notified Time 01/02/2025 15:22     Collected by SB     pH, Temp Corrected      pCO2, Temperature Corrected      pO2, Temperature Corrected     POC Glucose Once    Collection Time: 01/02/25  3:54 PM    Specimen: Blood   Result Value Ref Range    Glucose 238 (H) 70 - 130 mg/dL   POC Glucose Once    Collection Time: 01/02/25  8:19 PM    Specimen: Blood   Result Value Ref Range    Glucose 234 (H) 70 - 130 mg/dL   Blood Gas, Arterial With Co-Ox    Collection Time: 01/03/25  5:09 AM    Specimen: Arterial Blood   Result Value Ref Range    Site Arterial Line     Shamar's Test N/A     pH, Arterial 7.300 7.300 - 7.500 pH units    pCO2, Arterial 90.9 (C) 35.0 - 45.0 mm Hg    pO2, Arterial 92.4 75.0 - 100.0 mm Hg    HCO3, Arterial 44.8 (H) 22.0 - 28.0 mmol/L    Base Excess, Arterial 13.7 (H) 0.0 - 2.0 mmol/L    O2 Saturation, Arterial 97.9 94.0 - 100.0 %    Hematocrit, Blood Gas 44.1 %    Oxyhemoglobin 96.9 94 - 99 %    Methemoglobin 0.00 0.00 - 1.50 %    Carboxyhemoglobin 0.9 0 - 2 %    A-a DO2 295.0 mmHg    Barometric Pressure for Blood Gas 737 mmHg    Modality BiPap     FIO2 70 %    Ventilator Mode BiPAP     Set [facility] Resp Rate 20.0     IPAP 22     EPAP 6     Notified Who N     Notified By [id number]     Notified Time 01/03/2025 05:09     pH, Temp Corrected      pCO2, Temperature Corrected      pO2, Temperature Corrected     Basic Metabolic Panel    Collection Time: 01/03/25  5:11 AM    Specimen: Blood   Result Value Ref Range    Glucose 237 (H) 65 - 99 mg/dL    BUN 26 (H) 6 - 20 mg/dL    Creatinine 1.01 0.76 - 1.27 mg/dL    Sodium 136 136 - 145 mmol/L    Potassium 4.3 3.5 - 5.2 mmol/L    Chloride 91 (L) 98 - 107 mmol/L    CO2 36.6 (H) 22.0 - 29.0 mmol/L    Calcium 8.6 8.6 - 10.5 mg/dL    BUN/Creatinine Ratio 25.7 (H) 7.0 - 25.0    Anion Gap 8.4 5.0 - 15.0 mmol/L    eGFR 86.7 >60.0  mL/min/1.73   CBC Auto Differential    Collection Time: 01/03/25  5:11 AM    Specimen: Blood   Result Value Ref Range    WBC 16.97 (H) 3.40 - 10.80 10*3/mm3    RBC 4.73 4.14 - 5.80 10*6/mm3    Hemoglobin 13.7 13.0 - 17.7 g/dL    Hematocrit 45.1 37.5 - 51.0 %    MCV 95.3 79.0 - 97.0 fL    MCH 29.0 26.6 - 33.0 pg    MCHC 30.4 (L) 31.5 - 35.7 g/dL    RDW 13.4 12.3 - 15.4 %    RDW-SD 47.2 37.0 - 54.0 fl    MPV 9.8 6.0 - 12.0 fL    Platelets 244 140 - 450 10*3/mm3    Neutrophil % 85.0 (H) 42.7 - 76.0 %    Lymphocyte % 7.3 (L) 19.6 - 45.3 %    Monocyte % 7.0 5.0 - 12.0 %    Eosinophil % 0.1 (L) 0.3 - 6.2 %    Basophil % 0.1 0.0 - 1.5 %    Immature Grans % 0.5 0.0 - 0.5 %    Neutrophils, Absolute 14.41 (H) 1.70 - 7.00 10*3/mm3    Lymphocytes, Absolute 1.24 0.70 - 3.10 10*3/mm3    Monocytes, Absolute 1.19 (H) 0.10 - 0.90 10*3/mm3    Eosinophils, Absolute 0.02 0.00 - 0.40 10*3/mm3    Basophils, Absolute 0.02 0.00 - 0.20 10*3/mm3    Immature Grans, Absolute 0.09 (H) 0.00 - 0.05 10*3/mm3    nRBC 0.0 0.0 - 0.2 /100 WBC   POC Glucose 4x Daily Before Meals & at Bedtime    Collection Time: 01/03/25  7:22 AM    Specimen: Blood   Result Value Ref Range    Glucose 239 (H) 70 - 130 mg/dL   POC Glucose Once    Collection Time: 01/03/25 10:57 AM    Specimen: Blood   Result Value Ref Range    Glucose 218 (H) 70 - 130 mg/dL   POC Glucose Once    Collection Time: 01/03/25  4:00 PM    Specimen: Blood   Result Value Ref Range    Glucose 263 (H) 70 - 130 mg/dL   POC Glucose Once    Collection Time: 01/03/25  8:22 PM    Specimen: Blood   Result Value Ref Range    Glucose 242 (H) 70 - 130 mg/dL   Blood Gas, Arterial With Co-Ox    Collection Time: 01/03/25 11:45 PM    Specimen: Arterial Blood   Result Value Ref Range    Site Right Radial     Shamar's Test Positive     pH, Arterial 7.360 7.300 - 7.500 pH units    pCO2, Arterial 77.1 (C) 35.0 - 45.0 mm Hg    pO2, Arterial 61.8 (L) 75.0 - 100.0 mm Hg    HCO3, Arterial 43.5 (H) 22.0 - 28.0 mmol/L     Base Excess, Arterial 14.0 (H) 0.0 - 2.0 mmol/L    O2 Saturation, Arterial 92.4 (L) 94.0 - 100.0 %    Hematocrit, Blood Gas 44.6 %    Oxyhemoglobin 91.2 (L) 94 - 99 %    Methemoglobin 0.50 0.00 - 1.50 %    Carboxyhemoglobin 0.8 0 - 2 %    A-a DO2      Barometric Pressure for Blood Gas 739 mmHg    Modality Nasal Cannula     Flow Rate 12.0 lpm    Ventilator Mode NA     Notified Who MD     Notified By [id number]     Notified Time 01/03/2025 23:44     Collected by [id number]     pH, Temp Corrected      pCO2, Temperature Corrected      pO2, Temperature Corrected     Blood Gas, Arterial With Co-Ox    Collection Time: 01/04/25  4:26 AM    Specimen: Arterial Blood   Result Value Ref Range    Site Right Radial     Shamar's Test Positive     pH, Arterial 7.365 7.300 - 7.500 pH units    pCO2, Arterial 78.5 (C) 35.0 - 45.0 mm Hg    pO2, Arterial 70.9 (L) 75.0 - 100.0 mm Hg    HCO3, Arterial 44.8 (H) 22.0 - 28.0 mmol/L    Base Excess, Arterial 15.2 (H) 0.0 - 2.0 mmol/L    O2 Saturation, Arterial 94.8 94.0 - 100.0 %    Hematocrit, Blood Gas 44.5 %    Oxyhemoglobin 93.8 (L) 94 - 99 %    Methemoglobin 0.20 0.00 - 1.50 %    Carboxyhemoglobin 0.9 0 - 2 %    A-a DO2 296.6 mmHg    Barometric Pressure for Blood Gas 740 mmHg    Modality BiPap     FIO2 65 %    Ventilator Mode BiPAP     Set Mech Resp Rate 22.0     IPAP 22     EPAP 6     Notified Who MD     Notified By [id number]     Notified Time 01/04/2025 04:25     Collected by      pH, Temp Corrected      pCO2, Temperature Corrected      pO2, Temperature Corrected     Basic Metabolic Panel    Collection Time: 01/04/25  7:04 AM    Specimen: Blood   Result Value Ref Range    Glucose 199 (H) 65 - 99 mg/dL    BUN 22 (H) 6 - 20 mg/dL    Creatinine 0.74 (L) 0.76 - 1.27 mg/dL    Sodium 139 136 - 145 mmol/L    Potassium 3.8 3.5 - 5.2 mmol/L    Chloride 95 (L) 98 - 107 mmol/L    CO2 36.8 (H) 22.0 - 29.0 mmol/L    Calcium 8.4 (L) 8.6 - 10.5 mg/dL    BUN/Creatinine Ratio 29.7 (H) 7.0 - 25.0    Anion Gap 7.2  5.0 - 15.0 mmol/L    eGFR 105.7 >60.0 mL/min/1.73   Procalcitonin    Collection Time: 01/04/25  7:04 AM    Specimen: Blood   Result Value Ref Range    Procalcitonin 0.04 0.00 - 0.25 ng/mL   CBC Auto Differential    Collection Time: 01/04/25  7:05 AM    Specimen: Blood   Result Value Ref Range    WBC 15.45 (H) 3.40 - 10.80 10*3/mm3    RBC 4.87 4.14 - 5.80 10*6/mm3    Hemoglobin 14.0 13.0 - 17.7 g/dL    Hematocrit 45.2 37.5 - 51.0 %    MCV 92.8 79.0 - 97.0 fL    MCH 28.7 26.6 - 33.0 pg    MCHC 31.0 (L) 31.5 - 35.7 g/dL    RDW 13.3 12.3 - 15.4 %    RDW-SD 45.4 37.0 - 54.0 fl    MPV 9.6 6.0 - 12.0 fL    Platelets 245 140 - 450 10*3/mm3    Neutrophil % 85.0 (H) 42.7 - 76.0 %    Lymphocyte % 6.6 (L) 19.6 - 45.3 %    Monocyte % 7.7 5.0 - 12.0 %    Eosinophil % 0.1 (L) 0.3 - 6.2 %    Basophil % 0.1 0.0 - 1.5 %    Immature Grans % 0.5 0.0 - 0.5 %    Neutrophils, Absolute 13.13 (H) 1.70 - 7.00 10*3/mm3    Lymphocytes, Absolute 1.02 0.70 - 3.10 10*3/mm3    Monocytes, Absolute 1.19 (H) 0.10 - 0.90 10*3/mm3    Eosinophils, Absolute 0.02 0.00 - 0.40 10*3/mm3    Basophils, Absolute 0.02 0.00 - 0.20 10*3/mm3    Immature Grans, Absolute 0.07 (H) 0.00 - 0.05 10*3/mm3    nRBC 0.0 0.0 - 0.2 /100 WBC   POC Glucose 4x Daily Before Meals & at Bedtime    Collection Time: 01/04/25  7:24 AM    Specimen: Blood   Result Value Ref Range    Glucose 185 (H) 70 - 130 mg/dL   POC Glucose 4x Daily Before Meals & at Bedtime    Collection Time: 01/04/25 11:35 AM    Specimen: Blood   Result Value Ref Range    Glucose 264 (H) 70 - 130 mg/dL   Urinalysis With Culture If Indicated - Indwelling Urethral Catheter    Collection Time: 01/04/25 12:35 PM    Specimen: Indwelling Urethral Catheter; Urine   Result Value Ref Range    Color, UA Orange (A) Yellow, Straw    Appearance, UA Clear Clear    pH, UA 5.5 5.0 - 8.0    Specific Gravity, UA 1.012 1.005 - 1.030    Glucose,  mg/dL (2+) (A) Negative    Ketones, UA Negative Negative    Bilirubin, UA Negative  Negative    Blood, UA Large (3+) (A) Negative    Protein, UA Negative Negative    Leuk Esterase, UA Negative Negative    Nitrite, UA Negative Negative    Urobilinogen, UA 0.2 E.U./dL 0.2 - 1.0 E.U./dL   Urine Drug Screen - Indwelling Urethral Catheter    Collection Time: 01/04/25 12:35 PM    Specimen: Indwelling Urethral Catheter; Urine   Result Value Ref Range    THC, Screen, Urine Negative Negative    Phencyclidine (PCP), Urine Negative Negative    Cocaine Screen, Urine Negative Negative    Methamphetamine, Ur Negative Negative    Opiate Screen Negative Negative    Amphetamine Screen, Urine Negative Negative    Benzodiazepine Screen, Urine Positive (A) Negative    Tricyclic Antidepressants Screen Negative Negative    Methadone Screen, Urine Negative Negative    Barbiturates Screen, Urine Negative Negative    Oxycodone Screen, Urine Negative Negative    Buprenorphine, Screen, Urine Negative Negative   Fentanyl, Urine - Indwelling Urethral Catheter    Collection Time: 01/04/25 12:35 PM    Specimen: Indwelling Urethral Catheter; Urine   Result Value Ref Range    Fentanyl, Urine Negative Negative   Urinalysis, Microscopic Only - Indwelling Urethral Catheter    Collection Time: 01/04/25 12:35 PM    Specimen: Indwelling Urethral Catheter; Urine   Result Value Ref Range    RBC, UA 11-20 (A) None Seen, 0-2 /HPF    WBC, UA 0-2 None Seen, 0-2 /HPF    Bacteria, UA Trace (A) None Seen /HPF    Squamous Epithelial Cells, UA 0-2 None Seen, 0-2 /HPF    Hyaline Casts, UA None Seen None Seen /LPF    Mucus, UA Small/1+ (A) None Seen, Trace /HPF    Methodology Manual Light Microscopy    POC Glucose Once    Collection Time: 01/04/25  4:08 PM    Specimen: Blood   Result Value Ref Range    Glucose 280 (H) 70 - 130 mg/dL   POC Glucose Once    Collection Time: 01/04/25  8:02 PM    Specimen: Blood   Result Value Ref Range    Glucose 197 (H) 70 - 130 mg/dL   Basic Metabolic Panel    Collection Time: 01/05/25  4:07 AM    Specimen: Blood    Result Value Ref Range    Glucose 218 (H) 65 - 99 mg/dL    BUN 21 (H) 6 - 20 mg/dL    Creatinine 0.65 (L) 0.76 - 1.27 mg/dL    Sodium 143 136 - 145 mmol/L    Potassium 3.9 3.5 - 5.2 mmol/L    Chloride 95 (L) 98 - 107 mmol/L    CO2 40.7 (H) 22.0 - 29.0 mmol/L    Calcium 8.5 (L) 8.6 - 10.5 mg/dL    BUN/Creatinine Ratio 32.3 (H) 7.0 - 25.0    Anion Gap 7.3 5.0 - 15.0 mmol/L    eGFR 109.9 >60.0 mL/min/1.73   Procalcitonin    Collection Time: 01/05/25  4:07 AM    Specimen: Blood   Result Value Ref Range    Procalcitonin 0.04 0.00 - 0.25 ng/mL   Ammonia    Collection Time: 01/05/25  4:07 AM    Specimen: Blood   Result Value Ref Range    Ammonia 71 (H) 16 - 60 umol/L   CBC Auto Differential    Collection Time: 01/05/25  4:07 AM    Specimen: Blood   Result Value Ref Range    WBC 13.31 (H) 3.40 - 10.80 10*3/mm3    RBC 4.98 4.14 - 5.80 10*6/mm3    Hemoglobin 14.4 13.0 - 17.7 g/dL    Hematocrit 46.3 37.5 - 51.0 %    MCV 93.0 79.0 - 97.0 fL    MCH 28.9 26.6 - 33.0 pg    MCHC 31.1 (L) 31.5 - 35.7 g/dL    RDW 13.2 12.3 - 15.4 %    RDW-SD 45.1 37.0 - 54.0 fl    MPV 9.9 6.0 - 12.0 fL    Platelets 237 140 - 450 10*3/mm3    Neutrophil % 88.7 (H) 42.7 - 76.0 %    Lymphocyte % 5.8 (L) 19.6 - 45.3 %    Monocyte % 4.7 (L) 5.0 - 12.0 %    Eosinophil % 0.2 (L) 0.3 - 6.2 %    Basophil % 0.2 0.0 - 1.5 %    Immature Grans % 0.4 0.0 - 0.5 %    Neutrophils, Absolute 11.82 (H) 1.70 - 7.00 10*3/mm3    Lymphocytes, Absolute 0.77 0.70 - 3.10 10*3/mm3    Monocytes, Absolute 0.63 0.10 - 0.90 10*3/mm3    Eosinophils, Absolute 0.02 0.00 - 0.40 10*3/mm3    Basophils, Absolute 0.02 0.00 - 0.20 10*3/mm3    Immature Grans, Absolute 0.05 0.00 - 0.05 10*3/mm3    nRBC 0.0 0.0 - 0.2 /100 WBC   POC Glucose 4x Daily Before Meals & at Bedtime    Collection Time: 01/05/25  7:33 AM    Specimen: Blood   Result Value Ref Range    Glucose 206 (H) 70 - 130 mg/dL   Blood Gas, Arterial With Co-Ox    Collection Time: 01/05/25  7:55 AM    Specimen: Arterial Blood    Result Value Ref Range    Site Right Radial     Shamar's Test Positive     pH, Arterial 7.386 7.300 - 7.500 pH units    pCO2, Arterial 79.1 (C) 35.0 - 45.0 mm Hg    pO2, Arterial 63.6 (L) 75.0 - 100.0 mm Hg    HCO3, Arterial 47.4 (H) 22.0 - 28.0 mmol/L    Base Excess, Arterial 17.8 (H) 0.0 - 2.0 mmol/L    O2 Saturation, Arterial 93.1 (L) 94.0 - 100.0 %    Hematocrit, Blood Gas 43.7 %    Oxyhemoglobin 91.9 (L) 94 - 99 %    Methemoglobin 0.30 0.00 - 1.50 %    Carboxyhemoglobin 1.0 0 - 2 %    A-a DO2 302.0 mmHg    Barometric Pressure for Blood Gas 738 mmHg    Modality BiPap     FIO2 65 %    Ventilator Mode BiPAP     Set [facility] Resp Rate 22.0     IPAP 22     EPAP 6     Notified Who DR KERLEY     Notified By [id number]     Notified Time 01/05/2025 07:54     Collected by [id number]     pH, Temp Corrected      pCO2, Temperature Corrected      pO2, Temperature Corrected     POC Glucose Once    Collection Time: 01/05/25 11:08 AM    Specimen: Blood   Result Value Ref Range    Glucose 274 (H) 70 - 130 mg/dL   POC Glucose Once    Collection Time: 01/05/25  4:07 PM    Specimen: Blood   Result Value Ref Range    Glucose 253 (H) 70 - 130 mg/dL   POC Glucose Once    Collection Time: 01/05/25  8:16 PM    Specimen: Blood   Result Value Ref Range    Glucose 118 70 - 130 mg/dL   Basic Metabolic Panel    Collection Time: 01/06/25  4:31 AM    Specimen: Blood   Result Value Ref Range    Glucose 198 (H) 65 - 99 mg/dL    BUN 15 6 - 20 mg/dL    Creatinine 0.62 (L) 0.76 - 1.27 mg/dL    Sodium 141 136 - 145 mmol/L    Potassium 3.7 3.5 - 5.2 mmol/L    Chloride 94 (L) 98 - 107 mmol/L    CO2 39.4 (H) 22.0 - 29.0 mmol/L    Calcium 8.5 (L) 8.6 - 10.5 mg/dL    BUN/Creatinine Ratio 24.2 7.0 - 25.0    Anion Gap 7.6 5.0 - 15.0 mmol/L    eGFR 111.5 >60.0 mL/min/1.73   Procalcitonin    Collection Time: 01/06/25  4:31 AM    Specimen: Blood   Result Value Ref Range    Procalcitonin 0.05 0.00 - 0.25 ng/mL   CBC Auto Differential    Collection Time: 01/06/25  4:31 AM     Specimen: Blood   Result Value Ref Range    WBC 16.03 (H) 3.40 - 10.80 10*3/mm3    RBC 4.96 4.14 - 5.80 10*6/mm3    Hemoglobin 14.3 13.0 - 17.7 g/dL    Hematocrit 45.0 37.5 - 51.0 %    MCV 90.7 79.0 - 97.0 fL    MCH 28.8 26.6 - 33.0 pg    MCHC 31.8 31.5 - 35.7 g/dL    RDW 13.2 12.3 - 15.4 %    RDW-SD 43.9 37.0 - 54.0 fl    MPV 9.9 6.0 - 12.0 fL    Platelets 254 140 - 450 10*3/mm3    Neutrophil % 82.0 (H) 42.7 - 76.0 %    Lymphocyte % 9.0 (L) 19.6 - 45.3 %    Monocyte % 8.3 5.0 - 12.0 %    Eosinophil % 0.1 (L) 0.3 - 6.2 %    Basophil % 0.1 0.0 - 1.5 %    Immature Grans % 0.5 0.0 - 0.5 %    Neutrophils, Absolute 13.15 (H) 1.70 - 7.00 10*3/mm3    Lymphocytes, Absolute 1.44 0.70 - 3.10 10*3/mm3    Monocytes, Absolute 1.33 (H) 0.10 - 0.90 10*3/mm3    Eosinophils, Absolute 0.01 0.00 - 0.40 10*3/mm3    Basophils, Absolute 0.02 0.00 - 0.20 10*3/mm3    Immature Grans, Absolute 0.08 (H) 0.00 - 0.05 10*3/mm3    nRBC 0.0 0.0 - 0.2 /100 WBC   POC Glucose 4x Daily Before Meals & at Bedtime    Collection Time: 01/06/25  7:34 AM    Specimen: Blood   Result Value Ref Range    Glucose 170 (H) 70 - 130 mg/dL   Blood Gas, Arterial With Co-Ox    Collection Time: 01/06/25  7:47 AM    Specimen: Arterial Blood   Result Value Ref Range    Site Right Radial     Shamar's Test N/A     pH, Arterial 7.442 7.300 - 7.500 pH units    pCO2, Arterial 67.4 (C) 35.0 - 45.0 mm Hg    pO2, Arterial 61.8 (L) 75.0 - 100.0 mm Hg    HCO3, Arterial 46.0 (H) 22.0 - 28.0 mmol/L    Base Excess, Arterial 17.9 (H) 0.0 - 2.0 mmol/L    O2 Saturation, Arterial 93.2 (L) 94.0 - 100.0 %    Hematocrit, Blood Gas 44.3 %    Oxyhemoglobin 91.6 (L) 94 - 99 %    Methemoglobin 0.60 0.00 - 1.50 %    Carboxyhemoglobin 1.1 0 - 2 %    A-a DO2 203.8 mmHg    Barometric Pressure for Blood Gas 724 mmHg    Modality BiPap     FIO2 50 %    Ventilator Mode NA     Notified Who B. KERLEY DO     Notified By [id number]     Notified Time 01/06/2025 07:47     Collected by [id number]     pH, Temp  Corrected      pCO2, Temperature Corrected      pO2, Temperature Corrected     Blood Culture With KELVIN - Blood, Arm, Right    Collection Time: 01/06/25  8:05 AM    Specimen: Arm, Right; Blood   Result Value Ref Range    Blood Culture No growth at 5 days    Lactic Acid, Plasma    Collection Time: 01/06/25  8:05 AM    Specimen: Blood   Result Value Ref Range    Lactate 0.8 0.5 - 2.0 mmol/L   MRSA Screen, PCR (Inpatient) - Swab, Nares    Collection Time: 01/06/25  8:09 AM    Specimen: Nares; Swab   Result Value Ref Range    MRSA PCR No MRSA Detected No MRSA Detected   Blood Culture - Blood, Arm, Left    Collection Time: 01/06/25  8:13 AM    Specimen: Arm, Left; Blood   Result Value Ref Range    Blood Culture No growth at 5 days    POC Glucose 4x Daily Before Meals & at Bedtime    Collection Time: 01/06/25 11:14 AM    Specimen: Blood   Result Value Ref Range    Glucose 155 (H) 70 - 130 mg/dL   POC Glucose Once    Collection Time: 01/06/25  4:01 PM    Specimen: Blood   Result Value Ref Range    Glucose 179 (H) 70 - 130 mg/dL   POC Glucose Once    Collection Time: 01/06/25  8:25 PM    Specimen: Blood   Result Value Ref Range    Glucose 187 (H) 70 - 130 mg/dL   Basic Metabolic Panel    Collection Time: 01/07/25  4:47 AM    Specimen: Blood   Result Value Ref Range    Glucose 175 (H) 65 - 99 mg/dL    BUN 17 6 - 20 mg/dL    Creatinine 0.63 (L) 0.76 - 1.27 mg/dL    Sodium 141 136 - 145 mmol/L    Potassium 3.5 3.5 - 5.2 mmol/L    Chloride 97 (L) 98 - 107 mmol/L    CO2 35.0 (H) 22.0 - 29.0 mmol/L    Calcium 8.5 (L) 8.6 - 10.5 mg/dL    BUN/Creatinine Ratio 27.0 (H) 7.0 - 25.0    Anion Gap 9.0 5.0 - 15.0 mmol/L    eGFR 110.9 >60.0 mL/min/1.73   CBC Auto Differential    Collection Time: 01/07/25  4:47 AM    Specimen: Blood   Result Value Ref Range    WBC 11.66 (H) 3.40 - 10.80 10*3/mm3    RBC 5.30 4.14 - 5.80 10*6/mm3    Hemoglobin 15.1 13.0 - 17.7 g/dL    Hematocrit 47.3 37.5 - 51.0 %    MCV 89.2 79.0 - 97.0 fL    MCH 28.5 26.6 -  33.0 pg    MCHC 31.9 31.5 - 35.7 g/dL    RDW 13.4 12.3 - 15.4 %    RDW-SD 43.7 37.0 - 54.0 fl    MPV 9.6 6.0 - 12.0 fL    Platelets 285 140 - 450 10*3/mm3    Neutrophil % 82.1 (H) 42.7 - 76.0 %    Lymphocyte % 11.2 (L) 19.6 - 45.3 %    Monocyte % 5.8 5.0 - 12.0 %    Eosinophil % 0.2 (L) 0.3 - 6.2 %    Basophil % 0.2 0.0 - 1.5 %    Immature Grans % 0.5 0.0 - 0.5 %    Neutrophils, Absolute 9.57 (H) 1.70 - 7.00 10*3/mm3    Lymphocytes, Absolute 1.31 0.70 - 3.10 10*3/mm3    Monocytes, Absolute 0.68 0.10 - 0.90 10*3/mm3    Eosinophils, Absolute 0.02 0.00 - 0.40 10*3/mm3    Basophils, Absolute 0.02 0.00 - 0.20 10*3/mm3    Immature Grans, Absolute 0.06 (H) 0.00 - 0.05 10*3/mm3    nRBC 0.0 0.0 - 0.2 /100 WBC   Blood Gas, Arterial With Co-Ox    Collection Time: 01/07/25  5:34 AM    Specimen: Arterial Blood   Result Value Ref Range    Site Right Radial     Shamar's Test Positive     pH, Arterial 7.452 7.300 - 7.500 pH units    pCO2, Arterial 58.7 (C) 35.0 - 45.0 mm Hg    pO2, Arterial 61.4 (L) 75.0 - 100.0 mm Hg    HCO3, Arterial 40.9 (H) 22.0 - 28.0 mmol/L    Base Excess, Arterial 13.8 (H) 0.0 - 2.0 mmol/L    O2 Saturation, Arterial 92.9 (L) 94.0 - 100.0 %    Hematocrit, Blood Gas 48.2 %    Oxyhemoglobin 91.4 (L) 94 - 99 %    Methemoglobin 0.50 0.00 - 1.50 %    Carboxyhemoglobin 1.1 0 - 2 %    A-a DO2 221.5 mmHg    Barometric Pressure for Blood Gas 740 mmHg    Modality BiPap     FIO2 50 %    Ventilator Mode BiPAP     Set Mech Resp Rate 22.0     IPAP 22     EPAP 6     Collected by TAMARA     pH, Temp Corrected      pCO2, Temperature Corrected      pO2, Temperature Corrected     POC Glucose 4x Daily Before Meals & at Bedtime    Collection Time: 01/07/25  7:53 AM    Specimen: Blood   Result Value Ref Range    Glucose 200 (H) 70 - 130 mg/dL   POC Glucose 4x Daily Before Meals & at Bedtime    Collection Time: 01/07/25 11:18 AM    Specimen: Blood   Result Value Ref Range    Glucose 211 (H) 70 - 130 mg/dL   POC Glucose Once     Collection Time: 01/07/25  4:35 PM    Specimen: Blood   Result Value Ref Range    Glucose 176 (H) 70 - 130 mg/dL   POC Glucose Once    Collection Time: 01/07/25  8:13 PM    Specimen: Blood   Result Value Ref Range    Glucose 330 (H) 70 - 130 mg/dL   Basic Metabolic Panel    Collection Time: 01/08/25  4:26 AM    Specimen: Blood   Result Value Ref Range    Glucose 197 (H) 65 - 99 mg/dL    BUN 16 6 - 20 mg/dL    Creatinine 0.59 (L) 0.76 - 1.27 mg/dL    Sodium 139 136 - 145 mmol/L    Potassium 3.5 3.5 - 5.2 mmol/L    Chloride 97 (L) 98 - 107 mmol/L    CO2 33.6 (H) 22.0 - 29.0 mmol/L    Calcium 8.6 8.6 - 10.5 mg/dL    BUN/Creatinine Ratio 27.1 (H) 7.0 - 25.0    Anion Gap 8.4 5.0 - 15.0 mmol/L    eGFR 113.2 >60.0 mL/min/1.73   Hemoglobin A1c    Collection Time: 01/08/25  4:26 AM    Specimen: Blood   Result Value Ref Range    Hemoglobin A1C 8.90 (H) 4.80 - 5.60 %   CBC Auto Differential    Collection Time: 01/08/25  4:26 AM    Specimen: Blood   Result Value Ref Range    WBC 11.57 (H) 3.40 - 10.80 10*3/mm3    RBC 5.34 4.14 - 5.80 10*6/mm3    Hemoglobin 15.2 13.0 - 17.7 g/dL    Hematocrit 47.4 37.5 - 51.0 %    MCV 88.8 79.0 - 97.0 fL    MCH 28.5 26.6 - 33.0 pg    MCHC 32.1 31.5 - 35.7 g/dL    RDW 13.6 12.3 - 15.4 %    RDW-SD 43.9 37.0 - 54.0 fl    MPV 9.7 6.0 - 12.0 fL    Platelets 257 140 - 450 10*3/mm3    Neutrophil % 81.8 (H) 42.7 - 76.0 %    Lymphocyte % 11.8 (L) 19.6 - 45.3 %    Monocyte % 5.3 5.0 - 12.0 %    Eosinophil % 0.6 0.3 - 6.2 %    Basophil % 0.2 0.0 - 1.5 %    Immature Grans % 0.3 0.0 - 0.5 %    Neutrophils, Absolute 9.46 (H) 1.70 - 7.00 10*3/mm3    Lymphocytes, Absolute 1.37 0.70 - 3.10 10*3/mm3    Monocytes, Absolute 0.61 0.10 - 0.90 10*3/mm3    Eosinophils, Absolute 0.07 0.00 - 0.40 10*3/mm3    Basophils, Absolute 0.02 0.00 - 0.20 10*3/mm3    Immature Grans, Absolute 0.04 0.00 - 0.05 10*3/mm3    nRBC 0.0 0.0 - 0.2 /100 WBC   POC Glucose 4x Daily Before Meals & at Bedtime    Collection Time: 01/08/25  7:36  AM    Specimen: Blood   Result Value Ref Range    Glucose 205 (H) 70 - 130 mg/dL   POC Glucose Once    Collection Time: 01/08/25 10:55 AM    Specimen: Blood   Result Value Ref Range    Glucose 270 (H) 70 - 130 mg/dL   POC Glucose Once    Collection Time: 01/08/25  4:03 PM    Specimen: Blood   Result Value Ref Range    Glucose 85 70 - 130 mg/dL   POC Glucose Once    Collection Time: 01/08/25  8:15 PM    Specimen: Blood   Result Value Ref Range    Glucose 288 (H) 70 - 130 mg/dL   Procalcitonin    Collection Time: 01/09/25  4:35 AM    Specimen: Blood   Result Value Ref Range    Procalcitonin 0.08 0.00 - 0.25 ng/mL   Basic Metabolic Panel    Collection Time: 01/09/25  4:35 AM    Specimen: Blood   Result Value Ref Range    Glucose 170 (H) 65 - 99 mg/dL    BUN 14 6 - 20 mg/dL    Creatinine 0.70 (L) 0.76 - 1.27 mg/dL    Sodium 142 136 - 145 mmol/L    Potassium 3.5 3.5 - 5.2 mmol/L    Chloride 98 98 - 107 mmol/L    CO2 37.5 (H) 22.0 - 29.0 mmol/L    Calcium 8.2 (L) 8.6 - 10.5 mg/dL    BUN/Creatinine Ratio 20.0 7.0 - 25.0    Anion Gap 6.5 5.0 - 15.0 mmol/L    eGFR 107.5 >60.0 mL/min/1.73   CBC Auto Differential    Collection Time: 01/09/25  4:35 AM    Specimen: Blood   Result Value Ref Range    WBC 11.46 (H) 3.40 - 10.80 10*3/mm3    RBC 4.96 4.14 - 5.80 10*6/mm3    Hemoglobin 14.4 13.0 - 17.7 g/dL    Hematocrit 44.7 37.5 - 51.0 %    MCV 90.1 79.0 - 97.0 fL    MCH 29.0 26.6 - 33.0 pg    MCHC 32.2 31.5 - 35.7 g/dL    RDW 13.7 12.3 - 15.4 %    RDW-SD 45.2 37.0 - 54.0 fl    MPV 9.7 6.0 - 12.0 fL    Platelets 225 140 - 450 10*3/mm3    Neutrophil % 82.6 (H) 42.7 - 76.0 %    Lymphocyte % 10.2 (L) 19.6 - 45.3 %    Monocyte % 5.4 5.0 - 12.0 %    Eosinophil % 1.2 0.3 - 6.2 %    Basophil % 0.2 0.0 - 1.5 %    Immature Grans % 0.4 0.0 - 0.5 %    Neutrophils, Absolute 9.46 (H) 1.70 - 7.00 10*3/mm3    Lymphocytes, Absolute 1.17 0.70 - 3.10 10*3/mm3    Monocytes, Absolute 0.62 0.10 - 0.90 10*3/mm3    Eosinophils, Absolute 0.14 0.00 - 0.40  10*3/mm3    Basophils, Absolute 0.02 0.00 - 0.20 10*3/mm3    Immature Grans, Absolute 0.05 0.00 - 0.05 10*3/mm3    nRBC 0.0 0.0 - 0.2 /100 WBC   Blood Gas, Arterial With Co-Ox    Collection Time: 01/09/25  7:17 AM    Specimen: Arterial Blood   Result Value Ref Range    Site Left Radial     Shamar's Test Positive     pH, Arterial 7.396 7.300 - 7.500 pH units    pCO2, Arterial 70.3 (C) 35.0 - 45.0 mm Hg    pO2, Arterial 61.7 (L) 75.0 - 100.0 mm Hg    HCO3, Arterial 43.1 (H) 22.0 - 28.0 mmol/L    Base Excess, Arterial 14.4 (H) 0.0 - 2.0 mmol/L    O2 Saturation, Arterial 91.8 (L) 94.0 - 100.0 %    Hematocrit, Blood Gas 45.5 %    Oxyhemoglobin 90.3 (L) 94 - 99 %    Methemoglobin 0.50 0.00 - 1.50 %    Carboxyhemoglobin 1.1 0 - 2 %    A-a DO2 208.7 mmHg    Barometric Pressure for Blood Gas 740 mmHg    Modality BiPap     FIO2 50 %    Ventilator Mode NIV     Set [facility] Resp Rate 18.0     IPAP 18     EPAP 13     Notified Who RN     Notified By [id number]     Notified Time 01/09/2025 07:16     Collected by CB     pH, Temp Corrected      pCO2, Temperature Corrected      pO2, Temperature Corrected     POC Glucose 4x Daily Before Meals & at Bedtime    Collection Time: 01/09/25  7:28 AM    Specimen: Blood   Result Value Ref Range    Glucose 164 (H) 70 - 130 mg/dL   POC Glucose Once    Collection Time: 01/09/25 10:49 AM    Specimen: Blood   Result Value Ref Range    Glucose 243 (H) 70 - 130 mg/dL   POC Glucose Once    Collection Time: 01/09/25  4:06 PM    Specimen: Blood   Result Value Ref Range    Glucose 144 (H) 70 - 130 mg/dL   POC Glucose Once    Collection Time: 01/09/25  7:28 PM    Specimen: Blood   Result Value Ref Range    Glucose 211 (H) 70 - 130 mg/dL   Basic Metabolic Panel    Collection Time: 01/10/25  4:33 AM    Specimen: Blood   Result Value Ref Range    Glucose 185 (H) 65 - 99 mg/dL    BUN 14 6 - 20 mg/dL    Creatinine 0.66 (L) 0.76 - 1.27 mg/dL    Sodium 140 136 - 145 mmol/L    Potassium 3.3 (L) 3.5 - 5.2 mmol/L     Chloride 96 (L) 98 - 107 mmol/L    CO2 40.6 (H) 22.0 - 29.0 mmol/L    Calcium 8.4 (L) 8.6 - 10.5 mg/dL    BUN/Creatinine Ratio 21.2 7.0 - 25.0    Anion Gap 3.4 (L) 5.0 - 15.0 mmol/L    eGFR 109.4 >60.0 mL/min/1.73   CBC Auto Differential    Collection Time: 01/10/25  4:33 AM    Specimen: Blood   Result Value Ref Range    WBC 10.41 3.40 - 10.80 10*3/mm3    RBC 4.79 4.14 - 5.80 10*6/mm3    Hemoglobin 13.8 13.0 - 17.7 g/dL    Hematocrit 43.6 37.5 - 51.0 %    MCV 91.0 79.0 - 97.0 fL    MCH 28.8 26.6 - 33.0 pg    MCHC 31.7 31.5 - 35.7 g/dL    RDW 13.8 12.3 - 15.4 %    RDW-SD 45.9 37.0 - 54.0 fl    MPV 9.9 6.0 - 12.0 fL    Platelets 208 140 - 450 10*3/mm3    Neutrophil % 71.4 42.7 - 76.0 %    Lymphocyte % 16.6 (L) 19.6 - 45.3 %    Monocyte % 9.1 5.0 - 12.0 %    Eosinophil % 2.5 0.3 - 6.2 %    Basophil % 0.1 0.0 - 1.5 %    Immature Grans % 0.3 0.0 - 0.5 %    Neutrophils, Absolute 7.43 (H) 1.70 - 7.00 10*3/mm3    Lymphocytes, Absolute 1.73 0.70 - 3.10 10*3/mm3    Monocytes, Absolute 0.95 (H) 0.10 - 0.90 10*3/mm3    Eosinophils, Absolute 0.26 0.00 - 0.40 10*3/mm3    Basophils, Absolute 0.01 0.00 - 0.20 10*3/mm3    Immature Grans, Absolute 0.03 0.00 - 0.05 10*3/mm3    nRBC 0.0 0.0 - 0.2 /100 WBC   POC Glucose Once    Collection Time: 01/10/25  7:22 AM    Specimen: Blood   Result Value Ref Range    Glucose 203 (H) 70 - 130 mg/dL   Blood Gas, Arterial With Co-Ox    Collection Time: 01/10/25  8:27 AM    Specimen: Arterial Blood   Result Value Ref Range    Site Right Radial     Shamar's Test Positive     pH, Arterial 7.387 7.300 - 7.500 pH units    pCO2, Arterial 74.0 (C) 35.0 - 45.0 mm Hg    pO2, Arterial 61.2 (L) 75.0 - 100.0 mm Hg    HCO3, Arterial 44.5 (H) 22.0 - 28.0 mmol/L    Base Excess, Arterial 15.5 (H) 0.0 - 2.0 mmol/L    O2 Saturation, Arterial 92.1 (L) 94.0 - 100.0 %    Hematocrit, Blood Gas 43.4 %    Oxyhemoglobin 92.1 (L) 94 - 99 %    Methemoglobin -0.90 (L) 0.00 - 1.50 %    Carboxyhemoglobin 0.9 0 - 2 %    A-a DO2       Barometric Pressure for Blood Gas 734 mmHg    Modality HFNC     Flow Rate 10.0 lpm    Ventilator Mode NA     Notified Who RN     Notified By [id number]     Notified Time 01/10/2025 08:27     Collected by CB     pH, Temp Corrected      pCO2, Temperature Corrected      pO2, Temperature Corrected     POC Glucose Once    Collection Time: 01/10/25 11:38 AM    Specimen: Blood   Result Value Ref Range    Glucose 172 (H) 70 - 130 mg/dL         Assessment & Plan       Stopped 3 nabil drinks a day. Not smoking.    Had respitroy failure.    Was transferred to [place] for Pulmonary during the holidays.    02 sats drop to 85%. Off oxygen  Has oxygen machine at home.   [employer] is o2 provider.         echo.       Left ventricular systolic function is normal. Calculated left ventricular EF = 61.6% Left ventricular ejection fraction appears to be 61 - 65%.    Left ventricular wall thickness is consistent with mild concentric hypertrophy.    Left ventricular diastolic function is consistent with (grade II w/high LAP) pseudonormalization.    The left atrial cavity is moderate to severely dilated.         Pt on a good road to recovery.       Diagnoses and all orders for this visit:    1. Hospital discharge follow-up (Primary)  -     CBC & Differential  -     Vitamin B12  -     Comprehensive Metabolic Panel  -     TSH  -     Hemoglobin A1c  -     Microalbumin / Creatinine Urine Ratio - Urine, Clean Catch    2. Panlobular emphysema  -     CBC & Differential  -     Vitamin B12  -     Comprehensive Metabolic Panel  -     TSH  -     Hemoglobin A1c  -     Microalbumin / Creatinine Urine Ratio - Urine, Clean Catch    3. Acute diastolic CHF (congestive heart failure)  -     CBC & Differential  -     Vitamin B12  -     Comprehensive Metabolic Panel  -     TSH  -     Hemoglobin A1c  -     Microalbumin / Creatinine Urine Ratio - Urine, Clean Catch    4. Supplemental oxygen dependent  -     CBC & Differential  -     Vitamin B12  -     Comprehensive  Metabolic Panel  -     TSH  -     Hemoglobin A1c  -     Microalbumin / Creatinine Urine Ratio - Urine, Clean Catch    5. Abnormal finding of blood chemistry, unspecified  -     Hemoglobin A1c    Other orders  -     Discontinue: ipratropium-albuterol (DUO-NEB) 0.5-2.5 mg/3 ml nebulizer; Take 3 mL by nebulization Every 6 (Six) Hours.  -     Continuous Glucose Sensor (FreeStyle Kelvin 3 Plus Sensor); Use Every 15 (Fifteen) Days.  Dispense: 2 each; Refill: 0  -     ipratropium-albuterol (DUO-NEB) 0.5-2.5 mg/3 ml nebulizer; Take 3 mL by nebulization Every 6 (Six) Hours.  Dispense: 360 mL; Refill: 1      Return in about 4 weeks (around 2/27/2025).          There are no Patient Instructions on file for this visit.     Woo Betancourt MD    Assessment & Plan

## 2025-02-03 LAB
ALBUMIN SERPL-MCNC: 3.7 G/DL (ref 3.5–5.2)
ALBUMIN/GLOB SERPL: 1.4 G/DL
ALP SERPL-CCNC: 89 U/L (ref 39–117)
ALT SERPL-CCNC: 20 U/L (ref 1–41)
AST SERPL-CCNC: 12 U/L (ref 1–40)
BASOPHILS # BLD AUTO: 0.02 10*3/MM3 (ref 0–0.2)
BASOPHILS NFR BLD AUTO: 0.2 % (ref 0–1.5)
BILIRUB SERPL-MCNC: 0.3 MG/DL (ref 0–1.2)
BUN SERPL-MCNC: 11 MG/DL (ref 6–20)
BUN/CREAT SERPL: 15.3 (ref 7–25)
CALCIUM SERPL-MCNC: 9.2 MG/DL (ref 8.6–10.5)
CHLORIDE SERPL-SCNC: 97 MMOL/L (ref 98–107)
CO2 SERPL-SCNC: 31.2 MMOL/L (ref 22–29)
CREAT SERPL-MCNC: 0.72 MG/DL (ref 0.76–1.27)
EGFRCR SERPLBLD CKD-EPI 2021: 106.6 ML/MIN/1.73
EOSINOPHIL # BLD AUTO: 0.14 10*3/MM3 (ref 0–0.4)
EOSINOPHIL NFR BLD AUTO: 1.4 % (ref 0.3–6.2)
ERYTHROCYTE [DISTWIDTH] IN BLOOD BY AUTOMATED COUNT: 13.5 % (ref 12.3–15.4)
GLOBULIN SER CALC-MCNC: 2.6 GM/DL
GLUCOSE SERPL-MCNC: 166 MG/DL (ref 65–99)
HBA1C MFR BLD: 8.9 % (ref 4.8–5.6)
HCT VFR BLD AUTO: 43.7 % (ref 37.5–51)
HGB BLD-MCNC: 14.3 G/DL (ref 13–17.7)
IMM GRANULOCYTES # BLD AUTO: 0.05 10*3/MM3 (ref 0–0.05)
IMM GRANULOCYTES NFR BLD AUTO: 0.5 % (ref 0–0.5)
LYMPHOCYTES # BLD AUTO: 1.41 10*3/MM3 (ref 0.7–3.1)
LYMPHOCYTES NFR BLD AUTO: 13.8 % (ref 19.6–45.3)
MCH RBC QN AUTO: 29.1 PG (ref 26.6–33)
MCHC RBC AUTO-ENTMCNC: 32.7 G/DL (ref 31.5–35.7)
MCV RBC AUTO: 88.8 FL (ref 79–97)
MONOCYTES # BLD AUTO: 0.71 10*3/MM3 (ref 0.1–0.9)
MONOCYTES NFR BLD AUTO: 6.9 % (ref 5–12)
NEUTROPHILS # BLD AUTO: 7.91 10*3/MM3 (ref 1.7–7)
NEUTROPHILS NFR BLD AUTO: 77.2 % (ref 42.7–76)
NRBC BLD AUTO-RTO: 0 /100 WBC (ref 0–0.2)
PLATELET # BLD AUTO: 297 10*3/MM3 (ref 140–450)
POTASSIUM SERPL-SCNC: 4.5 MMOL/L (ref 3.5–5.2)
PROT SERPL-MCNC: 6.3 G/DL (ref 6–8.5)
RBC # BLD AUTO: 4.92 10*6/MM3 (ref 4.14–5.8)
SODIUM SERPL-SCNC: 139 MMOL/L (ref 136–145)
TSH SERPL DL<=0.005 MIU/L-ACNC: 1.99 UIU/ML (ref 0.27–4.2)
VIT B12 SERPL-MCNC: 524 PG/ML (ref 211–946)
WBC # BLD AUTO: 10.24 10*3/MM3 (ref 3.4–10.8)

## 2025-02-25 DIAGNOSIS — I10 ESSENTIAL HYPERTENSION: ICD-10-CM

## 2025-02-25 NOTE — TELEPHONE ENCOUNTER
Caller: Cal Oliver Jr.    Relationship: Self    Best call back number: [phone]    Requested Prescriptions:   Requested Prescriptions     Pending Prescriptions Disp Refills    metFORMIN (GLUCOPHAGE) 500 MG tablet       Sig: Take 1 tablet by mouth 2 (Two) Times a Day With Meals.    terazosin (HYTRIN) 10 MG capsule       Sig: Take 1 capsule by mouth Every Night.    hydroCHLOROthiazide 25 MG tablet       Sig: Take 1 tablet by mouth Daily.    spironolactone (ALDACTONE) 50 MG tablet       Sig: Take 1 tablet by mouth Daily.    amLODIPine (NORVASC) 10 MG tablet 90 tablet 1     Sig: Take 1 tablet by mouth Daily.    potassium chloride ER (K-TAB) 20 MEQ tablet controlled-release ER tablet 180 tablet 1     Sig: Take 1 tablet by mouth 2 (Two) Times a Day With Meals.    losartan (COZAAR) 100 MG tablet       Sig: Take 1 tablet by mouth Daily.        Pharmacy where request should be sent:    [facility] [phone]  Last office visit with prescribing clinician: 1/30/2025   Last telemedicine visit with prescribing clinician: Visit date not found   Next office visit with prescribing clinician: 2/27/2025     Additional details provided by patient: PATIENT IS OUT OF MEDICATIONS. ALSO NEEDS JARDIANCE 10MG ONCE DAILY HOWEVER IS NOT ON MEDICATION LIST.     Does the patient have less than a 3 day supply:  [x] Yes  [] No    Would you like a call back once the refill request has been completed: [] Yes [x] No    If the office needs to give you a call back, can they leave a voicemail: [] Yes [x] No    Ashley Bejarano   02/25/25 10:05 EST         
verbal cues

## 2025-02-26 DIAGNOSIS — I10 ESSENTIAL HYPERTENSION: ICD-10-CM

## 2025-02-26 RX ORDER — SPIRONOLACTONE 50 MG/1
50 TABLET, FILM COATED ORAL DAILY
Qty: 30 TABLET | Refills: 0 | Status: SHIPPED | OUTPATIENT
Start: 2025-02-26 | End: 2025-02-27 | Stop reason: SDUPTHER

## 2025-02-26 RX ORDER — LOSARTAN POTASSIUM 100 MG/1
100 TABLET ORAL DAILY
Qty: 30 TABLET | Refills: 0 | Status: SHIPPED | OUTPATIENT
Start: 2025-02-26 | End: 2025-02-27 | Stop reason: SDUPTHER

## 2025-02-26 RX ORDER — SPIRONOLACTONE 50 MG/1
50 TABLET, FILM COATED ORAL DAILY
Qty: 90 TABLET | OUTPATIENT
Start: 2025-02-26

## 2025-02-26 RX ORDER — POTASSIUM CHLORIDE 1500 MG/1
20 TABLET, EXTENDED RELEASE ORAL 2 TIMES DAILY WITH MEALS
Qty: 60 TABLET | Refills: 0 | Status: SHIPPED | OUTPATIENT
Start: 2025-02-26 | End: 2025-02-27 | Stop reason: SDUPTHER

## 2025-02-26 RX ORDER — HYDROCHLOROTHIAZIDE 25 MG/1
25 TABLET ORAL DAILY
Qty: 90 TABLET | OUTPATIENT
Start: 2025-02-26

## 2025-02-26 RX ORDER — AMLODIPINE BESYLATE 10 MG/1
10 TABLET ORAL DAILY
Qty: 30 TABLET | Refills: 0 | Status: SHIPPED | OUTPATIENT
Start: 2025-02-26 | End: 2025-02-27 | Stop reason: SDUPTHER

## 2025-02-26 RX ORDER — TERAZOSIN 10 MG/1
10 CAPSULE ORAL NIGHTLY
Qty: 90 CAPSULE | OUTPATIENT
Start: 2025-02-26

## 2025-02-26 RX ORDER — LOSARTAN POTASSIUM 100 MG/1
100 TABLET ORAL DAILY
Qty: 90 TABLET | OUTPATIENT
Start: 2025-02-26

## 2025-02-26 RX ORDER — AMLODIPINE BESYLATE 10 MG/1
10 TABLET ORAL DAILY
Qty: 90 TABLET | OUTPATIENT
Start: 2025-02-26

## 2025-02-26 RX ORDER — TERAZOSIN 10 MG/1
10 CAPSULE ORAL NIGHTLY
Qty: 30 CAPSULE | Refills: 0 | Status: SHIPPED | OUTPATIENT
Start: 2025-02-26 | End: 2025-02-27 | Stop reason: SDUPTHER

## 2025-02-26 RX ORDER — HYDROCHLOROTHIAZIDE 25 MG/1
25 TABLET ORAL DAILY
Qty: 30 TABLET | Refills: 0 | Status: SHIPPED | OUTPATIENT
Start: 2025-02-26 | End: 2025-02-27 | Stop reason: SDUPTHER

## 2025-02-27 ENCOUNTER — OFFICE VISIT (OUTPATIENT)
Dept: INTERNAL MEDICINE | Facility: CLINIC | Age: 60
End: 2025-02-27
Payer: MEDICARE

## 2025-02-27 VITALS
BODY MASS INDEX: 44.1 KG/M2 | HEART RATE: 93 BPM | SYSTOLIC BLOOD PRESSURE: 184 MMHG | TEMPERATURE: 97.7 F | HEIGHT: 71 IN | DIASTOLIC BLOOD PRESSURE: 120 MMHG | WEIGHT: 315 LBS | OXYGEN SATURATION: 96 % | RESPIRATION RATE: 20 BRPM

## 2025-02-27 DIAGNOSIS — I10 HYPERTENSION, UNSPECIFIED TYPE: Primary | ICD-10-CM

## 2025-02-27 DIAGNOSIS — I10 ESSENTIAL HYPERTENSION: ICD-10-CM

## 2025-02-27 DIAGNOSIS — E11.9 TYPE 2 DIABETES MELLITUS WITHOUT COMPLICATION, WITHOUT LONG-TERM CURRENT USE OF INSULIN: ICD-10-CM

## 2025-02-27 DIAGNOSIS — G89.29 CHRONIC PAIN OF BOTH KNEES: ICD-10-CM

## 2025-02-27 DIAGNOSIS — M25.562 CHRONIC PAIN OF BOTH KNEES: ICD-10-CM

## 2025-02-27 DIAGNOSIS — M25.561 CHRONIC PAIN OF BOTH KNEES: ICD-10-CM

## 2025-02-27 PROBLEM — M47.816 LUMBAR SPONDYLOSIS: Status: ACTIVE | Noted: 2023-11-02

## 2025-02-27 PROBLEM — J96.02 ACUTE RESPIRATORY FAILURE WITH HYPOXIA AND HYPERCAPNIA: Status: ACTIVE | Noted: 2025-01-10

## 2025-02-27 PROBLEM — I16.1 HYPERTENSIVE EMERGENCY: Status: ACTIVE | Noted: 2024-01-12

## 2025-02-27 PROBLEM — J96.01 ACUTE RESPIRATORY FAILURE WITH HYPOXIA AND HYPERCAPNIA: Status: ACTIVE | Noted: 2025-01-10

## 2025-02-27 PROBLEM — E66.3 OVERWEIGHT: Status: ACTIVE | Noted: 2023-02-16

## 2025-02-27 RX ORDER — CLONIDINE HYDROCHLORIDE 0.1 MG/1
0.1 TABLET ORAL 3 TIMES DAILY
Qty: 270 TABLET | Refills: 3 | Status: SHIPPED | OUTPATIENT
Start: 2025-02-27

## 2025-02-27 RX ORDER — CARVEDILOL 12.5 MG/1
12.5 TABLET ORAL 2 TIMES DAILY WITH MEALS
COMMUNITY
Start: 2025-02-18 | End: 2025-02-27 | Stop reason: SDUPTHER

## 2025-02-27 RX ORDER — HYDROCHLOROTHIAZIDE 25 MG/1
25 TABLET ORAL DAILY
Qty: 90 TABLET | Refills: 3 | Status: SHIPPED | OUTPATIENT
Start: 2025-02-27

## 2025-02-27 RX ORDER — OMEPRAZOLE 20 MG/1
20 CAPSULE, DELAYED RELEASE ORAL DAILY
Qty: 90 CAPSULE | Refills: 3 | Status: SHIPPED | OUTPATIENT
Start: 2025-02-27

## 2025-02-27 RX ORDER — TERAZOSIN 10 MG/1
10 CAPSULE ORAL NIGHTLY
Qty: 90 CAPSULE | Refills: 3 | Status: SHIPPED | OUTPATIENT
Start: 2025-02-27

## 2025-02-27 RX ORDER — CARVEDILOL 12.5 MG/1
12.5 TABLET ORAL 2 TIMES DAILY WITH MEALS
Qty: 180 TABLET | Refills: 3 | Status: SHIPPED | OUTPATIENT
Start: 2025-02-27

## 2025-02-27 RX ORDER — ROPINIROLE 3 MG/1
3 TABLET, FILM COATED ORAL
COMMUNITY
Start: 2025-02-21

## 2025-02-27 RX ORDER — POTASSIUM CHLORIDE 1500 MG/1
20 TABLET, EXTENDED RELEASE ORAL 2 TIMES DAILY WITH MEALS
Qty: 180 TABLET | Refills: 0 | Status: SHIPPED | OUTPATIENT
Start: 2025-02-27

## 2025-02-27 RX ORDER — IPRATROPIUM BROMIDE AND ALBUTEROL SULFATE 2.5; .5 MG/3ML; MG/3ML
3 SOLUTION RESPIRATORY (INHALATION)
Qty: 360 ML | Refills: 1 | Status: SHIPPED | OUTPATIENT
Start: 2025-02-27

## 2025-02-27 RX ORDER — AMLODIPINE BESYLATE 10 MG/1
10 TABLET ORAL DAILY
Qty: 90 TABLET | Refills: 3 | Status: SHIPPED | OUTPATIENT
Start: 2025-02-27

## 2025-02-27 RX ORDER — SPIRONOLACTONE 50 MG/1
50 TABLET, FILM COATED ORAL DAILY
Qty: 90 TABLET | Refills: 3 | Status: SHIPPED | OUTPATIENT
Start: 2025-02-27

## 2025-02-27 RX ORDER — LOSARTAN POTASSIUM 100 MG/1
100 TABLET ORAL DAILY
Qty: 90 TABLET | Refills: 3 | Status: SHIPPED | OUTPATIENT
Start: 2025-02-27

## 2025-02-27 RX ORDER — OMEPRAZOLE 20 MG/1
1 CAPSULE, DELAYED RELEASE ORAL DAILY
COMMUNITY
Start: 2025-02-19 | End: 2025-02-27 | Stop reason: SDUPTHER

## 2025-02-27 NOTE — PROGRESS NOTES
Subjective     Patient ID: Cal Oliver Jr. is a 59 y.o. male. Patient is here for management of multiple medical problems.     Chief Complaint   Patient presents with    Pneumonia     4 week follow-up.     History of Present Illness     The following portions of the patient's history were reviewed and updated as appropriate: allergies, current medications, past family history, past medical history, past social history, past surgical history and problem list.    Review of Systems    Current Outpatient Medications:     amLODIPine (NORVASC) 10 MG tablet, Take 1 tablet by mouth Daily., Disp: 90 tablet, Rfl: 3    aspirin 81 MG chewable tablet, CHEW AND SWALLOW 1 TABLET BY MOUTH DAILY, Disp: 90 tablet, Rfl: 1    carvedilol (COREG) 12.5 MG tablet, Take 1 tablet by mouth 2 (Two) Times a Day With Meals., Disp: 180 tablet, Rfl: 3    cloNIDine (CATAPRES) 0.1 MG tablet, Take 1 tablet by mouth 3 times a day., Disp: 270 tablet, Rfl: 3    Continuous Glucose Sensor (FreeStyle Kelvin 3 Plus Sensor), Use Every 15 (Fifteen) Days., Disp: 2 each, Rfl: 0    Enoxaparin Sodium (LOVENOX) 60 MG/0.6ML solution prefilled syringe syringe, Inject 0.6 mL under the skin into the appropriate area as directed Every 12 (Twelve) Hours. Indications: Prevention of Unwanted Clot in Veins, Disp: , Rfl:     folic acid (FOLVITE) 1 MG tablet, Take 1 tablet by mouth Daily., Disp: , Rfl:     hydroCHLOROthiazide 25 MG tablet, Take 1 tablet by mouth Daily., Disp: 90 tablet, Rfl: 3    ipratropium-albuterol (DUO-NEB) 0.5-2.5 mg/3 ml nebulizer, Take 3 mL by nebulization Every 6 (Six) Hours., Disp: 360 mL, Rfl: 1    losartan (COZAAR) 100 MG tablet, Take 1 tablet by mouth Daily., Disp: 90 tablet, Rfl: 3    metFORMIN (GLUCOPHAGE) 500 MG tablet, Take 1 tablet by mouth 2 (Two) Times a Day With Meals., Disp: 180 tablet, Rfl: 3    mometasone-formoterol (DULERA 100) 100-5 MCG/ACT inhaler, Inhale 2 puffs 2 (Two) Times a Day., Disp: 1 each, Rfl: 11    omeprazole  "(priLOSEC) 20 MG capsule, Take 1 capsule by mouth Daily., Disp: 90 capsule, Rfl: 3    potassium chloride ER (K-TAB) 20 MEQ tablet controlled-release ER tablet, Take 1 tablet by mouth 2 (Two) Times a Day With Meals., Disp: 180 tablet, Rfl: 0    rOPINIRole (REQUIP) 3 MG tablet, Take 1 tablet by mouth every night at bedtime., Disp: , Rfl:     spironolactone (ALDACTONE) 50 MG tablet, Take 1 tablet by mouth Daily., Disp: 90 tablet, Rfl: 3    terazosin (HYTRIN) 10 MG capsule, Take 1 capsule by mouth Every Night., Disp: 90 capsule, Rfl: 3    Objective      Blood pressure (!) 184/120, pulse 93, temperature 97.7 °F (36.5 °C), resp. rate 20, height 180.3 cm (71\"), weight (!) 156 kg (345 lb), SpO2 96%.            Physical Exam     General Appearance:    Alert, cooperative, no distress, appears stated age   Head:    Normocephalic, without obvious abnormality, atraumatic   Eyes:    PERRL, conjunctiva/corneas clear, EOM's intact   Ears:    Normal TM's and external ear canals, both ears   Nose:   Nares normal, septum midline, mucosa normal, no drainage   or sinus tenderness   Throat:   Lips, mucosa, and tongue normal; teeth and gums normal   Neck:   Supple, symmetrical, trachea midline, no adenopathy;        thyroid:  No enlargement/tenderness/nodules; no carotid    bruit or JVD   Back:     Symmetric, no curvature, ROM normal, no CVA tenderness   Lungs:     Clear to auscultation bilaterally, respirations unlabored   Chest wall:    No tenderness or deformity   Heart:    Regular rate and rhythm, S1 and S2 normal, no murmur,        rub or gallop   Abdomen:     Soft, non-tender, bowel sounds active all four quadrants,     no masses, no organomegaly   Extremities:   Extremities normal, atraumatic, no cyanosis or edema   Pulses:   2+ and symmetric all extremities   Skin:   Skin color, texture, turgor normal, no rashes or lesions   Lymph nodes:   Cervical, supraclavicular, and axillary nodes normal   Neurologic:   CNII-XII intact. Normal " strength, sensation and reflexes       throughout      Results for orders placed or performed in visit on 01/30/25   Vitamin B12    Collection Time: 02/03/25 10:14 AM    Specimen: Blood   Result Value Ref Range    Vitamin B-12 524 211 - 946 pg/mL   Comprehensive Metabolic Panel    Collection Time: 02/03/25 10:14 AM    Specimen: Blood   Result Value Ref Range    Glucose 166 (H) 65 - 99 mg/dL    BUN 11 6 - 20 mg/dL    Creatinine 0.72 (L) 0.76 - 1.27 mg/dL    EGFR Result 106.6 >60.0 mL/min/1.73    BUN/Creatinine Ratio 15.3 7.0 - 25.0    Sodium 139 136 - 145 mmol/L    Potassium 4.5 3.5 - 5.2 mmol/L    Chloride 97 (L) 98 - 107 mmol/L    Total CO2 31.2 (H) 22.0 - 29.0 mmol/L    Calcium 9.2 8.6 - 10.5 mg/dL    Total Protein 6.3 6.0 - 8.5 g/dL    Albumin 3.7 3.5 - 5.2 g/dL    Globulin 2.6 gm/dL    A/G Ratio 1.4 g/dL    Total Bilirubin 0.3 0.0 - 1.2 mg/dL    Alkaline Phosphatase 89 39 - 117 U/L    AST (SGOT) 12 1 - 40 U/L    ALT (SGPT) 20 1 - 41 U/L   TSH    Collection Time: 02/03/25 10:14 AM    Specimen: Blood   Result Value Ref Range    TSH 1.990 0.270 - 4.200 uIU/mL   Hemoglobin A1c    Collection Time: 02/03/25 10:14 AM    Specimen: Blood   Result Value Ref Range    Hemoglobin A1C 8.90 (H) 4.80 - 5.60 %   CBC & Differential    Collection Time: 02/03/25 10:14 AM    Specimen: Blood   Result Value Ref Range    WBC 10.24 3.40 - 10.80 10*3/mm3    RBC 4.92 4.14 - 5.80 10*6/mm3    Hemoglobin 14.3 13.0 - 17.7 g/dL    Hematocrit 43.7 37.5 - 51.0 %    MCV 88.8 79.0 - 97.0 fL    MCH 29.1 26.6 - 33.0 pg    MCHC 32.7 31.5 - 35.7 g/dL    RDW 13.5 12.3 - 15.4 %    Platelets 297 140 - 450 10*3/mm3    Neutrophil Rel % 77.2 (H) 42.7 - 76.0 %    Lymphocyte Rel % 13.8 (L) 19.6 - 45.3 %    Monocyte Rel % 6.9 5.0 - 12.0 %    Eosinophil Rel % 1.4 0.3 - 6.2 %    Basophil Rel % 0.2 0.0 - 1.5 %    Neutrophils Absolute 7.91 (H) 1.70 - 7.00 10*3/mm3    Lymphocytes Absolute 1.41 0.70 - 3.10 10*3/mm3    Monocytes Absolute 0.71 0.10 - 0.90 10*3/mm3     Eosinophils Absolute 0.14 0.00 - 0.40 10*3/mm3    Basophils Absolute 0.02 0.00 - 0.20 10*3/mm3    Immature Granulocyte Rel % 0.5 0.0 - 0.5 %    Immature Grans Absolute 0.05 0.00 - 0.05 10*3/mm3    nRBC 0.0 0.0 - 0.2 /100 WBC         Assessment & Plan   Off monstor soda.  Still on soda    Hurting in joints.    Ha1c.       Diagnoses and all orders for this visit:    1. Hypertension, unspecified type (Primary)    2. Type 2 diabetes mellitus without complication, without long-term current use of insulin    3. Essential hypertension  -     amLODIPine (NORVASC) 10 MG tablet; Take 1 tablet by mouth Daily.  Dispense: 90 tablet; Refill: 3  -     potassium chloride ER (K-TAB) 20 MEQ tablet controlled-release ER tablet; Take 1 tablet by mouth 2 (Two) Times a Day With Meals.  Dispense: 180 tablet; Refill: 0    4. Chronic pain of both knees  -     Ambulatory Referral to Orthopedic Surgery    Other orders  -     ipratropium-albuterol (DUO-NEB) 0.5-2.5 mg/3 ml nebulizer; Take 3 mL by nebulization Every 6 (Six) Hours.  Dispense: 360 mL; Refill: 1  -     carvedilol (COREG) 12.5 MG tablet; Take 1 tablet by mouth 2 (Two) Times a Day With Meals.  Dispense: 180 tablet; Refill: 3  -     cloNIDine (CATAPRES) 0.1 MG tablet; Take 1 tablet by mouth 3 times a day.  Dispense: 270 tablet; Refill: 3  -     hydroCHLOROthiazide 25 MG tablet; Take 1 tablet by mouth Daily.  Dispense: 90 tablet; Refill: 3  -     losartan (COZAAR) 100 MG tablet; Take 1 tablet by mouth Daily.  Dispense: 90 tablet; Refill: 3  -     metFORMIN (GLUCOPHAGE) 500 MG tablet; Take 1 tablet by mouth 2 (Two) Times a Day With Meals.  Dispense: 180 tablet; Refill: 3  -     mometasone-formoterol (DULERA 100) 100-5 MCG/ACT inhaler; Inhale 2 puffs 2 (Two) Times a Day.  Dispense: 1 each; Refill: 11  -     omeprazole (priLOSEC) 20 MG capsule; Take 1 capsule by mouth Daily.  Dispense: 90 capsule; Refill: 3  -     spironolactone (ALDACTONE) 50 MG tablet; Take 1 tablet by mouth Daily.   Dispense: 90 tablet; Refill: 3  -     terazosin (HYTRIN) 10 MG capsule; Take 1 capsule by mouth Every Night.  Dispense: 90 capsule; Refill: 3      Return in about 1 week (around 3/6/2025).          There are no Patient Instructions on file for this visit.     Woo Betancourt MD    Assessment & Plan

## 2025-03-04 ENCOUNTER — DISEASE STATE MANAGEMENT VISIT (OUTPATIENT)
Dept: PHARMACY | Facility: HOSPITAL | Age: 60
End: 2025-03-04
Payer: MEDICARE

## 2025-03-04 VITALS
SYSTOLIC BLOOD PRESSURE: 155 MMHG | WEIGHT: 315 LBS | BODY MASS INDEX: 44.1 KG/M2 | HEIGHT: 71 IN | OXYGEN SATURATION: 97 % | HEART RATE: 76 BPM | DIASTOLIC BLOOD PRESSURE: 89 MMHG

## 2025-03-04 DIAGNOSIS — J44.9 CHRONIC OBSTRUCTIVE PULMONARY DISEASE, UNSPECIFIED COPD TYPE: ICD-10-CM

## 2025-03-04 DIAGNOSIS — G47.33 OSA TREATED WITH BIPAP: ICD-10-CM

## 2025-03-04 DIAGNOSIS — R06.02 SHORTNESS OF BREATH: Primary | ICD-10-CM

## 2025-03-04 DIAGNOSIS — J96.11 CHRONIC RESPIRATORY FAILURE WITH HYPOXIA AND HYPERCAPNIA: ICD-10-CM

## 2025-03-04 DIAGNOSIS — J96.12 CHRONIC RESPIRATORY FAILURE WITH HYPOXIA AND HYPERCAPNIA: ICD-10-CM

## 2025-03-04 DIAGNOSIS — J30.9 ALLERGIC RHINITIS, UNSPECIFIED SEASONALITY, UNSPECIFIED TRIGGER: ICD-10-CM

## 2025-03-04 DIAGNOSIS — Z87.891 PERSONAL HISTORY OF NICOTINE DEPENDENCE: ICD-10-CM

## 2025-03-04 RX ORDER — FLUTICASONE FUROATE, UMECLIDINIUM BROMIDE AND VILANTEROL TRIFENATATE 200; 62.5; 25 UG/1; UG/1; UG/1
1 POWDER RESPIRATORY (INHALATION)
Qty: 60 EACH | Refills: 3 | Status: SHIPPED | OUTPATIENT
Start: 2025-03-04

## 2025-03-04 RX ORDER — MONTELUKAST SODIUM 10 MG/1
10 TABLET ORAL NIGHTLY
Qty: 30 TABLET | Refills: 5 | Status: SHIPPED | OUTPATIENT
Start: 2025-03-04

## 2025-03-04 NOTE — PROGRESS NOTES
"     Follow Up Office Visit      Patient Name: Cal Oliver Jr.    Chief Complaint:  COPD      History of Present Illness: Cal Oliver Jr. is a 59 y.o. male who is here today for follow up of COPD after 01/2025 hospitalization at [facility] for management of acute hypoxic and hypercapnic respiratory failure, COPD with acute exacerbation, possible diastolic heart failure.  During admission, he was treated with bronchodilators, Solu-Medrol, supplemental oxygen, BiPAP, was placed on Unasyn due to concern for possible aspiration event.  He clinically improved and was discharged to [facility].  He notes that he is now residing back in his home.  His dyspnea has improved since the time of hospitalization.  He has been started on BiPAP, which she reports nightly compliance.  His DME is [employer] and he is using a fullface mask.  He has maintained smoking cessation since the time of his hospitalization.    Duration: Asthma his \"whole life\"  Timing: Daily  Context: About 20 minutes of activity  Associated Symptoms: Dyspnea, chronic cough intermittently productive of mucus, wheezing, no fevers, no hemoptysis, allergic rhinitis  Modifying Factors: Dyspnea is worse with exertion, allergic rhinitis worse during the spring, wheezing worse at nighttime  Supplemental Oxygen: 3L NC  Ever had Blood Clots: No  Last Hospitalization: January 2025  Number of exacerbations per year: 2-3    Subjective      Review of Systems:  Review of Systems   Constitutional:  Negative for fever and unexpected weight change.   Respiratory:  Positive for cough, shortness of breath and wheezing.    Cardiovascular:  Negative for chest pain.   Allergic/Immunologic: Positive for environmental allergies.        Past Medical History:   Past Medical History:   Diagnosis Date    Asthma     Chronic back pain     Diabetes mellitus     High blood pressure     Hyperlipidemia     Migraine     Nausea and vomiting     Sleep apnea        Past Surgical History:   Past Surgical " History:   Procedure Laterality Date    BACK SURGERY      GALLBLADDER SURGERY      KNEE SURGERY         Family History:   Family History   Problem Relation Age of Onset    Other Other         HIGH BLOOD PRESSURE    Hypertension Other     Heart disease Mother     Heart attack Mother     Heart disease Father        Social History:   Social History     Socioeconomic History    Marital status:    Tobacco Use    Smoking status: Former     Current packs/day: 0.00     Average packs/day: 0.3 packs/day for 20.0 years (5.0 ttl pk-yrs)     Types: Cigarettes     Quit date: 2025     Years since quittin.1     Passive exposure: Past    Smokeless tobacco: Never    Tobacco comments:     not smoked since2020   Vaping Use    Vaping status: Never Used   Substance and Sexual Activity    Alcohol use: No    Drug use: No    Sexual activity: Defer       Current Medications:     Current Outpatient Medications:     amLODIPine (NORVASC) 10 MG tablet, Take 1 tablet by mouth Daily., Disp: 90 tablet, Rfl: 3    carvedilol (COREG) 12.5 MG tablet, Take 1 tablet by mouth 2 (Two) Times a Day With Meals., Disp: 180 tablet, Rfl: 3    cloNIDine (CATAPRES) 0.1 MG tablet, Take 1 tablet by mouth 3 times a day., Disp: 270 tablet, Rfl: 3    hydroCHLOROthiazide 25 MG tablet, Take 1 tablet by mouth Daily., Disp: 90 tablet, Rfl: 3    ipratropium-albuterol (DUO-NEB) 0.5-2.5 mg/3 ml nebulizer, Take 3 mL by nebulization Every 6 (Six) Hours., Disp: 360 mL, Rfl: 1    losartan (COZAAR) 100 MG tablet, Take 1 tablet by mouth Daily., Disp: 90 tablet, Rfl: 3    metFORMIN (GLUCOPHAGE) 500 MG tablet, Take 1 tablet by mouth 2 (Two) Times a Day With Meals., Disp: 180 tablet, Rfl: 3    mometasone-formoterol (DULERA 100) 100-5 MCG/ACT inhaler, Inhale 2 puffs 2 (Two) Times a Day., Disp: 1 each, Rfl: 11    omeprazole (priLOSEC) 20 MG capsule, Take 1 capsule by mouth Daily., Disp: 90 capsule, Rfl: 3    potassium chloride ER (K-TAB) 20 MEQ tablet  "controlled-release ER tablet, Take 1 tablet by mouth 2 (Two) Times a Day With Meals., Disp: 180 tablet, Rfl: 0    rOPINIRole (REQUIP) 3 MG tablet, Take 1 tablet by mouth every night at bedtime., Disp: , Rfl:     spironolactone (ALDACTONE) 50 MG tablet, Take 1 tablet by mouth Daily., Disp: 90 tablet, Rfl: 3    terazosin (HYTRIN) 10 MG capsule, Take 1 capsule by mouth Every Night., Disp: 90 capsule, Rfl: 3    aspirin 81 MG chewable tablet, CHEW AND SWALLOW 1 TABLET BY MOUTH DAILY (Patient not taking: Reported on 3/4/2025), Disp: 90 tablet, Rfl: 1    Continuous Glucose Sensor (FreeStyle Kelvin 3 Plus Sensor), Use Every 15 (Fifteen) Days. (Patient not taking: Reported on 3/4/2025), Disp: 2 each, Rfl: 0    Enoxaparin Sodium (LOVENOX) 60 MG/0.6ML solution prefilled syringe syringe, Inject 0.6 mL under the skin into the appropriate area as directed Every 12 (Twelve) Hours. Indications: Prevention of Unwanted Clot in Veins (Patient not taking: Reported on 3/4/2025), Disp: , Rfl:     folic acid (FOLVITE) 1 MG tablet, Take 1 tablet by mouth Daily., Disp: , Rfl:      Allergies:   Allergies   Allergen Reactions    Atorvastatin Hives, Myalgia and Other (See Comments)    Levothyroxine Hives, Itching, Other (See Comments), Rash and Swelling    Levothyroxine Sodium Swelling    Piroxicam Rash     Objective     Physical Exam:  Vital Signs:   Vitals:    03/04/25 1312   BP: 155/89   Pulse: 76   SpO2: 97%   Weight: (!) 156 kg (345 lb)   Height: 180.3 cm (71\")     Body mass index is 48.12 kg/m².    Physical Exam  Vitals reviewed.   Constitutional:       General: He is not in acute distress.     Appearance: He is obese. He is not toxic-appearing.   HENT:      Head: Normocephalic and atraumatic.      Mouth/Throat:      Mouth: Mucous membranes are moist.   Eyes:      Extraocular Movements: Extraocular movements intact.      Conjunctiva/sclera: Conjunctivae normal.   Cardiovascular:      Rate and Rhythm: Normal rate.      Heart sounds: Normal " heart sounds.   Pulmonary:      Effort: Pulmonary effort is normal.      Breath sounds: Wheezing (Scattered throughout) present.   Abdominal:      General: There is no distension.      Palpations: Abdomen is soft.   Musculoskeletal:         General: No swelling.      Cervical back: Neck supple.   Skin:     General: Skin is warm and dry.      Findings: No rash.   Neurological:      General: No focal deficit present.      Mental Status: He is alert and oriented to person, place, and time.   Psychiatric:         Mood and Affect: Mood normal.         Behavior: Behavior normal.       Results Review:   January 2025 CTA chest showed no pulmonary embolism.  Bilateral segmental/subsegmental consolidation which is favored to be atelectasis, pneumonia considered less likely.    WBC   Date Value Ref Range Status   02/03/2025 10.24 3.40 - 10.80 10*3/mm3 Final     RBC   Date Value Ref Range Status   02/03/2025 4.92 4.14 - 5.80 10*6/mm3 Final     Hemoglobin   Date Value Ref Range Status   02/03/2025 14.3 13.0 - 17.7 g/dL Final   01/10/2025 13.8 13.0 - 17.7 g/dL Final     Hematocrit   Date Value Ref Range Status   02/03/2025 43.7 37.5 - 51.0 % Final   01/10/2025 43.6 37.5 - 51.0 % Final     MCV   Date Value Ref Range Status   02/03/2025 88.8 79.0 - 97.0 fL Final   01/10/2025 91.0 79.0 - 97.0 fL Final     MCH   Date Value Ref Range Status   02/03/2025 29.1 26.6 - 33.0 pg Final   01/10/2025 28.8 26.6 - 33.0 pg Final     MCHC   Date Value Ref Range Status   02/03/2025 32.7 31.5 - 35.7 g/dL Final   01/10/2025 31.7 31.5 - 35.7 g/dL Final     RDW   Date Value Ref Range Status   02/03/2025 13.5 12.3 - 15.4 % Final   01/10/2025 13.8 12.3 - 15.4 % Final     RDW-SD   Date Value Ref Range Status   01/10/2025 45.9 37.0 - 54.0 fl Final     MPV   Date Value Ref Range Status   01/10/2025 9.9 6.0 - 12.0 fL Final     Platelets   Date Value Ref Range Status   02/03/2025 297 140 - 450 10*3/mm3 Final   01/10/2025 208 140 - 450 10*3/mm3 Final      Neutrophil Rel %   Date Value Ref Range Status   02/03/2025 77.2 (H) 42.7 - 76.0 % Final     Neutrophil %   Date Value Ref Range Status   01/10/2025 71.4 42.7 - 76.0 % Final     Lymphocyte Rel %   Date Value Ref Range Status   02/03/2025 13.8 (L) 19.6 - 45.3 % Final     Lymphocyte %   Date Value Ref Range Status   01/10/2025 16.6 (L) 19.6 - 45.3 % Final     Monocyte Rel %   Date Value Ref Range Status   02/03/2025 6.9 5.0 - 12.0 % Final     Monocyte %   Date Value Ref Range Status   01/10/2025 9.1 5.0 - 12.0 % Final     Eosinophil Rel %   Date Value Ref Range Status   02/03/2025 1.4 0.3 - 6.2 % Final     Eosinophil %   Date Value Ref Range Status   01/10/2025 2.5 0.3 - 6.2 % Final     Basophil Rel %   Date Value Ref Range Status   02/03/2025 0.2 0.0 - 1.5 % Final     Basophil %   Date Value Ref Range Status   01/10/2025 0.1 0.0 - 1.5 % Final     Immature Grans %   Date Value Ref Range Status   01/10/2025 0.3 0.0 - 0.5 % Final     Neutrophils Absolute   Date Value Ref Range Status   02/03/2025 7.91 (H) 1.70 - 7.00 10*3/mm3 Final     Neutrophils, Absolute   Date Value Ref Range Status   01/10/2025 7.43 (H) 1.70 - 7.00 10*3/mm3 Final     Lymphocytes Absolute   Date Value Ref Range Status   02/03/2025 1.41 0.70 - 3.10 10*3/mm3 Final     Lymphocytes, Absolute   Date Value Ref Range Status   01/10/2025 1.73 0.70 - 3.10 10*3/mm3 Final     Monocytes Absolute   Date Value Ref Range Status   02/03/2025 0.71 0.10 - 0.90 10*3/mm3 Final     Monocytes, Absolute   Date Value Ref Range Status   01/10/2025 0.95 (H) 0.10 - 0.90 10*3/mm3 Final     Eosinophils Absolute   Date Value Ref Range Status   02/03/2025 0.14 0.00 - 0.40 10*3/mm3 Final     Eosinophils, Absolute   Date Value Ref Range Status   01/10/2025 0.26 0.00 - 0.40 10*3/mm3 Final     Basophils Absolute   Date Value Ref Range Status   02/03/2025 0.02 0.00 - 0.20 10*3/mm3 Final     Basophils, Absolute   Date Value Ref Range Status   01/10/2025 0.01 0.00 - 0.20 10*3/mm3  Final     Immature Grans, Absolute   Date Value Ref Range Status   01/10/2025 0.03 0.00 - 0.05 10*3/mm3 Final     nRBC   Date Value Ref Range Status   02/03/2025 0.0 0.0 - 0.2 /100 WBC Final   01/10/2025 0.0 0.0 - 0.2 /100 WBC Final     Lab Results   Component Value Date    GLUCOSE 166 (H) 02/03/2025    BUN 11 02/03/2025    CREATININE 0.72 (L) 02/03/2025     02/03/2025    K 4.5 02/03/2025    CL 97 (L) 02/03/2025    CALCIUM 9.2 02/03/2025    PROTEINTOT 6.3 02/03/2025    ALBUMIN 3.7 02/03/2025    ALT 20 02/03/2025    AST 12 02/03/2025    ALKPHOS 89 02/03/2025    BILITOT 0.3 02/03/2025    GLOB 2.6 02/03/2025    AGRATIO 1.4 02/03/2025    BCR 15.3 02/03/2025    ANIONGAP 3.4 (L) 01/10/2025    EGFR 106.6 02/03/2025     Site   Date Value Ref Range Status   01/10/2025 Right Radial  Final     Shamar's Test   Date Value Ref Range Status   01/10/2025 Positive  Final     pH, Arterial   Date Value Ref Range Status   01/10/2025 7.387 7.300 - 7.500 pH units Final     pCO2, Arterial   Date Value Ref Range Status   01/10/2025 74.0 (C) 35.0 - 45.0 mm Hg Final     Comment:     86 Value above critical limit     pO2, Arterial   Date Value Ref Range Status   01/10/2025 61.2 (L) 75.0 - 100.0 mm Hg Final     Comment:     84 Value below reference range     HCO3, Arterial   Date Value Ref Range Status   01/10/2025 44.5 (H) 22.0 - 28.0 mmol/L Final     Comment:     83 Value above reference range     Base Excess, Arterial   Date Value Ref Range Status   01/10/2025 15.5 (H) 0.0 - 2.0 mmol/L Final     Comment:     83 Value above reference range     O2 Saturation, Arterial   Date Value Ref Range Status   01/10/2025 92.1 (L) 94.0 - 100.0 % Final     Comment:     84 Value below reference range     Hematocrit, Blood Gas   Date Value Ref Range Status   01/10/2025 43.4 % Final     Oxyhemoglobin   Date Value Ref Range Status   01/10/2025 92.1 (L) 94 - 99 % Final     Comment:     84 Value below reference range     Methemoglobin   Date Value Ref  Range Status   01/10/2025 -0.90 (L) 0.00 - 1.50 % Final     Comment:     84 Value below reference range     Carboxyhemoglobin   Date Value Ref Range Status   01/10/2025 0.9 0 - 2 % Final     Barometric Pressure for Blood Gas   Date Value Ref Range Status   01/10/2025 734 mmHg Final     Modality   Date Value Ref Range Status   01/10/2025 HFNC  Final     FIO2   Date Value Ref Range Status   01/09/2025 50 % Final     Results for orders placed during the hospital encounter of 01/01/25    Adult Transthoracic Echo Complete w/ Color, Spectral and Contrast if necessary per protocol    Interpretation Summary    Left ventricular systolic function is normal. Calculated left ventricular EF = 61.6% Left ventricular ejection fraction appears to be 61 - 65%.    Left ventricular wall thickness is consistent with mild concentric hypertrophy.    Left ventricular diastolic function is consistent with (grade II w/high LAP) pseudonormalization.    The left atrial cavity is moderate to severely dilated.    Office spirometry obtained today showed best FEV1 68%, FEV1/FVC ratio 71.    Assessment / Plan      Assessment/Plan:   Diagnoses and all orders for this visit:    1. Shortness of breath (Primary)  -     Spirometry; Future  Improved since the time of recent hospitalization.  Dyspnea is likely multifactorial due to underlying lung disease as well as diastolic dysfunction.    2. Chronic obstructive pulmonary disease, unspecified COPD type  -     Alpha - 1 - Antitrypsin Deficiency; Future  -     Complete PFT - Pre & Post Bronchodilator; Future  Moderate reduction in FEV1 per office spirometry obtained today, further assess with full PFTs prior to next clinic visit.  He reports a longstanding history of asthma.  Stop Dulera and optimize inhaled regimen with transition to use of high-dose Trelegy. We discussed the risk and benefits of inhaled corticosteroids. Patient instructed to take them on a regular basis as prescribed. Patient  instructed to rinse their mouth out after each use. Appropriate inhaler technique demonstrated today in clinic. Patient instructed to contact their insurance company and make sure that the medications prescribed are on their formulary and the lowest cost/tier for them. They will call the clinic back if different medications need to prescribed.  Can continue using DuoNebs on an as-needed basis. Patient was encouraged to maintain as much physical activity as he can safely tolerate.    3. Allergic rhinitis, unspecified seasonality, unspecified trigger  -     montelukast (SINGULAIR) 10 MG tablet; Take 1 tablet by mouth Every Night.  Dispense: 30 tablet; Refill: 5  Begin use of montelukast.  He can add an over-the-counter oral antihistamine and/or Flonase as needed. Patient advised to stop taking the medicine if they experience any mood disturbances/SI. Discussed possible side effects of medications. Risk and benefits of the medication were discussed with patient.     4. SHUKRI treated with BiPAP  Continue on BiPAP nightly.  Request compliance report from [facility] for review.    5. Chronic respiratory failure with hypoxia and hypercapnia  Oxygenating well today.  Continue on BiPAP as noted above. He should use any sedating medications cautiously, as this can worsen their respiratory status.     6. Personal history of nicotine dependence  Currently in early remission.  Ongoing cessation advised.    Other orders  -     Fluticasone-Umeclidin-Vilant (Trelegy Ellipta) 200-62.5-25 MCG/ACT inhaler; Inhale 1 puff by mouth Daily. Swish with water and spit after use  Dispense: 60 each; Refill: 3       Follow Up:   Return in about 1 month (around 4/4/2025) for Recheck.  The patient was counseled on diagnostic results, risks and benefits of treatment options, risk factor modifications and the importance of treatment compliance. The patient was advised to contact the clinic with concerns or worsening symptoms.     REFUGIO Gilbert    P[facility] [place]     This document has been electronically signed by REFUGIO Gilbert  March 4, 2025

## 2025-03-05 NOTE — PROGRESS NOTES
[facility] Initial Visit Note       Patient Name: Cal Oliver Jr.  : 1965   MRN: [id number]   Sex: male  Care Team: Patient Care Team:  Woo Betancourt MD as PCP - General (Internal Medicine)     Primary Care Provider:   Referring Provider: Fadumo Liu,*  Encounter Provider: REFUGIO Gilbert      COPD Management Visit     History of Present Illness: Cal Oliver Jr. is a 59 y.o. male who is here today for initial evaluation of COPD Management. PMH significant for T2DM, HTN, and SHUKRI with BiPAP use. Patient was recently hospitalized from 1/10 -  with respiratory failure and COPD exacerbation. Patient reports he has never been worked up for, or diagnosed with COPD. Upon discharge, Mr. Oliver was initiated on Dulera and DuoNebs.      In clinic today, Mr. Ruvalcaba was initated on Trelegy maintenance therapy. PFT is ordered to determine COPD diagnosis prior to next visit. Education was provided for proper inhaler technique and storage.    New patient history form is scanned into patient chart under media tab.    Insurance Coverage:   Trelegy- $12.15- no PA or PAP requirement.    Subjective      Past Medical History:   Past Medical History:   Diagnosis Date    Asthma     Chronic back pain     Diabetes mellitus     High blood pressure     Hyperlipidemia     Migraine     Nausea and vomiting     Sleep apnea        Past Surgical History:   Past Surgical History:   Procedure Laterality Date    BACK SURGERY      GALLBLADDER SURGERY      KNEE SURGERY         Family History:   Family History   Problem Relation Age of Onset    Other Other         HIGH BLOOD PRESSURE    Hypertension Other     Heart disease Mother     Heart attack Mother     Heart disease Father        Social History:   Social History     Socioeconomic History    Marital status:    Tobacco Use    Smoking status: Former     Current packs/day: 0.00     Average packs/day: 0.3  packs/day for 20.0 years (5.0 ttl pk-yrs)     Types: Cigarettes     Quit date: 2025     Years since quittin.1     Passive exposure: Past    Smokeless tobacco: Never    Tobacco comments:     not smoked since2020   Vaping Use    Vaping status: Never Used   Substance and Sexual Activity    Alcohol use: No    Drug use: No    Sexual activity: Defer       Tobacco History:   Social History     Tobacco Use   Smoking Status Former    Current packs/day: 0.00    Average packs/day: 0.3 packs/day for 20.0 years (5.0 ttl pk-yrs)    Types: Cigarettes    Quit date: 2025    Years since quittin.1    Passive exposure: Past   Smokeless Tobacco Never   Tobacco Comments    not smoked since2020       Medications:     Current Outpatient Medications:     amLODIPine (NORVASC) 10 MG tablet, Take 1 tablet by mouth Daily., Disp: 90 tablet, Rfl: 3    carvedilol (COREG) 12.5 MG tablet, Take 1 tablet by mouth 2 (Two) Times a Day With Meals., Disp: 180 tablet, Rfl: 3    cloNIDine (CATAPRES) 0.1 MG tablet, Take 1 tablet by mouth 3 times a day., Disp: 270 tablet, Rfl: 3    hydroCHLOROthiazide 25 MG tablet, Take 1 tablet by mouth Daily., Disp: 90 tablet, Rfl: 3    ipratropium-albuterol (DUO-NEB) 0.5-2.5 mg/3 ml nebulizer, Take 3 mL by nebulization Every 6 (Six) Hours., Disp: 360 mL, Rfl: 1    losartan (COZAAR) 100 MG tablet, Take 1 tablet by mouth Daily., Disp: 90 tablet, Rfl: 3    metFORMIN (GLUCOPHAGE) 500 MG tablet, Take 1 tablet by mouth 2 (Two) Times a Day With Meals., Disp: 180 tablet, Rfl: 3    mometasone-formoterol (DULERA 100) 100-5 MCG/ACT inhaler, Inhale 2 puffs 2 (Two) Times a Day., Disp: 1 each, Rfl: 11    omeprazole (priLOSEC) 20 MG capsule, Take 1 capsule by mouth Daily., Disp: 90 capsule, Rfl: 3    potassium chloride ER (K-TAB) 20 MEQ tablet controlled-release ER tablet, Take 1 tablet by mouth 2 (Two) Times a Day With Meals., Disp: 180 tablet, Rfl: 0    rOPINIRole (REQUIP) 3 MG tablet, Take 1 tablet by mouth  "every night at bedtime., Disp: , Rfl:     spironolactone (ALDACTONE) 50 MG tablet, Take 1 tablet by mouth Daily., Disp: 90 tablet, Rfl: 3    terazosin (HYTRIN) 10 MG capsule, Take 1 capsule by mouth Every Night., Disp: 90 capsule, Rfl: 3    aspirin 81 MG chewable tablet, CHEW AND SWALLOW 1 TABLET BY MOUTH DAILY (Patient not taking: Reported on 3/4/2025), Disp: 90 tablet, Rfl: 1    Continuous Glucose Sensor (FreeStyle Kelvin 3 Plus Sensor), Use Every 15 (Fifteen) Days. (Patient not taking: Reported on 3/4/2025), Disp: 2 each, Rfl: 0    Enoxaparin Sodium (LOVENOX) 60 MG/0.6ML solution prefilled syringe syringe, Inject 0.6 mL under the skin into the appropriate area as directed Every 12 (Twelve) Hours. Indications: Prevention of Unwanted Clot in Veins (Patient not taking: Reported on 3/4/2025), Disp: , Rfl:     Fluticasone-Umeclidin-Vilant (Trelegy Ellipta) 200-62.5-25 MCG/ACT inhaler, Inhale 1 puff by mouth Daily. Swish with water and spit after use, Disp: 60 each, Rfl: 3    folic acid (FOLVITE) 1 MG tablet, Take 1 tablet by mouth Daily., Disp: , Rfl:     montelukast (SINGULAIR) 10 MG tablet, Take 1 tablet by mouth Every Night., Disp: 30 tablet, Rfl: 5    Allergies:   Allergies   Allergen Reactions    Atorvastatin Hives, Myalgia and Other (See Comments)    Levothyroxine Hives, Itching, Other (See Comments), Rash and Swelling    Levothyroxine Sodium Swelling    Piroxicam Rash         Spirometry     FEV1 Result: No results found for: \"FEV1\"     COPD Classification: --    Eosinophil Count:   Eosinophils Absolute   Date Value Ref Range Status   02/03/2025 0.14 0.00 - 0.40 10*3/mm3 Final     Eosinophils, Absolute   Date Value Ref Range Status   01/10/2025 0.26 0.00 - 0.40 10*3/mm3 Final       Vaccination Status: Influenza current? no Pneumococcal current? no    COPD Symptom Assessment          CAT Score today: --  mMRC today: --    Exacerbation Assessment     How many times have you been hospitalized this year due to your " COPD? Once, 1/10 - 1/23.      Education     Smoking Cessation: Patient is currently abstinent from tobacco.     Visit 1: The following information was reviewed today  Patient history, questionnaires, scores, past and current COPD regimen  Assessed adherence, inhaler technique  Inhaler affordability was discussed  Medication education was printed and given to the patient  COPD Zones booklet was discussed and provided to the patient  Discussed COPD rescue kit  RPM Device option was discussed      COPDEDUINHALER: Technique for use of various dry powder inhalers    -Load a dose of medicine  -For single-dose inhalers, load a dose by taking a pill out of its packaging and putting the pill into the inhaler. Then push 1 or more buttons on the inhaler to poke holes in the pill.  -For multiple-dose inhalers, load a dose by sliding a lever or twisting part of the inhaler. Loading a dose should decrease the counter on the device by one. This means a dose is ready to be inhaled.  -Once the dose is loaded, maintain your inhaler in the correct position - some inhalers need to be held upright, but others need to be horizontal. The dose may be lost or spilled if the inhaler is tipped over after loading.  -Medication delivery  -Open the device or take off the cap to expose the mouthpiece, if necessary.  -While holding the inhaler away from your mouth, breathe out (exhale) fully. Do not exhale into the mouthpiece.  -Put the mouthpiece between your lips. Breathe in quickly and steadily through your mouth, as deeply as possible. Do not breathe in through your nose. You might not taste or feel the medicine, even when you are using the inhaler correctly. If your device has air vent holes, do not cover those holes while inhaling through the device.  -Remove the device from your mouth and hold your breath for about 10 seconds (or as long as you comfortably can).  -Breathe out slowly (away from the mouthpiece).  If you are supposed to take 2  puffs of your inhaler, load/activate another dose and breathe it in.  -Rinse your mouth out with water, gargle, and spit out the water.  -Device maintenance and storage  -After use, close your inhaler or replace the cover or cap. Make sure the device is fully closed before storing.  -For single dose inhalers, you often need to dispose of the used dose. Please refer to the package insert.  -Pay attention to the number of doses you have remaining. For multi-dose devices, observe the counter to determine when you need a refill on the device. For single dose devices, monitor the number of individual doses remaining.  -Store your inhaler in a cool, dry place.  -Dry powder inhalers generally do not require internal cleaning. The mouthpiece may be wiped       Patient was provided education on Trelegy inhaler, indication, possible side effects, how to store, and administration.  She was provided verbal and written education. Patient voiced understanding and has no further questions at this time.     Pharmacological Treatment Plan     TREATMENT:   Start Trelegy 1 puff daily. Rinse mouth after each use.   Continue DuoNebs BID. Clean nebulizer weekly.  Pt voiced understanding of how to use each inhaler and will call with any questions or concerns.   Pt voiced understanding of s/sx of exacerbation and will call the clinic within business hours or seek immediate care in ED      Follow-up Treatment Plan: Will follow up via telephone in ~ 2 weeks, and in clinic in 1 month (3/31). PFT prior to next in-clinic visit.       Jazmin Garcia, Pharmacy Intern  03/04/2025  09:23 EST

## 2025-03-06 ENCOUNTER — OFFICE VISIT (OUTPATIENT)
Dept: INTERNAL MEDICINE | Facility: CLINIC | Age: 60
End: 2025-03-06
Payer: MEDICARE

## 2025-03-06 VITALS
OXYGEN SATURATION: 97 % | HEART RATE: 88 BPM | TEMPERATURE: 96.4 F | DIASTOLIC BLOOD PRESSURE: 84 MMHG | RESPIRATION RATE: 18 BRPM | SYSTOLIC BLOOD PRESSURE: 128 MMHG | WEIGHT: 315 LBS | BODY MASS INDEX: 44.1 KG/M2 | HEIGHT: 71 IN

## 2025-03-06 DIAGNOSIS — J18.9 COMMUNITY ACQUIRED PNEUMONIA, UNSPECIFIED LATERALITY: Primary | ICD-10-CM

## 2025-03-06 DIAGNOSIS — E11.9 TYPE 2 DIABETES MELLITUS WITHOUT COMPLICATION, WITHOUT LONG-TERM CURRENT USE OF INSULIN: ICD-10-CM

## 2025-03-06 DIAGNOSIS — I10 HYPERTENSION, UNSPECIFIED TYPE: ICD-10-CM

## 2025-03-06 DIAGNOSIS — R45.86 MOOD DISTURBANCE: ICD-10-CM

## 2025-03-06 RX ORDER — MOMETASONE FUROATE AND FORMOTEROL FUMARATE DIHYDRATE 100; 5 UG/1; UG/1
2 AEROSOL RESPIRATORY (INHALATION)
Qty: 13 G | Refills: 11 | Status: CANCELLED | OUTPATIENT
Start: 2025-03-06

## 2025-03-06 RX ORDER — ASPIRIN 81 MG/1
81 TABLET, CHEWABLE ORAL DAILY
Qty: 90 TABLET | Refills: 1 | Status: SHIPPED | OUTPATIENT
Start: 2025-03-06

## 2025-03-06 RX ORDER — ESCITALOPRAM OXALATE 10 MG/1
10 TABLET ORAL DAILY
Qty: 90 TABLET | Refills: 3 | Status: SHIPPED | OUTPATIENT
Start: 2025-03-06

## 2025-03-06 NOTE — PROGRESS NOTES
Subjective     Patient ID: Cal Oliver Jr. is a 59 y.o. male. Patient is here for management of multiple medical problems.     Chief Complaint   Patient presents with    Pneumonia     History of Present Illness   Cap recovering.  Htn  Better and feels better.      The following portions of the patient's history were reviewed and updated as appropriate: allergies, current medications, past family history, past medical history, past social history, past surgical history and problem list.    Review of Systems    Current Outpatient Medications:     aspirin 81 MG chewable tablet, Chew 1 tablet Daily., Disp: 90 tablet, Rfl: 1    carvedilol (COREG) 12.5 MG tablet, Take 1 tablet by mouth 2 (Two) Times a Day With Meals., Disp: 180 tablet, Rfl: 3    cloNIDine (CATAPRES) 0.1 MG tablet, Take 1 tablet by mouth 3 times a day., Disp: 270 tablet, Rfl: 3    empagliflozin (Jardiance) 10 MG tablet tablet, Take 1 tablet by mouth Daily., Disp: 90 tablet, Rfl: 3    Fluticasone-Umeclidin-Vilant (Trelegy Ellipta) 200-62.5-25 MCG/ACT inhaler, Inhale 1 puff by mouth Daily. Swish with water and spit after use, Disp: 60 each, Rfl: 3    folic acid (FOLVITE) 1 MG tablet, Take 1 tablet by mouth Daily., Disp: , Rfl:     hydroCHLOROthiazide 25 MG tablet, Take 1 tablet by mouth Daily., Disp: 90 tablet, Rfl: 3    ipratropium-albuterol (DUO-NEB) 0.5-2.5 mg/3 ml nebulizer, Take 3 mL by nebulization Every 6 (Six) Hours., Disp: 360 mL, Rfl: 1    losartan (COZAAR) 100 MG tablet, Take 1 tablet by mouth Daily., Disp: 90 tablet, Rfl: 3    metFORMIN (GLUCOPHAGE) 500 MG tablet, Take 1 tablet by mouth 2 (Two) Times a Day With Meals., Disp: 180 tablet, Rfl: 3    montelukast (SINGULAIR) 10 MG tablet, Take 1 tablet by mouth Every Night., Disp: 30 tablet, Rfl: 5    omeprazole (priLOSEC) 20 MG capsule, Take 1 capsule by mouth Daily., Disp: 90 capsule, Rfl: 3    potassium chloride ER (K-TAB) 20 MEQ tablet controlled-release ER tablet, Take 1 tablet by mouth 2  "(Two) Times a Day With Meals., Disp: 180 tablet, Rfl: 0    rOPINIRole (REQUIP) 3 MG tablet, Take 1 tablet by mouth every night at bedtime., Disp: , Rfl:     spironolactone (ALDACTONE) 50 MG tablet, Take 1 tablet by mouth Daily., Disp: 90 tablet, Rfl: 3    terazosin (HYTRIN) 10 MG capsule, Take 1 capsule by mouth Every Night., Disp: 90 capsule, Rfl: 3    escitalopram (Lexapro) 10 MG tablet, Take 1 tablet by mouth Daily., Disp: 90 tablet, Rfl: 3    Objective      Blood pressure 128/84, pulse 88, temperature 96.4 °F (35.8 °C), resp. rate 18, height 180.3 cm (71\"), weight (!) 156 kg (345 lb), SpO2 97%.            Physical Exam     General Appearance:    Alert, cooperative, no distress, appears stated age   Head:    Normocephalic, without obvious abnormality, atraumatic   Eyes:    PERRL, conjunctiva/corneas clear, EOM's intact   Ears:    Normal TM's and external ear canals, both ears   Nose:   Nares normal, septum midline, mucosa normal, no drainage   or sinus tenderness   Throat:   Lips, mucosa, and tongue normal; teeth and gums normal   Neck:   Supple, symmetrical, trachea midline, no adenopathy;        thyroid:  No enlargement/tenderness/nodules; no carotid    bruit or JVD   Back:     Symmetric, no curvature, ROM normal, no CVA tenderness   Lungs:     Clear to auscultation bilaterally, respirations unlabored   Chest wall:    No tenderness or deformity   Heart:    Regular rate and rhythm, S1 and S2 normal, no murmur,        rub or gallop   Abdomen:     Soft, non-tender, bowel sounds active all four quadrants,     no masses, no organomegaly   Extremities:   Extremities normal, atraumatic, no cyanosis or edema   Pulses:   2+ and symmetric all extremities   Skin:   Skin color, texture, turgor normal, no rashes or lesions   Lymph nodes:   Cervical, supraclavicular, and axillary nodes normal   Neurologic:   CNII-XII intact. Normal strength, sensation and reflexes       throughout      Results for orders placed or performed " in visit on 01/30/25   Vitamin B12    Collection Time: 02/03/25 10:14 AM    Specimen: Blood   Result Value Ref Range    Vitamin B-12 524 211 - 946 pg/mL   Comprehensive Metabolic Panel    Collection Time: 02/03/25 10:14 AM    Specimen: Blood   Result Value Ref Range    Glucose 166 (H) 65 - 99 mg/dL    BUN 11 6 - 20 mg/dL    Creatinine 0.72 (L) 0.76 - 1.27 mg/dL    EGFR Result 106.6 >60.0 mL/min/1.73    BUN/Creatinine Ratio 15.3 7.0 - 25.0    Sodium 139 136 - 145 mmol/L    Potassium 4.5 3.5 - 5.2 mmol/L    Chloride 97 (L) 98 - 107 mmol/L    Total CO2 31.2 (H) 22.0 - 29.0 mmol/L    Calcium 9.2 8.6 - 10.5 mg/dL    Total Protein 6.3 6.0 - 8.5 g/dL    Albumin 3.7 3.5 - 5.2 g/dL    Globulin 2.6 gm/dL    A/G Ratio 1.4 g/dL    Total Bilirubin 0.3 0.0 - 1.2 mg/dL    Alkaline Phosphatase 89 39 - 117 U/L    AST (SGOT) 12 1 - 40 U/L    ALT (SGPT) 20 1 - 41 U/L   TSH    Collection Time: 02/03/25 10:14 AM    Specimen: Blood   Result Value Ref Range    TSH 1.990 0.270 - 4.200 uIU/mL   Hemoglobin A1c    Collection Time: 02/03/25 10:14 AM    Specimen: Blood   Result Value Ref Range    Hemoglobin A1C 8.90 (H) 4.80 - 5.60 %   CBC & Differential    Collection Time: 02/03/25 10:14 AM    Specimen: Blood   Result Value Ref Range    WBC 10.24 3.40 - 10.80 10*3/mm3    RBC 4.92 4.14 - 5.80 10*6/mm3    Hemoglobin 14.3 13.0 - 17.7 g/dL    Hematocrit 43.7 37.5 - 51.0 %    MCV 88.8 79.0 - 97.0 fL    MCH 29.1 26.6 - 33.0 pg    MCHC 32.7 31.5 - 35.7 g/dL    RDW 13.5 12.3 - 15.4 %    Platelets 297 140 - 450 10*3/mm3    Neutrophil Rel % 77.2 (H) 42.7 - 76.0 %    Lymphocyte Rel % 13.8 (L) 19.6 - 45.3 %    Monocyte Rel % 6.9 5.0 - 12.0 %    Eosinophil Rel % 1.4 0.3 - 6.2 %    Basophil Rel % 0.2 0.0 - 1.5 %    Neutrophils Absolute 7.91 (H) 1.70 - 7.00 10*3/mm3    Lymphocytes Absolute 1.41 0.70 - 3.10 10*3/mm3    Monocytes Absolute 0.71 0.10 - 0.90 10*3/mm3    Eosinophils Absolute 0.14 0.00 - 0.40 10*3/mm3    Basophils Absolute 0.02 0.00 - 0.20 10*3/mm3     Immature Granulocyte Rel % 0.5 0.0 - 0.5 %    Immature Grans Absolute 0.05 0.00 - 0.05 10*3/mm3    nRBC 0.0 0.0 - 0.2 /100 WBC         Assessment & Plan   Dm out of control. Now that he is getting better from hospitalization should improve. On jardiacne since hospitaliazation.    Needs to stop soda and doing better with less.    Mood do following icu and ventolation.    Stopped smoking    Diagnoses and all orders for this visit:    1. Community acquired pneumonia, unspecified laterality (Primary)    2. Hypertension, unspecified type    3. Type 2 diabetes mellitus without complication, without long-term current use of insulin  -     empagliflozin (Jardiance) 10 MG tablet tablet; Take 1 tablet by mouth Daily.  Dispense: 90 tablet; Refill: 3    4. Mood disturbance  -     escitalopram (Lexapro) 10 MG tablet; Take 1 tablet by mouth Daily.  Dispense: 90 tablet; Refill: 3    Other orders  -     aspirin 81 MG chewable tablet; Chew 1 tablet Daily.  Dispense: 90 tablet; Refill: 1      Return in about 6 weeks (around 4/17/2025).          There are no Patient Instructions on file for this visit.     Woo Betancourt MD    Assessment & Plan

## 2025-03-31 ENCOUNTER — DISEASE STATE MANAGEMENT VISIT (OUTPATIENT)
Dept: PHARMACY | Facility: HOSPITAL | Age: 60
End: 2025-03-31
Payer: MEDICARE

## 2025-03-31 ENCOUNTER — OFFICE VISIT (OUTPATIENT)
Dept: PULMONOLOGY | Facility: CLINIC | Age: 60
End: 2025-03-31
Payer: MEDICARE

## 2025-03-31 VITALS
HEIGHT: 71 IN | WEIGHT: 315 LBS | SYSTOLIC BLOOD PRESSURE: 108 MMHG | BODY MASS INDEX: 44.1 KG/M2 | HEART RATE: 96 BPM | DIASTOLIC BLOOD PRESSURE: 61 MMHG | OXYGEN SATURATION: 94 %

## 2025-03-31 DIAGNOSIS — J44.9 CHRONIC OBSTRUCTIVE PULMONARY DISEASE, UNSPECIFIED COPD TYPE: Primary | ICD-10-CM

## 2025-03-31 DIAGNOSIS — J44.9 CHRONIC OBSTRUCTIVE PULMONARY DISEASE, UNSPECIFIED COPD TYPE: ICD-10-CM

## 2025-03-31 DIAGNOSIS — F17.210 CIGARETTE SMOKER: ICD-10-CM

## 2025-03-31 DIAGNOSIS — J30.9 ALLERGIC RHINITIS, UNSPECIFIED SEASONALITY, UNSPECIFIED TRIGGER: ICD-10-CM

## 2025-03-31 DIAGNOSIS — G47.33 OSA TREATED WITH BIPAP: ICD-10-CM

## 2025-03-31 PROCEDURE — 99214 OFFICE O/P EST MOD 30 MIN: CPT | Performed by: NURSE PRACTITIONER

## 2025-03-31 PROCEDURE — G0463 HOSPITAL OUTPT CLINIC VISIT: HCPCS

## 2025-03-31 RX ORDER — ALBUTEROL SULFATE 90 UG/1
2 INHALANT RESPIRATORY (INHALATION) EVERY 4 HOURS PRN
COMMUNITY
End: 2025-03-31 | Stop reason: SDUPTHER

## 2025-03-31 RX ORDER — ALBUTEROL SULFATE 90 UG/1
2 INHALANT RESPIRATORY (INHALATION) EVERY 4 HOURS PRN
Qty: 8.5 G | Refills: 2 | Status: SHIPPED | OUTPATIENT
Start: 2025-03-31

## 2025-03-31 NOTE — PROGRESS NOTES
"     Follow Up Office Visit      Patient Name: Cal Oliver Jr.    Chief Complaint:  COPD      History of Present Illness: Cal Oliver Jr. is a 59 y.o. male who is here today for follow up of COPD.  Since last visit, he denies having any exacerbations.  Henotes that use of Trelegy was beneficial though did not continue on use due to an \"insurance issue\" and is now only using albuterol, which is ~3x/day.  He notes that his bilateral knee pain secondary to arthritis limits his functional mobility more than his dyspnea does.  Chronic cough intermittently productive of sputum, no current wheezing.  No fevers, no hemoptysis.  He notes that his allergic rhinitis is well-controlled with use of montelukast.  Continues on BiPAP nightly.  Still smoking some.    Supplemental Oxygen: 3L qhs & PRN    Subjective      Review of Systems:  Review of Systems   Constitutional:  Negative for fever and unexpected weight change.   Respiratory:  Positive for cough and shortness of breath. Negative for wheezing.    Cardiovascular:  Negative for chest pain.   Musculoskeletal:  Positive for arthralgias.   Allergic/Immunologic: Positive for environmental allergies.        Past Medical History:   Past Medical History:   Diagnosis Date    Asthma     Chronic back pain     Diabetes mellitus     High blood pressure     Hyperlipidemia     Migraine     Nausea and vomiting     Sleep apnea        Past Surgical History:   Past Surgical History:   Procedure Laterality Date    BACK SURGERY      GALLBLADDER SURGERY      KNEE SURGERY         Family History:   Family History   Problem Relation Age of Onset    Other Other         HIGH BLOOD PRESSURE    Hypertension Other     Heart disease Mother     Heart attack Mother     Heart disease Father        Social History:   Social History     Socioeconomic History    Marital status:    Tobacco Use    Smoking status: Some Days     Current packs/day: 0.00     Average packs/day: 0.3 packs/day for " "20.0 years (5.0 ttl pk-yrs)     Types: Cigarettes     Last attempt to quit: 2025     Years since quittin.2     Passive exposure: Past    Smokeless tobacco: Never    Tobacco comments:     not smoked since2020     Patient states he smokes \"some days\"   Vaping Use    Vaping status: Never Used   Substance and Sexual Activity    Alcohol use: No    Drug use: No    Sexual activity: Defer       Current Medications:     Current Outpatient Medications:     albuterol sulfate  (90 Base) MCG/ACT inhaler, Inhale 2 puffs Every 4 (Four) Hours As Needed for Wheezing or Shortness of Air., Disp: , Rfl:     aspirin 81 MG chewable tablet, Chew 1 tablet Daily., Disp: 90 tablet, Rfl: 1    carvedilol (COREG) 12.5 MG tablet, Take 1 tablet by mouth 2 (Two) Times a Day With Meals., Disp: 180 tablet, Rfl: 3    cloNIDine (CATAPRES) 0.1 MG tablet, Take 1 tablet by mouth 3 times a day., Disp: 270 tablet, Rfl: 3    empagliflozin (Jardiance) 10 MG tablet tablet, Take 1 tablet by mouth Daily., Disp: 90 tablet, Rfl: 3    escitalopram (Lexapro) 10 MG tablet, Take 1 tablet by mouth Daily., Disp: 90 tablet, Rfl: 3    folic acid (FOLVITE) 1 MG tablet, Take 1 tablet by mouth Daily., Disp: , Rfl:     hydroCHLOROthiazide 25 MG tablet, Take 1 tablet by mouth Daily., Disp: 90 tablet, Rfl: 3    ipratropium-albuterol (DUO-NEB) 0.5-2.5 mg/3 ml nebulizer, Take 3 mL by nebulization Every 6 (Six) Hours., Disp: 360 mL, Rfl: 1    losartan (COZAAR) 100 MG tablet, Take 1 tablet by mouth Daily., Disp: 90 tablet, Rfl: 3    metFORMIN (GLUCOPHAGE) 500 MG tablet, Take 1 tablet by mouth 2 (Two) Times a Day With Meals., Disp: 180 tablet, Rfl: 3    montelukast (SINGULAIR) 10 MG tablet, Take 1 tablet by mouth Every Night., Disp: 30 tablet, Rfl: 5    omeprazole (priLOSEC) 20 MG capsule, Take 1 capsule by mouth Daily., Disp: 90 capsule, Rfl: 3    potassium chloride ER (K-TAB) 20 MEQ tablet controlled-release ER tablet, Take 1 tablet by mouth 2 (Two) Times a " "Day With Meals., Disp: 180 tablet, Rfl: 0    rOPINIRole (REQUIP) 3 MG tablet, Take 1 tablet by mouth every night at bedtime., Disp: , Rfl:     spironolactone (ALDACTONE) 50 MG tablet, Take 1 tablet by mouth Daily., Disp: 90 tablet, Rfl: 3    terazosin (HYTRIN) 10 MG capsule, Take 1 capsule by mouth Every Night., Disp: 90 capsule, Rfl: 3    Fluticasone-Umeclidin-Vilant (Trelegy Ellipta) 200-62.5-25 MCG/ACT inhaler, Inhale 1 puff by mouth Daily. Swish with water and spit after use (Patient not taking: Reported on 3/31/2025), Disp: 60 each, Rfl: 3     Allergies:   Allergies   Allergen Reactions    Atorvastatin Hives, Myalgia and Other (See Comments)    Levothyroxine Hives, Itching, Other (See Comments), Rash and Swelling    Levothyroxine Sodium Swelling    Piroxicam Rash       Objective     Physical Exam:  Vital Signs:   Vitals:    03/31/25 1134   BP: 108/61   Pulse: 96   SpO2: 94%   Weight: (!) 159 kg (351 lb)   Height: 180.3 cm (71\")     Body mass index is 48.95 kg/m².    Physical Exam  Vitals reviewed.   Constitutional:       General: He is not in acute distress.     Appearance: He is obese. He is not toxic-appearing.   HENT:      Head: Normocephalic and atraumatic.      Mouth/Throat:      Mouth: Mucous membranes are moist.   Eyes:      Extraocular Movements: Extraocular movements intact.      Conjunctiva/sclera: Conjunctivae normal.   Cardiovascular:      Rate and Rhythm: Normal rate.      Heart sounds: Normal heart sounds.   Pulmonary:      Effort: Pulmonary effort is normal.   Abdominal:      General: There is no distension.      Palpations: Abdomen is soft.   Musculoskeletal:      Cervical back: Neck supple.   Skin:     General: Skin is warm and dry.      Findings: No rash.   Neurological:      General: No focal deficit present.      Mental Status: He is alert and oriented to person, place, and time.   Psychiatric:         Mood and Affect: Mood normal.         Behavior: Behavior normal.       Results Review: "   PFTs obtained today suggestive of moderate obstruction without bronchodilator responsiveness.  FEV1 64%, + air trapping, no restriction or hyperinflation.  DLCO/%.    Assessment / Plan      Assessment/Plan:   Diagnoses and all orders for this visit:    1. Chronic obstructive pulmonary disease, unspecified COPD type (Primary)  -     albuterol sulfate  (90 Base) MCG/ACT inhaler; Inhale 2 puffs Every 4 (Four) Hours As Needed for Wheezing or Shortness of Air.  Dispense: 8 g; Refill: 2  PFT results obtained today were reviewed and discussed with patient.  Recommend continuing on Trelegy as his maintenance regimen; test claim ran by pharmacy and inhaler is well covered, ready for  at [facility] and patient will fill today. It appears that he had been using Trelegy BID and therefore ran out of the medication before it could be filled again. We discussed the risk and benefits of inhaled corticosteroids. Patient instructed to take them on a regular basis as prescribed. Patient instructed to rinse their mouth out after each use.     2. Allergic rhinitis, unspecified seasonality, unspecified trigger  Controlled currently with use of montelukast.    3. SHUKRI treated with BiPAP  Continue on BiPAP nightly.    4. Cigarette smoker  Complete cessation is advised. The patient was educated on the risk of fire/explosion and associated harm to self or others if open flame, including that from cigarettes if near a supplemental oxygen source. Patient voiced understanding.        Follow Up:   Return in about 2 months (around 5/31/2025) for Recheck.  The patient was counseled on diagnostic results, risks and benefits of treatment options, risk factor modifications and the importance of treatment compliance. The patient was advised to contact the clinic with concerns or worsening symptoms.     REFUGIO Gilbert   Pulmonary Medicine [place]     This document has been electronically signed by REFUGIO Gilbert  March  31, 2025

## 2025-03-31 NOTE — PROGRESS NOTES
[facility] Clinic Initial Visit Note       Patient Name: Cal Oliver Jr.  : 1965   MRN: [id number]   Sex: male  Care Team: Patient Care Team:  Woo Betancourt MD as PCP - General (Internal Medicine)     Primary Care Provider:   Referring Provider: Fadumo Liu,*  Encounter Provider: REFUGIO Gilbert      COPD Management Visit     History of Present Illness: Cal Oliver Jr. is a 59 y.o. male who is here today for 4-week follow-up for COPD Management. PMH significant for T2DM, HTN, and SHUKRI with BiPAP use.     At initial clinic visit, Mr. Oliver was started on high-dose Trelegy. Upon speaking to patient today, he has been using Trelegy twice daily as opposed to once daily and has therefore been out of it for 2-weeks due to insurance not covering it when he needed a refill (refill too soon). Counseled patient on the appropriate frequency of inhaler and the importance of rinsing his mouth out after each use.    Insurance Coverage:  Trelegy- $12.15- no PA or PAP requirement.    Subjective      Past Medical History:   Past Medical History:   Diagnosis Date    Asthma     Chronic back pain     Diabetes mellitus     High blood pressure     Hyperlipidemia     Migraine     Nausea and vomiting     Sleep apnea        Past Surgical History:   Past Surgical History:   Procedure Laterality Date    BACK SURGERY      GALLBLADDER SURGERY      KNEE SURGERY         Family History:   Family History   Problem Relation Age of Onset    Other Other         HIGH BLOOD PRESSURE    Hypertension Other     Heart disease Mother     Heart attack Mother     Heart disease Father        Social History:   Social History     Socioeconomic History    Marital status:    Tobacco Use    Smoking status: Some Days     Current packs/day: 0.00     Average packs/day: 0.3 packs/day for 20.0 years (5.0 ttl pk-yrs)     Types: Cigarettes     Last attempt to quit: 2025     Years since  "quittin.2     Passive exposure: Past    Smokeless tobacco: Never    Tobacco comments:     not smoked since2020     Patient states he smokes \"some days\"   Vaping Use    Vaping status: Never Used   Substance and Sexual Activity    Alcohol use: No    Drug use: No    Sexual activity: Defer       Tobacco History:   Social History     Tobacco Use   Smoking Status Some Days    Current packs/day: 0.00    Average packs/day: 0.3 packs/day for 20.0 years (5.0 ttl pk-yrs)    Types: Cigarettes    Last attempt to quit: 2025    Years since quittin.2    Passive exposure: Past   Smokeless Tobacco Never   Tobacco Comments    not smoked since2020    Patient states he smokes \"some days\"       Medications:     Current Outpatient Medications:     aspirin 81 MG chewable tablet, Chew 1 tablet Daily., Disp: 90 tablet, Rfl: 1    carvedilol (COREG) 12.5 MG tablet, Take 1 tablet by mouth 2 (Two) Times a Day With Meals., Disp: 180 tablet, Rfl: 3    cloNIDine (CATAPRES) 0.1 MG tablet, Take 1 tablet by mouth 3 times a day., Disp: 270 tablet, Rfl: 3    empagliflozin (Jardiance) 10 MG tablet tablet, Take 1 tablet by mouth Daily., Disp: 90 tablet, Rfl: 3    escitalopram (Lexapro) 10 MG tablet, Take 1 tablet by mouth Daily., Disp: 90 tablet, Rfl: 3    folic acid (FOLVITE) 1 MG tablet, Take 1 tablet by mouth Daily., Disp: , Rfl:     hydroCHLOROthiazide 25 MG tablet, Take 1 tablet by mouth Daily., Disp: 90 tablet, Rfl: 3    ipratropium-albuterol (DUO-NEB) 0.5-2.5 mg/3 ml nebulizer, Take 3 mL by nebulization Every 6 (Six) Hours., Disp: 360 mL, Rfl: 1    losartan (COZAAR) 100 MG tablet, Take 1 tablet by mouth Daily., Disp: 90 tablet, Rfl: 3    metFORMIN (GLUCOPHAGE) 500 MG tablet, Take 1 tablet by mouth 2 (Two) Times a Day With Meals., Disp: 180 tablet, Rfl: 3    montelukast (SINGULAIR) 10 MG tablet, Take 1 tablet by mouth Every Night., Disp: 30 tablet, Rfl: 5    omeprazole (priLOSEC) 20 MG capsule, Take 1 capsule by mouth Daily., " Disp: 90 capsule, Rfl: 3    potassium chloride ER (K-TAB) 20 MEQ tablet controlled-release ER tablet, Take 1 tablet by mouth 2 (Two) Times a Day With Meals., Disp: 180 tablet, Rfl: 0    rOPINIRole (REQUIP) 3 MG tablet, Take 1 tablet by mouth every night at bedtime., Disp: , Rfl:     spironolactone (ALDACTONE) 50 MG tablet, Take 1 tablet by mouth Daily., Disp: 90 tablet, Rfl: 3    terazosin (HYTRIN) 10 MG capsule, Take 1 capsule by mouth Every Night., Disp: 90 capsule, Rfl: 3    Fluticasone-Umeclidin-Vilant (Trelegy Ellipta) 200-62.5-25 MCG/ACT inhaler, Inhale 1 puff by mouth Daily. Swish with water and spit after use (Patient not taking: Reported on 3/31/2025), Disp: 60 each, Rfl: 3    Allergies:   Allergies   Allergen Reactions    Atorvastatin Hives, Myalgia and Other (See Comments)    Levothyroxine Hives, Itching, Other (See Comments), Rash and Swelling    Levothyroxine Sodium Swelling    Piroxicam Rash         Spirometry     FEV1 Result: 70 % (PFT completed 3/31/25)    COPD Classification: GOLD 2, Moderate; FEV1 50%-79% predicted    Eosinophil Count:   Eosinophils Absolute   Date Value Ref Range Status   02/03/2025 0.14 0.00 - 0.40 10*3/mm3 Final     Eosinophils, Absolute   Date Value Ref Range Status   01/10/2025 0.26 0.00 - 0.40 10*3/mm3 Final       Vaccination Status: Influenza current? No Pneumococcal current? No    COPD Symptom Assessment     CAT Score today: --  mMRC today: --    Exacerbation Assessment     How many times have you been hospitalized this year due to your COPD? Once, 1/10 - 1/23.      Education     Smoking Cessation: Patient is currently abstinent from tobacco.     Visit 3: The following information was reviewed today  Reviewed History, Questionnaires, scores, past and current COPD regimen  Assess adherence, inhaler technique  Inhaler affordability   COPD zones  Vaccinations/Infection prevention was discussed from COPD booklet  Nebulizer use/care         Pharmacological Treatment Plan      TREATMENT:   Continue high-dose Trelegy 1 puff daily rinsing mouth after each use.   Continue albuterol rescue inhaler as needed for SOB. Refill sent this encounter.   Continue DuoNebs BID. Clean nebulizer weekly.  Patient voiced understanding of how to use each inhaler and will call with any questions or concerns.   Patient voiced understanding of s/sx of exacerbation and will call the clinic within business hours or seek immediate care in ED      Follow-up Treatment Plan: Will follow up via telephone in ~ 3 weeks for visit 4.      Dinah Saul, CyD  3/31/2025   11:44 EDT

## 2025-04-17 ENCOUNTER — OFFICE VISIT (OUTPATIENT)
Dept: INTERNAL MEDICINE | Facility: CLINIC | Age: 60
End: 2025-04-17
Payer: MEDICARE

## 2025-04-17 VITALS
HEIGHT: 71 IN | WEIGHT: 315 LBS | HEART RATE: 62 BPM | OXYGEN SATURATION: 99 % | TEMPERATURE: 98 F | BODY MASS INDEX: 44.1 KG/M2 | RESPIRATION RATE: 16 BRPM | SYSTOLIC BLOOD PRESSURE: 110 MMHG | DIASTOLIC BLOOD PRESSURE: 82 MMHG

## 2025-04-17 DIAGNOSIS — M25.562 CHRONIC PAIN OF BOTH KNEES: ICD-10-CM

## 2025-04-17 DIAGNOSIS — E11.9 TYPE 2 DIABETES MELLITUS WITHOUT COMPLICATION, WITHOUT LONG-TERM CURRENT USE OF INSULIN: Primary | ICD-10-CM

## 2025-04-17 DIAGNOSIS — G89.29 CHRONIC PAIN OF BOTH KNEES: ICD-10-CM

## 2025-04-17 DIAGNOSIS — M25.561 CHRONIC PAIN OF BOTH KNEES: ICD-10-CM

## 2025-04-17 RX ORDER — ORAL SEMAGLUTIDE 3 MG/1
3 TABLET ORAL DAILY
Qty: 30 TABLET | Refills: 11 | Status: SHIPPED | OUTPATIENT
Start: 2025-04-17

## 2025-04-17 RX ORDER — METHOCARBAMOL 500 MG/1
500 TABLET, FILM COATED ORAL 2 TIMES DAILY
Qty: 60 TABLET | Refills: 5 | Status: SHIPPED | OUTPATIENT
Start: 2025-04-17

## 2025-04-17 RX ORDER — AMLODIPINE BESYLATE 10 MG/1
10 TABLET ORAL DAILY
COMMUNITY
Start: 2025-04-09

## 2025-04-17 NOTE — PROGRESS NOTES
Subjective     Patient ID: Cal Oliver Jr. is a 59 y.o. male. Patient is here for management of multiple medical problems.     Chief Complaint   Patient presents with    Hypertension    Knee Pain     Bilateral knee pain     History of Present Illness   Knee pain. Preventing sleep.    Wt going up.     Smoking pot form [place].   Smokes and doesn't move for hours.    Vijay on cpap and oxygen            The following portions of the patient's history were reviewed and updated as appropriate: allergies, current medications, past family history, past medical history, past social history, past surgical history and problem list.    Review of Systems    Current Outpatient Medications:     albuterol sulfate  (90 Base) MCG/ACT inhaler, Inhale 2 puffs by mouth Every 4 (Four) Hours As Needed for Wheezing or Shortness of Air., Disp: 8.5 g, Rfl: 2    amLODIPine (NORVASC) 10 MG tablet, Take 1 tablet by mouth Daily., Disp: , Rfl:     aspirin 81 MG chewable tablet, Chew 1 tablet Daily., Disp: 90 tablet, Rfl: 1    carvedilol (COREG) 12.5 MG tablet, Take 1 tablet by mouth 2 (Two) Times a Day With Meals., Disp: 180 tablet, Rfl: 3    cloNIDine (CATAPRES) 0.1 MG tablet, Take 1 tablet by mouth 3 times a day., Disp: 270 tablet, Rfl: 3    empagliflozin (Jardiance) 10 MG tablet tablet, Take 1 tablet by mouth Daily., Disp: 90 tablet, Rfl: 3    escitalopram (Lexapro) 10 MG tablet, Take 1 tablet by mouth Daily., Disp: 90 tablet, Rfl: 3    Fluticasone-Umeclidin-Vilant (Trelegy Ellipta) 200-62.5-25 MCG/ACT inhaler, Inhale 1 puff by mouth Daily. Swish with water and spit after use, Disp: 60 each, Rfl: 3    folic acid (FOLVITE) 1 MG tablet, Take 1 tablet by mouth Daily., Disp: , Rfl:     hydroCHLOROthiazide 25 MG tablet, Take 1 tablet by mouth Daily., Disp: 90 tablet, Rfl: 3    ipratropium-albuterol (DUO-NEB) 0.5-2.5 mg/3 ml nebulizer, Take 3 mL by nebulization Every 6 (Six) Hours., Disp: 360 mL, Rfl: 1    losartan (COZAAR) 100 MG  "tablet, Take 1 tablet by mouth Daily., Disp: 90 tablet, Rfl: 3    metFORMIN (GLUCOPHAGE) 500 MG tablet, Take 1 tablet by mouth 2 (Two) Times a Day With Meals., Disp: 180 tablet, Rfl: 3    montelukast (SINGULAIR) 10 MG tablet, Take 1 tablet by mouth Every Night., Disp: 30 tablet, Rfl: 5    omeprazole (priLOSEC) 20 MG capsule, Take 1 capsule by mouth Daily., Disp: 90 capsule, Rfl: 3    potassium chloride ER (K-TAB) 20 MEQ tablet controlled-release ER tablet, Take 1 tablet by mouth 2 (Two) Times a Day With Meals., Disp: 180 tablet, Rfl: 0    rOPINIRole (REQUIP) 3 MG tablet, Take 1 tablet by mouth every night at bedtime., Disp: , Rfl:     spironolactone (ALDACTONE) 50 MG tablet, Take 1 tablet by mouth Daily., Disp: 90 tablet, Rfl: 3    terazosin (HYTRIN) 10 MG capsule, Take 1 capsule by mouth Every Night., Disp: 90 capsule, Rfl: 3    methocarbamol (ROBAXIN) 500 MG tablet, Take 1 tablet by mouth 2 (Two) Times a Day., Disp: 60 tablet, Rfl: 5    Semaglutide (Rybelsus) 3 MG tablet, Take 1 tablet by mouth Daily., Disp: 30 tablet, Rfl: 11    Objective      Blood pressure 110/82, pulse 62, temperature 98 °F (36.7 °C), resp. rate 16, height 180.3 cm (71\"), weight (!) 160 kg (352 lb), SpO2 99%.            Physical Exam     General Appearance:    Alert, cooperative, no distress, appears stated age   Head:    Normocephalic, without obvious abnormality, atraumatic   Eyes:    PERRL, conjunctiva/corneas clear, EOM's intact   Ears:    Normal TM's and external ear canals, both ears   Nose:   Nares normal, septum midline, mucosa normal, no drainage   or sinus tenderness   Throat:   Lips, mucosa, and tongue normal; teeth and gums normal   Neck:   Supple, symmetrical, trachea midline, no adenopathy;        thyroid:  No enlargement/tenderness/nodules; no carotid    bruit or JVD   Back:     Symmetric, no curvature, ROM normal, no CVA tenderness   Lungs:     Clear to auscultation bilaterally, respirations unlabored   Chest wall:    No " tenderness or deformity   Heart:    Regular rate and rhythm, S1 and S2 normal, no murmur,        rub or gallop   Abdomen:     Soft, non-tender, bowel sounds active all four quadrants,     no masses, no organomegaly   Extremities:   Extremities normal, atraumatic, no cyanosis or edema   Pulses:   2+ and symmetric all extremities   Skin:   Skin color, texture, turgor normal, no rashes or lesions   Lymph nodes:   Cervical, supraclavicular, and axillary nodes normal   Neurologic:   CNII-XII intact. Normal strength, sensation and reflexes       throughout      Results for orders placed or performed in visit on 03/04/25   Spirometry    Collection Time: 03/03/25  1:46 PM   Result Value Ref Range    FEV1 73 %    FVC 68 %    FEV1/FVC 71.11 %         Assessment & Plan   Knee pain. Preventing sleep.    Wt going up.     Smoking pot form [place].   Smokes and doesn't move for hours.  Lung function 65 %      Vijay on cpap and oxygen.    Ha1c 8.9. wt going up.        Diagnoses and all orders for this visit:    1. Type 2 diabetes mellitus without complication, without long-term current use of insulin (Primary)  -     Semaglutide (Rybelsus) 3 MG tablet; Take 1 tablet by mouth Daily.  Dispense: 30 tablet; Refill: 11    2. Chronic pain of both knees  -     Ambulatory Referral to Orthopedic Surgery    Other orders  -     methocarbamol (ROBAXIN) 500 MG tablet; Take 1 tablet by mouth 2 (Two) Times a Day.  Dispense: 60 tablet; Refill: 5      Return in about 4 weeks (around 5/15/2025).          There are no Patient Instructions on file for this visit.     Woo Betancourt MD    Assessment & Plan

## 2025-04-22 ENCOUNTER — DISEASE STATE MANAGEMENT VISIT (OUTPATIENT)
Dept: PHARMACY | Facility: HOSPITAL | Age: 60
End: 2025-04-22
Payer: MEDICARE

## 2025-04-22 NOTE — PROGRESS NOTES
[facility] Initial Visit Note       Patient Name: Cal Oliver Jr.  : 1965   MRN: [id number]   Sex: male  Care Team: Patient Care Team:  Woo Betancourt MD as PCP - General (Internal Medicine)     Primary Care Provider:   Referring Provider: No ref. provider found  Encounter Provider: Micaela Lemon, PharmD      COPD Management Visit     History of Present Illness: Cal Oliver Jr. is a 59 y.o. male who is on the phone today for 4-week follow-up for COPD Management. PMH significant for T2DM, HTN, and SHUKRI with BiPAP use.     At initial clinic visit, Mr. Oliver was started on high-dose Trelegy. Upon speaking to patient today, he has been using Trelegy once daily. Counseled patient on the appropriate frequency of inhaler and the importance of rinsing his mouth out after each use.he is using rescue about 2x daily which he reports is a large improvement,     Insurance Coverage:  Trelegy- $12.15- no PA or PAP requirement.    Subjective      Past Medical History:   Past Medical History:   Diagnosis Date    Asthma     Chronic back pain     Diabetes mellitus     High blood pressure     Hyperlipidemia     Migraine     Nausea and vomiting     Sleep apnea        Past Surgical History:   Past Surgical History:   Procedure Laterality Date    BACK SURGERY      GALLBLADDER SURGERY      KNEE SURGERY         Family History:   Family History   Problem Relation Age of Onset    Other Other         HIGH BLOOD PRESSURE    Hypertension Other     Heart disease Mother     Heart attack Mother     Heart disease Father        Social History:   Social History     Socioeconomic History    Marital status:    Tobacco Use    Smoking status: Some Days     Current packs/day: 0.00     Average packs/day: 0.3 packs/day for 20.0 years (5.0 ttl pk-yrs)     Types: Cigarettes     Last attempt to quit: 2025     Years since quittin.3     Passive exposure: Past    Smokeless tobacco:  "Never    Tobacco comments:     not smoked since2020     Patient states he smokes \"some days\"   Vaping Use    Vaping status: Never Used   Substance and Sexual Activity    Alcohol use: No    Drug use: No    Sexual activity: Defer       Tobacco History:   Social History     Tobacco Use   Smoking Status Some Days    Current packs/day: 0.00    Average packs/day: 0.3 packs/day for 20.0 years (5.0 ttl pk-yrs)    Types: Cigarettes    Last attempt to quit: 2025    Years since quittin.3    Passive exposure: Past   Smokeless Tobacco Never   Tobacco Comments    not smoked since2020    Patient states he smokes \"some days\"       Medications:     Current Outpatient Medications:     albuterol sulfate  (90 Base) MCG/ACT inhaler, Inhale 2 puffs by mouth Every 4 (Four) Hours As Needed for Wheezing or Shortness of Air., Disp: 8.5 g, Rfl: 2    amLODIPine (NORVASC) 10 MG tablet, Take 1 tablet by mouth Daily., Disp: , Rfl:     aspirin 81 MG chewable tablet, Chew 1 tablet Daily., Disp: 90 tablet, Rfl: 1    carvedilol (COREG) 12.5 MG tablet, Take 1 tablet by mouth 2 (Two) Times a Day With Meals., Disp: 180 tablet, Rfl: 3    cloNIDine (CATAPRES) 0.1 MG tablet, Take 1 tablet by mouth 3 times a day., Disp: 270 tablet, Rfl: 3    empagliflozin (Jardiance) 10 MG tablet tablet, Take 1 tablet by mouth Daily., Disp: 90 tablet, Rfl: 3    escitalopram (Lexapro) 10 MG tablet, Take 1 tablet by mouth Daily., Disp: 90 tablet, Rfl: 3    Fluticasone-Umeclidin-Vilant (Trelegy Ellipta) 200-62.5-25 MCG/ACT inhaler, Inhale 1 puff by mouth Daily. Swish with water and spit after use, Disp: 60 each, Rfl: 3    folic acid (FOLVITE) 1 MG tablet, Take 1 tablet by mouth Daily., Disp: , Rfl:     hydroCHLOROthiazide 25 MG tablet, Take 1 tablet by mouth Daily., Disp: 90 tablet, Rfl: 3    ipratropium-albuterol (DUO-NEB) 0.5-2.5 mg/3 ml nebulizer, Take 3 mL by nebulization Every 6 (Six) Hours., Disp: 360 mL, Rfl: 1    losartan (COZAAR) 100 MG " tablet, Take 1 tablet by mouth Daily., Disp: 90 tablet, Rfl: 3    metFORMIN (GLUCOPHAGE) 500 MG tablet, Take 1 tablet by mouth 2 (Two) Times a Day With Meals., Disp: 180 tablet, Rfl: 3    methocarbamol (ROBAXIN) 500 MG tablet, Take 1 tablet by mouth 2 (Two) Times a Day., Disp: 60 tablet, Rfl: 5    montelukast (SINGULAIR) 10 MG tablet, Take 1 tablet by mouth Every Night., Disp: 30 tablet, Rfl: 5    omeprazole (priLOSEC) 20 MG capsule, Take 1 capsule by mouth Daily., Disp: 90 capsule, Rfl: 3    potassium chloride ER (K-TAB) 20 MEQ tablet controlled-release ER tablet, Take 1 tablet by mouth 2 (Two) Times a Day With Meals., Disp: 180 tablet, Rfl: 0    rOPINIRole (REQUIP) 3 MG tablet, Take 1 tablet by mouth every night at bedtime., Disp: , Rfl:     Semaglutide (Rybelsus) 3 MG tablet, Take 1 tablet by mouth Daily., Disp: 30 tablet, Rfl: 11    spironolactone (ALDACTONE) 50 MG tablet, Take 1 tablet by mouth Daily., Disp: 90 tablet, Rfl: 3    terazosin (HYTRIN) 10 MG capsule, Take 1 capsule by mouth Every Night., Disp: 90 capsule, Rfl: 3    Allergies:   Allergies   Allergen Reactions    Atorvastatin Hives, Myalgia and Other (See Comments)    Levothyroxine Hives, Itching, Other (See Comments), Rash and Swelling    Levothyroxine Sodium Swelling    Piroxicam Rash         Spirometry     FEV1 Result: 70 % (PFT completed 3/31/25)    COPD Classification: GOLD 2, Moderate; FEV1 50%-79% predicted    Eosinophil Count:   Eosinophils Absolute   Date Value Ref Range Status   02/03/2025 0.14 0.00 - 0.40 10*3/mm3 Final     Eosinophils, Absolute   Date Value Ref Range Status   01/10/2025 0.26 0.00 - 0.40 10*3/mm3 Final       Vaccination Status: Influenza current? No Pneumococcal current? No    COPD Symptom Assessment     CAT Score today: --  mMRC today: --    Exacerbation Assessment     How many times have you been hospitalized this year due to your COPD? Once, 1/10 - 1/23.      Education     Smoking Cessation: Patient is currently  abstinent from tobacco.     Visit 4: The following information was reviewed today  [facility]  COPD medications including adherence, inhaler techniques  ADR's  Rescue inhaler use  Rescue Kit  Patient education modules  COPD Zones         Pharmacological Treatment Plan     TREATMENT:   Continue high-dose Trelegy 1 puff daily rinsing mouth after each use.   Continue albuterol rescue inhaler as needed for SOB. Refill sent this encounter.   Continue DuoNebs BID. Clean nebulizer weekly.  Patient voiced understanding of how to use each inhaler and will call with any questions or concerns.   Patient voiced understanding of s/sx of exacerbation and will call the clinic within business hours or seek immediate care in ED      Follow-up Treatment Plan: Will follow up via telephone in ~ 3 weeks for visit 4.      Dinah Saul, PharmD  4/22/2025   11:35 EDT

## 2025-04-23 DIAGNOSIS — J44.9 CHRONIC OBSTRUCTIVE PULMONARY DISEASE, UNSPECIFIED COPD TYPE: ICD-10-CM

## 2025-05-19 NOTE — TELEPHONE ENCOUNTER
Rx Refill Note  Do you want patient to continue this medication?  Requested Prescriptions     Pending Prescriptions Disp Refills    rOPINIRole (REQUIP) 3 MG tablet [Pharmacy Med Name: ROPINIROLE 3MG TABLETS] 90 tablet      Sig: TAKE 1 TABLET BY MOUTH EVERY NIGHT AT BEDTIME      Last office visit with prescribing clinician: 4/17/2025   Last telemedicine visit with prescribing clinician: Visit date not found   Next office visit with prescribing clinician: 6/2/2025                         Would you like a call back once the refill request has been completed: [] Yes [] No    If the office needs to give you a call back, can they leave a voicemail: [] Yes [] No    Asha Buchanan, REYES  05/19/25, 08:58 EDT

## 2025-05-20 RX ORDER — ROPINIROLE 3 MG/1
3 TABLET, FILM COATED ORAL
Qty: 90 TABLET | Refills: 3 | Status: SHIPPED | OUTPATIENT
Start: 2025-05-20

## 2025-06-05 NOTE — TELEPHONE ENCOUNTER
Rx Refill Note  Requested Prescriptions     Pending Prescriptions Disp Refills    carvedilol (COREG) 12.5 MG tablet [Pharmacy Med Name: CARVEDILOL 12.5MG TABLETS] 180 tablet 3     Sig: TAKE 1 TABLET BY MOUTH TWICE DAILY WITH MEALS      Last office visit with prescribing clinician: 4/17/2025   Last telemedicine visit with prescribing clinician: Visit date not found   Next office visit with prescribing clinician: Visit date not found                         Ju Manzano MA  06/05/25, 12:23 EDT

## 2025-06-06 RX ORDER — CARVEDILOL 12.5 MG/1
12.5 TABLET ORAL 2 TIMES DAILY WITH MEALS
Qty: 180 TABLET | Refills: 3 | Status: SHIPPED | OUTPATIENT
Start: 2025-06-06

## 2025-08-26 DIAGNOSIS — I10 ESSENTIAL HYPERTENSION: ICD-10-CM

## 2025-08-26 RX ORDER — POTASSIUM CHLORIDE 1500 MG/1
20 TABLET, EXTENDED RELEASE ORAL 2 TIMES DAILY WITH MEALS
Qty: 180 TABLET | Refills: 0 | Status: SHIPPED | OUTPATIENT
Start: 2025-08-26